# Patient Record
Sex: FEMALE | Race: OTHER | Employment: UNEMPLOYED | URBAN - METROPOLITAN AREA
[De-identification: names, ages, dates, MRNs, and addresses within clinical notes are randomized per-mention and may not be internally consistent; named-entity substitution may affect disease eponyms.]

---

## 2021-03-26 ENCOUNTER — IMMUNIZATION (OUTPATIENT)
Dept: LAB | Age: 26
End: 2021-03-26
Attending: HOSPITALIST
Payer: MEDICAID

## 2021-03-26 DIAGNOSIS — Z23 NEED FOR VACCINATION: Primary | ICD-10-CM

## 2021-03-26 PROCEDURE — 0001A SARSCOV2 VAC 30MCG/0.3ML IM: CPT

## 2021-04-08 ENCOUNTER — OFFICE VISIT (OUTPATIENT)
Dept: OBGYN CLINIC | Facility: CLINIC | Age: 26
End: 2021-04-08
Payer: MEDICAID

## 2021-04-08 VITALS
DIASTOLIC BLOOD PRESSURE: 62 MMHG | BODY MASS INDEX: 30.63 KG/M2 | WEIGHT: 156 LBS | HEART RATE: 101 BPM | HEIGHT: 60 IN | SYSTOLIC BLOOD PRESSURE: 118 MMHG

## 2021-04-08 DIAGNOSIS — Z01.419 WELL WOMAN EXAM WITH ROUTINE GYNECOLOGICAL EXAM: ICD-10-CM

## 2021-04-08 DIAGNOSIS — Z11.3 SCREEN FOR STD (SEXUALLY TRANSMITTED DISEASE): ICD-10-CM

## 2021-04-08 DIAGNOSIS — R56.9 SEIZURE (HCC): ICD-10-CM

## 2021-04-08 DIAGNOSIS — Z12.4 PAPANICOLAOU SMEAR FOR CERVICAL CANCER SCREENING: Primary | ICD-10-CM

## 2021-04-08 PROBLEM — N97.9 FEMALE INFERTILITY: Status: ACTIVE | Noted: 2021-04-08

## 2021-04-08 PROCEDURE — 88175 CYTOPATH C/V AUTO FLUID REDO: CPT | Performed by: OBSTETRICS & GYNECOLOGY

## 2021-04-08 PROCEDURE — 3074F SYST BP LT 130 MM HG: CPT | Performed by: OBSTETRICS & GYNECOLOGY

## 2021-04-08 PROCEDURE — 87491 CHLMYD TRACH DNA AMP PROBE: CPT | Performed by: OBSTETRICS & GYNECOLOGY

## 2021-04-08 PROCEDURE — 99385 PREV VISIT NEW AGE 18-39: CPT | Performed by: OBSTETRICS & GYNECOLOGY

## 2021-04-08 PROCEDURE — 3078F DIAST BP <80 MM HG: CPT | Performed by: OBSTETRICS & GYNECOLOGY

## 2021-04-08 PROCEDURE — 87591 N.GONORRHOEAE DNA AMP PROB: CPT | Performed by: OBSTETRICS & GYNECOLOGY

## 2021-04-08 PROCEDURE — 3008F BODY MASS INDEX DOCD: CPT | Performed by: OBSTETRICS & GYNECOLOGY

## 2021-04-08 RX ORDER — ERGOCALCIFEROL 1.25 MG/1
CAPSULE ORAL
COMMUNITY
End: 2021-04-27

## 2021-04-08 RX ORDER — LEVETIRACETAM 1000 MG/1
TABLET ORAL 2 TIMES DAILY
COMMUNITY
End: 2021-06-16

## 2021-04-08 NOTE — PROGRESS NOTES
Ash Meléndez is a 23year old female [id number] Patient's last menstrual period was 03/16/2021 (exact date). Patient presents with: Other: family planning  . She started her period when she was 8years old she always has heavy periods.   She changed somet Topics      Concerns:        Not on file    Social History Narrative      Not on file    Social Determinants of Health  Financial Resource Strain:       Difficulty of Paying Living Expenses:   Food Insecurity:       Worried About [address] in the weakness or numbness.       PHYSICAL EXAM:   Constitutional: well developed, well nourished  Head/Face: normocephalic    Abdomen:  soft, nontender, nondistended, no masses  Skin/Hair: no unusual rashes or bruises   Extremities: no edema, no cyanosis  Psychi

## 2021-04-16 ENCOUNTER — IMMUNIZATION (OUTPATIENT)
Dept: LAB | Age: 26
End: 2021-04-16
Attending: HOSPITALIST
Payer: MEDICAID

## 2021-04-16 DIAGNOSIS — Z23 NEED FOR VACCINATION: Primary | ICD-10-CM

## 2021-04-16 PROCEDURE — 0002A SARSCOV2 VAC 30MCG/0.3ML IM: CPT

## 2021-04-24 ENCOUNTER — LAB ENCOUNTER (OUTPATIENT)
Dept: LAB | Facility: HOSPITAL | Age: 26
End: 2021-04-24
Attending: OBSTETRICS & GYNECOLOGY
Payer: MEDICAID

## 2021-04-24 DIAGNOSIS — Z01.419 WELL WOMAN EXAM WITH ROUTINE GYNECOLOGICAL EXAM: ICD-10-CM

## 2021-04-24 PROCEDURE — 84443 ASSAY THYROID STIM HORMONE: CPT

## 2021-04-24 PROCEDURE — 80177 DRUG SCRN QUAN LEVETIRACETAM: CPT

## 2021-04-24 PROCEDURE — 36415 COLL VENOUS BLD VENIPUNCTURE: CPT

## 2021-04-24 PROCEDURE — 85025 COMPLETE CBC W/AUTO DIFF WBC: CPT

## 2021-04-24 PROCEDURE — 84144 ASSAY OF PROGESTERONE: CPT

## 2021-04-24 PROCEDURE — 84146 ASSAY OF PROLACTIN: CPT

## 2021-04-27 ENCOUNTER — NURSE ONLY (OUTPATIENT)
Dept: OBGYN CLINIC | Facility: CLINIC | Age: 26
End: 2021-04-27
Payer: COMMERCIAL

## 2021-04-27 VITALS
DIASTOLIC BLOOD PRESSURE: 76 MMHG | HEIGHT: 60 IN | HEART RATE: 98 BPM | SYSTOLIC BLOOD PRESSURE: 118 MMHG | WEIGHT: 156 LBS | BODY MASS INDEX: 30.63 KG/M2

## 2021-04-27 DIAGNOSIS — N34.2 INFECTIVE URETHRITIS: Primary | ICD-10-CM

## 2021-04-27 PROCEDURE — 87086 URINE CULTURE/COLONY COUNT: CPT | Performed by: OBSTETRICS & GYNECOLOGY

## 2021-05-01 ENCOUNTER — OFFICE VISIT (OUTPATIENT)
Dept: INTERNAL MEDICINE CLINIC | Facility: CLINIC | Age: 26
End: 2021-05-01
Payer: MEDICAID

## 2021-05-01 VITALS
DIASTOLIC BLOOD PRESSURE: 78 MMHG | TEMPERATURE: 98 F | WEIGHT: 166.81 LBS | HEIGHT: 60 IN | HEART RATE: 78 BPM | SYSTOLIC BLOOD PRESSURE: 108 MMHG | BODY MASS INDEX: 32.75 KG/M2 | RESPIRATION RATE: 16 BRPM

## 2021-05-01 DIAGNOSIS — E66.09 CLASS 1 OBESITY DUE TO EXCESS CALORIES WITHOUT SERIOUS COMORBIDITY WITH BODY MASS INDEX (BMI) OF 32.0 TO 32.9 IN ADULT: ICD-10-CM

## 2021-05-01 DIAGNOSIS — L91.0 KELOID: ICD-10-CM

## 2021-05-01 DIAGNOSIS — G40.909 SEIZURE DISORDER (HCC): Primary | ICD-10-CM

## 2021-05-01 PROCEDURE — 3008F BODY MASS INDEX DOCD: CPT | Performed by: INTERNAL MEDICINE

## 2021-05-01 PROCEDURE — 99203 OFFICE O/P NEW LOW 30 MIN: CPT | Performed by: INTERNAL MEDICINE

## 2021-05-01 PROCEDURE — 3074F SYST BP LT 130 MM HG: CPT | Performed by: INTERNAL MEDICINE

## 2021-05-01 PROCEDURE — 3078F DIAST BP <80 MM HG: CPT | Performed by: INTERNAL MEDICINE

## 2021-05-01 NOTE — PROGRESS NOTES
Gwen Marker is a 23year old female  Patient presents with:  Establish Care: Patient [place] and looking to establish care; wants neuro referral and vit d level checked      HPI:   She moved from [place] [id number] 6 mos ago  3 concerns:   On Kep cm ovoid anterior upper midline chest  HEART RRR no m      Nurse Only on 04/27/2021   Component Date Value Ref Range Status   • Urine Culture 04/27/2021 No Growth 2 Days   Final   [facility] Lab Encounter on 04/24/2021   Component Date Value Ref Range Status   • Negative Final   • Chlam/GC Source 04/08/2021 Cervical/endocervical/vaginal   Final   • Interpretation/Result 04/08/2021 Negative for intraepithelial lesion or malignancy  Negative for intraepithelial lesion or malignancy Final   • Specimen Adequacy 04/08/ reviewed, WNL  Class 1 obesity due to excess calories without serious comorbidity with body mass index (bmi) of 32.0 to 32.9 in adult-recent TSH normal,  refer to weight loss clinic  Keloid- refer to plastics    No orders of the defined types were placed i

## 2021-05-12 ENCOUNTER — OCC HEALTH (OUTPATIENT)
Dept: OTHER | Facility: HOSPITAL | Age: 26
End: 2021-05-12
Attending: PREVENTIVE MEDICINE

## 2021-05-12 DIAGNOSIS — Z77.21 EXPOSURE TO BLOOD-BORNE PATHOGEN: Primary | ICD-10-CM

## 2021-05-12 PROCEDURE — 87389 HIV-1 AG W/HIV-1&-2 AB AG IA: CPT

## 2021-05-12 PROCEDURE — 86803 HEPATITIS C AB TEST: CPT

## 2021-05-12 PROCEDURE — 86706 HEP B SURFACE ANTIBODY: CPT

## 2021-05-19 ENCOUNTER — TELEPHONE (OUTPATIENT)
Dept: OBGYN CLINIC | Facility: CLINIC | Age: 26
End: 2021-05-19

## 2021-05-19 DIAGNOSIS — O26.899 PREGNANCY RELATED NAUSEA, ANTEPARTUM: ICD-10-CM

## 2021-05-19 DIAGNOSIS — O99.351 SEIZURE DISORDER DURING PREGNANCY IN FIRST TRIMESTER (HCC): ICD-10-CM

## 2021-05-19 DIAGNOSIS — Z87.59 HISTORY OF HYPEREMESIS GRAVIDARUM: ICD-10-CM

## 2021-05-19 DIAGNOSIS — G40.909 SEIZURE DISORDER DURING PREGNANCY IN FIRST TRIMESTER (HCC): ICD-10-CM

## 2021-05-19 DIAGNOSIS — R11.0 PREGNANCY RELATED NAUSEA, ANTEPARTUM: ICD-10-CM

## 2021-05-19 DIAGNOSIS — O36.80X0 PREGNANCY WITH UNCERTAIN FETAL VIABILITY, SINGLE OR UNSPECIFIED FETUS: Primary | ICD-10-CM

## 2021-05-19 RX ORDER — FOLIC ACID 5 MG
5 CAPSULE ORAL DAILY
Qty: 30 CAPSULE | Refills: 5 | Status: SHIPPED | OUTPATIENT
Start: 2021-05-19 | End: 2021-06-18

## 2021-05-19 RX ORDER — METOCLOPRAMIDE 10 MG/1
10 TABLET ORAL EVERY 6 HOURS PRN
Qty: 90 TABLET | Refills: 3 | Status: SHIPPED | OUTPATIENT
Start: 2021-05-19 | End: 2021-06-03

## 2021-05-21 ENCOUNTER — LAB ENCOUNTER (OUTPATIENT)
Dept: LAB | Age: 26
End: 2021-05-21
Attending: OBSTETRICS & GYNECOLOGY
Payer: MEDICAID

## 2021-05-21 DIAGNOSIS — O36.80X0 PREGNANCY WITH UNCERTAIN FETAL VIABILITY, SINGLE OR UNSPECIFIED FETUS: ICD-10-CM

## 2021-05-21 DIAGNOSIS — O99.351 SEIZURE DISORDER DURING PREGNANCY IN FIRST TRIMESTER (HCC): ICD-10-CM

## 2021-05-21 DIAGNOSIS — G40.909 SEIZURE DISORDER DURING PREGNANCY IN FIRST TRIMESTER (HCC): ICD-10-CM

## 2021-05-21 PROCEDURE — 84702 CHORIONIC GONADOTROPIN TEST: CPT

## 2021-05-21 PROCEDURE — 86901 BLOOD TYPING SEROLOGIC RH(D): CPT

## 2021-05-21 PROCEDURE — 84144 ASSAY OF PROGESTERONE: CPT

## 2021-05-21 PROCEDURE — 80177 DRUG SCRN QUAN LEVETIRACETAM: CPT

## 2021-05-21 PROCEDURE — 86900 BLOOD TYPING SEROLOGIC ABO: CPT

## 2021-05-21 PROCEDURE — 86850 RBC ANTIBODY SCREEN: CPT

## 2021-05-21 PROCEDURE — 36415 COLL VENOUS BLD VENIPUNCTURE: CPT

## 2021-05-25 ENCOUNTER — LABORATORY ENCOUNTER (OUTPATIENT)
Dept: LAB | Age: 26
End: 2021-05-25
Attending: OBSTETRICS & GYNECOLOGY
Payer: MEDICAID

## 2021-05-25 DIAGNOSIS — O36.80X0 PREGNANCY WITH UNCERTAIN FETAL VIABILITY, SINGLE OR UNSPECIFIED FETUS: ICD-10-CM

## 2021-05-25 PROCEDURE — 36415 COLL VENOUS BLD VENIPUNCTURE: CPT

## 2021-05-25 PROCEDURE — 84702 CHORIONIC GONADOTROPIN TEST: CPT

## 2021-05-28 ENCOUNTER — TELEPHONE (OUTPATIENT)
Dept: OBGYN CLINIC | Facility: CLINIC | Age: 26
End: 2021-05-28

## 2021-05-28 DIAGNOSIS — O21.9 PREGNANCY RELATED NAUSEA AND VOMITING, ANTEPARTUM: Primary | ICD-10-CM

## 2021-05-28 RX ORDER — PROCHLORPERAZINE MALEATE 10 MG
10 TABLET ORAL EVERY 6 HOURS PRN
Qty: 56 TABLET | Refills: 3 | Status: SHIPPED | OUTPATIENT
Start: 2021-05-28 | End: 2021-08-10

## 2021-05-28 RX ORDER — PROMETHAZINE HYDROCHLORIDE 12.5 MG/1
TABLET ORAL
Qty: 56 TABLET | Refills: 3 | Status: SHIPPED | OUTPATIENT
Start: 2021-05-28 | End: 2021-08-10

## 2021-06-01 ENCOUNTER — TELEPHONE (OUTPATIENT)
Dept: OBGYN CLINIC | Facility: CLINIC | Age: 26
End: 2021-06-01

## 2021-06-01 DIAGNOSIS — Z86.73 HISTORY OF STROKE: Primary | ICD-10-CM

## 2021-06-01 DIAGNOSIS — O09.291 HISTORY OF MISCARRIAGE, CURRENTLY PREGNANT, FIRST TRIMESTER: ICD-10-CM

## 2021-06-01 NOTE — TELEPHONE ENCOUNTER
Tuesday, June 01, 2021 9:52 AM  Summary of Your Request  Please review the details of your request below and if everything looks correct click CONTINUE    Your case has been sent to Medical Review.      Provider Name: DR. Rolly Chavez Contact: Kavon Gregorio

## 2021-06-02 NOTE — TELEPHONE ENCOUNTER
Authorization Number: [id number]  Case Number: [id number]     Status: Approved  P2P Status:   Approval Date: 6/1/2021 12:00:00 AM  Service Code: [id number]  Service Description: OB US < 14 WKS SINGLE FETUS  Site Name: Agueda Mir  Expiration Date: 2/26/2022  D

## 2021-06-03 ENCOUNTER — ULTRASOUND ENCOUNTER (OUTPATIENT)
Dept: OBGYN CLINIC | Facility: CLINIC | Age: 26
End: 2021-06-03
Payer: COMMERCIAL

## 2021-06-03 ENCOUNTER — INITIAL PRENATAL (OUTPATIENT)
Dept: OBGYN CLINIC | Facility: CLINIC | Age: 26
End: 2021-06-03
Payer: COMMERCIAL

## 2021-06-03 VITALS
BODY MASS INDEX: 32.2 KG/M2 | DIASTOLIC BLOOD PRESSURE: 60 MMHG | HEIGHT: 60 IN | SYSTOLIC BLOOD PRESSURE: 110 MMHG | HEART RATE: 87 BPM | WEIGHT: 164 LBS

## 2021-06-03 DIAGNOSIS — Z34.81 PRENATAL CARE, SUBSEQUENT PREGNANCY, FIRST TRIMESTER: Primary | ICD-10-CM

## 2021-06-03 DIAGNOSIS — O99.211 OBESITY AFFECTING PREGNANCY IN FIRST TRIMESTER: ICD-10-CM

## 2021-06-03 DIAGNOSIS — Z86.73 HISTORY OF STROKE: ICD-10-CM

## 2021-06-03 DIAGNOSIS — O09.291 HISTORY OF MISCARRIAGE, CURRENTLY PREGNANT, FIRST TRIMESTER: ICD-10-CM

## 2021-06-03 PROCEDURE — 87491 CHLMYD TRACH DNA AMP PROBE: CPT | Performed by: OBSTETRICS & GYNECOLOGY

## 2021-06-03 PROCEDURE — 0500F INITIAL PRENATAL CARE VISIT: CPT | Performed by: OBSTETRICS & GYNECOLOGY

## 2021-06-03 PROCEDURE — 87591 N.GONORRHOEAE DNA AMP PROB: CPT | Performed by: OBSTETRICS & GYNECOLOGY

## 2021-06-03 PROCEDURE — 76817 TRANSVAGINAL US OBSTETRIC: CPT | Performed by: OBSTETRICS & GYNECOLOGY

## 2021-06-03 PROCEDURE — 87086 URINE CULTURE/COLONY COUNT: CPT | Performed by: OBSTETRICS & GYNECOLOGY

## 2021-06-03 PROCEDURE — 81002 URINALYSIS NONAUTO W/O SCOPE: CPT | Performed by: OBSTETRICS & GYNECOLOGY

## 2021-06-03 PROCEDURE — 3074F SYST BP LT 130 MM HG: CPT | Performed by: OBSTETRICS & GYNECOLOGY

## 2021-06-03 PROCEDURE — 3008F BODY MASS INDEX DOCD: CPT | Performed by: OBSTETRICS & GYNECOLOGY

## 2021-06-03 PROCEDURE — 3078F DIAST BP <80 MM HG: CPT | Performed by: OBSTETRICS & GYNECOLOGY

## 2021-06-03 NOTE — PROGRESS NOTES
1st OB  - patient c/o nausea, vomiting daily - she had severe hyperemesis with 1st pregnancy - not as bad so far - taking phenergan  - denies h/o HSV, blood transfusion, hepatitis  - daughter with severe GI sensitivities to multiple foods - no clear diagno

## 2021-06-16 ENCOUNTER — TELEMEDICINE (OUTPATIENT)
Dept: NEUROLOGY | Facility: CLINIC | Age: 26
End: 2021-06-16
Payer: COMMERCIAL

## 2021-06-16 DIAGNOSIS — G40.909 SEIZURE DISORDER DURING PREGNANCY IN FIRST TRIMESTER (HCC): Primary | ICD-10-CM

## 2021-06-16 DIAGNOSIS — O99.351 SEIZURE DISORDER DURING PREGNANCY IN FIRST TRIMESTER (HCC): Primary | ICD-10-CM

## 2021-06-16 PROCEDURE — 99204 OFFICE O/P NEW MOD 45 MIN: CPT | Performed by: OTHER

## 2021-06-16 RX ORDER — LEVETIRACETAM 1000 MG/1
1000 TABLET ORAL 2 TIMES DAILY
Qty: 180 TABLET | Refills: 1 | Status: SHIPPED | OUTPATIENT
Start: 2021-06-16 | End: 2021-08-10

## 2021-06-16 NOTE — H&P
[facility] [employer] Video H&P    HPI  Patient presents with:  Seizure Disorder      Please note that the following visit was completed using two-way, real-time interactive audio and video communication.   This has been done in good fait Epilepsy [facility]) 2012   • Female infertility 4/8/2021   • Hyperemesis gravidarum     Was put on Zofran - didn't help much. • Keloid scar     chest   • Obesity (BMI 30-39. 9)    • Pap smear for cervical cancer screening 04/08/2021    Pap negative.     • Promise tablet, Rfl: 3  •  Folic Acid 5 MG Oral Cap, Take 5 mg by mouth daily. , Disp: 30 capsule, Rfl: 5    Review of Systems:  No chest pain or palpitations; otherwise as noted in HPI.     Exam:  Exam limited due to virtual visit - unable to comment on sensation, she appears well controlled on her current regimen of Keppra 1000 mg twice daily. As such, I recommend that she continue on this medication. Recent levels appeared to be within normal range as of 5/2021.   However, patient was advised that the clearance [place]  Pager [phone]  6/16/2021

## 2021-06-20 ENCOUNTER — HOSPITAL ENCOUNTER (EMERGENCY)
Facility: HOSPITAL | Age: 26
Discharge: HOME | End: 2021-06-21
Attending: EMERGENCY MEDICINE
Payer: COMMERCIAL

## 2021-06-20 ENCOUNTER — TELEPHONE (OUTPATIENT)
Dept: OBGYN CLINIC | Facility: CLINIC | Age: 26
End: 2021-06-20

## 2021-06-20 DIAGNOSIS — O21.0 HYPEREMESIS AFFECTING PREGNANCY, ANTEPARTUM: Primary | ICD-10-CM

## 2021-06-20 DIAGNOSIS — O26.899 PREGNANCY RELATED NAUSEA, ANTEPARTUM: ICD-10-CM

## 2021-06-20 DIAGNOSIS — R11.0 PREGNANCY RELATED NAUSEA, ANTEPARTUM: ICD-10-CM

## 2021-06-20 DIAGNOSIS — O21.0 HYPEREMESIS GRAVIDARUM: Primary | ICD-10-CM

## 2021-06-20 PROCEDURE — 96375 TX/PRO/DX INJ NEW DRUG ADDON: CPT

## 2021-06-20 PROCEDURE — 85025 COMPLETE CBC W/AUTO DIFF WBC: CPT

## 2021-06-20 PROCEDURE — 99284 EMERGENCY DEPT VISIT MOD MDM: CPT

## 2021-06-20 PROCEDURE — 96361 HYDRATE IV INFUSION ADD-ON: CPT

## 2021-06-20 PROCEDURE — 80053 COMPREHEN METABOLIC PANEL: CPT | Performed by: EMERGENCY MEDICINE

## 2021-06-20 PROCEDURE — 96365 THER/PROPH/DIAG IV INF INIT: CPT

## 2021-06-20 PROCEDURE — 85025 COMPLETE CBC W/AUTO DIFF WBC: CPT | Performed by: EMERGENCY MEDICINE

## 2021-06-20 PROCEDURE — 81001 URINALYSIS AUTO W/SCOPE: CPT | Performed by: EMERGENCY MEDICINE

## 2021-06-20 PROCEDURE — 80053 COMPREHEN METABOLIC PANEL: CPT

## 2021-06-20 RX ORDER — ONDANSETRON 2 MG/ML
4 INJECTION INTRAMUSCULAR; INTRAVENOUS ONCE
Status: COMPLETED | OUTPATIENT
Start: 2021-06-20 | End: 2021-06-20

## 2021-06-20 RX ORDER — METOCLOPRAMIDE HYDROCHLORIDE 5 MG/ML
10 INJECTION INTRAMUSCULAR; INTRAVENOUS ONCE
Status: COMPLETED | OUTPATIENT
Start: 2021-06-20 | End: 2021-06-20

## 2021-06-21 VITALS
BODY MASS INDEX: 26.7 KG/M2 | WEIGHT: 136 LBS | HEIGHT: 60 IN | OXYGEN SATURATION: 96 % | RESPIRATION RATE: 18 BRPM | SYSTOLIC BLOOD PRESSURE: 101 MMHG | TEMPERATURE: 98 F | HEART RATE: 63 BPM | DIASTOLIC BLOOD PRESSURE: 60 MMHG

## 2021-06-21 PROBLEM — O21.0 HYPEREMESIS AFFECTING PREGNANCY, ANTEPARTUM (HCC): Status: ACTIVE | Noted: 2021-06-21

## 2021-06-21 PROBLEM — O21.0 HYPEREMESIS AFFECTING PREGNANCY, ANTEPARTUM: Status: ACTIVE | Noted: 2021-06-21

## 2021-06-21 RX ORDER — FAMOTIDINE 20 MG/1
20 TABLET ORAL 2 TIMES DAILY
Qty: 60 TABLET | Refills: 3 | Status: SHIPPED | OUTPATIENT
Start: 2021-06-21 | End: 2021-08-10

## 2021-06-21 RX ORDER — PROMETHAZINE HYDROCHLORIDE 25 MG/1
25 SUPPOSITORY RECTAL EVERY 6 HOURS PRN
Qty: 30 SUPPOSITORY | Refills: 3 | Status: SHIPPED | OUTPATIENT
Start: 2021-06-21 | End: 2021-08-10

## 2021-06-21 RX ORDER — ONDANSETRON 4 MG/1
4 TABLET, ORALLY DISINTEGRATING ORAL EVERY 8 HOURS PRN
Qty: 30 TABLET | Refills: 2 | Status: SHIPPED | OUTPATIENT
Start: 2021-06-21 | End: 2021-08-10

## 2021-06-21 NOTE — TELEPHONE ENCOUNTER
On call physician    23year old  at 9w1d calling c/o nausea & vomiting is not controlled with Phenergan, which she has been taking on a PRN basis for some weeks now.  Worried because she cannot keep her Keppra down, which she takes for her seizure d

## 2021-06-21 NOTE — ED PROVIDER NOTES
Patient Seen in: [facility] Emergency Department      History   Patient presents with:  Vomiting    Stated Complaint: 9 weeks pregnant and severe nausea and vomiting.     HPI/Subjective:   HPI    Patient is a 51-year-old female who is about 9 weeks p Pulse 63   Temp 98.4 °F (36.9 °C) (Oral)   Resp 17   Ht 152.4 cm (5')   Wt 61.7 kg   LMP 04/17/2021 (Exact Date)   SpO2 95%   BMI 26.56 kg/m²         Physical Exam  Vitals and nursing note reviewed.    Constitutional:       Appearance: She is well-developed these tests on the individual orders.    RAINBOW DRAW LAVENDER   RAINBOW DRAW LIGHT GREEN   CBC W/ DIFFERENTIAL          US OB TRANSVAGINAL ANY TRIMESTER EMG ONLY    Result Date: 6/3/2021  transvaginal us performed single viable iup at 6w1d ( 6 w5d by LMP)

## 2021-06-21 NOTE — ED INITIAL ASSESSMENT (HPI)
9 weeks 3 days pregnant with hyperemesis. Pt reports hx of hyperemesis and seizures with 1st pregnancy. States that she is on Keppra for seizures and her OB told her to come to the ER since she has been vomiting.

## 2021-06-21 NOTE — TELEPHONE ENCOUNTER
Spoke with patient. Was in ED last night and received IV hydration and IV nausea meds with no improvement. Feels somewhat better today, but is still not able to keep anything down. Notes routed to provider.

## 2021-06-22 ENCOUNTER — TELEPHONE (OUTPATIENT)
Dept: OBGYN CLINIC | Facility: CLINIC | Age: 26
End: 2021-06-22

## 2021-06-23 ENCOUNTER — HOSPITAL ENCOUNTER (EMERGENCY)
Facility: HOSPITAL | Age: 26
Discharge: HOME | End: 2021-06-24
Attending: EMERGENCY MEDICINE
Payer: COMMERCIAL

## 2021-06-23 ENCOUNTER — TELEPHONE (OUTPATIENT)
Dept: OBGYN CLINIC | Facility: CLINIC | Age: 26
End: 2021-06-23

## 2021-06-23 DIAGNOSIS — O21.0 HYPEREMESIS AFFECTING PREGNANCY, ANTEPARTUM: Primary | ICD-10-CM

## 2021-06-23 DIAGNOSIS — O20.9 VAGINAL BLEEDING IN PREGNANCY, FIRST TRIMESTER: ICD-10-CM

## 2021-06-23 DIAGNOSIS — O21.0 HYPEREMESIS GRAVIDARUM: Primary | ICD-10-CM

## 2021-06-23 PROCEDURE — 85025 COMPLETE CBC W/AUTO DIFF WBC: CPT | Performed by: EMERGENCY MEDICINE

## 2021-06-23 PROCEDURE — 99284 EMERGENCY DEPT VISIT MOD MDM: CPT

## 2021-06-23 PROCEDURE — 96361 HYDRATE IV INFUSION ADD-ON: CPT

## 2021-06-23 PROCEDURE — 96374 THER/PROPH/DIAG INJ IV PUSH: CPT

## 2021-06-23 PROCEDURE — 80053 COMPREHEN METABOLIC PANEL: CPT | Performed by: EMERGENCY MEDICINE

## 2021-06-23 PROCEDURE — 84702 CHORIONIC GONADOTROPIN TEST: CPT | Performed by: EMERGENCY MEDICINE

## 2021-06-23 RX ORDER — METOCLOPRAMIDE HYDROCHLORIDE 5 MG/ML
10 INJECTION INTRAMUSCULAR; INTRAVENOUS ONCE
Status: COMPLETED | OUTPATIENT
Start: 2021-06-23 | End: 2021-06-23

## 2021-06-23 RX ORDER — ONDANSETRON 2 MG/ML
4 INJECTION INTRAMUSCULAR; INTRAVENOUS ONCE
Status: DISCONTINUED | OUTPATIENT
Start: 2021-06-23 | End: 2021-06-23

## 2021-06-23 NOTE — TELEPHONE ENCOUNTER
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 The office wanted her to call and let Dr Trudy Adan know besides her vomiting she is having abdominal pain of a 4 on a 1-10 pain scale. No bleeding or cramping.  She is 9w4d pr

## 2021-06-24 ENCOUNTER — APPOINTMENT (OUTPATIENT)
Dept: ULTRASOUND IMAGING | Facility: HOSPITAL | Age: 26
End: 2021-06-24
Attending: EMERGENCY MEDICINE
Payer: COMMERCIAL

## 2021-06-24 ENCOUNTER — TELEPHONE (OUTPATIENT)
Dept: OBGYN CLINIC | Facility: CLINIC | Age: 26
End: 2021-06-24

## 2021-06-24 VITALS
OXYGEN SATURATION: 98 % | BODY MASS INDEX: 26.7 KG/M2 | RESPIRATION RATE: 16 BRPM | HEART RATE: 55 BPM | HEIGHT: 60 IN | WEIGHT: 136 LBS | TEMPERATURE: 97 F | DIASTOLIC BLOOD PRESSURE: 66 MMHG | SYSTOLIC BLOOD PRESSURE: 108 MMHG

## 2021-06-24 PROCEDURE — 81001 URINALYSIS AUTO W/SCOPE: CPT | Performed by: EMERGENCY MEDICINE

## 2021-06-24 PROCEDURE — 76801 OB US < 14 WKS SINGLE FETUS: CPT | Performed by: EMERGENCY MEDICINE

## 2021-06-24 NOTE — ED PROVIDER NOTES
Patient Seen in: [facility] Emergency Department      History   Patient presents with:  Eval-G    Stated Complaint: 9 wks preg, vomiting and lower abd cramping and bleeding    HPI/Subjective:   HPI    22-year-old female presents emergency department infection transmission during my interaction with the patient were taken. The patient was already wearing a droplet mask on my arrival to the room.  Personal protective equipment including droplet mask, eye protection, and gloves were worn throughout the du DIFFERENTIAL - Abnormal; Notable for the following components:    WBC 12.3 (*)     Neutrophil Absolute Prelim 8.85 (*)     Neutrophil Absolute 8.85 (*)     All other components within normal limits   CBC WITH DIFFERENTIAL WITH PLATELET    Narrative:      Pierce Martins return immediately to ER for worsening, concerning, new, or changing/persisting symptoms. I discussed the case with the patient and they had no questions, complaints, or concerns. Patient was comfortable going home.       This note was prepared using Drag

## 2021-06-24 NOTE — TELEPHONE ENCOUNTER
Pt advised home IV therapy was denied by insurance. Advised to contact [employer] and see if they are able to give her coverage through secondary insurance. Understanding verbalized.

## 2021-06-25 ENCOUNTER — HOSPITAL ENCOUNTER (EMERGENCY)
Facility: HOSPITAL | Age: 26
Discharge: HOME | End: 2021-06-25
Attending: EMERGENCY MEDICINE
Payer: COMMERCIAL

## 2021-06-25 ENCOUNTER — TELEPHONE (OUTPATIENT)
Dept: OBGYN CLINIC | Facility: CLINIC | Age: 26
End: 2021-06-25

## 2021-06-25 VITALS
BODY MASS INDEX: 25.56 KG/M2 | SYSTOLIC BLOOD PRESSURE: 124 MMHG | HEART RATE: 65 BPM | TEMPERATURE: 98 F | HEIGHT: 60 IN | OXYGEN SATURATION: 98 % | RESPIRATION RATE: 18 BRPM | WEIGHT: 130.19 LBS | DIASTOLIC BLOOD PRESSURE: 82 MMHG

## 2021-06-25 DIAGNOSIS — O21.0 HYPEREMESIS AFFECTING PREGNANCY, ANTEPARTUM: Primary | ICD-10-CM

## 2021-06-25 PROCEDURE — 96365 THER/PROPH/DIAG IV INF INIT: CPT

## 2021-06-25 PROCEDURE — 80053 COMPREHEN METABOLIC PANEL: CPT | Performed by: EMERGENCY MEDICINE

## 2021-06-25 PROCEDURE — 99284 EMERGENCY DEPT VISIT MOD MDM: CPT

## 2021-06-25 PROCEDURE — 96375 TX/PRO/DX INJ NEW DRUG ADDON: CPT

## 2021-06-25 PROCEDURE — 81003 URINALYSIS AUTO W/O SCOPE: CPT | Performed by: EMERGENCY MEDICINE

## 2021-06-25 PROCEDURE — 85025 COMPLETE CBC W/AUTO DIFF WBC: CPT | Performed by: EMERGENCY MEDICINE

## 2021-06-25 PROCEDURE — 99285 EMERGENCY DEPT VISIT HI MDM: CPT

## 2021-06-25 PROCEDURE — 96361 HYDRATE IV INFUSION ADD-ON: CPT

## 2021-06-25 RX ORDER — METOCLOPRAMIDE 10 MG/1
10 TABLET ORAL 3 TIMES DAILY PRN
Qty: 20 TABLET | Refills: 0 | Status: SHIPPED | OUTPATIENT
Start: 2021-06-25 | End: 2021-07-25

## 2021-06-25 RX ORDER — METOCLOPRAMIDE HYDROCHLORIDE 5 MG/ML
10 INJECTION INTRAMUSCULAR; INTRAVENOUS ONCE
Status: COMPLETED | OUTPATIENT
Start: 2021-06-25 | End: 2021-06-25

## 2021-06-25 RX ORDER — ONDANSETRON 2 MG/ML
4 INJECTION INTRAMUSCULAR; INTRAVENOUS ONCE
Status: COMPLETED | OUTPATIENT
Start: 2021-06-25 | End: 2021-06-25

## 2021-06-25 RX ORDER — DEXTROSE, SODIUM CHLORIDE, SODIUM LACTATE, POTASSIUM CHLORIDE, AND CALCIUM CHLORIDE 5; .6; .31; .03; .02 G/100ML; G/100ML; G/100ML; G/100ML; G/100ML
INJECTION, SOLUTION INTRAVENOUS CONTINUOUS
Status: DISCONTINUED | OUTPATIENT
Start: 2021-06-25 | End: 2021-06-25

## 2021-06-25 RX ORDER — DIPHENHYDRAMINE HYDROCHLORIDE 50 MG/ML
25 INJECTION INTRAMUSCULAR; INTRAVENOUS ONCE
Status: COMPLETED | OUTPATIENT
Start: 2021-06-25 | End: 2021-06-25

## 2021-06-25 NOTE — ED INITIAL ASSESSMENT (HPI)
Patient to ED with c/o hyperemesis, has not been able to keep down her keppra in 2 days. Also states she has not urinated since 0300 and is only throwing up brown bile.

## 2021-06-25 NOTE — TELEPHONE ENCOUNTER
Is not able to keep any food or fluids down despite trying brat diet. Has urinated x1 at 3 am this morning and looked very concentrated. Emesis now looks dark brown. Antiemetics have not helped including suppositories.  Patient advised to go to ER for IV f

## 2021-06-25 NOTE — ED QUICK NOTES
Pt 10 weeks pregm pt hyperemesis, pt hx seizure unable to take medication d/t nause/vomit, pt aox4, nad, skin warm and intact

## 2021-06-25 NOTE — ED PROVIDER NOTES
Patient Seen in: [facility] Emergency Department      History   Patient presents with:  Nausea/Vomiting/Diarrhea    Stated Complaint: vomiting    HPI/Subjective:   HPI    21-year-old female 10 weeks pregnant complaint of vomiting.   The patient's had Wt 59.1 kg   LMP 04/17/2021 (Exact Date)   SpO2 98%   BMI 25.43 kg/m²         Physical Exam    Alert and oriented ×3 in no acute distress. HEENT exam within normal limits. Oral mucosa slightly dry  Neck supple without lymphadenopathy or JVD.   Lungs are as IV Keppra after discussion with neurology. Patient was able to take fluids prior to discharge she was given a prescription for Reglan she did receive Reglan in the emergency department.                              Disposition and Plan     Clinical Impr

## 2021-06-29 ENCOUNTER — TELEPHONE (OUTPATIENT)
Dept: OBGYN CLINIC | Facility: CLINIC | Age: 26
End: 2021-06-29

## 2021-06-29 DIAGNOSIS — O21.0 HYPEREMESIS AFFECTING PREGNANCY, ANTEPARTUM: ICD-10-CM

## 2021-06-29 NOTE — TELEPHONE ENCOUNTER
Spoke with pharmacy, medication is on back order and will not be able to get it to patient. Pharmacy had not contacted pt yet, advised pharmacy to contact pt and notify them of status of rx. We can send rx to different pharmacy if pt requests.

## 2021-06-29 NOTE — TELEPHONE ENCOUNTER
Colin Damon called from [address] to inform  [facility] that the medication for Trimethobenzamide HCl 300 MG Oral Cap  Is not something that [facility] is able to order. Please advise.

## 2021-06-29 NOTE — TELEPHONE ENCOUNTER
Notification from [employer] received stating secondary insurance does not include maternity coverage. Left message for pt to call office back.

## 2021-06-30 NOTE — TELEPHONE ENCOUNTER
Spoke with pt, advised Rick on backorder at [facility]. she would like rx transferred to [facility] on [address] in [place]. Will route to provider.

## 2021-06-30 NOTE — TELEPHONE ENCOUNTER
Pt advised to contact insurance or [employer] for additional information regarding maternity coverage. Understanding verbalized.

## 2021-07-02 ENCOUNTER — TELEPHONE (OUTPATIENT)
Dept: OBGYN CLINIC | Facility: CLINIC | Age: 26
End: 2021-07-02

## 2021-07-02 NOTE — TELEPHONE ENCOUNTER
PA for Trimethobenzamide 300mg submitted through Cover My Meds  Your information has been submitted to [employer]. [employer] is reviewing the PA request and you will receive an electronic response.  You may check for the updated outcome later by neymar

## 2021-07-06 NOTE — TELEPHONE ENCOUNTER
Per fax, product does not require prior authorization at this time. May covered at pharmacy in accordance with benefit plan.      Case Certification Number: Zada Genera

## 2021-07-08 ENCOUNTER — TELEPHONE (OUTPATIENT)
Dept: OBGYN CLINIC | Facility: CLINIC | Age: 26
End: 2021-07-08

## 2021-07-08 ENCOUNTER — ROUTINE PRENATAL (OUTPATIENT)
Dept: OBGYN CLINIC | Facility: CLINIC | Age: 26
End: 2021-07-08
Payer: COMMERCIAL

## 2021-07-08 VITALS
DIASTOLIC BLOOD PRESSURE: 58 MMHG | WEIGHT: 140 LBS | SYSTOLIC BLOOD PRESSURE: 120 MMHG | BODY MASS INDEX: 27.48 KG/M2 | HEART RATE: 93 BPM | HEIGHT: 60 IN

## 2021-07-08 DIAGNOSIS — G40.909 SEIZURE DISORDER DURING PREGNANCY IN FIRST TRIMESTER (HCC): ICD-10-CM

## 2021-07-08 DIAGNOSIS — O21.0 HYPEREMESIS AFFECTING PREGNANCY, ANTEPARTUM: ICD-10-CM

## 2021-07-08 DIAGNOSIS — Z34.81 PRENATAL CARE, SUBSEQUENT PREGNANCY, FIRST TRIMESTER: Primary | ICD-10-CM

## 2021-07-08 DIAGNOSIS — O99.351 SEIZURE DISORDER DURING PREGNANCY IN FIRST TRIMESTER (HCC): ICD-10-CM

## 2021-07-08 LAB
GLUCOSE (URINE DIPSTICK): NEGATIVE MG/DL
MULTISTIX LOT#: ABNORMAL NUMERIC
PROTEIN (URINE DIPSTICK): 30 MG/DL

## 2021-07-08 PROCEDURE — 3074F SYST BP LT 130 MM HG: CPT | Performed by: OBSTETRICS & GYNECOLOGY

## 2021-07-08 PROCEDURE — 3078F DIAST BP <80 MM HG: CPT | Performed by: OBSTETRICS & GYNECOLOGY

## 2021-07-08 PROCEDURE — 99212 OFFICE O/P EST SF 10 MIN: CPT | Performed by: OBSTETRICS & GYNECOLOGY

## 2021-07-08 PROCEDURE — 3008F BODY MASS INDEX DOCD: CPT | Performed by: OBSTETRICS & GYNECOLOGY

## 2021-07-08 PROCEDURE — 81002 URINALYSIS NONAUTO W/O SCOPE: CPT | Performed by: OBSTETRICS & GYNECOLOGY

## 2021-07-18 LAB
AMB EXT MYRIAD TRISOMY 13: NEGATIVE
AMB EXT MYRIAD TRISOMY 18: NEGATIVE
AMB EXT MYRIAD TRISOMY 21: NEGATIVE

## 2021-07-19 ENCOUNTER — TELEPHONE (OUTPATIENT)
Dept: OBGYN CLINIC | Facility: CLINIC | Age: 26
End: 2021-07-19

## 2021-07-19 NOTE — TELEPHONE ENCOUNTER
Called patient with her [employer] NIPS screen results. NIPS negative for trisomy 13,18 and 21. Predicted fetal sex available per paper copy. Understanding verbalized.

## 2021-07-28 ENCOUNTER — PATIENT MESSAGE (OUTPATIENT)
Dept: OBGYN CLINIC | Facility: CLINIC | Age: 26
End: 2021-07-28

## 2021-07-28 NOTE — TELEPHONE ENCOUNTER
From: Areatha Felty  To: Sagar Martel MD  Sent: 7/28/2021 8:17 AM CDT  Subject: Non-Urgent Medical Question    Britney Vogel. I'm writing you because last time I saw you. That Saturday I was finally able to eat w/o vomiting and hold down drinks also.  I t

## 2021-08-05 PROBLEM — G40.909 SEIZURE DISORDER DURING PREGNANCY IN SECOND TRIMESTER (HCC): Status: ACTIVE | Noted: 2021-05-19

## 2021-08-05 PROBLEM — O99.352 SEIZURE DISORDER DURING PREGNANCY IN SECOND TRIMESTER (HCC): Status: ACTIVE | Noted: 2021-05-19

## 2021-08-05 PROBLEM — O36.80X0 PREGNANCY WITH UNCERTAIN FETAL VIABILITY: Status: RESOLVED | Noted: 2021-05-19 | Resolved: 2021-08-05

## 2021-08-05 PROBLEM — O36.80X0 PREGNANCY WITH UNCERTAIN FETAL VIABILITY (HCC): Status: RESOLVED | Noted: 2021-05-19 | Resolved: 2021-08-05

## 2021-08-05 PROBLEM — O99.212 OBESITY AFFECTING PREGNANCY IN SECOND TRIMESTER: Status: ACTIVE | Noted: 2021-06-03

## 2021-08-05 PROBLEM — O99.212 OBESITY AFFECTING PREGNANCY IN SECOND TRIMESTER (HCC): Status: ACTIVE | Noted: 2021-06-03

## 2021-08-05 NOTE — PATIENT INSTRUCTIONS
Aspirin 81 mg - 1.5 mg - 2 daily     Labs  Schedule 20 week ultrasound with MFM    AFP Level   There is an optional blood test that we can perform on you called \"AFP level. \" It is a chemical in the bloodstream produced by the pregnancy.  It is done betwevaldo

## 2021-08-05 NOTE — PROGRESS NOTES
OBBO  Nausea & vomiting has waxed & waned. Was better for 2 weeks for no apparent reason & then got bad again. Recently slowly better. The past 2 days doing well.    Not really taking anything for N&V at this time but did recently take some [employer] and [place] and

## 2021-08-06 ENCOUNTER — TELEPHONE (OUTPATIENT)
Dept: OBGYN CLINIC | Facility: CLINIC | Age: 26
End: 2021-08-06

## 2021-08-06 ENCOUNTER — ROUTINE PRENATAL (OUTPATIENT)
Dept: OBGYN CLINIC | Facility: CLINIC | Age: 26
End: 2021-08-06
Payer: MEDICAID

## 2021-08-06 VITALS
DIASTOLIC BLOOD PRESSURE: 52 MMHG | BODY MASS INDEX: 27.88 KG/M2 | SYSTOLIC BLOOD PRESSURE: 114 MMHG | WEIGHT: 142 LBS | HEIGHT: 60 IN | HEART RATE: 105 BPM

## 2021-08-06 DIAGNOSIS — Z36.89 ENCOUNTER FOR FETAL ANATOMIC SURVEY: ICD-10-CM

## 2021-08-06 DIAGNOSIS — O99.352 SEIZURE DISORDER DURING PREGNANCY IN SECOND TRIMESTER (HCC): ICD-10-CM

## 2021-08-06 DIAGNOSIS — Z83.3 FAMILY HISTORY OF DIABETES MELLITUS: ICD-10-CM

## 2021-08-06 DIAGNOSIS — Z36.1 ANTENATAL SCREENING FOR RAISED ALPHAFETOPROTEIN LEVEL: ICD-10-CM

## 2021-08-06 DIAGNOSIS — O21.0 HYPEREMESIS AFFECTING PREGNANCY, ANTEPARTUM: Primary | ICD-10-CM

## 2021-08-06 DIAGNOSIS — Z34.82 PRENATAL CARE, SUBSEQUENT PREGNANCY IN SECOND TRIMESTER: ICD-10-CM

## 2021-08-06 DIAGNOSIS — G40.909 SEIZURE DISORDER DURING PREGNANCY IN SECOND TRIMESTER (HCC): ICD-10-CM

## 2021-08-06 DIAGNOSIS — O99.212 OBESITY AFFECTING PREGNANCY IN SECOND TRIMESTER: ICD-10-CM

## 2021-08-06 PROCEDURE — 3008F BODY MASS INDEX DOCD: CPT | Performed by: OBSTETRICS & GYNECOLOGY

## 2021-08-06 PROCEDURE — 0502F SUBSEQUENT PRENATAL CARE: CPT | Performed by: OBSTETRICS & GYNECOLOGY

## 2021-08-06 PROCEDURE — 3074F SYST BP LT 130 MM HG: CPT | Performed by: OBSTETRICS & GYNECOLOGY

## 2021-08-06 PROCEDURE — 81002 URINALYSIS NONAUTO W/O SCOPE: CPT | Performed by: OBSTETRICS & GYNECOLOGY

## 2021-08-06 PROCEDURE — 3078F DIAST BP <80 MM HG: CPT | Performed by: OBSTETRICS & GYNECOLOGY

## 2021-08-06 RX ORDER — FOLIC ACID 1 MG/1
1 TABLET ORAL DAILY
Qty: 90 TABLET | Refills: 3 | Status: SHIPPED | OUTPATIENT
Start: 2021-08-06 | End: 2021-08-10

## 2021-08-06 RX ORDER — PNV 112/IRON/FOLIC/OM3/DHA/EPA 3.33-.33MG
1 TABLET,CHEWABLE ORAL DAILY
Qty: 90 TABLET | Refills: 3 | Status: SHIPPED | OUTPATIENT
Start: 2021-08-06 | End: 2021-08-10

## 2021-08-06 NOTE — TELEPHONE ENCOUNTER
Per MM, genetic carrier screen blood drawn, tubes labeled and placed in lab for orders and send out.

## 2021-08-09 ENCOUNTER — ROUTINE PRENATAL (OUTPATIENT)
Dept: OBGYN CLINIC | Facility: CLINIC | Age: 26
End: 2021-08-09
Payer: MEDICAID

## 2021-08-09 VITALS
DIASTOLIC BLOOD PRESSURE: 60 MMHG | WEIGHT: 146 LBS | HEART RATE: 96 BPM | BODY MASS INDEX: 28.66 KG/M2 | SYSTOLIC BLOOD PRESSURE: 109 MMHG | HEIGHT: 60 IN

## 2021-08-09 DIAGNOSIS — G40.909 SEIZURE DISORDER DURING PREGNANCY IN SECOND TRIMESTER (HCC): Primary | ICD-10-CM

## 2021-08-09 DIAGNOSIS — Z34.82 PRENATAL CARE, SUBSEQUENT PREGNANCY IN SECOND TRIMESTER: ICD-10-CM

## 2021-08-09 DIAGNOSIS — O99.352 SEIZURE DISORDER DURING PREGNANCY IN SECOND TRIMESTER (HCC): Primary | ICD-10-CM

## 2021-08-09 LAB
GLUCOSE (URINE DIPSTICK): NEGATIVE MG/DL
MULTISTIX LOT#: NORMAL NUMERIC
PROTEIN (URINE DIPSTICK): NEGATIVE MG/DL

## 2021-08-09 PROCEDURE — 0502F SUBSEQUENT PRENATAL CARE: CPT | Performed by: STUDENT IN AN ORGANIZED HEALTH CARE EDUCATION/TRAINING PROGRAM

## 2021-08-09 PROCEDURE — 3078F DIAST BP <80 MM HG: CPT | Performed by: STUDENT IN AN ORGANIZED HEALTH CARE EDUCATION/TRAINING PROGRAM

## 2021-08-09 PROCEDURE — 3074F SYST BP LT 130 MM HG: CPT | Performed by: STUDENT IN AN ORGANIZED HEALTH CARE EDUCATION/TRAINING PROGRAM

## 2021-08-09 PROCEDURE — 3008F BODY MASS INDEX DOCD: CPT | Performed by: STUDENT IN AN ORGANIZED HEALTH CARE EDUCATION/TRAINING PROGRAM

## 2021-08-09 PROCEDURE — 81002 URINALYSIS NONAUTO W/O SCOPE: CPT | Performed by: STUDENT IN AN ORGANIZED HEALTH CARE EDUCATION/TRAINING PROGRAM

## 2021-08-09 NOTE — PROGRESS NOTES
BOBO  Seen in ED at [facility] for witnessed seizure x2 on Sat. Did start having more nausea and vomiting end of July, and last vomited a few days ago.  Wondering if may have vomited up Keppra (did not see pills in emesis but if may have already been dissolved by

## 2021-08-09 NOTE — PATIENT INSTRUCTIONS
To do:  1) get lab tests drawn as soon as possible (need to make lab appointment for this)  2) call to schedule ultrasound appointment with Maternal Fetal Medicine ([phone])--UF this ASAP because they are often booked up a month in advance  3) call yo

## 2021-08-10 ENCOUNTER — OFFICE VISIT (OUTPATIENT)
Dept: NEUROLOGY | Facility: CLINIC | Age: 26
End: 2021-08-10
Payer: MEDICAID

## 2021-08-10 VITALS
RESPIRATION RATE: 16 BRPM | SYSTOLIC BLOOD PRESSURE: 110 MMHG | HEART RATE: 81 BPM | HEIGHT: 60 IN | BODY MASS INDEX: 28.66 KG/M2 | DIASTOLIC BLOOD PRESSURE: 70 MMHG | WEIGHT: 146 LBS

## 2021-08-10 DIAGNOSIS — O99.352 SEIZURE DISORDER DURING PREGNANCY IN SECOND TRIMESTER (HCC): Primary | ICD-10-CM

## 2021-08-10 DIAGNOSIS — G40.909 SEIZURE DISORDER DURING PREGNANCY IN SECOND TRIMESTER (HCC): Primary | ICD-10-CM

## 2021-08-10 PROCEDURE — 3074F SYST BP LT 130 MM HG: CPT | Performed by: OTHER

## 2021-08-10 PROCEDURE — 99214 OFFICE O/P EST MOD 30 MIN: CPT | Performed by: OTHER

## 2021-08-10 PROCEDURE — 3078F DIAST BP <80 MM HG: CPT | Performed by: OTHER

## 2021-08-10 PROCEDURE — 3008F BODY MASS INDEX DOCD: CPT | Performed by: OTHER

## 2021-08-10 RX ORDER — LEVETIRACETAM 1000 MG/1
TABLET ORAL
Qty: 180 TABLET | Refills: 1 | Status: SHIPPED | OUTPATIENT
Start: 2021-08-10 | End: 2021-09-08

## 2021-08-10 RX ORDER — LEVETIRACETAM 250 MG/1
TABLET ORAL
Qty: 60 TABLET | Refills: 2 | Status: SHIPPED | OUTPATIENT
Start: 2021-08-10 | End: 2021-09-08

## 2021-08-10 NOTE — PATIENT INSTRUCTIONS
Please have levels drawn as soon as you can  Increase Keppra to 1250 mg bid tonight and continue while awaiting results of testing   Monitor for additional seizures.    Follow up ~ 3 months or sooner as needed     Refill policies:    • Allow 2-3 business da MONDAY-FRIDAY    Scheduling Tests: If your physician has ordered radiology tests such as MRI or CT scans, do not schedule the test until this office has notified you that the test has been approved by your insurer.   Depending on your insurance carrier,  for this paper. • Failure to follow above steps may result in the delay of form completion.   •

## 2021-08-10 NOTE — PROGRESS NOTES
[facility] Progress Note    HPI  Patient presents with:  Seizures: Follow up      As per my initial H&P from 6/16/2021,   \"   Lorrie Sanchez is a 23year old female, who presents for evaluation of seizures.       Patient states she has hi nonpurposefully without awareness and then had generalized tonic clonic seizure, lasted less than 30 seconds. She denies any auras with her seizures; denies any urinary incontinence or tongue biting and denies being sore with her most recent seizures.  She Concerns:        Caffeine Concern: No        Exercise: No        Seat Belt: Yes        Special Diet: No        Stress Concern: No        Weight Concern: Yes      Shellfish-Derived P*    SWELLING      Current Outpatient Medications:   •  folic acid 1 MG Ora LMP 04/17/2021 (Exact Date)   BMI 28.51 kg/m²   Estimated body mass index is 28.51 kg/m² as calculated from the following:    Height as of this encounter: 60\". Weight as of this encounter: 146 lb (66.2 kg).     Gen: well developed, well nourished, no ac I suspect that this is related to subtherapeutic concentrations of her Keppra related to increased clearance during pregnancy as she is now ~16 weeks pregnant in her second trimester.   She has not had Keppra levels checked, and I recommend that she have le

## 2021-08-12 ENCOUNTER — LAB ENCOUNTER (OUTPATIENT)
Dept: LAB | Age: 26
End: 2021-08-12
Attending: OBSTETRICS & GYNECOLOGY
Payer: MEDICAID

## 2021-08-12 DIAGNOSIS — Z83.3 FAMILY HISTORY OF DIABETES MELLITUS: ICD-10-CM

## 2021-08-12 DIAGNOSIS — Z34.81 PRENATAL CARE, SUBSEQUENT PREGNANCY, FIRST TRIMESTER: ICD-10-CM

## 2021-08-12 DIAGNOSIS — O99.212 OBESITY AFFECTING PREGNANCY IN SECOND TRIMESTER: ICD-10-CM

## 2021-08-12 DIAGNOSIS — G40.909 SEIZURE DISORDER DURING PREGNANCY IN FIRST TRIMESTER (HCC): ICD-10-CM

## 2021-08-12 DIAGNOSIS — O99.012 ANEMIA AFFECTING PREGNANCY IN SECOND TRIMESTER: Primary | ICD-10-CM

## 2021-08-12 DIAGNOSIS — O99.351 SEIZURE DISORDER DURING PREGNANCY IN FIRST TRIMESTER (HCC): ICD-10-CM

## 2021-08-12 DIAGNOSIS — Z34.82 PRENATAL CARE, SUBSEQUENT PREGNANCY IN SECOND TRIMESTER: ICD-10-CM

## 2021-08-12 DIAGNOSIS — O99.012 ANEMIA AFFECTING PREGNANCY IN SECOND TRIMESTER: ICD-10-CM

## 2021-08-12 DIAGNOSIS — O21.0 HYPEREMESIS AFFECTING PREGNANCY, ANTEPARTUM: ICD-10-CM

## 2021-08-12 DIAGNOSIS — Z36.1 ANTENATAL SCREENING FOR RAISED ALPHAFETOPROTEIN LEVEL: ICD-10-CM

## 2021-08-12 PROBLEM — O09.899 RUBELLA NON-IMMUNE STATUS, ANTEPARTUM (HCC): Status: ACTIVE | Noted: 2021-08-12

## 2021-08-12 PROBLEM — Z28.39 RUBELLA NON-IMMUNE STATUS, ANTEPARTUM (HCC): Status: ACTIVE | Noted: 2021-08-12

## 2021-08-12 PROBLEM — O99.891 RUBELLA NON-IMMUNE STATUS, ANTEPARTUM: Status: ACTIVE | Noted: 2021-08-12

## 2021-08-12 PROBLEM — Z28.3 RUBELLA NON-IMMUNE STATUS, ANTEPARTUM: Status: ACTIVE | Noted: 2021-08-12

## 2021-08-12 PROBLEM — Z28.39 RUBELLA NON-IMMUNE STATUS, ANTEPARTUM: Status: ACTIVE | Noted: 2021-08-12

## 2021-08-12 PROBLEM — O09.899 RUBELLA NON-IMMUNE STATUS, ANTEPARTUM: Status: ACTIVE | Noted: 2021-08-12

## 2021-08-12 LAB
ALBUMIN SERPL-MCNC: 2.6 G/DL (ref 3.4–5)
ALBUMIN/GLOB SERPL: 0.7 {RATIO} (ref 1–2)
ALP LIVER SERPL-CCNC: 51 U/L
ALT SERPL-CCNC: 30 U/L
ANION GAP SERPL CALC-SCNC: 7 MMOL/L (ref 0–18)
ANTIBODY SCREEN: NEGATIVE
AST SERPL-CCNC: 10 U/L (ref 15–37)
BASOPHILS # BLD AUTO: 0.05 X10(3) UL (ref 0–0.2)
BASOPHILS NFR BLD AUTO: 0.6 %
BILIRUB SERPL-MCNC: 0.2 MG/DL (ref 0.1–2)
BUN BLD-MCNC: 5 MG/DL (ref 7–18)
CALCIUM BLD-MCNC: 8.8 MG/DL (ref 8.5–10.1)
CHLORIDE SERPL-SCNC: 105 MMOL/L (ref 98–112)
CO2 SERPL-SCNC: 25 MMOL/L (ref 21–32)
CREAT BLD-MCNC: 0.38 MG/DL
DEPRECATED HBV CORE AB SER IA-ACNC: 73.4 NG/ML
EOSINOPHIL # BLD AUTO: 0.34 X10(3) UL (ref 0–0.7)
EOSINOPHIL NFR BLD AUTO: 3.9 %
ERYTHROCYTE [DISTWIDTH] IN BLOOD BY AUTOMATED COUNT: 13.8 %
EST. AVERAGE GLUCOSE BLD GHB EST-MCNC: 108 MG/DL (ref 68–126)
GLOBULIN PLAS-MCNC: 3.9 G/DL (ref 2.8–4.4)
GLUCOSE BLD-MCNC: 96 MG/DL (ref 70–99)
HBA1C MFR BLD HPLC: 5.4 % (ref ?–5.7)
HBV SURFACE AG SER-ACNC: <0.1 [IU]/L
HBV SURFACE AG SERPL QL IA: NONREACTIVE
HCT VFR BLD AUTO: 34.1 %
HGB BLD-MCNC: 11.1 G/DL
IMM GRANULOCYTES # BLD AUTO: 0.03 X10(3) UL (ref 0–1)
IMM GRANULOCYTES NFR BLD: 0.3 %
IRON SATURATION: 18 %
IRON SERPL-MCNC: 76 UG/DL
LYMPHOCYTES # BLD AUTO: 1.59 X10(3) UL (ref 1–4)
LYMPHOCYTES NFR BLD AUTO: 18.2 %
M PROTEIN MFR SERPL ELPH: 6.5 G/DL (ref 6.4–8.2)
MCH RBC QN AUTO: 29.1 PG (ref 26–34)
MCHC RBC AUTO-ENTMCNC: 32.6 G/DL (ref 31–37)
MCV RBC AUTO: 89.5 FL
MONOCYTES # BLD AUTO: 0.59 X10(3) UL (ref 0.1–1)
MONOCYTES NFR BLD AUTO: 6.7 %
NEUTROPHILS # BLD AUTO: 6.15 X10 (3) UL (ref 1.5–7.7)
NEUTROPHILS # BLD AUTO: 6.15 X10(3) UL (ref 1.5–7.7)
NEUTROPHILS NFR BLD AUTO: 70.3 %
OSMOLALITY SERPL CALC.SUM OF ELEC: 281 MOSM/KG (ref 275–295)
PATIENT FASTING Y/N/NP: NO
PLATELET # BLD AUTO: 289 10(3)UL (ref 150–450)
POTASSIUM SERPL-SCNC: 3.6 MMOL/L (ref 3.5–5.1)
RBC # BLD AUTO: 3.81 X10(6)UL
RH BLOOD TYPE: POSITIVE
RUBV IGG SER-ACNC: 9.4 IU/ML (ref 10–?)
SODIUM SERPL-SCNC: 137 MMOL/L (ref 136–145)
T PALLIDUM AB SER QL IA: NONREACTIVE
TOTAL IRON BINDING CAPACITY: 434 UG/DL (ref 240–450)
TRANSFERRIN SERPL-MCNC: 291 MG/DL (ref 200–360)
TSI SER-ACNC: 1.57 MIU/ML (ref 0.36–3.74)
VIT B12 SERPL-MCNC: 225 PG/ML (ref 193–986)
WBC # BLD AUTO: 8.8 X10(3) UL (ref 4–11)

## 2021-08-12 PROCEDURE — 86901 BLOOD TYPING SEROLOGIC RH(D): CPT

## 2021-08-12 PROCEDURE — 86900 BLOOD TYPING SEROLOGIC ABO: CPT

## 2021-08-12 PROCEDURE — 87389 HIV-1 AG W/HIV-1&-2 AB AG IA: CPT

## 2021-08-12 PROCEDURE — 82105 ALPHA-FETOPROTEIN SERUM: CPT

## 2021-08-12 PROCEDURE — 82728 ASSAY OF FERRITIN: CPT

## 2021-08-12 PROCEDURE — 84443 ASSAY THYROID STIM HORMONE: CPT

## 2021-08-12 PROCEDURE — 86762 RUBELLA ANTIBODY: CPT

## 2021-08-12 PROCEDURE — 83036 HEMOGLOBIN GLYCOSYLATED A1C: CPT

## 2021-08-12 PROCEDURE — 85025 COMPLETE CBC W/AUTO DIFF WBC: CPT

## 2021-08-12 PROCEDURE — 86592 SYPHILIS TEST NON-TREP QUAL: CPT

## 2021-08-12 PROCEDURE — 86780 TREPONEMA PALLIDUM: CPT

## 2021-08-12 PROCEDURE — 87340 HEPATITIS B SURFACE AG IA: CPT

## 2021-08-12 PROCEDURE — 80177 DRUG SCRN QUAN LEVETIRACETAM: CPT

## 2021-08-12 PROCEDURE — 36415 COLL VENOUS BLD VENIPUNCTURE: CPT

## 2021-08-12 PROCEDURE — 83550 IRON BINDING TEST: CPT

## 2021-08-12 PROCEDURE — 83540 ASSAY OF IRON: CPT

## 2021-08-12 PROCEDURE — 86850 RBC ANTIBODY SCREEN: CPT

## 2021-08-12 PROCEDURE — 82607 VITAMIN B-12: CPT

## 2021-08-12 PROCEDURE — 80053 COMPREHEN METABOLIC PANEL: CPT

## 2021-08-13 ENCOUNTER — TELEPHONE (OUTPATIENT)
Dept: OBGYN CLINIC | Facility: CLINIC | Age: 26
End: 2021-08-13

## 2021-08-13 LAB — RPR SER QL: NONREACTIVE

## 2021-08-13 NOTE — TELEPHONE ENCOUNTER
Called pt to speak to her about her insurance. Pt states she does not know of any changes and that she should still have Medicaid [employer] Comm. Informed pt that neither the [employer] or [employer] PPO are eligible. Pt states she will call her insurance and let us know.

## 2021-08-13 NOTE — PROGRESS NOTES
Rubella equivocal (non-immune) - recommend MMR after delivery. Anemia noted. Recommend try to take the prenatal vitamin containing iron. Known difficulty with hyperemesis this pregnancy. Reflexed ferritin & vitamin B12.  If unable to tolerate PNV containing

## 2021-08-14 LAB — LEVETIRACETAM (KEPPRA): 34 UG/ML

## 2021-08-15 LAB
AFP SMOKING: NO
FAMILY HX NEURAL TUBE DEFECT: NO
INSULIN REQ MATERNAL DIABETES: NO
MATERNAL AGE OF DELIVERY: 24.5 YR
MOM FOR AFP: 1.14
PATIENT'S AFP: 45 NG/ML

## 2021-08-16 NOTE — PROGRESS NOTES
Pt aware of results and recommendations for Take vitamin B12 as advised. Prenatal vitamin containing iron. Understanding verbalized.      Pt would like vitafol gummies ordered since she is having a hard time keeping her prenatals down and had tried those in

## 2021-08-19 ENCOUNTER — TELEPHONE (OUTPATIENT)
Dept: OBGYN CLINIC | Facility: CLINIC | Age: 26
End: 2021-08-19

## 2021-08-19 NOTE — TELEPHONE ENCOUNTER
Spoke with pt, having pelvic bone pain for few days and yellow discharge since this morning. Occassional cramping that feels like period cramps, x4 today. Per pt, drinks 1/2-1 gal of water per day.  Does not feel like UTI or smell like it, although was pron

## 2021-08-19 NOTE — TELEPHONE ENCOUNTER
Patient called in stating she is experiencing lower vaginal pain, discharge and cramping. Please call back to advise.

## 2021-08-20 ENCOUNTER — ROUTINE PRENATAL (OUTPATIENT)
Dept: OBGYN CLINIC | Facility: CLINIC | Age: 26
End: 2021-08-20
Payer: MEDICAID

## 2021-08-20 ENCOUNTER — LAB ENCOUNTER (OUTPATIENT)
Dept: LAB | Age: 26
End: 2021-08-20
Attending: OBSTETRICS & GYNECOLOGY
Payer: MEDICAID

## 2021-08-20 VITALS
WEIGHT: 147 LBS | SYSTOLIC BLOOD PRESSURE: 98 MMHG | HEART RATE: 98 BPM | DIASTOLIC BLOOD PRESSURE: 59 MMHG | BODY MASS INDEX: 28.86 KG/M2 | HEIGHT: 60 IN

## 2021-08-20 DIAGNOSIS — O26.892 VAGINAL DISCHARGE DURING PREGNANCY IN SECOND TRIMESTER: ICD-10-CM

## 2021-08-20 DIAGNOSIS — Z34.82 PRENATAL CARE, SUBSEQUENT PREGNANCY, SECOND TRIMESTER: Primary | ICD-10-CM

## 2021-08-20 DIAGNOSIS — Z83.3 FAMILY HISTORY OF DIABETES MELLITUS: ICD-10-CM

## 2021-08-20 DIAGNOSIS — N89.8 VAGINAL DISCHARGE DURING PREGNANCY IN SECOND TRIMESTER: ICD-10-CM

## 2021-08-20 DIAGNOSIS — O99.212 OBESITY AFFECTING PREGNANCY IN SECOND TRIMESTER: ICD-10-CM

## 2021-08-20 LAB
GLUCOSE (URINE DIPSTICK): NEGATIVE MG/DL
GLUCOSE 1H P GLC SERPL-MCNC: 86 MG/DL
MULTISTIX LOT#: ABNORMAL NUMERIC
PROTEIN (URINE DIPSTICK): 30 MG/DL

## 2021-08-20 PROCEDURE — 87491 CHLMYD TRACH DNA AMP PROBE: CPT | Performed by: STUDENT IN AN ORGANIZED HEALTH CARE EDUCATION/TRAINING PROGRAM

## 2021-08-20 PROCEDURE — 87591 N.GONORRHOEAE DNA AMP PROB: CPT | Performed by: STUDENT IN AN ORGANIZED HEALTH CARE EDUCATION/TRAINING PROGRAM

## 2021-08-20 PROCEDURE — 3008F BODY MASS INDEX DOCD: CPT | Performed by: STUDENT IN AN ORGANIZED HEALTH CARE EDUCATION/TRAINING PROGRAM

## 2021-08-20 PROCEDURE — 87480 CANDIDA DNA DIR PROBE: CPT | Performed by: STUDENT IN AN ORGANIZED HEALTH CARE EDUCATION/TRAINING PROGRAM

## 2021-08-20 PROCEDURE — 87660 TRICHOMONAS VAGIN DIR PROBE: CPT | Performed by: STUDENT IN AN ORGANIZED HEALTH CARE EDUCATION/TRAINING PROGRAM

## 2021-08-20 PROCEDURE — 3074F SYST BP LT 130 MM HG: CPT | Performed by: STUDENT IN AN ORGANIZED HEALTH CARE EDUCATION/TRAINING PROGRAM

## 2021-08-20 PROCEDURE — 81002 URINALYSIS NONAUTO W/O SCOPE: CPT | Performed by: STUDENT IN AN ORGANIZED HEALTH CARE EDUCATION/TRAINING PROGRAM

## 2021-08-20 PROCEDURE — 87510 GARDNER VAG DNA DIR PROBE: CPT | Performed by: STUDENT IN AN ORGANIZED HEALTH CARE EDUCATION/TRAINING PROGRAM

## 2021-08-20 PROCEDURE — 0502F SUBSEQUENT PRENATAL CARE: CPT | Performed by: STUDENT IN AN ORGANIZED HEALTH CARE EDUCATION/TRAINING PROGRAM

## 2021-08-20 PROCEDURE — 3078F DIAST BP <80 MM HG: CPT | Performed by: STUDENT IN AN ORGANIZED HEALTH CARE EDUCATION/TRAINING PROGRAM

## 2021-08-20 PROCEDURE — 36415 COLL VENOUS BLD VENIPUNCTURE: CPT

## 2021-08-20 PROCEDURE — 82950 GLUCOSE TEST: CPT

## 2021-08-20 NOTE — PROGRESS NOTES
BOBO  Pelvic pain and increased discharge for several days. Felt like she was leaking urine some of the time but unsure if was urine vs discharge.  Generally mucusy and clear  No irritation or itching  -moderate amount of thinner appearing physiologic discha

## 2021-08-23 LAB
C TRACH DNA SPEC QL NAA+PROBE: NEGATIVE
N GONORRHOEA DNA SPEC QL NAA+PROBE: NEGATIVE

## 2021-09-05 ENCOUNTER — TELEPHONE (OUTPATIENT)
Dept: OBGYN CLINIC | Facility: CLINIC | Age: 26
End: 2021-09-05

## 2021-09-06 ENCOUNTER — PATIENT MESSAGE (OUTPATIENT)
Dept: NEUROLOGY | Facility: CLINIC | Age: 26
End: 2021-09-06

## 2021-09-06 NOTE — TELEPHONE ENCOUNTER
----- Message from Leisa Marina sent at 9/2/2021  2:27 PM CDT -----  Regarding: pa needed for 20 wk us on 09- [facility]  Orly Ohara pt needs pa for her 20 wk us on 09- [facility].  Thanks

## 2021-09-06 NOTE — TELEPHONE ENCOUNTER
Sunday, September 05, 2021 8:24 PM  Summary of Your Request  Please review the details of your request below and if everything looks correct click CONTINUE    Your case has been Approved.      Provider Name: DR. Patricia Sierra Contact: Oma Hawkins  Provider Vernestine Hammans

## 2021-09-07 ENCOUNTER — TELEPHONE (OUTPATIENT)
Dept: NEUROLOGY | Facility: CLINIC | Age: 26
End: 2021-09-07

## 2021-09-07 ENCOUNTER — ROUTINE PRENATAL (OUTPATIENT)
Dept: OBGYN CLINIC | Facility: CLINIC | Age: 26
End: 2021-09-07
Payer: MEDICAID

## 2021-09-07 ENCOUNTER — ULTRASOUND ENCOUNTER (OUTPATIENT)
Dept: OBGYN CLINIC | Facility: CLINIC | Age: 26
End: 2021-09-07
Payer: MEDICAID

## 2021-09-07 VITALS
SYSTOLIC BLOOD PRESSURE: 98 MMHG | DIASTOLIC BLOOD PRESSURE: 60 MMHG | BODY MASS INDEX: 29.84 KG/M2 | WEIGHT: 152 LBS | HEIGHT: 60 IN

## 2021-09-07 DIAGNOSIS — Z36.89 ENCOUNTER FOR FETAL ANATOMIC SURVEY: Primary | ICD-10-CM

## 2021-09-07 DIAGNOSIS — Z34.92 ENCOUNTER FOR SUPERVISION OF NORMAL PREGNANCY IN SECOND TRIMESTER, UNSPECIFIED GRAVIDITY: Primary | ICD-10-CM

## 2021-09-07 DIAGNOSIS — O99.352 SEIZURE DISORDER DURING PREGNANCY IN SECOND TRIMESTER (HCC): ICD-10-CM

## 2021-09-07 DIAGNOSIS — G40.909 SEIZURE DISORDER DURING PREGNANCY IN SECOND TRIMESTER (HCC): ICD-10-CM

## 2021-09-07 PROCEDURE — 76805 OB US >/= 14 WKS SNGL FETUS: CPT | Performed by: OBSTETRICS & GYNECOLOGY

## 2021-09-07 PROCEDURE — 3078F DIAST BP <80 MM HG: CPT | Performed by: OBSTETRICS & GYNECOLOGY

## 2021-09-07 PROCEDURE — 3074F SYST BP LT 130 MM HG: CPT | Performed by: OBSTETRICS & GYNECOLOGY

## 2021-09-07 PROCEDURE — 0502F SUBSEQUENT PRENATAL CARE: CPT | Performed by: OBSTETRICS & GYNECOLOGY

## 2021-09-07 PROCEDURE — 81002 URINALYSIS NONAUTO W/O SCOPE: CPT | Performed by: OBSTETRICS & GYNECOLOGY

## 2021-09-07 PROCEDURE — 3008F BODY MASS INDEX DOCD: CPT | Performed by: OBSTETRICS & GYNECOLOGY

## 2021-09-07 NOTE — TELEPHONE ENCOUNTER
From: Gwen Marker  To: Modena Phalen, MD  Sent: 9/6/2021 9:37 AM CDT  Subject: Non-Urgent Medical Question    Good morning Dr. Burns Favor, just wanting to let you know that I had 2 more episodes on Saturday. Early morning.  I have been taking the 1,200 how

## 2021-09-07 NOTE — TELEPHONE ENCOUNTER
RN informed pt Dr. Asher Sandoval is aware of her condition update. RN advised she currently is in clinic and will be contacting his sick patients after clinic. Pt saw her Ob/GYN today and baby is fine. Pt has an appt tomorrow with maternal fetal medicine.

## 2021-09-07 NOTE — TELEPHONE ENCOUNTER
Per Patria message: Good morning Dr. Maco Alberto, just wanting to let you know that I had 2 more episodes on Saturday. Early morning. I have been taking the 1,200 how I am suppose to. I'm not sure what's happening.

## 2021-09-07 NOTE — PROGRESS NOTES
Patient has no complaints, baby active at night and keeps her up  -Rubella nonimmune: MMR postpartum  -cfDNA neg, BOY   -AFP normal    1. Hyperemesis  - resolved in 2nd trimesteri    2.  Seizure Disorder  - patient had seizure 9/4/21 - grand mol - awaiting

## 2021-09-07 NOTE — TELEPHONE ENCOUNTER
Called patient - she states she had seizure Saturday morning - she states she lost awareness and was told she had seizure for ~30 seconds.       She states she had not taken her seizure medication that morning but states she took her medications the prior d

## 2021-09-08 RX ORDER — LEVETIRACETAM 1000 MG/1
TABLET ORAL
Qty: 180 TABLET | Refills: 1 | COMMUNITY
Start: 2021-09-08 | End: 2021-09-23

## 2021-09-08 RX ORDER — LEVETIRACETAM 250 MG/1
TABLET ORAL
Qty: 60 TABLET | Refills: 2 | COMMUNITY
Start: 2021-09-08 | End: 2021-09-23

## 2021-09-23 DIAGNOSIS — O99.352 SEIZURE DISORDER DURING PREGNANCY IN SECOND TRIMESTER (HCC): ICD-10-CM

## 2021-09-23 DIAGNOSIS — G40.909 SEIZURE DISORDER DURING PREGNANCY IN SECOND TRIMESTER (HCC): ICD-10-CM

## 2021-09-23 RX ORDER — LEVETIRACETAM 500 MG/1
TABLET ORAL
Qty: 60 TABLET | Refills: 1 | Status: SHIPPED | OUTPATIENT
Start: 2021-09-23 | End: 2021-11-26

## 2021-09-23 RX ORDER — LEVETIRACETAM 1000 MG/1
TABLET ORAL
Qty: 180 TABLET | Refills: 1 | Status: SHIPPED | OUTPATIENT
Start: 2021-09-23 | End: 2021-11-29

## 2021-10-05 ENCOUNTER — ROUTINE PRENATAL (OUTPATIENT)
Dept: OBGYN CLINIC | Facility: CLINIC | Age: 26
End: 2021-10-05
Payer: MEDICAID

## 2021-10-05 VITALS
DIASTOLIC BLOOD PRESSURE: 60 MMHG | HEART RATE: 74 BPM | SYSTOLIC BLOOD PRESSURE: 100 MMHG | BODY MASS INDEX: 31.41 KG/M2 | HEIGHT: 60 IN | WEIGHT: 160 LBS

## 2021-10-05 DIAGNOSIS — Z34.82 PRENATAL CARE, SUBSEQUENT PREGNANCY, SECOND TRIMESTER: Primary | ICD-10-CM

## 2021-10-05 DIAGNOSIS — Z23 NEED FOR VACCINATION: ICD-10-CM

## 2021-10-05 PROCEDURE — 90471 IMMUNIZATION ADMIN: CPT | Performed by: OBSTETRICS & GYNECOLOGY

## 2021-10-05 PROCEDURE — 3074F SYST BP LT 130 MM HG: CPT | Performed by: OBSTETRICS & GYNECOLOGY

## 2021-10-05 PROCEDURE — 81002 URINALYSIS NONAUTO W/O SCOPE: CPT | Performed by: OBSTETRICS & GYNECOLOGY

## 2021-10-05 PROCEDURE — 0502F SUBSEQUENT PRENATAL CARE: CPT | Performed by: OBSTETRICS & GYNECOLOGY

## 2021-10-05 PROCEDURE — 90686 IIV4 VACC NO PRSV 0.5 ML IM: CPT | Performed by: OBSTETRICS & GYNECOLOGY

## 2021-10-05 PROCEDURE — 3008F BODY MASS INDEX DOCD: CPT | Performed by: OBSTETRICS & GYNECOLOGY

## 2021-10-05 PROCEDURE — 3078F DIAST BP <80 MM HG: CPT | Performed by: OBSTETRICS & GYNECOLOGY

## 2021-10-05 RX ORDER — PRENATAL VIT/IRON FUM/FOLIC AC 27MG-0.8MG
1 TABLET ORAL DAILY
COMMUNITY

## 2021-10-05 NOTE — PROGRESS NOTES
Patient has no complaints, nausea greatly improved  -Rubella nonimmune: MMR postpartum  -cfDNA neg, BOY   -AFP normal  - 1 GTT, CBc and HIV ordered    1. Hyperemesis  - resolved in 2nd trimesteri    2.  Seizure Disorder  - patient had seizure 9/4/21 - gra

## 2021-10-17 ENCOUNTER — TELEPHONE (OUTPATIENT)
Dept: OBGYN CLINIC | Facility: CLINIC | Age: 26
End: 2021-10-17

## 2021-11-02 ENCOUNTER — ROUTINE PRENATAL (OUTPATIENT)
Dept: OBGYN CLINIC | Facility: CLINIC | Age: 26
End: 2021-11-02
Payer: MEDICAID

## 2021-11-02 VITALS
HEIGHT: 60 IN | BODY MASS INDEX: 32.59 KG/M2 | DIASTOLIC BLOOD PRESSURE: 56 MMHG | WEIGHT: 166 LBS | HEART RATE: 86 BPM | SYSTOLIC BLOOD PRESSURE: 102 MMHG

## 2021-11-02 DIAGNOSIS — O99.019 ANTEPARTUM ANEMIA: Primary | ICD-10-CM

## 2021-11-02 DIAGNOSIS — Z34.93 ENCOUNTER FOR SUPERVISION OF NORMAL PREGNANCY IN THIRD TRIMESTER, UNSPECIFIED GRAVIDITY: Primary | ICD-10-CM

## 2021-11-02 PROCEDURE — 3074F SYST BP LT 130 MM HG: CPT | Performed by: STUDENT IN AN ORGANIZED HEALTH CARE EDUCATION/TRAINING PROGRAM

## 2021-11-02 PROCEDURE — 81002 URINALYSIS NONAUTO W/O SCOPE: CPT | Performed by: STUDENT IN AN ORGANIZED HEALTH CARE EDUCATION/TRAINING PROGRAM

## 2021-11-02 PROCEDURE — 90715 TDAP VACCINE 7 YRS/> IM: CPT | Performed by: STUDENT IN AN ORGANIZED HEALTH CARE EDUCATION/TRAINING PROGRAM

## 2021-11-02 PROCEDURE — 3008F BODY MASS INDEX DOCD: CPT | Performed by: STUDENT IN AN ORGANIZED HEALTH CARE EDUCATION/TRAINING PROGRAM

## 2021-11-02 PROCEDURE — 90471 IMMUNIZATION ADMIN: CPT | Performed by: STUDENT IN AN ORGANIZED HEALTH CARE EDUCATION/TRAINING PROGRAM

## 2021-11-02 PROCEDURE — 3078F DIAST BP <80 MM HG: CPT | Performed by: STUDENT IN AN ORGANIZED HEALTH CARE EDUCATION/TRAINING PROGRAM

## 2021-11-02 PROCEDURE — 0502F SUBSEQUENT PRENATAL CARE: CPT | Performed by: STUDENT IN AN ORGANIZED HEALTH CARE EDUCATION/TRAINING PROGRAM

## 2021-11-02 NOTE — PROGRESS NOTES
Soreness at pubic symphysis, no contractions or cramping  No further seizures since increasing keppra to 1500 BID    -Rubella nonimmune: MMR postpartum  -cfDNA neg, BOY   -AFP normal  - 1 GTT, Cbc (10.9 at 27wk) and HIV normal  -Tdap done    1.  Hyperemesis

## 2021-11-10 ENCOUNTER — OFFICE VISIT (OUTPATIENT)
Dept: NEUROLOGY | Facility: CLINIC | Age: 26
End: 2021-11-10
Payer: MEDICAID

## 2021-11-10 VITALS
DIASTOLIC BLOOD PRESSURE: 58 MMHG | HEIGHT: 60 IN | HEART RATE: 77 BPM | BODY MASS INDEX: 32.59 KG/M2 | WEIGHT: 166 LBS | RESPIRATION RATE: 16 BRPM | SYSTOLIC BLOOD PRESSURE: 100 MMHG

## 2021-11-10 DIAGNOSIS — O99.352 SEIZURE DISORDER DURING PREGNANCY IN SECOND TRIMESTER (HCC): Primary | ICD-10-CM

## 2021-11-10 DIAGNOSIS — G40.909 SEIZURE DISORDER DURING PREGNANCY IN SECOND TRIMESTER (HCC): Primary | ICD-10-CM

## 2021-11-10 PROCEDURE — 99213 OFFICE O/P EST LOW 20 MIN: CPT | Performed by: OTHER

## 2021-11-10 PROCEDURE — 3074F SYST BP LT 130 MM HG: CPT | Performed by: OTHER

## 2021-11-10 PROCEDURE — 3078F DIAST BP <80 MM HG: CPT | Performed by: OTHER

## 2021-11-10 PROCEDURE — 3008F BODY MASS INDEX DOCD: CPT | Performed by: OTHER

## 2021-11-10 RX ORDER — FENOFIBRATE 67 MG/1
67 CAPSULE ORAL
COMMUNITY
End: 2021-11-29

## 2021-11-10 NOTE — PROGRESS NOTES
[facility] Progress Note    HPI  Patient presents with:  Seizures:  Follow up, last seizure september, no seizures since increased KEPPRA      As per my initial H&P from 6/16/2021,   \"   Alejandra Moreira is a 23year old female, who present at 13   • Female infertility 4/8/2021   • Hyperemesis gravidarum 2017    Was put on Zofran - didn't help much. • Keloid scar     chest   • Obesity (BMI 30-39. 9)    • Pap smear for cervical cancer screening 04/08/2021    Pap negative.     • Screening for palpitations; otherwise as noted in HPI.     Exam:  /58 (BP Location: Right arm, Patient Position: Sitting, Cuff Size: adult)   Pulse 77   Resp 16   Ht 60\"   Wt 166 lb (75.3 kg)   LMP 04/17/2021 (Exact Date)   BMI 32.42 kg/m²   Estimated body mass in was having complications of hyperemesis gravidarum during first and second trimesters with this pregnancy. She did not have any seizures during that time.   However, she had recurrent seizures and is now on Keppra 1500 mg bid with no recurrent seizures - w

## 2021-11-15 ENCOUNTER — ROUTINE PRENATAL (OUTPATIENT)
Dept: OBGYN CLINIC | Facility: CLINIC | Age: 26
End: 2021-11-15
Payer: MEDICAID

## 2021-11-15 VITALS
DIASTOLIC BLOOD PRESSURE: 60 MMHG | WEIGHT: 164 LBS | BODY MASS INDEX: 32.2 KG/M2 | HEIGHT: 60 IN | HEART RATE: 88 BPM | SYSTOLIC BLOOD PRESSURE: 98 MMHG

## 2021-11-15 DIAGNOSIS — Z34.93 ENCOUNTER FOR SUPERVISION OF NORMAL PREGNANCY IN THIRD TRIMESTER, UNSPECIFIED GRAVIDITY: Primary | ICD-10-CM

## 2021-11-15 DIAGNOSIS — O99.013 ANEMIA AFFECTING PREGNANCY IN THIRD TRIMESTER: ICD-10-CM

## 2021-11-15 PROCEDURE — 0502F SUBSEQUENT PRENATAL CARE: CPT | Performed by: STUDENT IN AN ORGANIZED HEALTH CARE EDUCATION/TRAINING PROGRAM

## 2021-11-15 PROCEDURE — 3078F DIAST BP <80 MM HG: CPT | Performed by: STUDENT IN AN ORGANIZED HEALTH CARE EDUCATION/TRAINING PROGRAM

## 2021-11-15 PROCEDURE — 3008F BODY MASS INDEX DOCD: CPT | Performed by: STUDENT IN AN ORGANIZED HEALTH CARE EDUCATION/TRAINING PROGRAM

## 2021-11-15 PROCEDURE — 3074F SYST BP LT 130 MM HG: CPT | Performed by: STUDENT IN AN ORGANIZED HEALTH CARE EDUCATION/TRAINING PROGRAM

## 2021-11-15 PROCEDURE — 81002 URINALYSIS NONAUTO W/O SCOPE: CPT | Performed by: STUDENT IN AN ORGANIZED HEALTH CARE EDUCATION/TRAINING PROGRAM

## 2021-11-15 NOTE — PROGRESS NOTES
Claudy hurst stronger but irregular. Still with pubic symphysis pain  SVE closed    -Rubella nonimmune: MMR postpartum  -cfDNA neg, BOY   -AFP normal  - 1 GTT, Cbc (10.9 at 27wk) and HIV normal  -Tdap, flu shot done    1.  Hyperemesis  - resolved in 2nd tr

## 2021-11-26 DIAGNOSIS — G40.909 SEIZURE DISORDER DURING PREGNANCY IN SECOND TRIMESTER (HCC): ICD-10-CM

## 2021-11-26 DIAGNOSIS — O99.352 SEIZURE DISORDER DURING PREGNANCY IN SECOND TRIMESTER (HCC): ICD-10-CM

## 2021-11-26 RX ORDER — LEVETIRACETAM 1000 MG/1
TABLET ORAL
Qty: 180 TABLET | Refills: 1 | Status: CANCELLED | OUTPATIENT
Start: 2021-11-26

## 2021-11-26 RX ORDER — LEVETIRACETAM 500 MG/1
TABLET ORAL
Qty: 60 TABLET | Refills: 1 | Status: CANCELLED | OUTPATIENT
Start: 2021-11-26

## 2021-11-26 NOTE — TELEPHONE ENCOUNTER
Medication: Levetiracetam 500  & 1000 mg     Date of last refill: 09/23/21 with 1 addt refill  [place]      Last office visit: 11/10/21  Due back to clinic per last office note:  No appt noted  Date next office visit sixto

## 2021-11-26 NOTE — TELEPHONE ENCOUNTER
Pt stated Dr. Simon Beck was suppose to refill both levetiracetam rx  last OV on 11/10/21. Pt is out of both medications at this time and needs them refilled today.

## 2021-11-28 PROBLEM — O28.0 LOW MATERNAL SERUM VITAMIN B12: Status: ACTIVE | Noted: 2021-11-28

## 2021-11-28 PROBLEM — M89.9 PUBIC BONE PAIN: Status: ACTIVE | Noted: 2021-11-28

## 2021-11-28 PROBLEM — G40.909 SEIZURE DISORDER DURING PREGNANCY IN THIRD TRIMESTER (HCC): Status: ACTIVE | Noted: 2021-05-19

## 2021-11-28 PROBLEM — O99.891 DISORDER OF MUSCULOSKELETAL SYSTEM DURING PREGNANCY (HCC): Status: ACTIVE | Noted: 2021-11-28

## 2021-11-28 PROBLEM — O99.013 ANEMIA AFFECTING PREGNANCY IN THIRD TRIMESTER (HCC): Status: ACTIVE | Noted: 2021-11-28

## 2021-11-28 PROBLEM — O99.353 SEIZURE DISORDER DURING PREGNANCY IN THIRD TRIMESTER (HCC): Status: ACTIVE | Noted: 2021-05-19

## 2021-11-28 PROBLEM — O99.213 OBESITY AFFECTING PREGNANCY IN THIRD TRIMESTER (HCC): Status: ACTIVE | Noted: 2021-06-03

## 2021-11-28 PROBLEM — O99.213 OBESITY AFFECTING PREGNANCY IN THIRD TRIMESTER: Status: ACTIVE | Noted: 2021-06-03

## 2021-11-28 PROBLEM — O99.891 DISORDER OF MUSCULOSKELETAL SYSTEM DURING PREGNANCY: Status: ACTIVE | Noted: 2021-11-28

## 2021-11-28 PROBLEM — O99.013 ANEMIA AFFECTING PREGNANCY IN THIRD TRIMESTER: Status: ACTIVE | Noted: 2021-11-28

## 2021-11-28 NOTE — PROGRESS NOTES
BOBO  +FM. Some contractions in little runs but can be 20-30 minutes apart. Sometimes they are painful. Last time was yesterday. Can be random. Grocery shopping is hard for her. Pubic bone super tender, especially when walking.      No leaking fluid, vaginal

## 2021-11-29 ENCOUNTER — TELEPHONE (OUTPATIENT)
Dept: OBGYN CLINIC | Facility: CLINIC | Age: 26
End: 2021-11-29

## 2021-11-29 ENCOUNTER — ROUTINE PRENATAL (OUTPATIENT)
Dept: OBGYN CLINIC | Facility: CLINIC | Age: 26
End: 2021-11-29
Payer: MEDICAID

## 2021-11-29 VITALS
BODY MASS INDEX: 32.67 KG/M2 | HEIGHT: 60 IN | DIASTOLIC BLOOD PRESSURE: 54 MMHG | SYSTOLIC BLOOD PRESSURE: 100 MMHG | HEART RATE: 95 BPM | WEIGHT: 166.38 LBS

## 2021-11-29 DIAGNOSIS — O99.213 OBESITY AFFECTING PREGNANCY IN THIRD TRIMESTER: Primary | ICD-10-CM

## 2021-11-29 DIAGNOSIS — O99.013 ANEMIA AFFECTING PREGNANCY IN THIRD TRIMESTER: ICD-10-CM

## 2021-11-29 DIAGNOSIS — Z28.3 RUBELLA NON-IMMUNE STATUS, ANTEPARTUM: ICD-10-CM

## 2021-11-29 DIAGNOSIS — Z87.59 HISTORY OF HYPEREMESIS GRAVIDARUM: ICD-10-CM

## 2021-11-29 DIAGNOSIS — O28.0 LOW MATERNAL SERUM VITAMIN B12: ICD-10-CM

## 2021-11-29 DIAGNOSIS — M89.9 PUBIC BONE PAIN: ICD-10-CM

## 2021-11-29 DIAGNOSIS — O99.353 SEIZURE DISORDER DURING PREGNANCY IN THIRD TRIMESTER (HCC): ICD-10-CM

## 2021-11-29 DIAGNOSIS — G40.909 SEIZURE DISORDER DURING PREGNANCY IN THIRD TRIMESTER (HCC): ICD-10-CM

## 2021-11-29 DIAGNOSIS — O99.891 RUBELLA NON-IMMUNE STATUS, ANTEPARTUM: ICD-10-CM

## 2021-11-29 DIAGNOSIS — O99.891 DISORDER OF MUSCULOSKELETAL SYSTEM DURING PREGNANCY: ICD-10-CM

## 2021-11-29 PROCEDURE — 3008F BODY MASS INDEX DOCD: CPT | Performed by: OBSTETRICS & GYNECOLOGY

## 2021-11-29 PROCEDURE — 0502F SUBSEQUENT PRENATAL CARE: CPT | Performed by: OBSTETRICS & GYNECOLOGY

## 2021-11-29 PROCEDURE — 3074F SYST BP LT 130 MM HG: CPT | Performed by: OBSTETRICS & GYNECOLOGY

## 2021-11-29 PROCEDURE — 3078F DIAST BP <80 MM HG: CPT | Performed by: OBSTETRICS & GYNECOLOGY

## 2021-11-29 RX ORDER — LEVETIRACETAM 1000 MG/1
TABLET ORAL
Qty: 180 TABLET | Refills: 1 | Status: SHIPPED | OUTPATIENT
Start: 2021-11-29

## 2021-11-29 RX ORDER — MELATONIN
325
COMMUNITY

## 2021-11-29 RX ORDER — LEVETIRACETAM 500 MG/1
TABLET ORAL
Qty: 180 TABLET | Refills: 1 | Status: SHIPPED | OUTPATIENT
Start: 2021-11-29

## 2021-11-30 DIAGNOSIS — O99.352 SEIZURE DISORDER DURING PREGNANCY IN SECOND TRIMESTER (HCC): ICD-10-CM

## 2021-11-30 DIAGNOSIS — G40.909 SEIZURE DISORDER DURING PREGNANCY IN SECOND TRIMESTER (HCC): ICD-10-CM

## 2021-12-01 ENCOUNTER — TELEPHONE (OUTPATIENT)
Dept: OBGYN CLINIC | Facility: CLINIC | Age: 26
End: 2021-12-01

## 2021-12-01 RX ORDER — LEVETIRACETAM 500 MG/1
TABLET ORAL
Qty: 60 TABLET | Refills: 0 | OUTPATIENT
Start: 2021-12-01

## 2021-12-01 NOTE — TELEPHONE ENCOUNTER
Wednesday, December 01, 2021 12:26 PM  Summary of Your Request  Please review the details of your request below and if everything looks correct click CONTINUE    Your case has been sent to Medical Review.      Provider Name: DR. Anahi Emerson Contact: Bernardo Navarro

## 2021-12-02 ENCOUNTER — TELEPHONE (OUTPATIENT)
Dept: OBGYN CLINIC | Facility: CLINIC | Age: 26
End: 2021-12-02

## 2021-12-03 ENCOUNTER — TELEPHONE (OUTPATIENT)
Dept: OBGYN CLINIC | Facility: CLINIC | Age: 26
End: 2021-12-03

## 2021-12-03 NOTE — TELEPHONE ENCOUNTER
Pt states she noticed some pink spotting since last night. Notices on the toilet and with wiping. Some cramping, but less notable than period cramps. Drinks 64+oz of water per day. Will route to on-call provider for advisement.

## 2021-12-03 NOTE — TELEPHONE ENCOUNTER
Per AM, pt advised Would have her take it easy, pelvic rest for now, tylenol. If goes away, don't worry about it. If increases to bleeding like a period then would have her come to L&D. Understanding verbalized.

## 2021-12-06 ENCOUNTER — TELEPHONE (OUTPATIENT)
Dept: OBGYN CLINIC | Facility: CLINIC | Age: 26
End: 2021-12-06

## 2021-12-06 DIAGNOSIS — Z34.82 PRENATAL CARE, SUBSEQUENT PREGNANCY, SECOND TRIMESTER: ICD-10-CM

## 2021-12-06 DIAGNOSIS — Z34.83 ENCOUNTER FOR SUPERVISION OF OTHER NORMAL PREGNANCY IN THIRD TRIMESTER: Primary | ICD-10-CM

## 2021-12-06 NOTE — TELEPHONE ENCOUNTER
Pt has been experiencing hot flashes on occasion, but had a very bad episode this Saturday evening. Started with a hot flash and lightheadedness/dizziness and then started votmiting.  While vomiting, pt saw spots/stars and when she got up she almost fell an

## 2021-12-06 NOTE — TELEPHONE ENCOUNTER
Richard Benjamin, DO  [facility] Clinical Staff 19 minutes ago (12:56 PM)         Those symptoms might be caused by drop in her BPs, when she feels that way she should drink water and lay down until it passes, staying hydrated most important, may consid

## 2021-12-07 PROBLEM — O99.019 IRON DEFICIENCY ANEMIA DURING PREGNANCY, ANTEPARTUM (HCC): Status: ACTIVE | Noted: 2021-08-12

## 2021-12-07 PROBLEM — O99.019 IRON DEFICIENCY ANEMIA DURING PREGNANCY, ANTEPARTUM: Status: ACTIVE | Noted: 2021-08-12

## 2021-12-07 PROBLEM — D50.9 IRON DEFICIENCY ANEMIA DURING PREGNANCY, ANTEPARTUM: Status: ACTIVE | Noted: 2021-08-12

## 2021-12-07 PROBLEM — D50.9 IRON DEFICIENCY ANEMIA DURING PREGNANCY, ANTEPARTUM (HCC): Status: ACTIVE | Noted: 2021-08-12

## 2021-12-07 NOTE — PROGRESS NOTES
Anemia is mild (Hb 11.1) though ferritin (bioavailable iron) & vitamin B12 are both very low at 33w3d. The ferritin has dropped by 92% over the past 3 months. The vitamin B12 is about the same level.      At last visit patient reported taking:  -PNV gummy -

## 2021-12-07 NOTE — PROGRESS NOTES
Pt advised of results and recommendations for -  -switch to prenatal vitamin containing iron  -take extra oral iron tablet once daily at least 4 hr apart from prenatal vitamin  -start extra vitamin B12 1000 mcg over the counter daily.    Understanding verb

## 2021-12-13 ENCOUNTER — ROUTINE PRENATAL (OUTPATIENT)
Dept: OBGYN CLINIC | Facility: CLINIC | Age: 26
End: 2021-12-13
Payer: MEDICAID

## 2021-12-13 VITALS
DIASTOLIC BLOOD PRESSURE: 62 MMHG | SYSTOLIC BLOOD PRESSURE: 102 MMHG | HEIGHT: 60 IN | BODY MASS INDEX: 32.98 KG/M2 | HEART RATE: 80 BPM | WEIGHT: 168 LBS

## 2021-12-13 DIAGNOSIS — O99.353 SEIZURE DISORDER DURING PREGNANCY IN THIRD TRIMESTER (HCC): Primary | ICD-10-CM

## 2021-12-13 DIAGNOSIS — O28.0 LOW MATERNAL SERUM VITAMIN B12: ICD-10-CM

## 2021-12-13 DIAGNOSIS — Z28.3 RUBELLA NON-IMMUNE STATUS, ANTEPARTUM: ICD-10-CM

## 2021-12-13 DIAGNOSIS — G40.909 SEIZURE DISORDER DURING PREGNANCY IN THIRD TRIMESTER (HCC): Primary | ICD-10-CM

## 2021-12-13 DIAGNOSIS — O99.013 ANEMIA AFFECTING PREGNANCY IN THIRD TRIMESTER: ICD-10-CM

## 2021-12-13 DIAGNOSIS — O99.891 DISORDER OF MUSCULOSKELETAL SYSTEM DURING PREGNANCY: ICD-10-CM

## 2021-12-13 DIAGNOSIS — M89.9 PUBIC BONE PAIN: ICD-10-CM

## 2021-12-13 DIAGNOSIS — Z87.59 HISTORY OF HYPEREMESIS GRAVIDARUM: ICD-10-CM

## 2021-12-13 DIAGNOSIS — O99.891 RUBELLA NON-IMMUNE STATUS, ANTEPARTUM: ICD-10-CM

## 2021-12-13 DIAGNOSIS — O9A.219 TRAUMA DURING PREGNANCY: ICD-10-CM

## 2021-12-13 DIAGNOSIS — D50.9 IRON DEFICIENCY ANEMIA DURING PREGNANCY, ANTEPARTUM: ICD-10-CM

## 2021-12-13 DIAGNOSIS — O99.213 OBESITY AFFECTING PREGNANCY IN THIRD TRIMESTER: ICD-10-CM

## 2021-12-13 DIAGNOSIS — O99.019 IRON DEFICIENCY ANEMIA DURING PREGNANCY, ANTEPARTUM: ICD-10-CM

## 2021-12-13 PROCEDURE — 3008F BODY MASS INDEX DOCD: CPT | Performed by: OBSTETRICS & GYNECOLOGY

## 2021-12-13 PROCEDURE — 81002 URINALYSIS NONAUTO W/O SCOPE: CPT | Performed by: OBSTETRICS & GYNECOLOGY

## 2021-12-13 PROCEDURE — 3078F DIAST BP <80 MM HG: CPT | Performed by: OBSTETRICS & GYNECOLOGY

## 2021-12-13 PROCEDURE — 3074F SYST BP LT 130 MM HG: CPT | Performed by: OBSTETRICS & GYNECOLOGY

## 2021-12-13 PROCEDURE — 0502F SUBSEQUENT PRENATAL CARE: CPT | Performed by: OBSTETRICS & GYNECOLOGY

## 2021-12-13 RX ORDER — PYRIDOXINE HCL (VITAMIN B6) 50 MG
TABLET ORAL
COMMUNITY
End: 2022-01-19

## 2021-12-13 NOTE — PROGRESS NOTES
BOBO    12/3 Patient contacted office c/o noticing some pink spotting with wiping evening prior, very mild cramping. 12/6 Patient contacted office - c/o hot flashes with lightheadedness -> vomiting. No LOC. No syncope.  Had had a headache but not current - NSTs 36 wk     4. Pubic symphysis pain  -PT referral placed     5. Rubella non-immune  -MMR postpartum     6.  Anemia   -8/12 Hb 11.1, Vit B12 225, ferritin 73.4, iron 76 -> advised extra vit B12   -10/27 Hb 10.9   -Hb 11.1, ferritin 6, vit B12 226 - ha

## 2021-12-13 NOTE — PATIENT INSTRUCTIONS
Please have the following [facility]:    -CMP ordered by Dr. Mahesh Tapia on 12/6  -Levetiracetam ordered by Dr. Magdalena Feng on 11/10    Notes about last ultrasound:   Fetal abdominal circumference was abnormally high on your last M ultrasound.  aJmaal Mancia is to lie down on your left side

## 2021-12-14 ENCOUNTER — TELEPHONE (OUTPATIENT)
Dept: OBGYN CLINIC | Facility: CLINIC | Age: 26
End: 2021-12-14

## 2021-12-14 DIAGNOSIS — Z01.812 PRE-PROCEDURAL LABORATORY EXAMINATION: Primary | ICD-10-CM

## 2021-12-14 NOTE — TELEPHONE ENCOUNTER
Surgical Scheduling Notification  Received: Maritza Lala, [facility] Staff  Please schedule PM cytotec IOL for 39 weeks for seizure disorder, obesity. Thanks!

## 2021-12-14 NOTE — PROGRESS NOTES
Pt aware IV iron to be ordered, should still take B12. Understanding verbalized. Order form filled and placed in provider bin for signature and faxing.

## 2021-12-14 NOTE — TELEPHONE ENCOUNTER
Pt aware IOL scheduled for 1/16/2021 at 2000. Understanding verbalized. Calendar updated, scheduled delivery COVID test placed and routed for signature.

## 2021-12-16 ENCOUNTER — TELEPHONE (OUTPATIENT)
Dept: HEMATOLOGY/ONCOLOGY | Facility: HOSPITAL | Age: 26
End: 2021-12-16

## 2021-12-18 ENCOUNTER — ROUTINE PRENATAL (OUTPATIENT)
Dept: OBGYN CLINIC | Facility: CLINIC | Age: 26
End: 2021-12-18
Payer: MEDICAID

## 2021-12-18 VITALS
HEIGHT: 60 IN | WEIGHT: 165 LBS | HEART RATE: 74 BPM | BODY MASS INDEX: 32.39 KG/M2 | DIASTOLIC BLOOD PRESSURE: 62 MMHG | SYSTOLIC BLOOD PRESSURE: 102 MMHG

## 2021-12-18 DIAGNOSIS — Z34.93 ENCOUNTER FOR SUPERVISION OF NORMAL PREGNANCY IN THIRD TRIMESTER, UNSPECIFIED GRAVIDITY: Primary | ICD-10-CM

## 2021-12-18 PROCEDURE — 3074F SYST BP LT 130 MM HG: CPT | Performed by: OBSTETRICS & GYNECOLOGY

## 2021-12-18 PROCEDURE — 81002 URINALYSIS NONAUTO W/O SCOPE: CPT | Performed by: OBSTETRICS & GYNECOLOGY

## 2021-12-18 PROCEDURE — 3078F DIAST BP <80 MM HG: CPT | Performed by: OBSTETRICS & GYNECOLOGY

## 2021-12-18 PROCEDURE — 0502F SUBSEQUENT PRENATAL CARE: CPT | Performed by: OBSTETRICS & GYNECOLOGY

## 2021-12-18 PROCEDURE — 3008F BODY MASS INDEX DOCD: CPT | Performed by: OBSTETRICS & GYNECOLOGY

## 2021-12-18 NOTE — PROGRESS NOTES
She was urinating today she got up and had an additional gush no bleeding no contractions other than [employer]. Is due for her third Covid vaccination. Having to wear a pad when she got here.   Sterile vaginal exam showed no fluid in the vagina and ne

## 2021-12-20 ENCOUNTER — APPOINTMENT (OUTPATIENT)
Dept: HEMATOLOGY/ONCOLOGY | Facility: HOSPITAL | Age: 26
End: 2021-12-20
Attending: OBSTETRICS & GYNECOLOGY
Payer: MEDICAID

## 2021-12-22 ENCOUNTER — OFFICE VISIT (OUTPATIENT)
Dept: HEMATOLOGY/ONCOLOGY | Facility: HOSPITAL | Age: 26
End: 2021-12-22
Attending: OBSTETRICS & GYNECOLOGY
Payer: MEDICAID

## 2021-12-22 VITALS
SYSTOLIC BLOOD PRESSURE: 108 MMHG | TEMPERATURE: 98 F | RESPIRATION RATE: 20 BRPM | DIASTOLIC BLOOD PRESSURE: 67 MMHG | HEART RATE: 74 BPM | OXYGEN SATURATION: 98 %

## 2021-12-22 DIAGNOSIS — O99.013 ANEMIA AFFECTING PREGNANCY IN THIRD TRIMESTER: Primary | ICD-10-CM

## 2021-12-22 PROCEDURE — 96374 THER/PROPH/DIAG INJ IV PUSH: CPT

## 2021-12-22 NOTE — PROGRESS NOTES
Education Record    Learner:  Patient    Disease / Diagnosis:Iron Deficiency     Barriers / Limitations:  None   Comments:    Method:  Discussion   Comments:    General Topics:  Plan of care reviewed   Comments:    Outcome:  Shows understanding   Comments:

## 2021-12-27 ENCOUNTER — ROUTINE PRENATAL (OUTPATIENT)
Dept: OBGYN CLINIC | Facility: CLINIC | Age: 26
End: 2021-12-27
Payer: MEDICAID

## 2021-12-27 ENCOUNTER — OFFICE VISIT (OUTPATIENT)
Dept: HEMATOLOGY/ONCOLOGY | Facility: HOSPITAL | Age: 26
End: 2021-12-27
Attending: OBSTETRICS & GYNECOLOGY
Payer: MEDICAID

## 2021-12-27 VITALS
HEART RATE: 94 BPM | OXYGEN SATURATION: 97 % | DIASTOLIC BLOOD PRESSURE: 50 MMHG | TEMPERATURE: 97 F | WEIGHT: 171.63 LBS | RESPIRATION RATE: 18 BRPM | SYSTOLIC BLOOD PRESSURE: 95 MMHG | HEIGHT: 60 IN | BODY MASS INDEX: 33.7 KG/M2

## 2021-12-27 VITALS
HEART RATE: 94 BPM | WEIGHT: 169 LBS | DIASTOLIC BLOOD PRESSURE: 58 MMHG | HEIGHT: 60 IN | BODY MASS INDEX: 33.18 KG/M2 | SYSTOLIC BLOOD PRESSURE: 96 MMHG

## 2021-12-27 DIAGNOSIS — O99.213 OBESITY AFFECTING PREGNANCY IN THIRD TRIMESTER: ICD-10-CM

## 2021-12-27 DIAGNOSIS — O99.013 ANEMIA AFFECTING PREGNANCY IN THIRD TRIMESTER: Primary | ICD-10-CM

## 2021-12-27 DIAGNOSIS — Z34.93 ENCOUNTER FOR SUPERVISION OF NORMAL PREGNANCY IN THIRD TRIMESTER, UNSPECIFIED GRAVIDITY: Primary | ICD-10-CM

## 2021-12-27 PROCEDURE — 87081 CULTURE SCREEN ONLY: CPT | Performed by: STUDENT IN AN ORGANIZED HEALTH CARE EDUCATION/TRAINING PROGRAM

## 2021-12-27 PROCEDURE — 0502F SUBSEQUENT PRENATAL CARE: CPT | Performed by: STUDENT IN AN ORGANIZED HEALTH CARE EDUCATION/TRAINING PROGRAM

## 2021-12-27 PROCEDURE — 3008F BODY MASS INDEX DOCD: CPT | Performed by: STUDENT IN AN ORGANIZED HEALTH CARE EDUCATION/TRAINING PROGRAM

## 2021-12-27 PROCEDURE — 87653 STREP B DNA AMP PROBE: CPT | Performed by: STUDENT IN AN ORGANIZED HEALTH CARE EDUCATION/TRAINING PROGRAM

## 2021-12-27 PROCEDURE — 59025 FETAL NON-STRESS TEST: CPT | Performed by: STUDENT IN AN ORGANIZED HEALTH CARE EDUCATION/TRAINING PROGRAM

## 2021-12-27 PROCEDURE — 81002 URINALYSIS NONAUTO W/O SCOPE: CPT | Performed by: STUDENT IN AN ORGANIZED HEALTH CARE EDUCATION/TRAINING PROGRAM

## 2021-12-27 PROCEDURE — 3074F SYST BP LT 130 MM HG: CPT | Performed by: STUDENT IN AN ORGANIZED HEALTH CARE EDUCATION/TRAINING PROGRAM

## 2021-12-27 PROCEDURE — 96374 THER/PROPH/DIAG INJ IV PUSH: CPT

## 2021-12-27 PROCEDURE — 3078F DIAST BP <80 MM HG: CPT | Performed by: STUDENT IN AN ORGANIZED HEALTH CARE EDUCATION/TRAINING PROGRAM

## 2021-12-27 NOTE — PROGRESS NOTES
Random pelvic pain & pressure. Some \"hot flashes\" e.g when standing doing dishes, last one >1wk ago  Baby active    27year old  at 36w2d   WAI 22   A+  -cfDNA neg, BOY. Carrier neg.  AFP normal  -1 hr GTT ok, 3rd trim HIV neg  -Tdap, Flu, COV

## 2021-12-29 ENCOUNTER — OFFICE VISIT (OUTPATIENT)
Dept: HEMATOLOGY/ONCOLOGY | Facility: HOSPITAL | Age: 26
End: 2021-12-29
Attending: OBSTETRICS & GYNECOLOGY
Payer: MEDICAID

## 2021-12-29 VITALS
DIASTOLIC BLOOD PRESSURE: 60 MMHG | TEMPERATURE: 98 F | RESPIRATION RATE: 16 BRPM | SYSTOLIC BLOOD PRESSURE: 114 MMHG | HEART RATE: 79 BPM

## 2021-12-29 DIAGNOSIS — O99.013 ANEMIA AFFECTING PREGNANCY IN THIRD TRIMESTER: Primary | ICD-10-CM

## 2021-12-29 PROCEDURE — 96374 THER/PROPH/DIAG INJ IV PUSH: CPT

## 2021-12-29 NOTE — PROGRESS NOTES
Education Record    Learner:  Patient    Disease / Diagnosis: anemia    Barriers / Limitations:  None   Comments:    Method:  Discussion   Comments:    General Topics:  Medication   Comments:    Outcome:  Shows understanding   Comments:    venofer given.  V

## 2022-01-03 ENCOUNTER — APPOINTMENT (OUTPATIENT)
Dept: HEMATOLOGY/ONCOLOGY | Facility: HOSPITAL | Age: 27
End: 2022-01-03
Attending: OBSTETRICS & GYNECOLOGY
Payer: MEDICAID

## 2022-01-05 ENCOUNTER — ROUTINE PRENATAL (OUTPATIENT)
Dept: OBGYN CLINIC | Facility: CLINIC | Age: 27
End: 2022-01-05
Payer: MEDICAID

## 2022-01-05 VITALS
BODY MASS INDEX: 33.77 KG/M2 | SYSTOLIC BLOOD PRESSURE: 116 MMHG | HEIGHT: 60 IN | WEIGHT: 172 LBS | DIASTOLIC BLOOD PRESSURE: 54 MMHG | HEART RATE: 79 BPM

## 2022-01-05 DIAGNOSIS — G40.909 SEIZURE DISORDER DURING PREGNANCY IN THIRD TRIMESTER (HCC): ICD-10-CM

## 2022-01-05 DIAGNOSIS — O99.353 SEIZURE DISORDER DURING PREGNANCY IN THIRD TRIMESTER (HCC): ICD-10-CM

## 2022-01-05 DIAGNOSIS — Z34.83 PRENATAL CARE, SUBSEQUENT PREGNANCY, THIRD TRIMESTER: Primary | ICD-10-CM

## 2022-01-05 LAB
GLUCOSE (URINE DIPSTICK): NEGATIVE MG/DL
MULTISTIX LOT#: NORMAL NUMERIC

## 2022-01-05 PROCEDURE — 59025 FETAL NON-STRESS TEST: CPT | Performed by: OBSTETRICS & GYNECOLOGY

## 2022-01-05 PROCEDURE — 3074F SYST BP LT 130 MM HG: CPT | Performed by: OBSTETRICS & GYNECOLOGY

## 2022-01-05 PROCEDURE — 81002 URINALYSIS NONAUTO W/O SCOPE: CPT | Performed by: OBSTETRICS & GYNECOLOGY

## 2022-01-05 PROCEDURE — 3008F BODY MASS INDEX DOCD: CPT | Performed by: OBSTETRICS & GYNECOLOGY

## 2022-01-05 PROCEDURE — 3078F DIAST BP <80 MM HG: CPT | Performed by: OBSTETRICS & GYNECOLOGY

## 2022-01-05 PROCEDURE — 0502F SUBSEQUENT PRENATAL CARE: CPT | Performed by: OBSTETRICS & GYNECOLOGY

## 2022-01-11 NOTE — PROGRESS NOTES
BOBO    +FM. Occasional mild contractions. No leaking fluid, vaginal bleeding    27year old  at 38w4d  WAI 22   A+/GBS neg    -cfDNA neg, BOY. Carrier neg. AFP normal  -1 hr GTT ok, 3rd trim HIV neg  -Tdap, Flu, COVID-19 vaccines done.  COVID jeanine weight gain   - Growth US at 32 wk - as above   - NSTs 36 wk     4. Pubic symphysis pain  -PT referral placed     5. Rubella non-immune  -MMR postpartum     6.  Anemia   -8/12 Hb 11.1, Vit B12 225, ferritin 73.4, iron 76 -> advised extra vit B12   -10/27 Hb

## 2022-01-12 ENCOUNTER — ROUTINE PRENATAL (OUTPATIENT)
Dept: OBGYN CLINIC | Facility: CLINIC | Age: 27
End: 2022-01-12
Payer: MEDICAID

## 2022-01-12 VITALS
SYSTOLIC BLOOD PRESSURE: 112 MMHG | DIASTOLIC BLOOD PRESSURE: 60 MMHG | WEIGHT: 172.19 LBS | HEART RATE: 82 BPM | HEIGHT: 60 IN | BODY MASS INDEX: 33.8 KG/M2

## 2022-01-12 DIAGNOSIS — Z30.2 REQUEST FOR STERILIZATION: ICD-10-CM

## 2022-01-12 DIAGNOSIS — O99.891 RUBELLA NON-IMMUNE STATUS, ANTEPARTUM: ICD-10-CM

## 2022-01-12 DIAGNOSIS — O99.213 OBESITY AFFECTING PREGNANCY IN THIRD TRIMESTER: ICD-10-CM

## 2022-01-12 DIAGNOSIS — O99.353 SEIZURE DISORDER DURING PREGNANCY IN THIRD TRIMESTER (HCC): Primary | ICD-10-CM

## 2022-01-12 DIAGNOSIS — O28.0 LOW MATERNAL SERUM VITAMIN B12: ICD-10-CM

## 2022-01-12 DIAGNOSIS — G40.909 SEIZURE DISORDER DURING PREGNANCY IN THIRD TRIMESTER (HCC): Primary | ICD-10-CM

## 2022-01-12 DIAGNOSIS — O99.019 IRON DEFICIENCY ANEMIA DURING PREGNANCY, ANTEPARTUM: ICD-10-CM

## 2022-01-12 DIAGNOSIS — O99.013 ANEMIA AFFECTING PREGNANCY IN THIRD TRIMESTER: ICD-10-CM

## 2022-01-12 DIAGNOSIS — D50.9 IRON DEFICIENCY ANEMIA DURING PREGNANCY, ANTEPARTUM: ICD-10-CM

## 2022-01-12 DIAGNOSIS — O21.0 HYPEREMESIS AFFECTING PREGNANCY, ANTEPARTUM: ICD-10-CM

## 2022-01-12 DIAGNOSIS — Z28.3 RUBELLA NON-IMMUNE STATUS, ANTEPARTUM: ICD-10-CM

## 2022-01-12 PROCEDURE — 3008F BODY MASS INDEX DOCD: CPT | Performed by: OBSTETRICS & GYNECOLOGY

## 2022-01-12 PROCEDURE — 0502F SUBSEQUENT PRENATAL CARE: CPT | Performed by: OBSTETRICS & GYNECOLOGY

## 2022-01-12 PROCEDURE — 81002 URINALYSIS NONAUTO W/O SCOPE: CPT | Performed by: OBSTETRICS & GYNECOLOGY

## 2022-01-12 PROCEDURE — 59025 FETAL NON-STRESS TEST: CPT | Performed by: OBSTETRICS & GYNECOLOGY

## 2022-01-12 PROCEDURE — 3074F SYST BP LT 130 MM HG: CPT | Performed by: OBSTETRICS & GYNECOLOGY

## 2022-01-12 PROCEDURE — 3078F DIAST BP <80 MM HG: CPT | Performed by: OBSTETRICS & GYNECOLOGY

## 2022-01-14 ENCOUNTER — LAB ENCOUNTER (OUTPATIENT)
Dept: LAB | Facility: HOSPITAL | Age: 27
End: 2022-01-14
Attending: OBSTETRICS & GYNECOLOGY
Payer: MEDICAID

## 2022-01-14 DIAGNOSIS — Z01.812 PRE-PROCEDURAL LABORATORY EXAMINATION: ICD-10-CM

## 2022-01-16 LAB — SARS-COV-2 RNA RESP QL NAA+PROBE: NOT DETECTED

## 2022-01-17 ENCOUNTER — ANESTHESIA (OUTPATIENT)
Dept: OBGYN UNIT | Facility: HOSPITAL | Age: 27
End: 2022-01-17
Payer: MEDICAID

## 2022-01-17 ENCOUNTER — ANESTHESIA EVENT (OUTPATIENT)
Dept: OBGYN UNIT | Facility: HOSPITAL | Age: 27
End: 2022-01-17
Payer: MEDICAID

## 2022-01-17 ENCOUNTER — HOSPITAL ENCOUNTER (INPATIENT)
Facility: HOSPITAL | Age: 27
LOS: 2 days | Discharge: HOME | End: 2022-01-19
Attending: OBSTETRICS & GYNECOLOGY | Admitting: OBSTETRICS & GYNECOLOGY
Payer: MEDICAID

## 2022-01-17 ENCOUNTER — APPOINTMENT (OUTPATIENT)
Dept: OBGYN CLINIC | Facility: HOSPITAL | Age: 27
End: 2022-01-17
Payer: MEDICAID

## 2022-01-17 PROBLEM — Z34.90 PREGNANCY (HCC): Status: ACTIVE | Noted: 2022-01-17

## 2022-01-17 PROBLEM — Z34.90 PREGNANCY: Status: ACTIVE | Noted: 2022-01-17

## 2022-01-17 LAB
ANTIBODY SCREEN: NEGATIVE
APTT PPP: 28.3 SECONDS (ref 23.3–35.6)
BASOPHILS # BLD AUTO: 0.02 X10(3) UL (ref 0–0.2)
BASOPHILS # BLD AUTO: 0.03 X10(3) UL (ref 0–0.2)
BASOPHILS NFR BLD AUTO: 0.1 %
BASOPHILS NFR BLD AUTO: 0.4 %
EOSINOPHIL # BLD AUTO: 0.03 X10(3) UL (ref 0–0.7)
EOSINOPHIL # BLD AUTO: 0.11 X10(3) UL (ref 0–0.7)
EOSINOPHIL NFR BLD AUTO: 0.2 %
EOSINOPHIL NFR BLD AUTO: 1.5 %
ERYTHROCYTE [DISTWIDTH] IN BLOOD BY AUTOMATED COUNT: 17 %
ERYTHROCYTE [DISTWIDTH] IN BLOOD BY AUTOMATED COUNT: 17.2 %
FIBRINOGEN PPP-MCNC: 345 MG/DL (ref 180–480)
HCT VFR BLD AUTO: 28 %
HCT VFR BLD AUTO: 35.9 %
HGB BLD-MCNC: 11.7 G/DL
HGB BLD-MCNC: 9.3 G/DL
IMM GRANULOCYTES # BLD AUTO: 0.05 X10(3) UL (ref 0–1)
IMM GRANULOCYTES # BLD AUTO: 0.05 X10(3) UL (ref 0–1)
IMM GRANULOCYTES NFR BLD: 0.3 %
IMM GRANULOCYTES NFR BLD: 0.7 %
INR BLD: 1.18 (ref 0.8–1.2)
LYMPHOCYTES # BLD AUTO: 1.67 X10(3) UL (ref 1–4)
LYMPHOCYTES # BLD AUTO: 1.9 X10(3) UL (ref 1–4)
LYMPHOCYTES NFR BLD AUTO: 12.8 %
LYMPHOCYTES NFR BLD AUTO: 22.8 %
MCH RBC QN AUTO: 27.9 PG (ref 26–34)
MCH RBC QN AUTO: 29.1 PG (ref 26–34)
MCHC RBC AUTO-ENTMCNC: 32.6 G/DL (ref 31–37)
MCHC RBC AUTO-ENTMCNC: 33.2 G/DL (ref 31–37)
MCV RBC AUTO: 85.5 FL
MCV RBC AUTO: 87.5 FL
MONOCYTES # BLD AUTO: 0.56 X10(3) UL (ref 0.1–1)
MONOCYTES # BLD AUTO: 1.02 X10(3) UL (ref 0.1–1)
MONOCYTES NFR BLD AUTO: 6.9 %
MONOCYTES NFR BLD AUTO: 7.7 %
NEUTROPHILS # BLD AUTO: 11.83 X10 (3) UL (ref 1.5–7.7)
NEUTROPHILS # BLD AUTO: 11.83 X10(3) UL (ref 1.5–7.7)
NEUTROPHILS # BLD AUTO: 4.9 X10 (3) UL (ref 1.5–7.7)
NEUTROPHILS # BLD AUTO: 4.9 X10(3) UL (ref 1.5–7.7)
NEUTROPHILS NFR BLD AUTO: 66.9 %
NEUTROPHILS NFR BLD AUTO: 79.7 %
PLATELET # BLD AUTO: 214 10(3)UL (ref 150–450)
PLATELET # BLD AUTO: 252 10(3)UL (ref 150–450)
PROTHROMBIN TIME: 15.1 SECONDS (ref 11.6–14.8)
RBC # BLD AUTO: 3.2 X10(6)UL
RBC # BLD AUTO: 4.2 X10(6)UL
RH BLOOD TYPE: POSITIVE
T PALLIDUM AB SER QL IA: NONREACTIVE
WBC # BLD AUTO: 14.9 X10(3) UL (ref 4–11)
WBC # BLD AUTO: 7.3 X10(3) UL (ref 4–11)

## 2022-01-17 PROCEDURE — 0UQC7ZZ REPAIR CERVIX, VIA NATURAL OR ARTIFICIAL OPENING: ICD-10-PCS | Performed by: OBSTETRICS & GYNECOLOGY

## 2022-01-17 PROCEDURE — 3E033VJ INTRODUCTION OF OTHER HORMONE INTO PERIPHERAL VEIN, PERCUTANEOUS APPROACH: ICD-10-PCS | Performed by: OBSTETRICS & GYNECOLOGY

## 2022-01-17 PROCEDURE — 59409 OBSTETRICAL CARE: CPT | Performed by: OBSTETRICS & GYNECOLOGY

## 2022-01-17 PROCEDURE — 30233N1 TRANSFUSION OF NONAUTOLOGOUS RED BLOOD CELLS INTO PERIPHERAL VEIN, PERCUTANEOUS APPROACH: ICD-10-PCS | Performed by: OBSTETRICS & GYNECOLOGY

## 2022-01-17 PROCEDURE — 10907ZC DRAINAGE OF AMNIOTIC FLUID, THERAPEUTIC FROM PRODUCTS OF CONCEPTION, VIA NATURAL OR ARTIFICIAL OPENING: ICD-10-PCS | Performed by: OBSTETRICS & GYNECOLOGY

## 2022-01-17 PROCEDURE — 57720 REVISION OF CERVIX: CPT | Performed by: OBSTETRICS & GYNECOLOGY

## 2022-01-17 PROCEDURE — 10D17Z9 MANUAL EXTRACTION OF PRODUCTS OF CONCEPTION, RETAINED, VIA NATURAL OR ARTIFICIAL OPENING: ICD-10-PCS | Performed by: OBSTETRICS & GYNECOLOGY

## 2022-01-17 PROCEDURE — 36430 TRANSFUSION BLD/BLD COMPNT: CPT | Performed by: ANESTHESIOLOGY

## 2022-01-17 PROCEDURE — 0KQM0ZZ REPAIR PERINEUM MUSCLE, OPEN APPROACH: ICD-10-PCS | Performed by: OBSTETRICS & GYNECOLOGY

## 2022-01-17 RX ORDER — BUPIVACAINE HCL/0.9 % NACL/PF 0.25 %
5 PLASTIC BAG, INJECTION (ML) EPIDURAL AS NEEDED
Status: DISCONTINUED | OUTPATIENT
Start: 2022-01-17 | End: 2022-01-17

## 2022-01-17 RX ORDER — LOPERAMIDE HYDROCHLORIDE 2 MG/1
4 CAPSULE ORAL ONCE AS NEEDED
Status: DISCONTINUED | OUTPATIENT
Start: 2022-01-17 | End: 2022-01-18

## 2022-01-17 RX ORDER — METHYLERGONOVINE MALEATE 0.2 MG/ML
INJECTION INTRAVENOUS
Status: COMPLETED
Start: 2022-01-17 | End: 2022-01-17

## 2022-01-17 RX ORDER — TRANEXAMIC ACID 10 MG/ML
INJECTION, SOLUTION INTRAVENOUS
Status: DISCONTINUED
Start: 2022-01-17 | End: 2022-01-17 | Stop reason: WASHOUT

## 2022-01-17 RX ORDER — DOCUSATE SODIUM 100 MG/1
100 CAPSULE, LIQUID FILLED ORAL
Status: DISCONTINUED | OUTPATIENT
Start: 2022-01-17 | End: 2022-01-18

## 2022-01-17 RX ORDER — SODIUM CHLORIDE, SODIUM LACTATE, POTASSIUM CHLORIDE, CALCIUM CHLORIDE 600; 310; 30; 20 MG/100ML; MG/100ML; MG/100ML; MG/100ML
INJECTION, SOLUTION INTRAVENOUS CONTINUOUS PRN
Status: DISCONTINUED | OUTPATIENT
Start: 2022-01-17 | End: 2022-01-18

## 2022-01-17 RX ORDER — METHYLERGONOVINE MALEATE 0.2 MG/ML
0.2 INJECTION INTRAVENOUS ONCE AS NEEDED
Status: DISCONTINUED | OUTPATIENT
Start: 2022-01-17 | End: 2022-01-17 | Stop reason: HOSPADM

## 2022-01-17 RX ORDER — BISACODYL 10 MG
10 SUPPOSITORY, RECTAL RECTAL ONCE AS NEEDED
Status: DISCONTINUED | OUTPATIENT
Start: 2022-01-17 | End: 2022-01-19

## 2022-01-17 RX ORDER — CEFAZOLIN SODIUM/WATER 2 G/20 ML
SYRINGE (ML) INTRAVENOUS
Status: DISCONTINUED
Start: 2022-01-17 | End: 2022-01-18

## 2022-01-17 RX ORDER — NALBUPHINE HCL 10 MG/ML
2.5 AMPUL (ML) INJECTION
Status: DISCONTINUED | OUTPATIENT
Start: 2022-01-17 | End: 2022-01-18 | Stop reason: HOSPADM

## 2022-01-17 RX ORDER — NITROGLYCERIN 20 MG/100ML
INJECTION INTRAVENOUS
Status: DISCONTINUED
Start: 2022-01-17 | End: 2022-01-18

## 2022-01-17 RX ORDER — TERBUTALINE SULFATE 1 MG/ML
0.25 INJECTION, SOLUTION SUBCUTANEOUS AS NEEDED
Status: DISCONTINUED | OUTPATIENT
Start: 2022-01-17 | End: 2022-01-17 | Stop reason: HOSPADM

## 2022-01-17 RX ORDER — OXYTOCIN 10 [USP'U]/ML
INJECTION, SOLUTION INTRAMUSCULAR; INTRAVENOUS
Status: DISCONTINUED
Start: 2022-01-17 | End: 2022-01-17 | Stop reason: WASHOUT

## 2022-01-17 RX ORDER — ACETAMINOPHEN 325 MG/1
650 TABLET ORAL EVERY 6 HOURS PRN
Status: DISCONTINUED | OUTPATIENT
Start: 2022-01-17 | End: 2022-01-19

## 2022-01-17 RX ORDER — HYDROMORPHONE HYDROCHLORIDE 1 MG/ML
0.2 INJECTION, SOLUTION INTRAMUSCULAR; INTRAVENOUS; SUBCUTANEOUS EVERY 5 MIN PRN
Status: DISCONTINUED | OUTPATIENT
Start: 2022-01-17 | End: 2022-01-18 | Stop reason: HOSPADM

## 2022-01-17 RX ORDER — NALBUPHINE HCL 10 MG/ML
2.5 AMPUL (ML) INJECTION
Status: DISCONTINUED | OUTPATIENT
Start: 2022-01-17 | End: 2022-01-17

## 2022-01-17 RX ORDER — TRANEXAMIC ACID 10 MG/ML
1000 INJECTION, SOLUTION INTRAVENOUS EVERY 30 MIN PRN
Status: DISCONTINUED | OUTPATIENT
Start: 2022-01-17 | End: 2022-01-18

## 2022-01-17 RX ORDER — DIPHENHYDRAMINE HYDROCHLORIDE 50 MG/ML
25 INJECTION INTRAMUSCULAR; INTRAVENOUS ONCE AS NEEDED
Status: ACTIVE | OUTPATIENT
Start: 2022-01-17 | End: 2022-01-17

## 2022-01-17 RX ORDER — DEXTROSE, SODIUM CHLORIDE, SODIUM LACTATE, POTASSIUM CHLORIDE, AND CALCIUM CHLORIDE 5; .6; .31; .03; .02 G/100ML; G/100ML; G/100ML; G/100ML; G/100ML
INJECTION, SOLUTION INTRAVENOUS AS NEEDED
Status: DISCONTINUED | OUTPATIENT
Start: 2022-01-17 | End: 2022-01-17 | Stop reason: HOSPADM

## 2022-01-17 RX ORDER — DEXAMETHASONE SODIUM PHOSPHATE 4 MG/ML
VIAL (ML) INJECTION AS NEEDED
Status: DISCONTINUED | OUTPATIENT
Start: 2022-01-17 | End: 2022-01-17 | Stop reason: SURG

## 2022-01-17 RX ORDER — MISOPROSTOL 200 UG/1
TABLET ORAL
Status: COMPLETED
Start: 2022-01-17 | End: 2022-01-17

## 2022-01-17 RX ORDER — LEVETIRACETAM 500 MG/1
1500 TABLET ORAL 2 TIMES DAILY
Status: DISCONTINUED | OUTPATIENT
Start: 2022-01-17 | End: 2022-01-19

## 2022-01-17 RX ORDER — CARBOPROST TROMETHAMINE 250 UG/ML
INJECTION, SOLUTION INTRAMUSCULAR
Status: COMPLETED
Start: 2022-01-17 | End: 2022-01-17

## 2022-01-17 RX ORDER — MISOPROSTOL 200 UG/1
1000 TABLET ORAL ONCE
Status: DISCONTINUED | OUTPATIENT
Start: 2022-01-17 | End: 2022-01-18

## 2022-01-17 RX ORDER — SODIUM CHLORIDE, SODIUM LACTATE, POTASSIUM CHLORIDE, CALCIUM CHLORIDE 600; 310; 30; 20 MG/100ML; MG/100ML; MG/100ML; MG/100ML
INJECTION, SOLUTION INTRAVENOUS CONTINUOUS
Status: DISCONTINUED | OUTPATIENT
Start: 2022-01-17 | End: 2022-01-17 | Stop reason: HOSPADM

## 2022-01-17 RX ORDER — SIMETHICONE 80 MG
80 TABLET,CHEWABLE ORAL 3 TIMES DAILY PRN
Status: DISCONTINUED | OUTPATIENT
Start: 2022-01-17 | End: 2022-01-19

## 2022-01-17 RX ORDER — TRISODIUM CITRATE DIHYDRATE AND CITRIC ACID MONOHYDRATE 500; 334 MG/5ML; MG/5ML
30 SOLUTION ORAL AS NEEDED
Status: DISCONTINUED | OUTPATIENT
Start: 2022-01-17 | End: 2022-01-17 | Stop reason: HOSPADM

## 2022-01-17 RX ORDER — ONDANSETRON 2 MG/ML
4 INJECTION INTRAMUSCULAR; INTRAVENOUS ONCE AS NEEDED
Status: ACTIVE | OUTPATIENT
Start: 2022-01-17 | End: 2022-01-17

## 2022-01-17 RX ORDER — SODIUM CHLORIDE, SODIUM LACTATE, POTASSIUM CHLORIDE, CALCIUM CHLORIDE 600; 310; 30; 20 MG/100ML; MG/100ML; MG/100ML; MG/100ML
INJECTION, SOLUTION INTRAVENOUS CONTINUOUS
Status: DISCONTINUED | OUTPATIENT
Start: 2022-01-18 | End: 2022-01-18

## 2022-01-17 RX ORDER — SODIUM CHLORIDE 9 MG/ML
INJECTION, SOLUTION INTRAVENOUS CONTINUOUS PRN
Status: DISCONTINUED | OUTPATIENT
Start: 2022-01-17 | End: 2022-01-18

## 2022-01-17 RX ORDER — TERBUTALINE SULFATE 1 MG/ML
INJECTION, SOLUTION SUBCUTANEOUS
Status: DISCONTINUED
Start: 2022-01-17 | End: 2022-01-18

## 2022-01-17 RX ORDER — CEFAZOLIN SODIUM/WATER 2 G/20 ML
SYRINGE (ML) INTRAVENOUS AS NEEDED
Status: DISCONTINUED | OUTPATIENT
Start: 2022-01-17 | End: 2022-01-17 | Stop reason: SURG

## 2022-01-17 RX ORDER — ONDANSETRON 2 MG/ML
4 INJECTION INTRAMUSCULAR; INTRAVENOUS EVERY 6 HOURS PRN
Status: DISCONTINUED | OUTPATIENT
Start: 2022-01-17 | End: 2022-01-17 | Stop reason: HOSPADM

## 2022-01-17 RX ORDER — KETOROLAC TROMETHAMINE 30 MG/ML
30 INJECTION, SOLUTION INTRAMUSCULAR; INTRAVENOUS EVERY 6 HOURS PRN
Status: DISCONTINUED | OUTPATIENT
Start: 2022-01-17 | End: 2022-01-18

## 2022-01-17 RX ORDER — IBUPROFEN 600 MG/1
600 TABLET ORAL EVERY 6 HOURS PRN
Status: DISCONTINUED | OUTPATIENT
Start: 2022-01-17 | End: 2022-01-17

## 2022-01-17 RX ORDER — METHYLERGONOVINE MALEATE 0.2 MG/ML
0.2 INJECTION INTRAVENOUS ONCE
Status: DISCONTINUED | OUTPATIENT
Start: 2022-01-17 | End: 2022-01-18

## 2022-01-17 RX ORDER — METHYLERGONOVINE MALEATE 0.2 MG/ML
INJECTION INTRAVENOUS
Status: DISCONTINUED
Start: 2022-01-17 | End: 2022-01-18

## 2022-01-17 RX ORDER — ONDANSETRON 2 MG/ML
INJECTION INTRAMUSCULAR; INTRAVENOUS AS NEEDED
Status: DISCONTINUED | OUTPATIENT
Start: 2022-01-17 | End: 2022-01-17 | Stop reason: SURG

## 2022-01-17 RX ORDER — ACETAMINOPHEN 500 MG
1000 TABLET ORAL EVERY 6 HOURS PRN
Status: DISCONTINUED | OUTPATIENT
Start: 2022-01-17 | End: 2022-01-17

## 2022-01-17 RX ORDER — AMMONIA INHALANTS 0.04 G/.3ML
0.3 INHALANT RESPIRATORY (INHALATION) AS NEEDED
Status: DISCONTINUED | OUTPATIENT
Start: 2022-01-17 | End: 2022-01-17 | Stop reason: HOSPADM

## 2022-01-17 RX ORDER — LOPERAMIDE HYDROCHLORIDE 2 MG/1
2 CAPSULE ORAL AS NEEDED
Status: DISCONTINUED | OUTPATIENT
Start: 2022-01-17 | End: 2022-01-18

## 2022-01-17 RX ORDER — MIDAZOLAM HYDROCHLORIDE 1 MG/ML
INJECTION INTRAMUSCULAR; INTRAVENOUS AS NEEDED
Status: DISCONTINUED | OUTPATIENT
Start: 2022-01-17 | End: 2022-01-17 | Stop reason: SURG

## 2022-01-17 RX ORDER — CARBOPROST TROMETHAMINE 250 UG/ML
250 INJECTION, SOLUTION INTRAMUSCULAR
Status: DISCONTINUED | OUTPATIENT
Start: 2022-01-17 | End: 2022-01-18

## 2022-01-17 RX ORDER — IBUPROFEN 600 MG/1
600 TABLET ORAL EVERY 6 HOURS
Status: DISCONTINUED | OUTPATIENT
Start: 2022-01-17 | End: 2022-01-18

## 2022-01-17 RX ADMIN — SODIUM CHLORIDE, SODIUM LACTATE, POTASSIUM CHLORIDE, CALCIUM CHLORIDE: 600; 310; 30; 20 INJECTION, SOLUTION INTRAVENOUS at 23:36:00

## 2022-01-17 RX ADMIN — DEXAMETHASONE SODIUM PHOSPHATE 8 MG: 4 MG/ML VIAL (ML) INJECTION at 23:00:00

## 2022-01-17 RX ADMIN — CEFAZOLIN SODIUM/WATER 2 G: 2 G/20 ML SYRINGE (ML) INTRAVENOUS at 22:54:00

## 2022-01-17 RX ADMIN — SODIUM CHLORIDE, SODIUM LACTATE, POTASSIUM CHLORIDE, CALCIUM CHLORIDE: 600; 310; 30; 20 INJECTION, SOLUTION INTRAVENOUS at 22:50:00

## 2022-01-17 RX ADMIN — MIDAZOLAM HYDROCHLORIDE 2 MG: 1 INJECTION INTRAMUSCULAR; INTRAVENOUS at 22:54:00

## 2022-01-17 RX ADMIN — ONDANSETRON 4 MG: 2 INJECTION INTRAMUSCULAR; INTRAVENOUS at 23:23:00

## 2022-01-17 RX ADMIN — SODIUM CHLORIDE, SODIUM LACTATE, POTASSIUM CHLORIDE, CALCIUM CHLORIDE: 600; 310; 30; 20 INJECTION, SOLUTION INTRAVENOUS at 23:05:00

## 2022-01-17 NOTE — PLAN OF CARE
Problem: SAFETY ADULT - FALL  Goal: Free from fall injury  Description: INTERVENTIONS:  - Assess pt frequently for physical needs  - Identify cognitive and physical deficits and behaviors that affect risk of falls.   - [place] fall precautions as indica Provide emotional support with 1:1 interaction with staff  Outcome: Progressing     Problem: Patient/Family Goals  Goal: Patient/Family Long Term Goal  Description: Patient's Long Term Goal: Have a safe delivery     Interventions:  - Follow policies and pr

## 2022-01-17 NOTE — ANESTHESIA PROCEDURE NOTES
Labor Analgesia  Performed by: Adan Turpin MD  Authorized by: Adan Turpin MD       General Information and Staff    Start Time:  1/17/2022 10:55 AM  End Time:  1/17/2022 11:05 AM  Anesthesiologist:  Adan Turpin MD  Performed by:   Misti

## 2022-01-17 NOTE — PROGRESS NOTES
EFMs applied, FHTs 140. Pt denies ctxs, LOF, and vaginal bleeding. +FM. Pt has anemia. Pt states she has epilepsy and has had seizures during this pregnancy. Pt taking meds for seizures. Pt has hyperemesis.  Pt denies any other problems with this or previou

## 2022-01-17 NOTE — ANESTHESIA PREPROCEDURE EVALUATION
PRE-OP EVALUATION    Patient Name: Georges Rajput    Admit Diagnosis: Pregnancy  Pregnancy    Pre-op Diagnosis: * No pre-op diagnosis entered *        Anesthesia Procedure: LABOR ANALGESIA    * No surgeons found in log *    Pre-op vitals reviewed.   Temp: 97 0615  levetiracetam 1000 MG Oral Tab, Take 1500 mg bid (1000 mg with 500 mg twice daily ), Disp: 180 tablet, Rfl: 1, 1/17/2022 at 0615  ferrous sulfate 325 (65 FE) MG Oral Tab EC, Take 325 mg by mouth daily with breakfast., Disp: , Rfl: , 1/16/2022 at [facility]    Pulmonary exam normal.  Breath sounds clear to auscultation bilaterally. Other findings            ASA: 2   Plan: epidural           Comment: Discussed epidural analgesia in detail.   Risk of headache, infection, bleeding, back pa

## 2022-01-17 NOTE — PROGRESS NOTES
Dr John Mccarthy on unit. This RN updates her on SVE. Pt has epidural. Tracing reviewed per Dr John Mccarthy.

## 2022-01-18 PROBLEM — S37.63XA CERVICAL LACERATION: Status: ACTIVE | Noted: 2022-01-18

## 2022-01-18 PROBLEM — D62 ACUTE BLOOD LOSS ANEMIA: Status: ACTIVE | Noted: 2022-01-18

## 2022-01-18 PROBLEM — Z92.89 HX OF TRANSFUSION OF PACKED RED BLOOD CELLS: Status: ACTIVE | Noted: 2022-01-18

## 2022-01-18 LAB
ALBUMIN SERPL-MCNC: 2 G/DL (ref 3.4–5)
ALBUMIN/GLOB SERPL: 0.6 {RATIO} (ref 1–2)
ALP LIVER SERPL-CCNC: 135 U/L
ALT SERPL-CCNC: 13 U/L
ANION GAP SERPL CALC-SCNC: 9 MMOL/L (ref 0–18)
AST SERPL-CCNC: 24 U/L (ref 15–37)
BASOPHILS # BLD AUTO: 0.03 X10(3) UL (ref 0–0.2)
BASOPHILS # BLD AUTO: 0.03 X10(3) UL (ref 0–0.2)
BASOPHILS NFR BLD AUTO: 0.2 %
BASOPHILS NFR BLD AUTO: 0.2 %
BILIRUB SERPL-MCNC: 0.3 MG/DL (ref 0.1–2)
BUN BLD-MCNC: 7 MG/DL (ref 7–18)
CALCIUM BLD-MCNC: 8.4 MG/DL (ref 8.5–10.1)
CHLORIDE SERPL-SCNC: 110 MMOL/L (ref 98–112)
CO2 SERPL-SCNC: 21 MMOL/L (ref 21–32)
CREAT BLD-MCNC: 0.57 MG/DL
EOSINOPHIL # BLD AUTO: 0.02 X10(3) UL (ref 0–0.7)
EOSINOPHIL # BLD AUTO: 0.03 X10(3) UL (ref 0–0.7)
EOSINOPHIL NFR BLD AUTO: 0.1 %
EOSINOPHIL NFR BLD AUTO: 0.2 %
ERYTHROCYTE [DISTWIDTH] IN BLOOD BY AUTOMATED COUNT: 16 %
ERYTHROCYTE [DISTWIDTH] IN BLOOD BY AUTOMATED COUNT: 16 %
GLOBULIN PLAS-MCNC: 3.2 G/DL (ref 2.8–4.4)
GLUCOSE BLD-MCNC: 100 MG/DL (ref 70–99)
HCT VFR BLD AUTO: 28.1 %
HCT VFR BLD AUTO: 33.5 %
HGB BLD-MCNC: 10.5 G/DL
HGB BLD-MCNC: 9 G/DL
IMM GRANULOCYTES # BLD AUTO: 0.08 X10(3) UL (ref 0–1)
IMM GRANULOCYTES # BLD AUTO: 0.08 X10(3) UL (ref 0–1)
IMM GRANULOCYTES NFR BLD: 0.4 %
IMM GRANULOCYTES NFR BLD: 0.5 %
LYMPHOCYTES # BLD AUTO: 1.39 X10(3) UL (ref 1–4)
LYMPHOCYTES # BLD AUTO: 2.85 X10(3) UL (ref 1–4)
LYMPHOCYTES NFR BLD AUTO: 18.2 %
LYMPHOCYTES NFR BLD AUTO: 7.8 %
MCH RBC QN AUTO: 28.2 PG (ref 26–34)
MCH RBC QN AUTO: 28.3 PG (ref 26–34)
MCHC RBC AUTO-ENTMCNC: 31.3 G/DL (ref 31–37)
MCHC RBC AUTO-ENTMCNC: 32 G/DL (ref 31–37)
MCV RBC AUTO: 88.4 FL
MCV RBC AUTO: 90.1 FL
MONOCYTES # BLD AUTO: 0.45 X10(3) UL (ref 0.1–1)
MONOCYTES # BLD AUTO: 1.19 X10(3) UL (ref 0.1–1)
MONOCYTES NFR BLD AUTO: 2.5 %
MONOCYTES NFR BLD AUTO: 7.6 %
NEUTROPHILS # BLD AUTO: 11.47 X10 (3) UL (ref 1.5–7.7)
NEUTROPHILS # BLD AUTO: 11.47 X10(3) UL (ref 1.5–7.7)
NEUTROPHILS # BLD AUTO: 15.84 X10 (3) UL (ref 1.5–7.7)
NEUTROPHILS # BLD AUTO: 15.84 X10(3) UL (ref 1.5–7.7)
NEUTROPHILS NFR BLD AUTO: 73.3 %
NEUTROPHILS NFR BLD AUTO: 89 %
OSMOLALITY SERPL CALC.SUM OF ELEC: 288 MOSM/KG (ref 275–295)
PLATELET # BLD AUTO: 207 10(3)UL (ref 150–450)
PLATELET # BLD AUTO: 211 10(3)UL (ref 150–450)
POTASSIUM SERPL-SCNC: 4.3 MMOL/L (ref 3.5–5.1)
PROT SERPL-MCNC: 5.2 G/DL (ref 6.4–8.2)
RBC # BLD AUTO: 3.18 X10(6)UL
RBC # BLD AUTO: 3.72 X10(6)UL
SODIUM SERPL-SCNC: 140 MMOL/L (ref 136–145)
WBC # BLD AUTO: 15.7 X10(3) UL (ref 4–11)
WBC # BLD AUTO: 17.8 X10(3) UL (ref 4–11)

## 2022-01-18 PROCEDURE — 3E0134Z INTRODUCTION OF SERUM, TOXOID AND VACCINE INTO SUBCUTANEOUS TISSUE, PERCUTANEOUS APPROACH: ICD-10-PCS | Performed by: OBSTETRICS & GYNECOLOGY

## 2022-01-18 RX ORDER — IBUPROFEN 600 MG/1
600 TABLET ORAL EVERY 6 HOURS
Status: DISCONTINUED | OUTPATIENT
Start: 2022-01-20 | End: 2022-01-18

## 2022-01-18 RX ORDER — METHYLERGONOVINE MALEATE 0.2 MG/ML
0.2 INJECTION INTRAVENOUS ONCE
Status: DISCONTINUED | OUTPATIENT
Start: 2021-01-17 | End: 2022-01-18

## 2022-01-18 RX ORDER — KETOROLAC TROMETHAMINE 30 MG/ML
INJECTION, SOLUTION INTRAMUSCULAR; INTRAVENOUS
Status: COMPLETED
Start: 2022-01-18 | End: 2022-01-18

## 2022-01-18 RX ORDER — IBUPROFEN 600 MG/1
600 TABLET ORAL EVERY 6 HOURS
Status: DISCONTINUED | OUTPATIENT
Start: 2022-01-18 | End: 2022-01-19

## 2022-01-18 RX ORDER — DOCUSATE SODIUM 100 MG/1
100 CAPSULE, LIQUID FILLED ORAL
Status: DISCONTINUED | OUTPATIENT
Start: 2022-01-18 | End: 2022-01-19

## 2022-01-18 RX ORDER — METHYLERGONOVINE MALEATE 0.2 MG/ML
0.2 INJECTION INTRAVENOUS ONCE
Status: COMPLETED | OUTPATIENT
Start: 2022-01-17 | End: 2022-01-17

## 2022-01-18 NOTE — PAYOR COMM NOTE
--------------  ADMISSION REVIEW     Payor: Tremaine Butler #:  [id number]  Authorization Number: [id number]    Admit date: 1/17/22  Admit time:  7:35 AM       REVIEW DOCUMENTATION:  ED Provider Notes    No notes of 09/10/2021    Carrier Screen = Negative   • Seizure disorder ([facility] [address]     epilepsy   • Vitamin D deficiency    Temp:  [96.6 °F (35.9 °C)-97.2 °F (36.2 °C)] 96.6 °F (35.9 °C)  Pulse:  [57-98] 98  Resp:  [20] 20  BP: ()/(50-76) 121/59    Lungs:   clear to exam under anesthesia, repair of cervical laceration, evacuation of blood clots from uterus  Anesthesia; GENERAL  Surgeon:   Procedure:  Patient was taken to OR were general anesthesia was obtained without difficulty, she was then prepped and d Dose Route User    1/18/2022 1132 Given 600 mg Oral Lester Jones RN      ketorolac (TORADOL) 30 MG/ML injection 30 mg     Date Action Dose Route User    1/18/2022 0628 Given 30 mg Intravenous Claire De Leon RN    1/18/2022 0021 Given 30 mg I 1/17/2022 1959 New Bag 300 mL/hr Intravenous Zachary Underwood RN    1/17/2022 1914 Bolus from Bag 300 mL/hr Intravenous Pipe Dawson RN      oxyTOCIN (PITOCIN) 30 units/ 500 ml 0.9% NS premix infusion     Date Action Dose Route User    1/17/2022 9454

## 2022-01-18 NOTE — PROGRESS NOTES
Labor Analgesia Follow Up Note    Patient underwent epidural anesthesia for labor analgesia,    Placenta Date/Time: 1/17/2022  7:13 PM    Delivery Date/Time[de-identified] 1/17/2022  7:10 PM    /67   Pulse 74   Temp 98.6 °F (37 °C) (Oral)   Resp 18   Ht 1.524 m (

## 2022-01-18 NOTE — CM/SW NOTE
met with Ms Kyra Carson review insurance and PCP for infant. [facility] already spoke with Ms Sharan Marinojasmyne and infant will be added to medicaid. PCP would be Dr Mary Fitch, but parent not sure is Dr Mary Fitch office accepts medicaid.   checked and

## 2022-01-18 NOTE — PROGRESS NOTES
OB progress note    A/P: 27year old  now PPD#1 s/p  at 39w2d on 2022 & POD1 s/p exam under anesthesia, manual removal of uterine clots, and repair of cervical laceration for postpartum hemorrhage on 2022.   Pregnancy complicated by hyp +Kimbrough catheter   Ext nontender, no edema, not wearing SCDs    Component      Latest Ref Rng & Units 1/18/2022   WBC      4.0 - 11.0 x10(3) uL 17.8 (H)   RBC      3.80 - 5.30 x10(6)uL 3.72 (L)   Hemoglobin      12.0 - 16.0 g/dL 10.5 (L)   Hematocrit      3

## 2022-01-18 NOTE — PROGRESS NOTES
Pt transferred  to Mother Baby room 21  in stable condition. Report given to Edelmira Augustine RN. Infant transferred with mother in stable condition.

## 2022-01-18 NOTE — PROGRESS NOTES
Dr Zaheer Jacques called per this RN. This RN informs her SVE 9/100/0. Pt feeling pressure but not strong urge to push. Dr Zaheer Jacques states she will be here in 30 min and to call if needed sooner.

## 2022-01-18 NOTE — PLAN OF CARE
Problem: POSTPARTUM  Goal: Long Term Goal:Experiences normal postpartum course  Description: INTERVENTIONS:  - Assess and monitor vital signs and lab values. - Assess fundus and lochia. - Provide ice/sitz baths for perineum discomfort.   - Monitor heali pain/trauma. - Instruct and provide assistance with proper latch. - Review techniques for milk expression (breast pumping) and storage of breast milk. Provide pumping equipment/supplies, instructions and assistance, as needed.   - Encourage rooming-in and INTERVENTIONS:  - Maintain airway, patient safety  and administer oxygen as ordered  - Monitor patient for seizure activity, document and report duration and description of seizure to MD/LIP  - If seizure occurs, turn patient to side and suction secretions

## 2022-01-18 NOTE — H&P
[address] Patient Status:  Inpatient    1995 MRN [id number]   Location [address] [facility] Attending Brooke Baldwin, [address] [facility] Day # 0 PCP Shari Mcfarlane MD     Date of Admission:  1 Known Problems Paternal Grandmother    • No Known Problems Paternal Grandfather    • No Known Problems Sister    • No Known Problems Sister    • No Known Problems Sister      Social History: Social History    Tobacco Use      Smoking status: Never Smoker Pre-admission, admission, and post admission procedures and expectations were discussed in detail. All questions answered, all appropriate consents will be signed at the Hospital. Admission is planned for today.    Intervention: amniorhexis and IV Pitocin

## 2022-01-18 NOTE — PROGRESS NOTES
Dr Lan Alanis called per this RN. This RN informs her SVE 9.5/100/0. Pt having variables to 60s. Dr Lan Alanis states she is on her way.

## 2022-01-18 NOTE — ANESTHESIA POSTPROCEDURE EVALUATION
[address] Patient Status:  Inpatient   Age/Gender 27year old female MRN [id number]   Location [address] [facility] Attending Alexsander Arguelles, [address] [facility] Day # 0 PCP Casper Cardona MD       Anesthesia Post-op Note

## 2022-01-18 NOTE — L&D DELIVERY NOTE
Clarence Zamora [[id number]]    Labor Events     labor?: No   steroids?: None  Antibiotics received during labor?: No  Antibiotics (enter # doses in comment): none  Rupture date/time: 2022 0941     Rupture type: AROM  Fluid color: Clear  Ind Minute:  5 Minute:  10 Minute:  15 Minute:  20 Minute:    Skin color: 0  1       Heart rate: 2  2       Reflex irritablity: 2  2       Muscle tone: 2  2       Respiratory effort: 2  2       Total: 8  9          Apgars assigned by: Rogelio So RN   di

## 2022-01-18 NOTE — PROGRESS NOTES
Was called to evaluate patient for postpartum hemorrhage, 3 hours after delivery started bleeding with clots, weighted 1500 ml, cytotec 1000 mcg rectally and one dose of Methergin IM given, bleeding actively.  Discussed with patient - will examine under ane

## 2022-01-18 NOTE — PLAN OF CARE
Pt returned from L&D S/P D&C for PPH. Drowsy but oriented. Accompanied by staff and s.o. VSS, fundus firm below umbilicus. Kimbrough in use, w/ clear yellow urine. LR infusing in rt hand.   Problem: POSTPARTUM  Goal: Long Term Goal:Experiences normal postpartum how to manage common lactation challenges. - Recommend avoidance of specific medications or substances incompatible with breast feeding.  - Assess and monitor for signs of nipple pain/trauma. - Instruct and provide assistance with proper latch.   - Review Administer anti-seizure medications as ordered  - Monitor neurological status  Outcome: Progressing  Goal: Remains free of injury related to seizure activity  Description: INTERVENTIONS:  - Maintain airway, patient safety  and administer oxygen as ordered

## 2022-01-18 NOTE — PROGRESS NOTES
Received pt at , s/p , pt c/o rt hip pain, tylenol PO given at . Demetrice Nevarezws Upon assessing pt, found to have a boggy uterus and steady bleeding, fundal massge pushed out 506cc clot. Pt A/O x 4, VSS,   Called for [facility] cart . 30units oxytocin iv hung at wide

## 2022-01-18 NOTE — CM/SW NOTE
Pt experienced a post partum hemorrhage. Pt to complete the [facility] post partum depression screening to determine further follow up resources.     Nasreen Denton MSW, LCSW   for [address] at [facility]  Ph: [phone] o

## 2022-01-18 NOTE — OPERATIVE REPORT
Pre op Dx: postpartum hemorrhage   Post op Dx; same  Procedure: exam under anesthesia, repair of cervical laceration, evacuation of blood clots from uterus  Anesthesia; GENERAL  Surgeon:   Procedure:  Patient was taken to OR were general anesth

## 2022-01-18 NOTE — ANESTHESIA PROCEDURE NOTES
Airway  Date/Time: 1/17/2022 10:53 PM  Urgency: elective    Airway not difficult    General Information and Staff    Patient location during procedure: OR  Anesthesiologist: Willian Hsieh MD  Performed: anesthesiologist     Indications and Patient Cond

## 2022-01-19 VITALS
HEIGHT: 60 IN | BODY MASS INDEX: 33.77 KG/M2 | SYSTOLIC BLOOD PRESSURE: 106 MMHG | RESPIRATION RATE: 18 BRPM | DIASTOLIC BLOOD PRESSURE: 81 MMHG | WEIGHT: 172 LBS | TEMPERATURE: 98 F | HEART RATE: 66 BPM | OXYGEN SATURATION: 95 %

## 2022-01-19 LAB
BASOPHILS # BLD AUTO: 0.05 X10(3) UL (ref 0–0.2)
BASOPHILS NFR BLD AUTO: 0.4 %
BLOOD TYPE BARCODE: 6200
EOSINOPHIL # BLD AUTO: 0.22 X10(3) UL (ref 0–0.7)
EOSINOPHIL NFR BLD AUTO: 1.9 %
ERYTHROCYTE [DISTWIDTH] IN BLOOD BY AUTOMATED COUNT: 16.7 %
HCT VFR BLD AUTO: 26.8 %
HGB BLD-MCNC: 8.8 G/DL
IMM GRANULOCYTES # BLD AUTO: 0.04 X10(3) UL (ref 0–1)
IMM GRANULOCYTES NFR BLD: 0.3 %
LYMPHOCYTES # BLD AUTO: 3.54 X10(3) UL (ref 1–4)
LYMPHOCYTES NFR BLD AUTO: 30.5 %
MCH RBC QN AUTO: 29.1 PG (ref 26–34)
MCHC RBC AUTO-ENTMCNC: 32.8 G/DL (ref 31–37)
MCV RBC AUTO: 88.7 FL
MONOCYTES # BLD AUTO: 0.85 X10(3) UL (ref 0.1–1)
MONOCYTES NFR BLD AUTO: 7.3 %
NEUTROPHILS # BLD AUTO: 6.92 X10 (3) UL (ref 1.5–7.7)
NEUTROPHILS # BLD AUTO: 6.92 X10(3) UL (ref 1.5–7.7)
NEUTROPHILS NFR BLD AUTO: 59.6 %
PLATELET # BLD AUTO: 188 10(3)UL (ref 150–450)
RBC # BLD AUTO: 3.02 X10(6)UL
WBC # BLD AUTO: 11.6 X10(3) UL (ref 4–11)

## 2022-01-19 NOTE — PROGRESS NOTES
PPD#2    Pt has no complaints, lochia min   01/19/22  0743   BP: 106/81   Pulse: 66   Resp: 18   Temp: 97.7 °F (36.5 °C)     Recent Labs   Lab 01/18/22  0639 01/18/22  1447 01/19/22  0600   RBC 3.72* 3.18* 3.02*   HGB 10.5* 9.0* 8.8*   HCT 33.5* 28.1* 26.8

## 2022-01-19 NOTE — PLAN OF CARE
Problem: POSTPARTUM  Goal: Long Term Goal:Experiences normal postpartum course  Description: INTERVENTIONS:  - Assess and monitor vital signs and lab values. - Assess fundus and lochia. - Provide ice/sitz baths for perineum discomfort.   - Monitor heali pain/trauma. - Instruct and provide assistance with proper latch. - Review techniques for milk expression (breast pumping) and storage of breast milk. Provide pumping equipment/supplies, instructions and assistance, as needed.   - Encourage rooming-in and Maintain airway, patient safety  and administer oxygen as ordered  - Monitor patient for seizure activity, document and report duration and description of seizure to MD/LIP  - If seizure occurs, turn patient to side and suction secretions as needed  - Reor

## 2022-01-21 ENCOUNTER — TELEPHONE (OUTPATIENT)
Dept: NEUROLOGY | Facility: CLINIC | Age: 27
End: 2022-01-21

## 2022-01-21 ENCOUNTER — HOSPITAL ENCOUNTER (EMERGENCY)
Facility: HOSPITAL | Age: 27
Discharge: HOME | End: 2022-01-21
Attending: STUDENT IN AN ORGANIZED HEALTH CARE EDUCATION/TRAINING PROGRAM
Payer: MEDICAID

## 2022-01-21 VITALS
TEMPERATURE: 98 F | DIASTOLIC BLOOD PRESSURE: 77 MMHG | BODY MASS INDEX: 26.58 KG/M2 | RESPIRATION RATE: 18 BRPM | SYSTOLIC BLOOD PRESSURE: 126 MMHG | HEIGHT: 63 IN | HEART RATE: 74 BPM | WEIGHT: 150 LBS | OXYGEN SATURATION: 99 %

## 2022-01-21 DIAGNOSIS — G40.919 BREAKTHROUGH SEIZURE (HCC): Primary | ICD-10-CM

## 2022-01-21 LAB
ALBUMIN SERPL-MCNC: 2.7 G/DL (ref 3.4–5)
ALBUMIN/GLOB SERPL: 0.6 {RATIO} (ref 1–2)
ALP LIVER SERPL-CCNC: 114 U/L
ALT SERPL-CCNC: 22 U/L
ANION GAP SERPL CALC-SCNC: 2 MMOL/L (ref 0–18)
AST SERPL-CCNC: 22 U/L (ref 15–37)
BASOPHILS # BLD AUTO: 0.03 X10(3) UL (ref 0–0.2)
BASOPHILS NFR BLD AUTO: 0.3 %
BILIRUB SERPL-MCNC: 0.4 MG/DL (ref 0.1–2)
BILIRUB UR QL STRIP.AUTO: NEGATIVE
BUN BLD-MCNC: 12 MG/DL (ref 7–18)
CALCIUM BLD-MCNC: 8.8 MG/DL (ref 8.5–10.1)
CHLORIDE SERPL-SCNC: 108 MMOL/L (ref 98–112)
CO2 SERPL-SCNC: 30 MMOL/L (ref 21–32)
COLOR UR AUTO: YELLOW
CREAT BLD-MCNC: 0.55 MG/DL
EOSINOPHIL # BLD AUTO: 0.2 X10(3) UL (ref 0–0.7)
EOSINOPHIL NFR BLD AUTO: 1.9 %
ERYTHROCYTE [DISTWIDTH] IN BLOOD BY AUTOMATED COUNT: 16.5 %
GLOBULIN PLAS-MCNC: 4.3 G/DL (ref 2.8–4.4)
GLUCOSE BLD-MCNC: 77 MG/DL (ref 70–99)
GLUCOSE UR STRIP.AUTO-MCNC: NEGATIVE MG/DL
HCT VFR BLD AUTO: 32.6 %
HGB BLD-MCNC: 10.3 G/DL
IMM GRANULOCYTES # BLD AUTO: 0.06 X10(3) UL (ref 0–1)
IMM GRANULOCYTES NFR BLD: 0.6 %
KETONES UR STRIP.AUTO-MCNC: NEGATIVE MG/DL
LYMPHOCYTES # BLD AUTO: 1.94 X10(3) UL (ref 1–4)
LYMPHOCYTES NFR BLD AUTO: 18.5 %
MCH RBC QN AUTO: 27.9 PG (ref 26–34)
MCHC RBC AUTO-ENTMCNC: 31.6 G/DL (ref 31–37)
MCV RBC AUTO: 88.3 FL
MONOCYTES # BLD AUTO: 0.55 X10(3) UL (ref 0.1–1)
MONOCYTES NFR BLD AUTO: 5.2 %
NEUTROPHILS # BLD AUTO: 7.73 X10 (3) UL (ref 1.5–7.7)
NEUTROPHILS # BLD AUTO: 7.73 X10(3) UL (ref 1.5–7.7)
NEUTROPHILS NFR BLD AUTO: 73.5 %
NITRITE UR QL STRIP.AUTO: NEGATIVE
OSMOLALITY SERPL CALC.SUM OF ELEC: 289 MOSM/KG (ref 275–295)
PH UR STRIP.AUTO: 7 [PH] (ref 5–8)
PLATELET # BLD AUTO: 287 10(3)UL (ref 150–450)
POTASSIUM SERPL-SCNC: 3.8 MMOL/L (ref 3.5–5.1)
PROT SERPL-MCNC: 7 G/DL (ref 6.4–8.2)
PROT UR STRIP.AUTO-MCNC: 100 MG/DL
RBC # BLD AUTO: 3.69 X10(6)UL
RBC #/AREA URNS AUTO: >10 /HPF
SODIUM SERPL-SCNC: 140 MMOL/L (ref 136–145)
SP GR UR STRIP.AUTO: >1.03 (ref 1–1.03)
UROBILINOGEN UR STRIP.AUTO-MCNC: <2 MG/DL
WBC # BLD AUTO: 10.5 X10(3) UL (ref 4–11)
WBC #/AREA URNS AUTO: >50 /HPF

## 2022-01-21 PROCEDURE — 87086 URINE CULTURE/COLONY COUNT: CPT | Performed by: STUDENT IN AN ORGANIZED HEALTH CARE EDUCATION/TRAINING PROGRAM

## 2022-01-21 PROCEDURE — 81001 URINALYSIS AUTO W/SCOPE: CPT | Performed by: STUDENT IN AN ORGANIZED HEALTH CARE EDUCATION/TRAINING PROGRAM

## 2022-01-21 PROCEDURE — 96365 THER/PROPH/DIAG IV INF INIT: CPT

## 2022-01-21 PROCEDURE — 85025 COMPLETE CBC W/AUTO DIFF WBC: CPT | Performed by: STUDENT IN AN ORGANIZED HEALTH CARE EDUCATION/TRAINING PROGRAM

## 2022-01-21 PROCEDURE — 99284 EMERGENCY DEPT VISIT MOD MDM: CPT

## 2022-01-21 PROCEDURE — 80053 COMPREHEN METABOLIC PANEL: CPT | Performed by: STUDENT IN AN ORGANIZED HEALTH CARE EDUCATION/TRAINING PROGRAM

## 2022-01-21 PROCEDURE — 80177 DRUG SCRN QUAN LEVETIRACETAM: CPT | Performed by: STUDENT IN AN ORGANIZED HEALTH CARE EDUCATION/TRAINING PROGRAM

## 2022-01-21 RX ORDER — LEVETIRACETAM 500 MG/5ML
1000 INJECTION, SOLUTION, CONCENTRATE INTRAVENOUS ONCE
Status: COMPLETED | OUTPATIENT
Start: 2022-01-21 | End: 2022-01-21

## 2022-01-21 NOTE — TELEPHONE ENCOUNTER
RN advised the patient of Dr. Linda portillo. Pt verbalized understanding and did not have any further questions.

## 2022-01-21 NOTE — TELEPHONE ENCOUNTER
RN called the patient to get more information about her seizures. RN asked the patient to call back with specific information about her seizures.

## 2022-01-21 NOTE — TELEPHONE ENCOUNTER
Asked for levels to be checked previously; this was not done; she is on max dose Keppra and just had delivery; recommend evaluation in ED for other etiologies for seizure breakthrough - ie infection, electrolyte abnormalities     Thanks

## 2022-01-21 NOTE — TELEPHONE ENCOUNTER
S: Increased Seizures     B: Dx: Seizure Disorder thru pregnancy     A: Pt delivered son Monday- 1--had a blood transfusion. Came home yesterday and had 3 seizures.  Third seizure was a grand mal, lasted 3 minutes, slightly bite her tongue and urinat

## 2022-01-21 NOTE — TELEPHONE ENCOUNTER
Patient calling, advised that she has been has been having seizures, thinks this may have something to do with having a baby recently. Notes that yesterday she had 3 seizures.  She does not remember scheduling the appointment that I helped her schedule

## 2022-01-21 NOTE — ED INITIAL ASSESSMENT (HPI)
Pt states gave birth on 1/17, was rushed to the OR d/t hemorrhage. Pt states yesterday having 3 witnessed seizures, estimated about 2 minutes each time.  Pt has hx of seizures, took normal dose of kepra today, spoke with Dr. Nadeen Conde who advised her to come

## 2022-01-21 NOTE — ED PROVIDER NOTES
Patient Seen in: [facility] Emergency Department      History   Patient presents with:  Seizure Disorder    Stated Complaint: pt had baby 12/17/21. pt states seizures x 3 yesterday.       Subjective:   HPI    Patient is a 28-year-old female who pres use: Not Currently    Drug use: Never             Review of Systems    Positive for stated complaint: pt had baby 12/17/22. pt states seizures x 3 yesterday. Other systems are as noted in HPI. Constitutional and vital signs reviewed.       All other sy CULTURE REFLEX - Abnormal; Notable for the following components:    Clarity Urine Hazy (*)     Spec Gravity >1.030 (*)     Blood Urine Large (*)     Protein Urine 100  (*)     Leukocyte Esterase Urine Small (*)     WBC Urine >50 (*)     RBC Urine >10 (*) medical care if she were to worsen.               Disposition and Plan     Clinical Impression:  Breakthrough seizure ([facility] [address]  (primary encounter diagnosis)     Disposition:  Discharge  1/21/2022  3:13 pm    Follow-up:  Akira Peñaloza MD  [id number] [facility]

## 2022-01-23 ENCOUNTER — TELEPHONE (OUTPATIENT)
Dept: OBGYN UNIT | Facility: HOSPITAL | Age: 27
End: 2022-01-23

## 2022-01-23 NOTE — PROGRESS NOTES
Reviewed self and infant care w / mom, she verbalizes understanding of instructions reviewed. Encourage to follow up w/ MDs as directed and w/ questions/concerns.  Remains on Keppra but had 3 seizures this week, Dr Adam Witt is aware and she has appt w him o

## 2022-01-24 LAB — LEVETIRACETAM (KEPPRA): 55 UG/ML

## 2022-01-26 ENCOUNTER — OFFICE VISIT (OUTPATIENT)
Dept: NEUROLOGY | Facility: CLINIC | Age: 27
End: 2022-01-26
Payer: MEDICAID

## 2022-01-26 VITALS
RESPIRATION RATE: 16 BRPM | SYSTOLIC BLOOD PRESSURE: 120 MMHG | WEIGHT: 150 LBS | HEIGHT: 63 IN | DIASTOLIC BLOOD PRESSURE: 84 MMHG | HEART RATE: 72 BPM | BODY MASS INDEX: 26.58 KG/M2

## 2022-01-26 DIAGNOSIS — G40.909 SEIZURE DISORDER (HCC): Primary | ICD-10-CM

## 2022-01-26 DIAGNOSIS — G40.919 BREAKTHROUGH SEIZURE (HCC): ICD-10-CM

## 2022-01-26 PROCEDURE — 99214 OFFICE O/P EST MOD 30 MIN: CPT | Performed by: OTHER

## 2022-01-26 PROCEDURE — 3008F BODY MASS INDEX DOCD: CPT | Performed by: OTHER

## 2022-01-26 PROCEDURE — 3079F DIAST BP 80-89 MM HG: CPT | Performed by: OTHER

## 2022-01-26 PROCEDURE — 3074F SYST BP LT 130 MM HG: CPT | Performed by: OTHER

## 2022-01-26 RX ORDER — LAMOTRIGINE 25(84)-100
KIT ORAL
Qty: 1 KIT | Refills: 0 | Status: SHIPPED | OUTPATIENT
Start: 2022-01-26

## 2022-01-26 NOTE — PROGRESS NOTES
Gil Progress Note    HPI  Patient presents with:  Seizures: F/U, no events since 1/21      As per my initial H&P from 6/16/2021,   \"   Davin Villela is a 23year old female, who presents for evaluation of seizures.       Patient s her dose of Keppra in the evening that night    She states on 1/20/2021, she took her AM dose of Keppra and subsequently had 3 seizures. She noted she had sat down to eat in the dining room.   She does not recall what occurred but was told she fell to the Sister      Social History    Socioeconomic History      Marital status: Single    Tobacco Use      Smoking status: Never Smoker      Smokeless tobacco: Never Used    Vaping Use      Vaping Use: Never used    Substance and Sexual Activity      Alcohol use: time  Speech Fluent and conversational    CN: PERRL, EOMI with no nystagmaus, VFF, smile symmetric, sensation intact, tongue and palate midline, SCM intact, otherwise, CN 2-12 intact  Motor: 5/5 strength throughout, tone normal  DTR: 2+ symmetric throughou 13 - 56 U/L 22   ALKALINE PHOSPHATASE      37 - 98 U/L 114 (H)   Total Bilirubin      0.1 - 2.0 mg/dL 0.4   PROTEIN, TOTAL      6.4 - 8.2 g/dL 7.0   Albumin      3.4 - 5.0 g/dL 2.7 (L)   Globulin      2.8 - 4.4 g/dL 4.3   A/G Ratio      1.0 - 2.0 0.6 (L) pregnancy. She did not have any seizures during that time.   However, she had recurrent seizures and is now on Keppra 1500 mg bid - she has since delivered her baby and had recurrent seizures, which suspect are breakthrough seizures and may be related to m

## 2022-01-28 ENCOUNTER — TELEPHONE (OUTPATIENT)
Dept: OBGYN CLINIC | Facility: CLINIC | Age: 27
End: 2022-01-28

## 2022-01-29 ENCOUNTER — PATIENT MESSAGE (OUTPATIENT)
Dept: OBGYN CLINIC | Facility: CLINIC | Age: 27
End: 2022-01-29

## 2022-01-30 ENCOUNTER — TELEPHONE (OUTPATIENT)
Dept: OBGYN CLINIC | Facility: CLINIC | Age: 27
End: 2022-01-30

## 2022-01-30 NOTE — TELEPHONE ENCOUNTER
Patient called 1/29/2022, Deliver 3 weeks ago and having clots. and now pelvic pain. Patient asked if need to change every 2 hours and said yes. Advise to go to E/R for evaluation.

## 2022-01-31 NOTE — TELEPHONE ENCOUNTER
S: c/o pain on rt side, vaginal bleeding, clots  B: 2 wks PP vd   A: Pain is resolved. Pt changes pads q3 hrs, does not soak whole pad in 1 hr, some heavy some light bleeding, passes lime size clot x 1. Denies fever, malodorous discharge.    R: Pt may take

## 2022-01-31 NOTE — TELEPHONE ENCOUNTER
From: Olesya Grant  To: Alexandru Marley DO  Sent: 1/29/2022 6:30 PM CST  Subject: Right side hip pain     Britney Lentz. Belinda Sanford been having constant pain on my right side where I got the procedure on after I delivered.  Belinda Sanford been taking Tylenol but only

## 2022-02-20 LAB — LAMOTRIGINE: 2.9 MCG/ML (ref 4–18)

## 2022-03-01 ENCOUNTER — POSTPARTUM (OUTPATIENT)
Dept: OBGYN CLINIC | Facility: CLINIC | Age: 27
End: 2022-03-01
Payer: MEDICAID

## 2022-03-01 VITALS
HEART RATE: 78 BPM | SYSTOLIC BLOOD PRESSURE: 110 MMHG | WEIGHT: 160 LBS | DIASTOLIC BLOOD PRESSURE: 80 MMHG | BODY MASS INDEX: 31.41 KG/M2 | HEIGHT: 60 IN

## 2022-03-01 DIAGNOSIS — Z30.430 ENCOUNTER FOR INSERTION OF INTRAUTERINE CONTRACEPTIVE DEVICE: ICD-10-CM

## 2022-03-01 DIAGNOSIS — Z01.812 PRE-PROCEDURAL LABORATORY EXAMINATION: ICD-10-CM

## 2022-03-01 LAB
CONTROL LINE PRESENT WITH A CLEAR BACKGROUND (YES/NO): YES YES/NO
PREGNANCY TEST, URINE: NEGATIVE

## 2022-03-01 PROCEDURE — 81025 URINE PREGNANCY TEST: CPT | Performed by: OBSTETRICS & GYNECOLOGY

## 2022-03-01 PROCEDURE — 3079F DIAST BP 80-89 MM HG: CPT | Performed by: OBSTETRICS & GYNECOLOGY

## 2022-03-01 PROCEDURE — 58300 INSERT INTRAUTERINE DEVICE: CPT | Performed by: OBSTETRICS & GYNECOLOGY

## 2022-03-01 PROCEDURE — 3008F BODY MASS INDEX DOCD: CPT | Performed by: OBSTETRICS & GYNECOLOGY

## 2022-03-01 PROCEDURE — 3074F SYST BP LT 130 MM HG: CPT | Performed by: OBSTETRICS & GYNECOLOGY

## 2022-03-01 RX ORDER — COPPER 313.4 MG/1
1 INTRAUTERINE DEVICE INTRAUTERINE ONCE
Status: COMPLETED | OUTPATIENT
Start: 2022-03-01 | End: 2022-03-01

## 2022-03-01 RX ADMIN — COPPER 1 DEVICE: 313.4 INTRAUTERINE DEVICE INTRAUTERINE at 10:47:00

## 2022-03-02 ENCOUNTER — TELEPHONE (OUTPATIENT)
Dept: OBGYN CLINIC | Facility: CLINIC | Age: 27
End: 2022-03-02

## 2022-03-02 NOTE — TELEPHONE ENCOUNTER
Per pt, IUD inserted yesterday. Woke up this morning with body aches and sore throat. Has a fever today. Denies pelvic pain, has some cramping, but very mild (1/10 on pain scale), spotting is also very light today. Pt advised to go to urgent care to be evaluated, current illness seems to be unrelated to IUD, but should be evaluated for respiratory illness and possible IUD complications. Understanding verbalized. Pt advised to call office back with any concerns or worsening pelvic pain/spotting. Understanding verbalized.

## 2022-03-09 ENCOUNTER — HOSPITAL ENCOUNTER (INPATIENT)
Facility: HOSPITAL | Age: 27
LOS: 2 days | Discharge: HOME | DRG: 101 | End: 2022-03-11
Attending: EMERGENCY MEDICINE | Admitting: HOSPITALIST
Payer: MEDICAID

## 2022-03-09 ENCOUNTER — APPOINTMENT (OUTPATIENT)
Dept: CT IMAGING | Facility: HOSPITAL | Age: 27
DRG: 101 | End: 2022-03-09
Attending: EMERGENCY MEDICINE
Payer: MEDICAID

## 2022-03-09 ENCOUNTER — TELEPHONE (OUTPATIENT)
Dept: NEUROLOGY | Facility: CLINIC | Age: 27
End: 2022-03-09

## 2022-03-09 ENCOUNTER — APPOINTMENT (OUTPATIENT)
Dept: GENERAL RADIOLOGY | Facility: HOSPITAL | Age: 27
DRG: 101 | End: 2022-03-09
Attending: HOSPITALIST
Payer: MEDICAID

## 2022-03-09 DIAGNOSIS — G40.909 SEIZURE DISORDER (HCC): Primary | ICD-10-CM

## 2022-03-09 LAB
ALBUMIN SERPL-MCNC: 4.2 G/DL (ref 3.4–5)
ALBUMIN/GLOB SERPL: 0.9 {RATIO} (ref 1–2)
ALP LIVER SERPL-CCNC: 75 U/L
ALT SERPL-CCNC: 51 U/L
AMPHET UR QL SCN: NEGATIVE
ANION GAP SERPL CALC-SCNC: 7 MMOL/L (ref 0–18)
AST SERPL-CCNC: 28 U/L (ref 15–37)
BASOPHILS # BLD AUTO: 0.02 X10(3) UL (ref 0–0.2)
BASOPHILS NFR BLD AUTO: 0.1 %
BENZODIAZ UR QL SCN: NEGATIVE
BILIRUB SERPL-MCNC: 0.3 MG/DL (ref 0.1–2)
BILIRUB UR QL STRIP.AUTO: NEGATIVE
BUN BLD-MCNC: 11 MG/DL (ref 7–18)
CALCIUM BLD-MCNC: 9.6 MG/DL (ref 8.5–10.1)
CANNABINOIDS UR QL SCN: NEGATIVE
CHLORIDE SERPL-SCNC: 109 MMOL/L (ref 98–112)
CO2 SERPL-SCNC: 23 MMOL/L (ref 21–32)
COCAINE UR QL: NEGATIVE
COLOR UR AUTO: YELLOW
CREAT BLD-MCNC: 0.8 MG/DL
CREAT UR-SCNC: 168 MG/DL
EOSINOPHIL # BLD AUTO: 0.04 X10(3) UL (ref 0–0.7)
EOSINOPHIL NFR BLD AUTO: 0.3 %
ERYTHROCYTE [DISTWIDTH] IN BLOOD BY AUTOMATED COUNT: 14 %
GLOBULIN PLAS-MCNC: 4.6 G/DL (ref 2.8–4.4)
GLUCOSE BLD-MCNC: 113 MG/DL (ref 70–99)
GLUCOSE UR STRIP.AUTO-MCNC: NEGATIVE MG/DL
HCT VFR BLD AUTO: 41.2 %
HGB BLD-MCNC: 13.1 G/DL
IMM GRANULOCYTES # BLD AUTO: 0.05 X10(3) UL (ref 0–1)
IMM GRANULOCYTES NFR BLD: 0.4 %
LYMPHOCYTES # BLD AUTO: 1.62 X10(3) UL (ref 1–4)
LYMPHOCYTES NFR BLD AUTO: 12.1 %
MCH RBC QN AUTO: 27.9 PG (ref 26–34)
MCHC RBC AUTO-ENTMCNC: 31.8 G/DL (ref 31–37)
MCV RBC AUTO: 87.7 FL
MDMA UR QL SCN: NEGATIVE
MONOCYTES # BLD AUTO: 0.4 X10(3) UL (ref 0.1–1)
MONOCYTES NFR BLD AUTO: 3 %
NEUTROPHILS # BLD AUTO: 11.28 X10 (3) UL (ref 1.5–7.7)
NEUTROPHILS # BLD AUTO: 11.28 X10(3) UL (ref 1.5–7.7)
NEUTROPHILS NFR BLD AUTO: 84.1 %
NITRITE UR QL STRIP.AUTO: NEGATIVE
OPIATES UR QL SCN: NEGATIVE
OSMOLALITY SERPL CALC.SUM OF ELEC: 288 MOSM/KG (ref 275–295)
OXYCODONE UR QL SCN: NEGATIVE
PH UR STRIP.AUTO: 5 [PH] (ref 5–8)
PLATELET # BLD AUTO: 324 10(3)UL (ref 150–450)
POTASSIUM SERPL-SCNC: 4 MMOL/L (ref 3.5–5.1)
PROT SERPL-MCNC: 8.8 G/DL (ref 6.4–8.2)
PROT UR STRIP.AUTO-MCNC: 30 MG/DL
RBC # BLD AUTO: 4.7 X10(6)UL
RBC #/AREA URNS AUTO: >10 /HPF
SARS-COV-2 RNA RESP QL NAA+PROBE: NOT DETECTED
SODIUM SERPL-SCNC: 139 MMOL/L (ref 136–145)
SP GR UR STRIP.AUTO: 1.02 (ref 1–1.03)
WBC # BLD AUTO: 13.4 X10(3) UL (ref 4–11)

## 2022-03-09 PROCEDURE — 70450 CT HEAD/BRAIN W/O DYE: CPT | Performed by: EMERGENCY MEDICINE

## 2022-03-09 PROCEDURE — 71045 X-RAY EXAM CHEST 1 VIEW: CPT | Performed by: HOSPITALIST

## 2022-03-09 PROCEDURE — 72125 CT NECK SPINE W/O DYE: CPT | Performed by: EMERGENCY MEDICINE

## 2022-03-09 PROCEDURE — 99223 1ST HOSP IP/OBS HIGH 75: CPT | Performed by: HOSPITALIST

## 2022-03-09 RX ORDER — PROCHLORPERAZINE EDISYLATE 5 MG/ML
5 INJECTION INTRAMUSCULAR; INTRAVENOUS EVERY 8 HOURS PRN
Status: DISCONTINUED | OUTPATIENT
Start: 2022-03-09 | End: 2022-03-11

## 2022-03-09 RX ORDER — SODIUM CHLORIDE 9 MG/ML
INJECTION, SOLUTION INTRAVENOUS CONTINUOUS
Status: ACTIVE | OUTPATIENT
Start: 2022-03-09 | End: 2022-03-09

## 2022-03-09 RX ORDER — LORAZEPAM 2 MG/ML
INJECTION INTRAMUSCULAR
Status: COMPLETED
Start: 2022-03-09 | End: 2022-03-09

## 2022-03-09 RX ORDER — ACETAMINOPHEN 500 MG
500 TABLET ORAL EVERY 6 HOURS PRN
COMMUNITY

## 2022-03-09 RX ORDER — LEVETIRACETAM 500 MG/5ML
500 INJECTION, SOLUTION, CONCENTRATE INTRAVENOUS ONCE
Status: COMPLETED | OUTPATIENT
Start: 2022-03-09 | End: 2022-03-09

## 2022-03-09 RX ORDER — ONDANSETRON 2 MG/ML
4 INJECTION INTRAMUSCULAR; INTRAVENOUS EVERY 6 HOURS PRN
Status: DISCONTINUED | OUTPATIENT
Start: 2022-03-09 | End: 2022-03-11

## 2022-03-09 RX ORDER — LORAZEPAM 2 MG/ML
1 INJECTION INTRAMUSCULAR ONCE
Status: COMPLETED | OUTPATIENT
Start: 2022-03-09 | End: 2022-03-09

## 2022-03-09 RX ORDER — LEVETIRACETAM 500 MG/1
1500 TABLET ORAL 2 TIMES DAILY
Status: DISCONTINUED | OUTPATIENT
Start: 2022-03-09 | End: 2022-03-09

## 2022-03-09 RX ORDER — SODIUM CHLORIDE 9 MG/ML
INJECTION, SOLUTION INTRAVENOUS CONTINUOUS
Status: DISCONTINUED | OUTPATIENT
Start: 2022-03-09 | End: 2022-03-11

## 2022-03-09 NOTE — ED QUICK NOTES
Stark patient make a yelling noise and arms were straight out in front of her, collar was off and was noted to be seizing. Yelled out to doctor, staff to bedside, placed on 100% NRB. Airway suctioned.

## 2022-03-09 NOTE — ED QUICK NOTES
Alert and oriented but slow to respond to questions at times. States has been compliant with medications.

## 2022-03-09 NOTE — IMAGING NOTE
Per Ed physician do ct scan without pregnancy test Problem: Falls - Risk of 
Goal: *Absence of Falls Document Candice Cuevas Fall Risk and appropriate interventions in the flowsheet. Outcome: Progressing Towards Goal 
Fall Risk Interventions: 
Mobility Interventions: Patient to call before getting OOB, Utilize walker, cane, or other assistive device, Bed/chair exit alarm Medication Interventions: Patient to call before getting OOB, Teach patient to arise slowly, Assess postural VS orthostatic hypotension Elimination Interventions: Call light in reach, Bed/chair exit alarm, Patient to call for help with toileting needs, Urinal in reach

## 2022-03-09 NOTE — TELEPHONE ENCOUNTER
RN heard the staff call for help. RN assisted with DAYAMI Alvarado in the bathroom.  was at the patient's side throughout the entire event. RN held patient's head until a pillow was available and held to the left. Pt was unresponsive to stimuli. 911 was called and Dr. Kingston Monreal arrived at the patient's side. VS were obtained.

## 2022-03-09 NOTE — TELEPHONE ENCOUNTER
RMA went to call pt back to exam room when heard a crash in the bathroom. Found pt and was able to assist the pt on the floor with BRIGITTE Ambrosio and yolanda VS. Pt was unresponsive to stimuli. 911 was called and Dr. Vinita Simmons arrived at the pt's side.  was at the pt's side throughout the entire event.

## 2022-03-09 NOTE — ED QUICK NOTES
Orders for admission, patient is aware of plan and ready to go upstairs. Any questions, please call ED RN Hammad Mosqueda  at extension [phone]. Vaccinated? Yes  Type of COVID test sent:  COVID Suspicion level: Low      Titratable drug(s) infusing:N/A  Rate:    LOC at time of transport:Opens eyes to verbal, somnolent.   Nonverbal at this time    Other pertinent information:    CIWA score=N/A  NIH score=N/A

## 2022-03-10 ENCOUNTER — NURSE ONLY (OUTPATIENT)
Dept: ELECTROPHYSIOLOGY | Facility: HOSPITAL | Age: 27
DRG: 101 | End: 2022-03-10
Attending: Other
Payer: MEDICAID

## 2022-03-10 ENCOUNTER — APPOINTMENT (OUTPATIENT)
Dept: MRI IMAGING | Facility: HOSPITAL | Age: 27
DRG: 101 | End: 2022-03-10
Attending: Other
Payer: MEDICAID

## 2022-03-10 LAB
ATRIAL RATE: 91 BPM
P AXIS: 45 DEGREES
P-R INTERVAL: 148 MS
Q-T INTERVAL: 358 MS
QRS DURATION: 80 MS
QTC CALCULATION (BEZET): 440 MS
R AXIS: 76 DEGREES
T AXIS: 51 DEGREES
VENTRICULAR RATE: 91 BPM

## 2022-03-10 PROCEDURE — 70553 MRI BRAIN STEM W/O & W/DYE: CPT | Performed by: OTHER

## 2022-03-10 PROCEDURE — 99255 IP/OBS CONSLTJ NEW/EST HI 80: CPT | Performed by: OTHER

## 2022-03-10 PROCEDURE — 99356 PROLONGED SERV,INPATIENT,1ST HR: CPT | Performed by: OTHER

## 2022-03-10 PROCEDURE — 95717 EEG PHYS/QHP 2-12 HR W/O VID: CPT | Performed by: OTHER

## 2022-03-10 PROCEDURE — 99232 SBSQ HOSP IP/OBS MODERATE 35: CPT | Performed by: HOSPITALIST

## 2022-03-10 RX ORDER — LAMOTRIGINE 25 MG/1
25 TABLET ORAL 2 TIMES DAILY
Status: DISCONTINUED | OUTPATIENT
Start: 2022-03-10 | End: 2022-03-11

## 2022-03-10 RX ORDER — LEVETIRACETAM 500 MG/5ML
750 INJECTION, SOLUTION, CONCENTRATE INTRAVENOUS ONCE
Status: COMPLETED | OUTPATIENT
Start: 2022-03-10 | End: 2022-03-10

## 2022-03-10 RX ORDER — CEPHALEXIN 500 MG/1
500 CAPSULE ORAL 4 TIMES DAILY
Qty: 8 CAPSULE | Refills: 0 | Status: SHIPPED | OUTPATIENT
Start: 2022-03-10 | End: 2022-03-12

## 2022-03-10 NOTE — PROGRESS NOTES
Neurology note:  Radhika Gowers to see pt, review EEG and discuss with pt and family again. EEG reviewed, active epileptic discharges, no clinical convulsion. Pt has no new complaints, no seizure reported. 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 This involved direct patient intervention, complex decision making, and/or extensive discussions with the patient, family, and clinical staff.     Andrew Rivers MD  Neurology  [facility]  3/10/2022, 3:28 PM  Sanjay Suarez MD

## 2022-03-10 NOTE — PROCEDURES
Date of Procedure: 3/10/2022    Procedure: EEG (ELECTROENCEPHALOGRAM) (2-12 hours prolonged EEG)     25 Y/O FEMALE PT WAS BROUGHT TO ER FROM NEUROLOGIST OFFICE YESTERDAY AFTER SHE WAS NOTED BY STAFF TO BE DOWN AND UNRESPONSIVE IN RESTROOM AFTER SZ. PER CHART, PT'S FAMILY REPORTED PT HAD 2 PRIOR EPISODES OF SZ AT HOME. PT ALSO HAD SZ IN ER AND WAS GIVEN ATIVAN AND STARTED ON KEPPRA AND WAS POST ICTAL. PER CHART, PT WAS ON KEPPRA AND LAMICTAL. PT REPORTED TO BE OUT OF LAMICTAL MEDICATION AND HAD NOT TAKEN IT FOR >2 WEEKS. PT FEELS BETTER TODAY. LEVELS FROM 02/17/22 LAMOTRIGINE 2.9. PMH: ANEMIA, SZ, HX OF TRANSFUSION, HYPEREMESIS GRAVIDUM    BACKGROUND ACTIVITY: Posterior rhythm was in the range of 7-9 Hz, reactive to eye opening; symmetrical and synchronous. Noted also are generalized intermittent slowing. Drowsiness is characterized by intermittent theta waves bitemporally. Occasional sharp transients noted in posterior region. EPILEPTIFORM DISCHARGES: There were persistent generalized sharp waves/complex,polyspike and waves (lasting 5-10 seconds, 3-5 hz) throughout the most recording which gradually was tapering down after intravanous keppra and depakote were given. No associated clinical convulsion recorded. HYPERVENTILATION: Hyperventilation was not performed. PHOTIC STIMULATION: Photic stimulation was not performed. Stage II sleep was not reached. IMPRESSION: Abnormal EEG with frequent and persistent generalized sharp waves/complex, polyspike and waves suggestive of underlying generalized epilepsy (non convulsive). This was improved with additional anticonvulsants; Clinical correlation is advised.     Jorge Art MD   Neurology  [facility]  3/10/2022, 4:17 PM  CC: Rasta Jeffers MD

## 2022-03-10 NOTE — PLAN OF CARE
NURSING ADMISSION NOTE      Patient admitted via Cart  Oriented to room. Safety precautions initiated. Bed in low position. Call light in reach. Assumed care of patient [address]  Patient's partner at the bedside. Patient still post ictal, unable to tell me the events of today or the year. Per patient's partner, patient had two seizures at home. The first seizure the partner found her on the kitchen floor. The second seizure was in the patient's bathroom. Patient's partner reports she fell and hit her head. Patient was then brought to her schedule neurology appointment and had a seizure in the waiting room and was then brought to the ED. Patient's partner reports that earlier in the day she was \"acting off\" and unable to pay attention.  Patient also reports that she was on the Lamictal starter pack but \"ran out and didn't get a refill\"  Seizure precautions in place  One episode of emesis, hospitalist notified

## 2022-03-10 NOTE — PLAN OF CARE
Assumed care of pt this pm. Pt is alert and oriented but still very lethargic overnight. SR;ST;Tele. RA. Denies pain. Neuro q 4hr- sz precautions maintained. Pt tolerated SB assist to bathroom U/A and drug screen collected- positive WBCs in urine- MD notified. IVF infusing per order. IV keppra given. Neuro to see this am. Needs attended to this pm, will continue to monitor.

## 2022-03-11 VITALS
SYSTOLIC BLOOD PRESSURE: 126 MMHG | TEMPERATURE: 98 F | HEART RATE: 78 BPM | HEIGHT: 60 IN | WEIGHT: 160.06 LBS | RESPIRATION RATE: 17 BRPM | DIASTOLIC BLOOD PRESSURE: 80 MMHG | OXYGEN SATURATION: 98 % | BODY MASS INDEX: 31.42 KG/M2

## 2022-03-11 LAB — VALPROATE SERPL-MCNC: 80.6 UG/ML (ref 50–100)

## 2022-03-11 PROCEDURE — 99232 SBSQ HOSP IP/OBS MODERATE 35: CPT | Performed by: OTHER

## 2022-03-11 PROCEDURE — 99239 HOSP IP/OBS DSCHRG MGMT >30: CPT | Performed by: HOSPITALIST

## 2022-03-11 RX ORDER — LAMOTRIGINE 25 MG/1
25 TABLET ORAL 2 TIMES DAILY
Qty: 60 TABLET | Refills: 0 | Status: SHIPPED | OUTPATIENT
Start: 2022-03-11 | End: 2022-03-29

## 2022-03-11 RX ORDER — DIVALPROEX SODIUM 500 MG/1
500 TABLET, DELAYED RELEASE ORAL 2 TIMES DAILY
Qty: 60 TABLET | Refills: 1 | Status: SHIPPED | OUTPATIENT
Start: 2022-03-11 | End: 2022-03-29

## 2022-03-11 NOTE — PLAN OF CARE
NURSING DISCHARGE NOTE    Discharged Home via Wheelchair. Accompanied by Family member and Support staff  Belongings Taken by patient/family. Cleared for discharge by all consults. Discharge AVS completed and reviewed with patient. Discussed discharge medications including purpose, dose, and side effects. Instructed to  scripts sent to pharmacy upon discharge. Reviewed follow-ups and the importance of maintaining those appointments. Discussed discharge instructions/precautions and when to notify MD vs seek emergency medical care. All questions and concerns addressed appropriately. Pt demonstrated adequate understanding of all instructions. Problem: Patient/Family Goals  Goal: Patient/Family Long Term Goal  Description: Patient's Long Term Goal: remain seizure free    Interventions:  - take meds as prescribed  - See additional Care Plan goals for specific interventions  Outcome: Adequate for Discharge  Goal: Patient/Family Short Term Goal  Description: Patient's Short Term Goal: go home    Interventions:   - take meds per [facility]  - See additional Care Plan goals for specific interventions  Outcome: Adequate for Discharge     Problem: SAFETY ADULT - FALL  Goal: Free from fall injury  Description: INTERVENTIONS:  - Assess pt frequently for physical needs  - Identify cognitive and physical deficits and behaviors that affect risk of falls.   - [place] fall precautions as indicated by assessment.  - Educate pt/family on patient safety including physical limitations  - Instruct pt to call for assistance with activity based on assessment  - Modify environment to reduce risk of injury  - Provide assistive devices as appropriate  - Consider OT/PT consult to assist with strengthening/mobility  - Encourage toileting schedule  Outcome: Adequate for Discharge     Problem: NEUROLOGICAL - ADULT  Goal: Achieves stable or improved neurological status  Description: INTERVENTIONS  - Assess for and report changes in neurological status  - Initiate measures to prevent increased intracranial pressure  - Maintain blood pressure and fluid volume within ordered parameters to optimize cerebral perfusion and minimize risk of hemorrhage  - Monitor temperature, glucose, and sodium.  Initiate appropriate interventions as ordered  Outcome: Adequate for Discharge  Goal: Absence of seizures  Description: INTERVENTIONS  - Monitor for seizure activity  - Administer anti-seizure medications as ordered  - Monitor neurological status  Outcome: Adequate for Discharge  Goal: Remains free of injury related to seizure activity  Description: INTERVENTIONS:  - Maintain airway, patient safety  and administer oxygen as ordered  - Monitor patient for seizure activity, document and report duration and description of seizure to MD/LIP  - If seizure occurs, turn patient to side and suction secretions as needed  - Reorient patient post seizure  - Seizure pads on all 4 side rails  - Instruct patient/family to notify RN of any seizure activity  - Instruct patient/family to call for assistance with activity based on assessment  Outcome: Adequate for Discharge

## 2022-03-11 NOTE — PLAN OF CARE
Assumed care at 1930   A&Ox4  MRI complete   Seizure precautions maintained   IV fluids infusing per protocol       POC discussed with patient

## 2022-03-14 ENCOUNTER — PATIENT OUTREACH (OUTPATIENT)
Dept: CASE MANAGEMENT | Age: 27
End: 2022-03-14

## 2022-03-14 NOTE — PROGRESS NOTES
NCM attempted to contact the patient for HFU. No answer after many rings and no VM turned on. NCM will try again another time.

## 2022-03-17 ENCOUNTER — OFFICE VISIT (OUTPATIENT)
Dept: NEUROLOGY | Facility: CLINIC | Age: 27
End: 2022-03-17
Payer: MEDICAID

## 2022-03-17 VITALS
RESPIRATION RATE: 16 BRPM | HEART RATE: 90 BPM | WEIGHT: 160 LBS | HEIGHT: 60 IN | DIASTOLIC BLOOD PRESSURE: 76 MMHG | BODY MASS INDEX: 31.41 KG/M2 | SYSTOLIC BLOOD PRESSURE: 112 MMHG

## 2022-03-17 DIAGNOSIS — G40.919 BREAKTHROUGH SEIZURE (HCC): ICD-10-CM

## 2022-03-17 DIAGNOSIS — G40.909 SEIZURE DISORDER (HCC): Primary | ICD-10-CM

## 2022-03-17 PROCEDURE — 99214 OFFICE O/P EST MOD 30 MIN: CPT | Performed by: OTHER

## 2022-03-17 PROCEDURE — 3074F SYST BP LT 130 MM HG: CPT | Performed by: OTHER

## 2022-03-17 PROCEDURE — 3008F BODY MASS INDEX DOCD: CPT | Performed by: OTHER

## 2022-03-17 PROCEDURE — 3078F DIAST BP <80 MM HG: CPT | Performed by: OTHER

## 2022-03-21 ENCOUNTER — PATIENT MESSAGE (OUTPATIENT)
Dept: NEUROLOGY | Facility: CLINIC | Age: 27
End: 2022-03-21

## 2022-03-23 LAB
LAMOTRIGINE: 5.8 MCG/ML (ref 4–18)
VALPROIC ACID: 131.6 MG/L (ref 50–100)

## 2022-03-24 ENCOUNTER — TELEPHONE (OUTPATIENT)
Dept: NEUROLOGY | Facility: CLINIC | Age: 27
End: 2022-03-24

## 2022-03-24 NOTE — TELEPHONE ENCOUNTER
Labs noted    Results for Gregg Guillen (MRN [id number]) as of 3/24/2022 16:26   Ref.  Range 3/21/2022 10:51   VALPROIC ACID Latest Ref Range: 50.0 - 100.0 mg/L  131.6 (H)   LAMOTRIGINE Latest Ref Range: 4.0 - 18.0 mcg/mL 5.8       Valproic acid level high and lamotrigine level normal    Currently on valproic acid (Depakote) 500 mg bid   Lamictal 50 mg bid    Patient reports no signs of toxicity - denies balance issues, falls, double vision, nausea and denies seizures; no rash    Recommend:   - reduce Depakote to 250 mg AM / 500 mg PM  - continue Lamictal 50 mg bid     Repeat levels ~ 2 weeks     Follow up in ~3 weeks

## 2022-03-29 RX ORDER — LAMOTRIGINE 25 MG/1
50 TABLET ORAL 2 TIMES DAILY
Qty: 120 TABLET | Refills: 2 | Status: SHIPPED | OUTPATIENT
Start: 2022-03-29

## 2022-03-29 RX ORDER — DIVALPROEX SODIUM 250 MG/1
TABLET, DELAYED RELEASE ORAL
Qty: 90 TABLET | Refills: 2 | Status: SHIPPED | OUTPATIENT
Start: 2022-03-29

## 2022-03-29 NOTE — TELEPHONE ENCOUNTER
Pt states the lamictal and depokote was never called in pharm.   Pt only has one day left of medication

## 2022-04-19 ENCOUNTER — OFFICE VISIT (OUTPATIENT)
Dept: NEUROLOGY | Facility: CLINIC | Age: 27
End: 2022-04-19
Payer: MEDICAID

## 2022-04-19 VITALS
HEIGHT: 60 IN | WEIGHT: 160 LBS | BODY MASS INDEX: 31.41 KG/M2 | SYSTOLIC BLOOD PRESSURE: 104 MMHG | DIASTOLIC BLOOD PRESSURE: 64 MMHG | RESPIRATION RATE: 16 BRPM | HEART RATE: 84 BPM

## 2022-04-19 DIAGNOSIS — G40.909 SEIZURE DISORDER (HCC): Primary | ICD-10-CM

## 2022-04-19 PROCEDURE — 3078F DIAST BP <80 MM HG: CPT | Performed by: OTHER

## 2022-04-19 PROCEDURE — 3074F SYST BP LT 130 MM HG: CPT | Performed by: OTHER

## 2022-04-19 PROCEDURE — 99213 OFFICE O/P EST LOW 20 MIN: CPT | Performed by: OTHER

## 2022-04-19 PROCEDURE — 3008F BODY MASS INDEX DOCD: CPT | Performed by: OTHER

## 2022-04-20 LAB
LAMOTRIGINE: 6.7 MCG/ML (ref 4–18)
VALPROIC ACID: 64.6 MG/L (ref 50–100)

## 2022-05-12 ENCOUNTER — OFFICE VISIT (OUTPATIENT)
Dept: INTERNAL MEDICINE CLINIC | Facility: CLINIC | Age: 27
End: 2022-05-12
Payer: MEDICAID

## 2022-05-12 VITALS
WEIGHT: 173 LBS | BODY MASS INDEX: 33.96 KG/M2 | HEART RATE: 92 BPM | SYSTOLIC BLOOD PRESSURE: 112 MMHG | DIASTOLIC BLOOD PRESSURE: 58 MMHG | OXYGEN SATURATION: 97 % | TEMPERATURE: 98 F | HEIGHT: 60 IN | RESPIRATION RATE: 16 BRPM

## 2022-05-12 DIAGNOSIS — Z00.00 ENCOUNTER FOR ANNUAL PHYSICAL EXAM: Primary | ICD-10-CM

## 2022-05-12 DIAGNOSIS — E66.09 CLASS 1 OBESITY DUE TO EXCESS CALORIES WITHOUT SERIOUS COMORBIDITY WITH BODY MASS INDEX (BMI) OF 33.0 TO 33.9 IN ADULT: ICD-10-CM

## 2022-05-12 DIAGNOSIS — L91.0 KELOID SCAR: ICD-10-CM

## 2022-05-12 DIAGNOSIS — R56.9 SEIZURE (HCC): ICD-10-CM

## 2022-05-12 DIAGNOSIS — F32.9 REACTIVE DEPRESSION: ICD-10-CM

## 2022-05-12 PROBLEM — R11.0 PREGNANCY RELATED NAUSEA, ANTEPARTUM: Status: RESOLVED | Noted: 2021-05-19 | Resolved: 2022-05-12

## 2022-05-12 PROBLEM — M89.9 PUBIC BONE PAIN: Status: RESOLVED | Noted: 2021-11-28 | Resolved: 2022-05-12

## 2022-05-12 PROBLEM — O26.899 PREGNANCY RELATED NAUSEA, ANTEPARTUM (HCC): Status: RESOLVED | Noted: 2021-05-19 | Resolved: 2022-05-12

## 2022-05-12 PROBLEM — O9A.219 TRAUMA DURING PREGNANCY: Status: RESOLVED | Noted: 2021-12-13 | Resolved: 2022-05-12

## 2022-05-12 PROBLEM — E66.811 OBESITY (BMI 30.0-34.9): Status: ACTIVE | Noted: 2022-05-12

## 2022-05-12 PROBLEM — O99.891 DISORDER OF MUSCULOSKELETAL SYSTEM DURING PREGNANCY (HCC): Status: RESOLVED | Noted: 2021-11-28 | Resolved: 2022-05-12

## 2022-05-12 PROBLEM — Z28.39 RUBELLA NON-IMMUNE STATUS, ANTEPARTUM (HCC): Status: RESOLVED | Noted: 2021-08-12 | Resolved: 2022-05-12

## 2022-05-12 PROBLEM — G40.909 SEIZURE DISORDER DURING PREGNANCY IN THIRD TRIMESTER (HCC): Status: RESOLVED | Noted: 2021-05-19 | Resolved: 2022-05-12

## 2022-05-12 PROBLEM — O09.899 RUBELLA NON-IMMUNE STATUS, ANTEPARTUM (HCC): Status: RESOLVED | Noted: 2021-08-12 | Resolved: 2022-05-12

## 2022-05-12 PROBLEM — S37.63XA CERVICAL LACERATION: Status: RESOLVED | Noted: 2022-01-18 | Resolved: 2022-05-12

## 2022-05-12 PROBLEM — Z92.89 HX OF TRANSFUSION OF PACKED RED BLOOD CELLS: Status: RESOLVED | Noted: 2022-01-18 | Resolved: 2022-05-12

## 2022-05-12 PROBLEM — Z34.90 PREGNANCY: Status: RESOLVED | Noted: 2022-01-17 | Resolved: 2022-05-12

## 2022-05-12 PROBLEM — O26.899 PREGNANCY RELATED NAUSEA, ANTEPARTUM: Status: RESOLVED | Noted: 2021-05-19 | Resolved: 2022-05-12

## 2022-05-12 PROBLEM — Z34.90 PREGNANCY (HCC): Status: RESOLVED | Noted: 2022-01-17 | Resolved: 2022-05-12

## 2022-05-12 PROBLEM — R11.0 PREGNANCY RELATED NAUSEA, ANTEPARTUM (HCC): Status: RESOLVED | Noted: 2021-05-19 | Resolved: 2022-05-12

## 2022-05-12 PROBLEM — Z87.59 HISTORY OF HYPEREMESIS GRAVIDARUM: Status: RESOLVED | Noted: 2021-05-19 | Resolved: 2022-05-12

## 2022-05-12 PROBLEM — O99.353 SEIZURE DISORDER DURING PREGNANCY IN THIRD TRIMESTER (HCC): Status: RESOLVED | Noted: 2021-05-19 | Resolved: 2022-05-12

## 2022-05-12 PROBLEM — E66.9 OBESITY (BMI 30.0-34.9): Status: ACTIVE | Noted: 2022-05-12

## 2022-05-12 PROBLEM — Z30.2 REQUEST FOR STERILIZATION: Status: RESOLVED | Noted: 2022-01-12 | Resolved: 2022-05-12

## 2022-05-12 PROBLEM — F32.A DEPRESSION: Status: ACTIVE | Noted: 2022-05-12

## 2022-05-12 PROBLEM — O99.213 OBESITY AFFECTING PREGNANCY IN THIRD TRIMESTER: Status: RESOLVED | Noted: 2021-06-03 | Resolved: 2022-05-12

## 2022-05-12 PROBLEM — O9A.219 TRAUMA DURING PREGNANCY (HCC): Status: RESOLVED | Noted: 2021-12-13 | Resolved: 2022-05-12

## 2022-05-12 PROBLEM — O99.019 IRON DEFICIENCY ANEMIA DURING PREGNANCY, ANTEPARTUM (HCC): Status: RESOLVED | Noted: 2021-08-12 | Resolved: 2022-05-12

## 2022-05-12 PROBLEM — O99.891 DISORDER OF MUSCULOSKELETAL SYSTEM DURING PREGNANCY: Status: RESOLVED | Noted: 2021-11-28 | Resolved: 2022-05-12

## 2022-05-12 PROBLEM — O99.213 OBESITY AFFECTING PREGNANCY IN THIRD TRIMESTER (HCC): Status: RESOLVED | Noted: 2021-06-03 | Resolved: 2022-05-12

## 2022-05-12 PROBLEM — D50.9 IRON DEFICIENCY ANEMIA DURING PREGNANCY, ANTEPARTUM (HCC): Status: RESOLVED | Noted: 2021-08-12 | Resolved: 2022-05-12

## 2022-05-12 PROBLEM — D50.9 IRON DEFICIENCY ANEMIA DURING PREGNANCY, ANTEPARTUM: Status: RESOLVED | Noted: 2021-08-12 | Resolved: 2022-05-12

## 2022-05-12 PROBLEM — O28.0 LOW MATERNAL SERUM VITAMIN B12: Status: RESOLVED | Noted: 2021-11-28 | Resolved: 2022-05-12

## 2022-05-12 PROBLEM — Z28.39 RUBELLA NON-IMMUNE STATUS, ANTEPARTUM: Status: RESOLVED | Noted: 2021-08-12 | Resolved: 2022-05-12

## 2022-05-12 PROBLEM — D62 ACUTE BLOOD LOSS ANEMIA: Status: RESOLVED | Noted: 2022-01-18 | Resolved: 2022-05-12

## 2022-05-12 PROBLEM — O09.899 RUBELLA NON-IMMUNE STATUS, ANTEPARTUM: Status: RESOLVED | Noted: 2021-08-12 | Resolved: 2022-05-12

## 2022-05-12 PROBLEM — E66.811 CLASS 1 OBESITY DUE TO EXCESS CALORIES WITHOUT SERIOUS COMORBIDITY WITH BODY MASS INDEX (BMI) OF 33.0 TO 33.9 IN ADULT: Status: ACTIVE | Noted: 2022-05-12

## 2022-05-12 PROBLEM — O99.019 IRON DEFICIENCY ANEMIA DURING PREGNANCY, ANTEPARTUM: Status: RESOLVED | Noted: 2021-08-12 | Resolved: 2022-05-12

## 2022-05-12 PROCEDURE — 3078F DIAST BP <80 MM HG: CPT | Performed by: NURSE PRACTITIONER

## 2022-05-12 PROCEDURE — 99214 OFFICE O/P EST MOD 30 MIN: CPT | Performed by: NURSE PRACTITIONER

## 2022-05-12 PROCEDURE — 99395 PREV VISIT EST AGE 18-39: CPT | Performed by: NURSE PRACTITIONER

## 2022-05-12 PROCEDURE — 3074F SYST BP LT 130 MM HG: CPT | Performed by: NURSE PRACTITIONER

## 2022-05-12 PROCEDURE — 3008F BODY MASS INDEX DOCD: CPT | Performed by: NURSE PRACTITIONER

## 2022-05-12 NOTE — PATIENT INSTRUCTIONS
Get your labs done. You should be fasting for at least 10 hours. If you take a multivitamin with Biotin or any biotin product it should be held for 3 days prior to getting your labs done. Continue seeing the psychiatrist and neurologist.    Follow a low carb diet and track macros in the free abby [employer].    Exercise at least 30 minutes 5 times weekly. See weight loss clinic. Start off with seeing dermatologist for your keloid scar treatment. Return to clinic in 1 year for annual physical or sooner as needed.

## 2022-06-08 ENCOUNTER — OFFICE VISIT (OUTPATIENT)
Dept: INTERNAL MEDICINE CLINIC | Facility: CLINIC | Age: 27
End: 2022-06-08
Payer: MEDICAID

## 2022-06-08 ENCOUNTER — OFFICE VISIT (OUTPATIENT)
Dept: NEUROLOGY | Facility: CLINIC | Age: 27
End: 2022-06-08
Payer: MEDICAID

## 2022-06-08 VITALS
RESPIRATION RATE: 16 BRPM | WEIGHT: 182 LBS | HEART RATE: 84 BPM | BODY MASS INDEX: 35.73 KG/M2 | DIASTOLIC BLOOD PRESSURE: 56 MMHG | HEIGHT: 60 IN | OXYGEN SATURATION: 98 % | SYSTOLIC BLOOD PRESSURE: 108 MMHG | TEMPERATURE: 98 F

## 2022-06-08 VITALS
HEART RATE: 98 BPM | WEIGHT: 173 LBS | BODY MASS INDEX: 33.96 KG/M2 | HEIGHT: 60 IN | RESPIRATION RATE: 16 BRPM | DIASTOLIC BLOOD PRESSURE: 56 MMHG | SYSTOLIC BLOOD PRESSURE: 108 MMHG

## 2022-06-08 DIAGNOSIS — M25.472 SWELLING OF BOTH ANKLES: Primary | ICD-10-CM

## 2022-06-08 DIAGNOSIS — G40.909 SEIZURE DISORDER (HCC): Primary | ICD-10-CM

## 2022-06-08 DIAGNOSIS — R35.0 URINARY FREQUENCY: ICD-10-CM

## 2022-06-08 DIAGNOSIS — N76.0 ACUTE VAGINITIS: ICD-10-CM

## 2022-06-08 DIAGNOSIS — N89.8 VAGINAL CYST: ICD-10-CM

## 2022-06-08 DIAGNOSIS — M25.471 SWELLING OF BOTH ANKLES: Primary | ICD-10-CM

## 2022-06-08 LAB
APPEARANCE: CLEAR
BILIRUBIN: NEGATIVE
GLUCOSE (URINE DIPSTICK): NEGATIVE MG/DL
LEUKOCYTES: NEGATIVE
MULTISTIX LOT#: ABNORMAL NUMERIC
NITRITE, URINE: NEGATIVE
PH, URINE: 5.5 (ref 4.5–8)
PROTEIN (URINE DIPSTICK): NEGATIVE MG/DL
SPECIFIC GRAVITY: >1.03 (ref 1–1.03)
URINE-COLOR: YELLOW
UROBILINOGEN,SEMI-QN: 0.2 MG/DL (ref 0–1.9)

## 2022-06-08 PROCEDURE — 87510 GARDNER VAG DNA DIR PROBE: CPT | Performed by: NURSE PRACTITIONER

## 2022-06-08 PROCEDURE — 87086 URINE CULTURE/COLONY COUNT: CPT | Performed by: NURSE PRACTITIONER

## 2022-06-08 PROCEDURE — 81003 URINALYSIS AUTO W/O SCOPE: CPT | Performed by: NURSE PRACTITIONER

## 2022-06-08 PROCEDURE — 99213 OFFICE O/P EST LOW 20 MIN: CPT | Performed by: OTHER

## 2022-06-08 PROCEDURE — 3008F BODY MASS INDEX DOCD: CPT | Performed by: NURSE PRACTITIONER

## 2022-06-08 PROCEDURE — 3074F SYST BP LT 130 MM HG: CPT | Performed by: NURSE PRACTITIONER

## 2022-06-08 PROCEDURE — 3078F DIAST BP <80 MM HG: CPT | Performed by: OTHER

## 2022-06-08 PROCEDURE — 87480 CANDIDA DNA DIR PROBE: CPT | Performed by: NURSE PRACTITIONER

## 2022-06-08 PROCEDURE — 99215 OFFICE O/P EST HI 40 MIN: CPT | Performed by: NURSE PRACTITIONER

## 2022-06-08 PROCEDURE — 87660 TRICHOMONAS VAGIN DIR PROBE: CPT | Performed by: NURSE PRACTITIONER

## 2022-06-08 PROCEDURE — 3078F DIAST BP <80 MM HG: CPT | Performed by: NURSE PRACTITIONER

## 2022-06-08 PROCEDURE — 3008F BODY MASS INDEX DOCD: CPT | Performed by: OTHER

## 2022-06-08 PROCEDURE — 3074F SYST BP LT 130 MM HG: CPT | Performed by: OTHER

## 2022-06-08 RX ORDER — LAMOTRIGINE 25 MG/1
50 TABLET ORAL 2 TIMES DAILY
Qty: 120 TABLET | Refills: 2 | Status: SHIPPED | OUTPATIENT
Start: 2022-06-08

## 2022-06-08 RX ORDER — LEVETIRACETAM 1000 MG/1
TABLET ORAL
Qty: 180 TABLET | Refills: 1 | Status: SHIPPED | OUTPATIENT
Start: 2022-06-08

## 2022-06-08 RX ORDER — CEPHALEXIN 500 MG/1
500 CAPSULE ORAL 3 TIMES DAILY
Qty: 30 CAPSULE | Refills: 0 | Status: SHIPPED | OUTPATIENT
Start: 2022-06-08

## 2022-06-08 RX ORDER — FLUCONAZOLE 150 MG/1
150 TABLET ORAL ONCE
Qty: 2 TABLET | Refills: 0 | Status: SHIPPED | OUTPATIENT
Start: 2022-06-08 | End: 2022-06-08

## 2022-06-08 RX ORDER — LEVETIRACETAM 500 MG/1
TABLET ORAL
Qty: 180 TABLET | Refills: 1 | Status: SHIPPED | OUTPATIENT
Start: 2022-06-08

## 2022-06-08 NOTE — PATIENT INSTRUCTIONS
Get your labs done. You should be fasting for at least 10 hours. If you take a multivitamin with Biotin or any biotin product it should be held for 3 days prior to getting your labs done. Take antibiotic completely as ordered. Take antibiotic with food. Eat yogurt twice a day while on antibiotic or take an oral probiotic. Monitor for diarrhea, side effects, allergy. Take the Fluconazole once, may repeat in 72 hours if symptoms persist.     Apply warm compresses to the cyst.     Apply ice on both ankles 2-3 times daily. Never apply ice directly on skin. Apply over a barrier. Elevate both feet while sitting or lying. Avoid prolonged sitting or standing. Decrease salt in your diet. Return to clinic in 1 month if the swelling worsens/does not improve or sooner as needed.

## 2022-06-09 RX ORDER — METRONIDAZOLE 7.5 MG/G
1 GEL VAGINAL 2 TIMES DAILY
Qty: 70 G | Refills: 0 | Status: SHIPPED | OUTPATIENT
Start: 2022-06-09 | End: 2022-06-14

## 2022-06-28 DIAGNOSIS — G40.909 SEIZURE DISORDER (HCC): ICD-10-CM

## 2022-06-28 RX ORDER — LAMOTRIGINE 25 MG/1
50 TABLET ORAL 2 TIMES DAILY
Qty: 120 TABLET | Refills: 2 | Status: SHIPPED | OUTPATIENT
Start: 2022-06-28

## 2022-06-28 NOTE — TELEPHONE ENCOUNTER
Patient states that pharmacy does not have the Lamotrigine that was ordered at office visit.     Medication pended for provider approval.

## 2022-06-28 NOTE — TELEPHONE ENCOUNTER
Pt saw Dr. Riley Reyes on 6/8/22 and doctor was to order lamotrigene. Not at pharm. Pt took last two pills this morning.

## 2022-07-10 LAB
ABSOLUTE BASOPHILS: 51 CELLS/UL (ref 0–200)
ABSOLUTE EOSINOPHILS: 504 CELLS/UL (ref 15–500)
ABSOLUTE LYMPHOCYTES: 2256 CELLS/UL (ref 850–3900)
ABSOLUTE MONOCYTES: 562 CELLS/UL (ref 200–950)
ABSOLUTE NEUTROPHILS: 3927 CELLS/UL (ref 1500–7800)
BASOPHILS: 0.7 %
CHOL/HDLC RATIO: 3.5 (CALC)
CHOLESTEROL, TOTAL: 153 MG/DL
EOSINOPHILS: 6.9 %
HDL CHOLESTEROL: 44 MG/DL
HEMATOCRIT: 40.9 % (ref 35–45)
HEMOGLOBIN A1C: 5.3 % OF TOTAL HGB
HEMOGLOBIN: 13.4 G/DL (ref 11.7–15.5)
LDL-CHOLESTEROL: 89 MG/DL (CALC)
LYMPHOCYTES: 30.9 %
MCH: 28.9 PG (ref 27–33)
MCHC: 32.8 G/DL (ref 32–36)
MCV: 88.1 FL (ref 80–100)
MONOCYTES: 7.7 %
MPV: 9.7 FL (ref 7.5–12.5)
NEUTROPHILS: 53.8 %
NON-HDL CHOLESTEROL: 109 MG/DL (CALC)
PLATELET COUNT: 374 THOUSAND/UL (ref 140–400)
RDW: 13.4 % (ref 11–15)
RED BLOOD CELL COUNT: 4.64 MILLION/UL (ref 3.8–5.1)
TRIGLYCERIDES: 106 MG/DL
TSH W/REFLEX TO FT4: 1.53 MIU/L
WHITE BLOOD CELL COUNT: 7.3 THOUSAND/UL (ref 3.8–10.8)

## 2022-07-12 LAB — LAMOTRIGINE: 3.1 MCG/ML (ref 4–18)

## 2022-07-13 ENCOUNTER — OFFICE VISIT (OUTPATIENT)
Dept: NEUROLOGY | Facility: CLINIC | Age: 27
End: 2022-07-13
Payer: MEDICAID

## 2022-07-13 VITALS
HEIGHT: 60 IN | BODY MASS INDEX: 35.73 KG/M2 | SYSTOLIC BLOOD PRESSURE: 120 MMHG | RESPIRATION RATE: 16 BRPM | WEIGHT: 182 LBS | DIASTOLIC BLOOD PRESSURE: 76 MMHG | OXYGEN SATURATION: 98 % | HEART RATE: 77 BPM

## 2022-07-13 DIAGNOSIS — G40.909 SEIZURE DISORDER (HCC): Primary | ICD-10-CM

## 2022-07-13 PROCEDURE — 3078F DIAST BP <80 MM HG: CPT | Performed by: OTHER

## 2022-07-13 PROCEDURE — 99213 OFFICE O/P EST LOW 20 MIN: CPT | Performed by: OTHER

## 2022-07-13 PROCEDURE — 3074F SYST BP LT 130 MM HG: CPT | Performed by: OTHER

## 2022-07-13 PROCEDURE — 3008F BODY MASS INDEX DOCD: CPT | Performed by: OTHER

## 2022-07-13 RX ORDER — LAMOTRIGINE 100 MG/1
TABLET ORAL
Qty: 60 TABLET | Refills: 2 | Status: SHIPPED | OUTPATIENT
Start: 2022-07-13

## 2022-07-13 RX ORDER — FLUCONAZOLE 150 MG/1
TABLET ORAL
COMMUNITY
Start: 2022-06-08

## 2022-07-13 RX ORDER — CLINDAMYCIN PHOSPHATE 10 UG/ML
LOTION TOPICAL
COMMUNITY
Start: 2022-06-15

## 2022-08-06 LAB — LAMOTRIGINE: 5.3 MCG/ML (ref 4–18)

## 2022-08-24 ENCOUNTER — OFFICE VISIT (OUTPATIENT)
Dept: INTERNAL MEDICINE CLINIC | Facility: CLINIC | Age: 27
End: 2022-08-24
Payer: MEDICAID

## 2022-08-24 VITALS
RESPIRATION RATE: 16 BRPM | SYSTOLIC BLOOD PRESSURE: 110 MMHG | HEIGHT: 60.5 IN | WEIGHT: 178 LBS | HEART RATE: 95 BPM | DIASTOLIC BLOOD PRESSURE: 78 MMHG | OXYGEN SATURATION: 98 % | BODY MASS INDEX: 34.04 KG/M2

## 2022-08-24 DIAGNOSIS — E66.9 OBESITY (BMI 30-39.9): ICD-10-CM

## 2022-08-24 DIAGNOSIS — F32.9 REACTIVE DEPRESSION: ICD-10-CM

## 2022-08-24 DIAGNOSIS — Z51.81 THERAPEUTIC DRUG MONITORING: Primary | ICD-10-CM

## 2022-08-24 DIAGNOSIS — R56.9 SEIZURE (HCC): ICD-10-CM

## 2022-08-24 PROCEDURE — 3008F BODY MASS INDEX DOCD: CPT | Performed by: NURSE PRACTITIONER

## 2022-08-24 PROCEDURE — 99213 OFFICE O/P EST LOW 20 MIN: CPT | Performed by: NURSE PRACTITIONER

## 2022-08-24 PROCEDURE — 3074F SYST BP LT 130 MM HG: CPT | Performed by: NURSE PRACTITIONER

## 2022-08-24 PROCEDURE — 3078F DIAST BP <80 MM HG: CPT | Performed by: NURSE PRACTITIONER

## 2022-08-24 NOTE — PATIENT INSTRUCTIONS
We are here to support you with weight loss, but please remember that you still need your primary care provider for your routine health maintenance. PLAN:  Will call you about medications  Schedule appt with moisés bhakta (behavioral psych)   Follow up with me in 5 weeks  Schedule follow up appointments: Jason Cruz (dietitian) or Merced Kim (presurgery dietitian)   Check for insurance coverage for dietitian and labwork prior to scheduling appointment. Please try to work on the following dietary changes:  1. Goals: Aim for 20-30 grams of protein/ meal  i. Aim for 100 grams of carbohydrates/day  ii. Eat 4-6 vegetables/day  iii. Avoid skipping meals- eat every 4-5 hours  iv. Aim for 3 meals/day  2. Drink lots of water and cut down on soda/juice consumption if soda/juice drinker  3. Focus on protein: (15-30 grams with each meal) ie. greek yogurt, cottage cheese, string cheese, hard boiled eggs  4. Healthy snacks: peanut butter and apples, hummus and carrots, berries, nuts (1/4 cup), tuna and crackers                 Protein Shakes: Premier protein or Core Power                Protein Bars: Rx Bars, Oatmega, Power Crunch                 [place] balanced breaks (cheese and nuts)- without chocolate  5. Reduce carbohydrates which includes sweets as well as rice, pasta, potatoes, bread, corn and instead choose whole grain options or more protein or vegetables (4-6 servings of vegetables per day)  6. Get a good night of sleep  7. Try to decrease stress in life     Please download apps:  1. \"My Fitness Pal\" (other option is Lose it)) to help you to monitor daily dietary intake and you will be able to see if you are eating the right amount of calories, protein, carbs                With My Fitness Pal-->When you set-up the abby or need to adjust settings:                Goals should include:                  Lose 1.5-2 lbs per week                Activity level: not very active (can't count exercise towards calorie number per day)                   ** Daily INPUT> Look at nutrition section-- \"nutrients\" and it will break down your macros for the day (ie. Protein, carbs, fibers, sugars and fats). Try to stay within these numbers daily     2. \"7 minute workout\" to help with exercise/activity which takes 7 minutes of your day and that you can do at home! 3. \"Calm\" or \"Headspace\" which helps with mindfulness, meditation, clarity, sleep, and nasir to your daily life. 4. [employer] blog for healthy recipe ideas  5. Streetline for low carb resources    HIGH PROTEIN SNACK IDEAS  -cottage cheese  -plain yogurt  -kefir  -hard-boiled eggs  -natural cheeses  -nuts (measure portion size)   -unsweetened nut butters  -dried edamame   -elyse seeds soaked in water or almond milk  -soy nuts  -cured meats (monitor for sodium issues)   -hummus with vegetables  -bean dip with vegetables     FRUIT  Low carb fruit options   Raspberries: Half a cup (60 grams) contains 3 grams of carbs. Blackberries: Half a cup (70 grams) contains 4 grams of carbs. Strawberries: Half a cup (100 grams) contains 6 grams of carbs. Blueberries: Half a cup (50 grams) contains 6 grams of carbs. Plum: One medium-sized (80 grams) contains 6 grams of carbs.      VEGETABLES  Low carb vegetables

## 2022-09-09 ENCOUNTER — TELEPHONE (OUTPATIENT)
Dept: INTERNAL MEDICINE CLINIC | Facility: CLINIC | Age: 27
End: 2022-09-09

## 2022-09-09 NOTE — TELEPHONE ENCOUNTER
Patient was seen 8/24/22 and was told she would get a call regarding medication - she has yet to get call. She further stated she left a message a week ago and never heard back. This is the first call on my nurse line. I tried calling patient back and let her know this is the first call I have gotten. I will inform AMF and let her know as soon as we can    PLAN:  Will call you about medications  Schedule appt with moisés bhakta (behavioral psych)   Follow up with me in 5 weeks  Schedule follow up appointments: Hailey Henderson (dietitian) or Shahla Cobos (presurgery dietitian)   Check for insurance coverage for dietitian and labwork prior to scheduling appointment.

## 2022-09-09 NOTE — TELEPHONE ENCOUNTER
I called the Dr office of Heidi Emery M.D.  [phone] to speak to nurse or clinical staff to know if pt can start on Prozac with given h/o Seizure. Office is closed, opens on Monday at [address]. Will call again on Monday.

## 2022-09-09 NOTE — TELEPHONE ENCOUNTER
This provider called and talked with patient, apologized that we didn't return her call (since we never got a notification that she called)  Told patient that he had sent a message to Dr. Marsha Anthony on 8/24/22 and haven't heard back from him. I would have one of our nurses try calling his office directly to see about starting medication. Below is the message sent  From: NGOC Gonzalez On: 08/24/2022 11:09 AM   To: Akira Peñaloza MD   Priority: Routine   Routing Comments:   Helmaritza Anthony,   I saw Ms. Margot Mcgee in the office today, I am wondering if you would be ok with her trying prozac (given her seizure history).    Sincerely, NGOC Ochoa      Can someone please call Dr. Shelly Newell office and ask about this. thanks

## 2022-09-13 ENCOUNTER — TELEPHONE (OUTPATIENT)
Dept: NEUROLOGY | Facility: CLINIC | Age: 27
End: 2022-09-13

## 2022-09-13 NOTE — TELEPHONE ENCOUNTER
I called office to see if safe for Prozac. I spoke with his nurse Daily. She will check with doctor and call us back.

## 2022-09-13 NOTE — TELEPHONE ENCOUNTER
RN received a call from [address] [place] [address] @ Dr. Fide Hills office asking Dr. Pelletier Folds opinion about patient being prescribed Prozac for weightloss. [phone] Dr. Desirae Ambrose    Prozac 20mg most likely due with her hx of seizures. Please advise.

## 2022-09-14 ENCOUNTER — PATIENT MESSAGE (OUTPATIENT)
Dept: INTERNAL MEDICINE CLINIC | Facility: CLINIC | Age: 27
End: 2022-09-14

## 2022-09-14 RX ORDER — FLUOXETINE 10 MG/1
10 CAPSULE ORAL DAILY
Qty: 30 CAPSULE | Refills: 1 | Status: SHIPPED | OUTPATIENT
Start: 2022-09-14

## 2022-09-14 NOTE — TELEPHONE ENCOUNTER
From: Nelli MCINTOSH  To: Jagruti Echeverria  Sent: 9/14/2022 10:45 AM CDT  Subject: fluoxetine/prozac    Hi Bebo Brooks did reach Dr. Faina Rubio office and they are okay with us prescribing fluoxetine/prozac for weight loss.  Slider would like to know if you wish to proceed with that? Thank you.   BRIGITTE Aiken

## 2022-09-14 NOTE — TELEPHONE ENCOUNTER
BRIGITTE PANDEY for the [facility] Jimenez advising Dr. Tico Butler is comfortable with patient being prescribed fluoxetine for weightloss. RN advised to call with any further questions.

## 2022-09-14 NOTE — TELEPHONE ENCOUNTER
Received a message on nurse line from [facility] with Dr. Janell Grayson office. He is fine with patient receiving fluoxetine for weight loss. He does not want you to prescribe bupropion as it causes increase risk for seizures.

## 2022-09-15 ENCOUNTER — OFFICE VISIT (OUTPATIENT)
Dept: OBGYN CLINIC | Facility: CLINIC | Age: 27
End: 2022-09-15
Payer: MEDICAID

## 2022-09-15 VITALS
SYSTOLIC BLOOD PRESSURE: 108 MMHG | BODY MASS INDEX: 34.81 KG/M2 | HEIGHT: 60.5 IN | WEIGHT: 182 LBS | DIASTOLIC BLOOD PRESSURE: 52 MMHG | HEART RATE: 111 BPM

## 2022-09-15 DIAGNOSIS — Z01.419 ENCOUNTER FOR WELL WOMAN EXAM WITH ROUTINE GYNECOLOGICAL EXAM: Primary | ICD-10-CM

## 2022-09-15 DIAGNOSIS — Z12.4 CERVICAL CANCER SCREENING: ICD-10-CM

## 2022-09-15 LAB — CYTOLOGY CVX/VAG DOC THIN PREP: NORMAL

## 2022-09-15 PROCEDURE — 3078F DIAST BP <80 MM HG: CPT | Performed by: OBSTETRICS & GYNECOLOGY

## 2022-09-15 PROCEDURE — 3074F SYST BP LT 130 MM HG: CPT | Performed by: OBSTETRICS & GYNECOLOGY

## 2022-09-15 PROCEDURE — 3008F BODY MASS INDEX DOCD: CPT | Performed by: OBSTETRICS & GYNECOLOGY

## 2022-09-15 PROCEDURE — 99395 PREV VISIT EST AGE 18-39: CPT | Performed by: OBSTETRICS & GYNECOLOGY

## 2022-09-29 ENCOUNTER — OFFICE VISIT (OUTPATIENT)
Dept: INTERNAL MEDICINE CLINIC | Facility: CLINIC | Age: 27
End: 2022-09-29

## 2022-09-29 VITALS
HEART RATE: 73 BPM | SYSTOLIC BLOOD PRESSURE: 110 MMHG | DIASTOLIC BLOOD PRESSURE: 76 MMHG | RESPIRATION RATE: 16 BRPM | HEIGHT: 60 IN | WEIGHT: 179 LBS | BODY MASS INDEX: 35.14 KG/M2 | OXYGEN SATURATION: 99 %

## 2022-09-29 DIAGNOSIS — Z51.81 THERAPEUTIC DRUG MONITORING: Primary | ICD-10-CM

## 2022-09-29 DIAGNOSIS — E66.9 OBESITY (BMI 30-39.9): ICD-10-CM

## 2022-09-29 DIAGNOSIS — F32.9 REACTIVE DEPRESSION: ICD-10-CM

## 2022-09-29 DIAGNOSIS — R56.9 SEIZURE (HCC): ICD-10-CM

## 2022-09-29 PROCEDURE — 3074F SYST BP LT 130 MM HG: CPT | Performed by: NURSE PRACTITIONER

## 2022-09-29 PROCEDURE — 99213 OFFICE O/P EST LOW 20 MIN: CPT | Performed by: NURSE PRACTITIONER

## 2022-09-29 PROCEDURE — 3008F BODY MASS INDEX DOCD: CPT | Performed by: NURSE PRACTITIONER

## 2022-09-29 PROCEDURE — 3078F DIAST BP <80 MM HG: CPT | Performed by: NURSE PRACTITIONER

## 2022-09-29 NOTE — PATIENT INSTRUCTIONS
We are here to support you with weight loss, but please remember that you still need your primary care provider for your routine health maintenance. PLAN:  Will try to increase the fluoxetine (can take 2 tablets= 20 mg) and if you like this dosage we can send it into the pharmacy   Follow up with me in 8 weeks  Schedule follow up appointments: Rita Paez (dietitian) or Juan Carlos Soriano (presurgery dietitian)   Check for insurance coverage for dietitian and labwork prior to scheduling appointment. Please try to work on the following dietary changes:  1. Goals: Aim for 20-30 grams of protein/ meal  i. Aim for 100 grams of carbohydrates/day  ii. Eat 4-6 vegetables/day  iii. Avoid skipping meals- eat every 4-5 hours  iv. Aim for 3 meals/day  2. Drink lots of water and cut down on soda/juice consumption if soda/juice drinker  3. Focus on protein: (15-30 grams with each meal) ie. greek yogurt, cottage cheese, string cheese, hard boiled eggs  4. Healthy snacks: peanut butter and apples, hummus and carrots, berries, nuts (1/4 cup), tuna and crackers                 Protein Shakes: Premier protein or Core Power                Protein Bars: Rx Bars, Oatmega, Power Crunch                 S[place]ento balanced breaks (cheese and nuts)- without chocolate  5. Reduce carbohydrates which includes sweets as well as rice, pasta, potatoes, bread, corn and instead choose whole grain options or more protein or vegetables (4-6 servings of vegetables per day)  6. Get a good night of sleep  7. Try to decrease stress in life     Please download apps:  1. \"My Fitness Pal\" (other option is Lose it)) to help you to monitor daily dietary intake and you will be able to see if you are eating the right amount of calories, protein, carbs                With My Fitness Pal-->When you set-up the abby or need to adjust settings:                Goals should include:                  Lose 1.5-2 lbs per week                Activity level: not very active (can't count exercise towards calorie number per day)                   ** Daily INPUT> Look at nutrition section-- \"nutrients\" and it will break down your macros for the day (ie. Protein, carbs, fibers, sugars and fats). Try to stay within these numbers daily     2. \"7 minute workout\" to help with exercise/activity which takes 7 minutes of your day and that you can do at home! 3. \"Calm\" or \"Headspace\" which helps with mindfulness, meditation, clarity, sleep, and nasir to your daily life. 4. [employer] blog for healthy recipe ideas  5. [employer]Path for low carb resources    HIGH PROTEIN SNACK IDEAS  -cottage cheese  -plain yogurt  -kefir  -hard-boiled eggs  -natural cheeses  -nuts (measure portion size)   -unsweetened nut butters  -dried edamame   -elyse seeds soaked in water or almond milk  -soy nuts  -cured meats (monitor for sodium issues)   -hummus with vegetables  -bean dip with vegetables     FRUIT  Low carb fruit options   Raspberries: Half a cup (60 grams) contains 3 grams of carbs. Blackberries: Half a cup (70 grams) contains 4 grams of carbs. Strawberries: Half a cup (100 grams) contains 6 grams of carbs. Blueberries: Half a cup (50 grams) contains 6 grams of carbs. Plum: One medium-sized (80 grams) contains 6 grams of carbs.      VEGETABLES  Low carb vegetables

## 2022-10-05 ENCOUNTER — TELEPHONE (OUTPATIENT)
Dept: INTERNAL MEDICINE CLINIC | Facility: CLINIC | Age: 27
End: 2022-10-05

## 2022-10-05 DIAGNOSIS — E66.9 OBESITY (BMI 30-39.9): Primary | ICD-10-CM

## 2022-10-05 NOTE — TELEPHONE ENCOUNTER
Nelli Cabrera RN Hi Amy,     Can you please enter a Dietitian referral for patient as she is seeing Humphrey Eva on Friday. Please remember to include any cardiovascular risk factor dx in addition to obesity.       No cardiac diagnosis to add.  done

## 2022-10-17 ENCOUNTER — OFFICE VISIT (OUTPATIENT)
Dept: NEUROLOGY | Facility: CLINIC | Age: 27
End: 2022-10-17
Payer: MEDICAID

## 2022-10-17 VITALS
SYSTOLIC BLOOD PRESSURE: 132 MMHG | BODY MASS INDEX: 35.14 KG/M2 | DIASTOLIC BLOOD PRESSURE: 84 MMHG | RESPIRATION RATE: 16 BRPM | WEIGHT: 179 LBS | HEIGHT: 60 IN | HEART RATE: 71 BPM

## 2022-10-17 DIAGNOSIS — G40.909 SEIZURE DISORDER (HCC): Primary | ICD-10-CM

## 2022-10-17 RX ORDER — LAMOTRIGINE 100 MG/1
100 TABLET ORAL 2 TIMES DAILY
Qty: 180 TABLET | Refills: 1 | Status: SHIPPED | OUTPATIENT
Start: 2022-10-17

## 2023-01-17 ENCOUNTER — OFFICE VISIT (OUTPATIENT)
Dept: NEUROLOGY | Facility: CLINIC | Age: 28
End: 2023-01-17
Payer: MEDICAID

## 2023-01-17 VITALS
DIASTOLIC BLOOD PRESSURE: 80 MMHG | HEART RATE: 87 BPM | OXYGEN SATURATION: 100 % | RESPIRATION RATE: 20 BRPM | WEIGHT: 170 LBS | HEIGHT: 60 IN | BODY MASS INDEX: 33.38 KG/M2 | SYSTOLIC BLOOD PRESSURE: 121 MMHG

## 2023-01-17 DIAGNOSIS — G40.909 SEIZURE DISORDER (HCC): Primary | ICD-10-CM

## 2023-01-17 PROCEDURE — 3008F BODY MASS INDEX DOCD: CPT | Performed by: OTHER

## 2023-01-17 PROCEDURE — 3074F SYST BP LT 130 MM HG: CPT | Performed by: OTHER

## 2023-01-17 PROCEDURE — 3079F DIAST BP 80-89 MM HG: CPT | Performed by: OTHER

## 2023-01-17 PROCEDURE — 99213 OFFICE O/P EST LOW 20 MIN: CPT | Performed by: OTHER

## 2023-01-17 RX ORDER — DULAGLUTIDE 3 MG/.5ML
INJECTION, SOLUTION SUBCUTANEOUS
COMMUNITY
Start: 2023-01-05

## 2023-01-17 RX ORDER — LEVETIRACETAM 1000 MG/1
1000 TABLET ORAL 2 TIMES DAILY
Qty: 180 TABLET | Refills: 1 | Status: SHIPPED | OUTPATIENT
Start: 2023-01-17

## 2023-01-17 RX ORDER — LAMOTRIGINE 100 MG/1
100 TABLET ORAL 2 TIMES DAILY
Qty: 180 TABLET | Refills: 1 | Status: SHIPPED | OUTPATIENT
Start: 2023-01-17

## 2023-02-17 ENCOUNTER — TELEPHONE (OUTPATIENT)
Dept: NEUROLOGY | Facility: CLINIC | Age: 28
End: 2023-02-17

## 2023-02-17 NOTE — TELEPHONE ENCOUNTER
Called patient. Patient states she was lying down in bed around 7 AM - reportedly generally seizure and now sore today with some headache     She is on Keppra 1000 mg bid and Lamictal 100 mg bid     Admits she missed dose of Keppra and Lamictal last night and took this this AM after having seizure this AM; no new seizures and admits she has been not sleeping well and more stressed as well.       Recommend keep same dose for now; have lamotrigine levels done when able - not able to view in chart but states she had this done in the past    Recommend if she has additional seizures, go to ER; otherwise, take regularly scheduled medications; seizure likely due to stress, lack of sleep and missing dose of medication and seems back to baseline over phone

## 2023-02-17 NOTE — TELEPHONE ENCOUNTER
Patient calling, advised that she just had a grand mal seizure for the first time in a while. Please advise if she needs to do anything additional or if Dariusz Margaret is ok.

## 2023-02-17 NOTE — TELEPHONE ENCOUNTER
Grand Mal seizure witnessed by . Was typical and lasted 4-5 minutes. Had very stressful family situation going on yesterday and then forgot to take [address] yesterday. No recent illness. Has been taking SAXENDA daily injectable for weight loss with no recent increase in dose. Routed to provider.

## 2023-06-21 DIAGNOSIS — G40.909 SEIZURE DISORDER (HCC): ICD-10-CM

## 2023-06-21 RX ORDER — LEVETIRACETAM 1000 MG/1
TABLET ORAL
Qty: 180 TABLET | Refills: 1 | Status: SHIPPED | OUTPATIENT
Start: 2023-06-21

## 2023-06-21 RX ORDER — LAMOTRIGINE 100 MG/1
TABLET ORAL
Qty: 180 TABLET | Refills: 1 | Status: SHIPPED | OUTPATIENT
Start: 2023-06-21

## 2023-07-31 ENCOUNTER — OFFICE VISIT (OUTPATIENT)
Dept: NEUROLOGY | Facility: CLINIC | Age: 28
End: 2023-07-31
Payer: COMMERCIAL

## 2023-07-31 VITALS
WEIGHT: 170 LBS | BODY MASS INDEX: 33.38 KG/M2 | RESPIRATION RATE: 16 BRPM | DIASTOLIC BLOOD PRESSURE: 86 MMHG | HEIGHT: 60 IN | HEART RATE: 85 BPM | SYSTOLIC BLOOD PRESSURE: 124 MMHG

## 2023-07-31 DIAGNOSIS — G40.909 SEIZURE DISORDER (HCC): Primary | ICD-10-CM

## 2023-07-31 PROCEDURE — 3074F SYST BP LT 130 MM HG: CPT | Performed by: OTHER

## 2023-07-31 PROCEDURE — 99214 OFFICE O/P EST MOD 30 MIN: CPT | Performed by: OTHER

## 2023-07-31 PROCEDURE — 3008F BODY MASS INDEX DOCD: CPT | Performed by: OTHER

## 2023-07-31 PROCEDURE — 3079F DIAST BP 80-89 MM HG: CPT | Performed by: OTHER

## 2023-08-07 ENCOUNTER — LAB ENCOUNTER (OUTPATIENT)
Dept: LAB | Age: 28
End: 2023-08-07
Attending: Other
Payer: COMMERCIAL

## 2023-08-07 DIAGNOSIS — G40.909 SEIZURE DISORDER (HCC): Primary | ICD-10-CM

## 2023-08-07 PROCEDURE — 80175 DRUG SCREEN QUAN LAMOTRIGINE: CPT

## 2023-08-07 PROCEDURE — 36415 COLL VENOUS BLD VENIPUNCTURE: CPT

## 2023-08-09 LAB — LAMOTRIGINE LVL: 4.6 UG/ML

## 2023-08-16 ENCOUNTER — OFFICE VISIT (OUTPATIENT)
Dept: FAMILY MEDICINE | Age: 28
End: 2023-08-16

## 2023-08-16 ENCOUNTER — LAB SERVICES (OUTPATIENT)
Dept: LAB | Age: 28
End: 2023-08-16

## 2023-08-16 VITALS
BODY MASS INDEX: 33.57 KG/M2 | WEIGHT: 171 LBS | DIASTOLIC BLOOD PRESSURE: 76 MMHG | SYSTOLIC BLOOD PRESSURE: 120 MMHG | HEIGHT: 60 IN | HEART RATE: 80 BPM

## 2023-08-16 DIAGNOSIS — Z23 NEED FOR HPV VACCINE: ICD-10-CM

## 2023-08-16 DIAGNOSIS — Z86.39 HX OF DIABETES MELLITUS: ICD-10-CM

## 2023-08-16 DIAGNOSIS — Z00.00 ANNUAL PHYSICAL EXAM: Primary | ICD-10-CM

## 2023-08-16 DIAGNOSIS — G40.919 INTRACTABLE EPILEPSY WITHOUT STATUS EPILEPTICUS, UNSPECIFIED EPILEPSY TYPE (CMD): ICD-10-CM

## 2023-08-16 DIAGNOSIS — L73.2 HIDRADENITIS SUPPURATIVA: ICD-10-CM

## 2023-08-16 PROBLEM — N97.9 FEMALE INFERTILITY: Status: ACTIVE | Noted: 2021-04-08

## 2023-08-16 PROBLEM — D62 ACUTE BLOOD LOSS ANEMIA: Status: ACTIVE | Noted: 2022-01-18

## 2023-08-16 PROBLEM — E66.09 CLASS 1 OBESITY DUE TO EXCESS CALORIES WITHOUT SERIOUS COMORBIDITY WITH BODY MASS INDEX (BMI) OF 33.0 TO 33.9 IN ADULT: Status: ACTIVE | Noted: 2022-05-12

## 2023-08-16 PROBLEM — O28.0 LOW MATERNAL SERUM VITAMIN B12: Status: ACTIVE | Noted: 2021-11-28

## 2023-08-16 PROBLEM — F32.A DEPRESSION: Status: ACTIVE | Noted: 2022-05-12

## 2023-08-16 LAB
ALBUMIN SERPL-MCNC: 4.1 G/DL (ref 3.6–5.1)
ALBUMIN/GLOB SERPL: 1 {RATIO} (ref 1–2.4)
ALP SERPL-CCNC: 78 UNITS/L (ref 45–117)
ALT SERPL-CCNC: 40 UNITS/L
ANION GAP SERPL CALC-SCNC: 12 MMOL/L (ref 7–19)
AST SERPL-CCNC: 25 UNITS/L
BILIRUB SERPL-MCNC: 0.4 MG/DL (ref 0.2–1)
BUN SERPL-MCNC: 11 MG/DL (ref 6–20)
BUN/CREAT SERPL: 17 (ref 7–25)
CALCIUM SERPL-MCNC: 9.1 MG/DL (ref 8.4–10.2)
CHLORIDE SERPL-SCNC: 104 MMOL/L (ref 97–110)
CO2 SERPL-SCNC: 30 MMOL/L (ref 21–32)
CREAT SERPL-MCNC: 0.65 MG/DL (ref 0.51–0.95)
EGFRCR SERPLBLD CKD-EPI 2021: >90 ML/MIN/{1.73_M2}
FASTING DURATION TIME PATIENT: 14 HOURS (ref 0–999)
GLOBULIN SER-MCNC: 4.1 G/DL (ref 2–4)
GLUCOSE SERPL-MCNC: 85 MG/DL (ref 70–99)
HBA1C MFR BLD: 5.3 % (ref 4.5–5.6)
POTASSIUM SERPL-SCNC: 4.8 MMOL/L (ref 3.4–5.1)
PROT SERPL-MCNC: 8.2 G/DL (ref 6.4–8.2)
SODIUM SERPL-SCNC: 141 MMOL/L (ref 135–145)

## 2023-08-16 PROCEDURE — 83036 HEMOGLOBIN GLYCOSYLATED A1C: CPT | Performed by: INTERNAL MEDICINE

## 2023-08-16 PROCEDURE — 99385 PREV VISIT NEW AGE 18-39: CPT | Performed by: STUDENT IN AN ORGANIZED HEALTH CARE EDUCATION/TRAINING PROGRAM

## 2023-08-16 PROCEDURE — 90471 IMMUNIZATION ADMIN: CPT | Performed by: STUDENT IN AN ORGANIZED HEALTH CARE EDUCATION/TRAINING PROGRAM

## 2023-08-16 PROCEDURE — 36415 COLL VENOUS BLD VENIPUNCTURE: CPT | Performed by: STUDENT IN AN ORGANIZED HEALTH CARE EDUCATION/TRAINING PROGRAM

## 2023-08-16 PROCEDURE — 90651 9VHPV VACCINE 2/3 DOSE IM: CPT | Performed by: STUDENT IN AN ORGANIZED HEALTH CARE EDUCATION/TRAINING PROGRAM

## 2023-08-16 PROCEDURE — 80053 COMPREHEN METABOLIC PANEL: CPT | Performed by: INTERNAL MEDICINE

## 2023-08-16 RX ORDER — LEVETIRACETAM 1000 MG/1
1 TABLET ORAL 2 TIMES DAILY
COMMUNITY
Start: 2023-06-21

## 2023-08-16 RX ORDER — LAMOTRIGINE 100 MG/1
100 TABLET ORAL 2 TIMES DAILY
COMMUNITY
Start: 2023-05-17

## 2023-08-16 RX ORDER — DULAGLUTIDE 4.5 MG/.5ML
INJECTION, SOLUTION SUBCUTANEOUS
COMMUNITY
Start: 2023-05-28

## 2023-08-16 ASSESSMENT — PATIENT HEALTH QUESTIONNAIRE - PHQ9
SUM OF ALL RESPONSES TO PHQ9 QUESTIONS 1 AND 2: 0
CLINICAL INTERPRETATION OF PHQ2 SCORE: NO FURTHER SCREENING NEEDED
SUM OF ALL RESPONSES TO PHQ9 QUESTIONS 1 AND 2: 0
2. FEELING DOWN, DEPRESSED OR HOPELESS: NOT AT ALL
1. LITTLE INTEREST OR PLEASURE IN DOING THINGS: NOT AT ALL

## 2023-12-13 DIAGNOSIS — G40.909 SEIZURE DISORDER (HCC): ICD-10-CM

## 2023-12-13 RX ORDER — LEVETIRACETAM 1000 MG/1
1000 TABLET ORAL 2 TIMES DAILY
Qty: 180 TABLET | Refills: 1 | Status: SHIPPED | OUTPATIENT
Start: 2023-12-13

## 2023-12-13 RX ORDER — LAMOTRIGINE 100 MG/1
100 TABLET ORAL 2 TIMES DAILY
Qty: 180 TABLET | Refills: 1 | Status: SHIPPED | OUTPATIENT
Start: 2023-12-13

## 2023-12-13 NOTE — TELEPHONE ENCOUNTER
Medication: Levetiracetam 1000 mg & Lamotrigine 100 mg     Date of last refill: 06/21/2023 with 1 addt refill:  [place]      Last office visit: 07/31/2023  Due back to clinic per last office note:    Date next office visit scheduled:    Future Appointments   Date Time Provider Department Center   1/29/2024  8:40 AM Yogi Alfonso MD [facility]           Last OV note recommendation:      Impression/ Plan:  Dotty Gross is a 28 year old female patient with PMHx significant for seizure disorder, who originally presented 6/2021, for evaluation and management.     Neurologic examination is normal and nonfocal     Patient endorses a history of seizures since 2012.  She has no recollection during these events.    Previously, her seizures were well controlled on Keppra at 1000 mg twice daily, with last seizure occurring approximately 1 year prior, and one prior to that occurring during second trimester of pregnancy in 2017.     However, she was having complications of hyperemesis gravidarum during first and second trimesters with most recent pregnancy.  She did not have any seizures during that time.  However, she had recurrent seizures and was increased on Keppra up to 1500 mg bid - she subsequently delivered her baby and had recurrent seizures, which suspect are breakthrough seizures and may be related to missing dose of medication or receiving blood transfusion.  However, levels were therapeutic and she has had seizures on several occasions during and after pregnancy.     Given this most recent seizure cluster, she was previously recommended to start Lamictal and was doing well on dose up to 100 mg daily after following starter kit instructions; however, she did not refill the medication and stopped talking for ~ 2 weeks and presented to ED for admission 3/9/2022 following witnessed seizure in office with reports of 3 or 4 additional seizures that day.       She is now  doing well and suspect her breakthrough seizures were due to missing anti-seizure medication as well as concurrent infectious process - she was doing well on Depakote and Lamictal aside from weight gain -  levels were in range as of 4/18/2022 - given weight gain, she was weaned off Depakote and started on Lamictal up to 100 mg bid along with Keppra 1500 mg bid.  Levels  done off Depakote and on Lamictal 100 mg bid were within normal limits; given improvement, she has been weaned down on Keppra to 1000 mg bid and doing well; lamictal level was low range as of 1/30/2023 and patient had seizure 2/17/2023.  She has not had any recurrent seizures and no repeat levels available at this time; recommend have levels done and pending results may need to increase Lamictal (ie 150 mg bid) but await levels; no recurrent seizures since this time but given seizure ~ 5 months ago, patient advised she should not drive for ~ 6 months from her last seizure.     (G40.909) Seizure disorder (HCC)  (primary encounter diagnosis)  Plan: LAMOTRIGINE (LAMICTAL), SERUM        As noted above  Return in about 6 months (around 1/31/2024).

## 2024-01-29 ENCOUNTER — OFFICE VISIT (OUTPATIENT)
Dept: NEUROLOGY | Facility: CLINIC | Age: 29
End: 2024-01-29
Payer: COMMERCIAL

## 2024-01-29 VITALS
WEIGHT: 170 LBS | HEART RATE: 74 BPM | DIASTOLIC BLOOD PRESSURE: 79 MMHG | BODY MASS INDEX: 33.38 KG/M2 | HEIGHT: 60 IN | RESPIRATION RATE: 16 BRPM | SYSTOLIC BLOOD PRESSURE: 127 MMHG

## 2024-01-29 DIAGNOSIS — G40.909 SEIZURE DISORDER (HCC): Primary | ICD-10-CM

## 2024-01-29 PROCEDURE — 3008F BODY MASS INDEX DOCD: CPT | Performed by: OTHER

## 2024-01-29 PROCEDURE — 3078F DIAST BP <80 MM HG: CPT | Performed by: OTHER

## 2024-01-29 PROCEDURE — 99214 OFFICE O/P EST MOD 30 MIN: CPT | Performed by: OTHER

## 2024-01-29 PROCEDURE — 3074F SYST BP LT 130 MM HG: CPT | Performed by: OTHER

## 2024-01-29 NOTE — PROGRESS NOTES
[facility] Progress Note    HPI  Chief Complaint   Patient presents with    Seizures     6 month Follow up and no recent seizures       As per my initial H&P from 6/16/2021,   \"   Dotty Gross is a 25 year old female, who presents for evaluation of seizures.      Patient states she has history of seizures.  She states these usually last 30 seconds and she does not recall them but is told she has a grunting sound and then shakes and loses awareness; denies auras of odd tastes, smells or sounds; has some post ictal confusion and is sore after seizures; may bite tongue and jaw muscles are sore.      She states last seizure was July 29, 2020.  She states she previously had COVID July 18-19, with extensive weight loss and nausea/emesis.  She denied lack of taste and smell and denied fevers or headaches.      She states when she had her last seizure, she was taking Keppra but was not able to keep down medication; was on 750 mg bid and was increased up to 1000 mg bid.  Since this time, she has not had any seizures.  She is currently 9 weeks pregnant and has remained on Keppra; her first pregnancy was in 2017 - had seizure during second trimester.     She has been doing well currently but has been having hyperemesis gravidarum.   Otherwise, patient denies any recent weight change, fevers, chills, double vision/ blurry vision / loss of vision, chest pain, palpitations, shortness of breath, rashes, joint pains, bowel / bladder incontinence or mood issues. \"    Prior notes as below 3/17/2022    Patient at time of last visit was unable to seen formally and was sent to ER as she had breakthrough seizures - states she does not recall coming to office but admits she was out of Lamictal for the prior 2 weeks - she had titrated up to 100 mg dose but never got this refilled and then started to have more confusion and was not as quick to respond; she had 4 seizures reportedly on day of admission 3/9/2022.  She was  admitted for workup and had EEG which was abnormal. She was treated with Depakote 500 mg q8 hrs and Lamictal was restarted at 25 mg bid.  She was discharged home with Depakote 500 mg bid with plan to increase Lamictal to 50 mg bid after 1 week, which she has not done yet.  She remains on Keppra 1500 mg bid.      She denies any recurrent seizures and denies any auras of odd tastes, smells or sounds; she feels alert and back to her usual self.     Prior notes as below: 4/19/2022  Patient last seen 3/17/2022.  Since last visit, she has increased lamotrigine (Lamictal) up to 50 mg bid and reduced valproic acid (Depakote) to 250 mg AM / 500 mg PM- her last level was done 3/21/2022 with valproic acid level elevated and lamotrigine within normal range. She was on Depakote 500 mg bid and Lamictal 50 mg bid at the time and advised to have repeat levels done in ~2 weeks but results not available at this time.  Currently, she is doing well with no seizures and denies any rash, balance issues, double vision, nausea; she denies any auras of odd tastes, smells or sounds and denies recurrent seizures recently.   She has not been able to lose weight since her pregnancy but denies tremor or balance issues.            Prior notes as below 6/8/2022.  Patient last seen 4/19/2022.  Since last visit, she has been on Depakote 250 mg AM/ 500 mg PM and Lamictal 50 mg bid along with Keppra 1500 mg bid.  On this regimen, she denies any seizures and denies any auras of odd tastes,smells, sounds or dizzy spells or episodes of loss of awareness. She has noted some continued weight gain but denies any other side effect of rash, double vision, nausea/emesis or balance issues/falls.            Prior notes as per 7/13/2022.  Patient last seen 6/8/2022.  She is now off Depakote and remains on Keppra 1500 mg bid and Lamictal 50 mg bid; she denies any seizures and denies any auras of odd tastes,smells, sounds or dizzy spells but had an episode a couple  of weeks ago where she briefly \"zoned out\" for a matter of seconds, after she stopped taking the Depakote but has not had any since this time; She denies other  episodes of loss of awareness. She denies other side effect of rash, double vision, nausea/emesis or balance issues/falls.  She recently had levels checked.        Prior notes as per 10/17/2022.  Patient last seen 7/13/2022.  She is still on Keppra 1500 mg bid and is now on Lamictal 100 mg bid.  She has been doing well on this dose.  She denies any episodes of \"zoning out\" and denies auras of odd tastes, smells or sounds.  She denies any episodes of speech arrest and denies other side effects of rash, double vision, nausea/emeiss or balance issues/falls. She has been going to weight loss clinic and is now on fluoxetine 20 mg daily but denies any significant improvement.      Prior notes as per 1/17/2023.  Patient last seen 10/17/2022. Since last visit, she has been weaned down on Keppra currently at 1000 mg bid.  She has remained on Lamictal 100 mg bid and denies any seizures or episodes of loss of awareness, or speech arrest or auras of odd tastes, smells or sounds.  She denies side effects of rash, balance issues, double vision, or nausea.  She has been working with weight loss clinic but is not on fluoxetine or topiramate at this time.         Prior notes as per 7/31/2023.  Patient last seen 1/17/2023.  Patient since last visit had a generalized seizure 2/17/2023.  At that time, she was on Keppra 1000 mg bid and Lamictal 100 mg bid.  She had missed doses of Keppra and Lamictal the night prior to her seizure and did not have recurrent seizures; no auras of odd tastes, smells or sounds.  She admitted to poor sleep and increased stress at time of seizure.  She was recommended to have levels checked but has not done so yet.  Currently, she is doing well with no recurrent seizures and denies auras of odd tastes, smells or sounds or episodes of loss of  awareness or speech arrest.  She denies side effects of rash, balance issues, double vision or nausea.        Patient last seen 2023.  Since last visit, she has been doing well.  She remains on Lamictal 100 mg bid and Keppra 1000 mg bid and denies any seizures.  She denies missing any doses of medication and has been sleeping better as her child is sleeping through the night ( no issues for child age 4). She denies any auras of odd tastes, smells or sounds or episodes of speech arrest or loss of awareness; no issues with waking in the AM having wet the bed or bitten her tongue.  She additionally denies any side effects of rash, balance issues, double vision or nausea.  She has been driving without issues.     Past Medical History:   Diagnosis Date    Anemia     during pregnancy    Anemia affecting pregnancy in third trimester 2021    Cervical laceration 2022    COVID-2020    Disorder of musculoskeletal system during pregnancy 2021    Epilepsy (HCC) 2012    1st grand mal seizure at 17    Female infertility 2021    History of hyperemesis gravidarum 2021    Hx of transfusion of packed red blood cells 2022    Received 2 units packed red blood cells for postpartum hemorrhage - atonic & cervical laceration    Hyperemesis gravidarum 2017    Was put on Zofran - didn't help much.     Iron deficiency anemia during pregnancy, antepartum 2021    Consider IV iron if difficulty with tolerating PNV due to hyperemesis persists    Keloid scar     chest    Low maternal serum vitamin B12 2021     (normal spontaneous vaginal delivery) 2022    Obesity (BMI 30-39.9)     Obesity affecting pregnancy in third trimester 6/3/2021    2021 - Passed early 1 hour glucose test at 17w6d. Plan re-screening at 26-28 wk      Other immediate postpartum hemorrhage     Pap smear for cervical cancer screening 2021    Pap negative.     Pregnancy related nausea, antepartum 2021     Pubic bone pain 11/28/2021    Request for sterilization 1/12/2022 1/12/2022 Discussed permanent/irreversible, optional nature of sterilization. Patient expressed understanding. Medicaid consent form signed together.     Rubella non-immune status, antepartum 8/12/2021    MMR after delivery    Screening for genetic disease carrier status 09/10/2021    Carrier Screen = Negative    Seizure disorder (HCC)     epilepsy    Seizure disorder during pregnancy in third trimester (HCC) 5/19/2021    Trauma during pregnancy 12/13/2021    Uterine atony, postpartum, current hospitalization 1/18/2022    Vitamin D deficiency      Past Surgical History:   Procedure Laterality Date    CHOLECYSTECTOMY  2018    D&C AFTER DELIVERY  01/17/2022    OTHER SURGICAL HISTORY  2012    benign keloid bx chest area     Family History   Problem Relation Age of Onset    No Known Problems Father     No Known Problems Mother     Other (Other) Daughter         severe GI sensitivities to multiple foods - no clear diagnosis    Diabetes Maternal Grandmother     No Known Problems Maternal Grandfather     No Known Problems Paternal Grandmother     No Known Problems Paternal Grandfather     No Known Problems Sister     No Known Problems Sister     No Known Problems Sister      Social History     Socioeconomic History    Marital status: Single   Tobacco Use    Smoking status: Never     Passive exposure: Never    Smokeless tobacco: Never   Vaping Use    Vaping Use: Never used   Substance and Sexual Activity    Alcohol use: Never    Drug use: Never    Sexual activity: Yes     Partners: Male   Other Topics Concern    Caffeine Concern Yes     Comment: occasionally    Exercise Yes     Comment: walking     Seat Belt Yes    Special Diet No    Stress Concern No    Weight Concern Yes     Allergies   Allergen Reactions    Shellfish-Derived Products SWELLING         Current Outpatient Medications:     levetiracetam 1000 MG Oral Tab, Take 1 tablet (1,000 mg total) by  mouth 2 (two) times daily., Disp: 180 tablet, Rfl: 1    lamoTRIgine 100 MG Oral Tab, Take 1 tablet (100 mg total) by mouth 2 (two) times daily., Disp: 180 tablet, Rfl: 1    Review of Systems:  No chest pain or palpitations; otherwise as noted in HPI.    Exam:  /79 (BP Location: Left arm, Patient Position: Sitting, Cuff Size: large)   Pulse 74   Resp 16   Ht 60\"   Wt 170 lb (77.1 kg)   LMP 01/15/2024 (Exact Date)   BMI 33.20 kg/m²   Estimated body mass index is 33.2 kg/m² as calculated from the following:    Height as of this encounter: 60\".    Weight as of this encounter: 170 lb (77.1 kg).    Gen: well developed, well nourished, no acute distress  HEENT: normocephalic  Heart; normal S1/S2, regular rate and rhythm  Lungs: clear to auscultation bilaterally  Extremities: no edema, peripheral pulses intact    Neck: supple, full range of motion; no carotid bruits    Neuro:  Mental status:  Orientation: Alert and oriented to person, place, time  Speech Fluent and conversational    CN: PERRL, EOMI with no nystagmaus, VFF, smile symmetric, sensation intact, tongue and palate midline, SCM intact, otherwise, CN 2-12 intact  Motor: 5/5 strength throughout, tone normal  DTR: 2+ symmetric throughout, toes downgoing bilaterally, no clonus  Sensory: intact to light touch throughout  Coord: FNF intact with no tremor or dysmetria; rapid alternating movements intact bilaterally  Romberg: absent  Gait: normal casual gait    Labs:  New    Component      Latest Ref Rng 8/7/2023   Lamotrigine Lvl      2.0 - 20.0 ug/mL 4.6      From EMR    12/6/2023    CMP: normal range with exception of ALT 38 (minimally elevated)  CBC: normal range    1/25/2023:   From Outside records    Lamotrigine: 1.5 (low)     1/11/2023: 3.3 (mildly low)     Prior as noted below    Component      Latest Ref Rng & Units 8/2/2022   LAMOTRIGINE      4.0 - 18.0 mcg/mL 5.3       Prior as noted below     Component      Latest Ref Rng & Units 7/9/2022    LAMOTRIGINE      4.0 - 18.0 mcg/mL 3.1 (L)       Prior as noted below  Component      Latest Ref Rng & Units 4/18/2022   LAMOTRIGINE      4.0 - 18.0 mcg/mL 6.7   VALPROIC ACID      50.0 - 100.0 mg/L  64.6     Prior as noted below    Component      Latest Ref Rng & Units 3/21/2022   LAMOTRIGINE      4.0 - 18.0 mcg/mL 5.8   VALPROIC ACID      50.0 - 100.0 mg/L  131.6 (H)     Prior as noted below    Component      Latest Ref Rng & Units 3/11/2022 3/9/2022 2/17/2022   WBC      4.0 - 11.0 x10(3) uL  13.4 (H)    RBC      3.80 - 5.30 x10(6)uL  4.70    Hemoglobin      12.0 - 16.0 g/dL  13.1    Hematocrit      35.0 - 48.0 %  41.2    Platelet Count      150.0 - 450.0 10(3)uL  324.0    MCV      80.0 - 100.0 fL  87.7    MCH      26.0 - 34.0 pg  27.9    MCHC      31.0 - 37.0 g/dL  31.8    RDW      %  14.0    Prelim Neutrophil Abs      1.50 - 7.70 x10 (3) uL  11.28 (H)    Neutrophils Absolute      1.50 - 7.70 x10(3) uL  11.28 (H)    Lymphocytes Absolute      1.00 - 4.00 x10(3) uL  1.62    Monocytes Absolute      0.10 - 1.00 x10(3) uL  0.40    Eosinophils Absolute      0.00 - 0.70 x10(3) uL  0.04    Basophils Absolute      0.00 - 0.20 x10(3) uL  0.02    Immature Granulocyte Absolute      0.00 - 1.00 x10(3) uL  0.05    Neutrophils %      %  84.1    Lymphocytes %      %  12.1    Monocytes %      %  3.0    Eosinophils %      %  0.3    Basophils %      %  0.1    Immature Granulocyte %      %  0.4    Glucose      70 - 99 mg/dL  113 (H)    Sodium      136 - 145 mmol/L  139    Potassium      3.5 - 5.1 mmol/L  4.0    Chloride      98 - 112 mmol/L  109    Carbon Dioxide, Total      21.0 - 32.0 mmol/L  23.0    ANION GAP      0 - 18 mmol/L  7    BUN      7 - 18 mg/dL  11    CREATININE      0.55 - 1.02 mg/dL  0.80    CALCIUM      8.5 - 10.1 mg/dL  9.6    CALCULATED OSMOLALITY      275 - 295 mOsm/kg  288    eGFR NON-AFR. AMERICAN      >=60  104    eGFR       >=60  119    AST (SGOT)      15 - 37 U/L  28    ALT (SGPT)      13 -  56 U/L  51    ALKALINE PHOSPHATASE      37 - 98 U/L  75    Total Bilirubin      0.1 - 2.0 mg/dL  0.3    PROTEIN, TOTAL      6.4 - 8.2 g/dL  8.8 (H)    Albumin      3.4 - 5.0 g/dL  4.2    Globulin      2.8 - 4.4 g/dL  4.6 (H)    A/G Ratio      1.0 - 2.0  0.9 (L)    Color Urine      Yellow  Yellow    Clarity Urine      Clear  Cloudy (A)    Spec Gravity      1.001 - 1.030  1.024    Glucose Urine      Negative mg/dL  Negative    Bilirubin Urine      Negative  Negative    Ketones, UA      Negative mg/dL  Trace (A)    Blood Urine      Negative  Large (A)    PH Urine      5.0 - 8.0  5.0    Protein Urine      Negative mg/dL  30 (A)    Urobilinogen Urine      <2.0 mg/dL  <2.0    Nitrite Urine      Negative  Negative    Leukocyte Esterase Urine      Negative  Large (A)    WBC Urine      0 - 5 /HPF  11-20 (A)    RBC Urine      0 - 2 /HPF  >10 (A)    Bacteria Urine      None Seen /HPF  Rare (A)    SQUAM EPI CELLS UR      None Seen /HPF  Few (A)    RENAL TUBULAR EPITHELIAL CELLS      None Seen /HPF  None Seen    TRANSITIONAL EPI CELLS      None Seen /HPF  None Seen    YEAST URINE      None Seen /HPF  None Seen    AMPHETAMINE URINE      Negative  Negative    BENZODIAZEPINES URINE      Negative  Negative    COCAINE URINE      Negative  Negative    CANNABINOID URINE      Negative  Negative    Ecstasy Urine      Negative  Negative    OPIATE URINE      Negative  Negative    Oxycodone Urine      Negative  Negative    CREATININE UR RANDOM      mg/dL  168.00    LEVETIRACETAM (KEPPRA)      12 - 46 ug/mL      LAMOTRIGINE      4.0 - 18.0 mcg/mL   2.9 (L)   VALPROIC ACID      50.0 - 100.0 ug/mL 80.6       Component      Latest Ref Rng & Units 1/21/2022   WBC      4.0 - 11.0 x10(3) uL    RBC      3.80 - 5.30 x10(6)uL    Hemoglobin      12.0 - 16.0 g/dL    Hematocrit      35.0 - 48.0 %    Platelet Count      150.0 - 450.0 10(3)uL    MCV      80.0 - 100.0 fL    MCH      26.0 - 34.0 pg    MCHC      31.0 - 37.0 g/dL    RDW      %    Prelim  Neutrophil Abs      1.50 - 7.70 x10 (3) uL    Neutrophils Absolute      1.50 - 7.70 x10(3) uL    Lymphocytes Absolute      1.00 - 4.00 x10(3) uL    Monocytes Absolute      0.10 - 1.00 x10(3) uL    Eosinophils Absolute      0.00 - 0.70 x10(3) uL    Basophils Absolute      0.00 - 0.20 x10(3) uL    Immature Granulocyte Absolute      0.00 - 1.00 x10(3) uL    Neutrophils %      %    Lymphocytes %      %    Monocytes %      %    Eosinophils %      %    Basophils %      %    Immature Granulocyte %      %    Glucose      70 - 99 mg/dL    Sodium      136 - 145 mmol/L    Potassium      3.5 - 5.1 mmol/L    Chloride      98 - 112 mmol/L    Carbon Dioxide, Total      21.0 - 32.0 mmol/L    ANION GAP      0 - 18 mmol/L    BUN      7 - 18 mg/dL    CREATININE      0.55 - 1.02 mg/dL    CALCIUM      8.5 - 10.1 mg/dL    CALCULATED OSMOLALITY      275 - 295 mOsm/kg    eGFR NON-AFR. AMERICAN      >=60    eGFR       >=60    AST (SGOT)      15 - 37 U/L    ALT (SGPT)      13 - 56 U/L    ALKALINE PHOSPHATASE      37 - 98 U/L    Total Bilirubin      0.1 - 2.0 mg/dL    PROTEIN, TOTAL      6.4 - 8.2 g/dL    Albumin      3.4 - 5.0 g/dL    Globulin      2.8 - 4.4 g/dL    A/G Ratio      1.0 - 2.0    Color Urine      Yellow    Clarity Urine      Clear    Spec Gravity      1.001 - 1.030    Glucose Urine      Negative mg/dL    Bilirubin Urine      Negative    Ketones, UA      Negative mg/dL    Blood Urine      Negative    PH Urine      5.0 - 8.0    Protein Urine      Negative mg/dL    Urobilinogen Urine      <2.0 mg/dL    Nitrite Urine      Negative    Leukocyte Esterase Urine      Negative    WBC Urine      0 - 5 /HPF    RBC Urine      0 - 2 /HPF    Bacteria Urine      None Seen /HPF    SQUAM EPI CELLS UR      None Seen /HPF    RENAL TUBULAR EPITHELIAL CELLS      None Seen /HPF    TRANSITIONAL EPI CELLS      None Seen /HPF    YEAST URINE      None Seen /HPF    AMPHETAMINE URINE      Negative    BENZODIAZEPINES URINE      Negative     COCAINE URINE      Negative    CANNABINOID URINE      Negative    Ecstasy Urine      Negative    OPIATE URINE      Negative    Oxycodone Urine      Negative    CREATININE UR RANDOM      mg/dL    LEVETIRACETAM (KEPPRA)      12 - 46 ug/mL 55 (H)   LAMOTRIGINE      4.0 - 18.0 mcg/mL    VALPROIC ACID      50.0 - 100.0 ug/mL          Component      Latest Ref Rng & Units 1/21/2022   WBC      4.0 - 11.0 x10(3) uL 10.5   RBC      3.80 - 5.30 x10(6)uL 3.69 (L)   Hemoglobin      12.0 - 16.0 g/dL 10.3 (L)   Hematocrit      35.0 - 48.0 % 32.6 (L)   Platelet Count      150.0 - 450.0 10(3)uL 287.0   MCV      80.0 - 100.0 fL 88.3   MCH      26.0 - 34.0 pg 27.9   MCHC      31.0 - 37.0 g/dL 31.6   RDW      % 16.5   Prelim Neutrophil Abs      1.50 - 7.70 x10 (3) uL 7.73 (H)   Neutrophils Absolute      1.50 - 7.70 x10(3) uL 7.73 (H)   Lymphocytes Absolute      1.00 - 4.00 x10(3) uL 1.94   Monocytes Absolute      0.10 - 1.00 x10(3) uL 0.55   Eosinophils Absolute      0.00 - 0.70 x10(3) uL 0.20   Basophils Absolute      0.00 - 0.20 x10(3) uL 0.03   Immature Granulocyte Absolute      0.00 - 1.00 x10(3) uL 0.06   Neutrophils %      % 73.5   Lymphocytes %      % 18.5   Monocytes %      % 5.2   Eosinophils %      % 1.9   Basophils %      % 0.3   Immature Granulocyte %      % 0.6   Glucose      70 - 99 mg/dL 77   Sodium      136 - 145 mmol/L 140   Potassium      3.5 - 5.1 mmol/L 3.8   Chloride      98 - 112 mmol/L 108   Carbon Dioxide, Total      21.0 - 32.0 mmol/L 30.0   ANION GAP      0 - 18 mmol/L 2   BUN      7 - 18 mg/dL 12   CREATININE      0.55 - 1.02 mg/dL 0.55   CALCIUM      8.5 - 10.1 mg/dL 8.8   CALCULATED OSMOLALITY      275 - 295 mOsm/kg 289   eGFR NON-AFR. AMERICAN      >=60 132   eGFR       >=60 152   AST (SGOT)      15 - 37 U/L 22   ALT (SGPT)      13 - 56 U/L 22   ALKALINE PHOSPHATASE      37 - 98 U/L 114 (H)   Total Bilirubin      0.1 - 2.0 mg/dL 0.4   PROTEIN, TOTAL      6.4 - 8.2 g/dL 7.0   Albumin       3.4 - 5.0 g/dL 2.7 (L)   Globulin      2.8 - 4.4 g/dL 4.3   A/G Ratio      1.0 - 2.0 0.6 (L)   Color Urine      Yellow Yellow   Clarity Urine      Clear Hazy (A)   Spec Gravity      1.001 - 1.030 >1.030 (H)   Glucose Urine      Negative mg/dL Negative   Bilirubin Urine      Negative Negative   Ketones, UA      Negative mg/dL Negative   Blood Urine      Negative Large (A)   PH Urine      5.0 - 8.0 7.0   Protein Urine      Negative mg/dL 100 (A)   Urobilinogen Urine      <2.0 mg/dL <2.0   Nitrite Urine      Negative Negative   Leukocyte Esterase Urine      Negative Small (A)   WBC Urine      0 - 5 /HPF >50 (A)   RBC Urine      0 - 2 /HPF >10 (A)   Bacteria Urine      None Seen /HPF Rare (A)   SQUAM EPI CELLS UR      None Seen /HPF Few (A)   RENAL TUBULAR EPITHELIAL CELLS      None Seen /HPF None Seen   TRANSITIONAL EPI CELLS      None Seen /HPF None Seen   YEAST URINE      None Seen /HPF None Seen   LEVETIRACETAM (KEPPRA)      12 - 46 ug/mL 55 (H)     Prior as noted below  Component      Latest Ref Rng & Units 8/12/2021   LEVETIRACETAM (KEPPRA)      12 - 46 ug/mL 34       Imaging:  None new             Impression/ Plan:  Dotty Gross is a 28 year old female patient with PMHx significant for seizure disorder, who originally presented 6/2021, for evaluation and management.     Neurologic examination is normal and nonfocal     Patient endorses a history of seizures since 2012.  She has no recollection during these events.    Previously, her seizures were well controlled on Keppra at 1000 mg twice daily, with last seizure occurring approximately 1 year prior, and one prior to that occurring during second trimester of pregnancy in 2017.    However, she was having complications of hyperemesis gravidarum during first and second trimesters with most recent pregnancy.  She did not have any seizures during that time.  However, she had recurrent seizures and was increased on Keppra up to 1500 mg bid - she subsequently delivered  her baby and had recurrent seizures, which suspect are breakthrough seizures and may be related to missing dose of medication or receiving blood transfusion.  However, levels were therapeutic and she has had seizures on several occasions during and after pregnancy.     Given this most recent seizure cluster, she was previously recommended to start Lamictal and was doing well on dose up to 100 mg daily after following starter kit instructions; however, she did not refill the medication and stopped talking for ~ 2 weeks and presented to ED for admission 3/9/2022 following witnessed seizure in office with reports of 3 or 4 additional seizures that day.       She is now doing well and suspect her breakthrough seizures were due to missing anti-seizure medication as well as concurrent infectious process - she was doing well on Depakote and Lamictal aside from weight gain -  levels were in range as of 4/18/2022 - given weight gain, she was weaned off Depakote and started on Lamictal up to 100 mg bid along with Keppra 1500 mg bid.  Levels  done off Depakote and on Lamictal 100 mg bid were within normal limits; given improvement, she has been weaned down on Keppra to 1000 mg bid and doing well.      Lamictal level was low range as of 1/30/2023 and patient had seizure 2/17/2023.  She has not had any recurrent seizures and repeat level 8/2023 was within range - she remains on Lamictal 100 mg bid and Keppra 1000 mg bid - CMP showed minimally elevated ALT but otherwise labs in range - will continue same dose and patient has not had seizures in over 6 months and advised ok to continue to drive.     1. Seizure disorder (HCC)  As noted above    30 total minutes spent, including chart review, discussion of pertinent labs and imaging with patient with discussion of seizure safety and plan of care as noted above; patient allowed to ask questions and all questions answered to the best of my ability     Return in about 6 months (around  7/29/2024).    Yogi Alfonso MD, Neurology  [facility]  Pager [phone]  1/29/2024

## 2024-01-29 NOTE — PATIENT INSTRUCTIONS
Refill policies:    Allow 2-3 business days for refills; controlled substances may take longer.  Contact your pharmacy at least 5 days prior to running out of medication and have them send an electronic request or submit request through the “request refill” option in your [employer] account.  Refills are not addressed on weekends; covering physicians do not authorize routine medications on weekends.  No narcotics or controlled substances are refilled after noon on Fridays or by on call physicians.  By law, narcotics must be electronically prescribed.  A 30 day supply with no refills is the maximum allowed.  If your prescription is due for a refill, you may be due for a follow up appointment.  To best provide you care, patients receiving routine medications need to be seen at least once a year.  Patients receiving narcotic/controlled substance medications need to be seen at least once every 3 months.  In the event that your preferred pharmacy does not have the requested medication in stock (e.g. Backordered), it is your responsibility to find another pharmacy that has the requested medication available.  We will gladly send a new prescription to that pharmacy at your request.    Scheduling Tests:    If your physician has ordered radiology tests such as MRI or CT scans, please contact Central Scheduling at [phone] right away to schedule the test.  Once scheduled, the [facility] Centralized Referral Team will work with your insurance carrier to obtain pre-certification or prior authorization.  Depending on your insurance carrier, approval may take 3-10 days.  It is highly recommended patients assure they have received an authorization before having a test performed.  If test is done without insurance authorization, patient may be responsible for the entire amount billed.      Precertification and Prior Authorizations:  If your physician has recommended that you have a procedure or additional testing performed the [facility]  Centralized Referral Team will contact your insurance carrier to obtain pre-certification or prior authorization.    You are strongly encouraged to contact your insurance carrier to verify that your procedure/test has been approved and is a COVERED benefit.  Although the [facility] Centralized Referral Team does its due diligence, the insurance carrier gives the disclaimer that \"Although the procedure is authorized, this does not guarantee payment.\"    Ultimately the patient is responsible for payment.   Thank you for your understanding in this matter.  Paperwork Completion:  If you require FMLA or disability paperwork for your recovery, please make sure to either drop it off or have it faxed to our office at [phone]. Be sure the form has your name and date of birth on it.  The form will be faxed to our Forms Department and they will complete it for you.  There is a 25$ fee for all forms that need to be filled out.  Please be aware there is a 10-14 day turnaround time.  You will need to sign a release of information (MINOO) form if your paperwork does not come with one.  You may call the Forms Department with any questions at [phone].  Their fax number is [phone].

## 2024-02-27 ENCOUNTER — CLINICAL ABSTRACT (OUTPATIENT)
Dept: CARE COORDINATION | Age: 29
End: 2024-02-27

## 2024-03-19 ENCOUNTER — HOSPITAL ENCOUNTER (EMERGENCY)
Facility: HOSPITAL | Age: 29
Discharge: HOME | End: 2024-03-19
Attending: EMERGENCY MEDICINE
Payer: COMMERCIAL

## 2024-03-19 ENCOUNTER — APPOINTMENT (OUTPATIENT)
Dept: GENERAL RADIOLOGY | Facility: HOSPITAL | Age: 29
End: 2024-03-19
Attending: EMERGENCY MEDICINE
Payer: COMMERCIAL

## 2024-03-19 VITALS
HEIGHT: 60 IN | DIASTOLIC BLOOD PRESSURE: 82 MMHG | TEMPERATURE: 99 F | WEIGHT: 277 LBS | RESPIRATION RATE: 18 BRPM | SYSTOLIC BLOOD PRESSURE: 118 MMHG | BODY MASS INDEX: 54.38 KG/M2 | HEART RATE: 109 BPM | OXYGEN SATURATION: 95 %

## 2024-03-19 DIAGNOSIS — J18.9 PNEUMONIA OF BOTH LOWER LOBES DUE TO INFECTIOUS ORGANISM: Primary | ICD-10-CM

## 2024-03-19 LAB
B-HCG UR QL: NEGATIVE
FLUAV + FLUBV RNA SPEC NAA+PROBE: NEGATIVE
FLUAV + FLUBV RNA SPEC NAA+PROBE: NEGATIVE
RSV RNA SPEC NAA+PROBE: NEGATIVE
SARS-COV-2 RNA RESP QL NAA+PROBE: NOT DETECTED

## 2024-03-19 PROCEDURE — 96375 TX/PRO/DX INJ NEW DRUG ADDON: CPT

## 2024-03-19 PROCEDURE — 81025 URINE PREGNANCY TEST: CPT

## 2024-03-19 PROCEDURE — 0241U SARS-COV-2/FLU A AND B/RSV BY PCR (GENEXPERT): CPT | Performed by: EMERGENCY MEDICINE

## 2024-03-19 PROCEDURE — 94640 AIRWAY INHALATION TREATMENT: CPT

## 2024-03-19 PROCEDURE — 99284 EMERGENCY DEPT VISIT MOD MDM: CPT

## 2024-03-19 PROCEDURE — S0119 ONDANSETRON 4 MG: HCPCS | Performed by: EMERGENCY MEDICINE

## 2024-03-19 PROCEDURE — 96365 THER/PROPH/DIAG IV INF INIT: CPT

## 2024-03-19 PROCEDURE — 71046 X-RAY EXAM CHEST 2 VIEWS: CPT | Performed by: EMERGENCY MEDICINE

## 2024-03-19 PROCEDURE — 96367 TX/PROPH/DG ADDL SEQ IV INF: CPT

## 2024-03-19 PROCEDURE — 94799 UNLISTED PULMONARY SVC/PX: CPT

## 2024-03-19 PROCEDURE — 99285 EMERGENCY DEPT VISIT HI MDM: CPT

## 2024-03-19 RX ORDER — AZITHROMYCIN 250 MG/1
250 TABLET, FILM COATED ORAL DAILY
Qty: 4 TABLET | Refills: 0 | Status: SHIPPED | OUTPATIENT
Start: 2024-03-20 | End: 2024-03-24

## 2024-03-19 RX ORDER — BENZONATATE 100 MG/1
100 CAPSULE ORAL ONCE
Status: COMPLETED | OUTPATIENT
Start: 2024-03-19 | End: 2024-03-19

## 2024-03-19 RX ORDER — ONDANSETRON 4 MG/1
4 TABLET, ORALLY DISINTEGRATING ORAL EVERY 4 HOURS PRN
Qty: 10 TABLET | Refills: 0 | Status: SHIPPED | OUTPATIENT
Start: 2024-03-19 | End: 2024-03-26

## 2024-03-19 RX ORDER — BENZONATATE 200 MG/1
200 CAPSULE ORAL 3 TIMES DAILY PRN
Qty: 30 CAPSULE | Refills: 0 | Status: SHIPPED | OUTPATIENT
Start: 2024-03-19 | End: 2024-04-18

## 2024-03-19 RX ORDER — DEXAMETHASONE SODIUM PHOSPHATE 10 MG/ML
10 INJECTION, SOLUTION INTRAMUSCULAR; INTRAVENOUS ONCE
Status: COMPLETED | OUTPATIENT
Start: 2024-03-19 | End: 2024-03-19

## 2024-03-19 RX ORDER — ACETAMINOPHEN 500 MG
1000 TABLET ORAL ONCE
Status: COMPLETED | OUTPATIENT
Start: 2024-03-19 | End: 2024-03-19

## 2024-03-19 RX ORDER — ONDANSETRON 4 MG/1
4 TABLET, ORALLY DISINTEGRATING ORAL ONCE
Status: COMPLETED | OUTPATIENT
Start: 2024-03-19 | End: 2024-03-19

## 2024-03-19 RX ORDER — ALBUTEROL SULFATE 2.5 MG/3ML
2.5 SOLUTION RESPIRATORY (INHALATION) ONCE
Status: COMPLETED | OUTPATIENT
Start: 2024-03-19 | End: 2024-03-19

## 2024-03-19 RX ORDER — AMOXICILLIN AND CLAVULANATE POTASSIUM 875; 125 MG/1; MG/1
1 TABLET, FILM COATED ORAL 2 TIMES DAILY
Qty: 20 TABLET | Refills: 0 | Status: SHIPPED | OUTPATIENT
Start: 2024-03-19 | End: 2024-03-29

## 2024-03-19 RX ORDER — GUAIFENESIN AND DEXTROMETHORPHAN HYDROBROMIDE 1200; 60 MG/1; MG/1
1 TABLET, EXTENDED RELEASE ORAL EVERY 12 HOURS
Qty: 20 TABLET | Refills: 0 | Status: SHIPPED | OUTPATIENT
Start: 2024-03-19

## 2024-03-19 NOTE — ED PROVIDER NOTES
Patient Seen in: [facility] Emergency Department      History     Chief Complaint   Patient presents with    Cough/URI    Nausea/Vomiting/Diarrhea     Stated Complaint: Pt presents ambulatory to ED for cough/uri x1 month. Pt states X-ray and CT wer*    Subjective:   HPI    28-year-old female comes to the emergency department complaining of a 4-day history of a increasing cough with fevers as high as 101.9 degrees yesterday.  She feels as though she is wheezing and she is coughing hard enough that she is vomiting.  No diarrhea.  A month ago she was treated for pneumonia with dual antibiotic therapy and has had intermittent wheezing since that time for which she is using an inhaler which does not provide symptom relief today.  Some increase in shortness of breath recently.  She was evaluated 3 weeks ago in another emergency department and had a CTA of the chest which ruled out a pulmonary embolism.    Objective:   Past Medical History:   Diagnosis Date    Anemia     during pregnancy    Anemia affecting pregnancy in third trimester ([facility]) 2021    Cervical laceration 2022    COVID-2020    Disorder of musculoskeletal system during pregnancy ([facility]) 2021    Epilepsy ([facility])     1st grand mal seizure at 17    Female infertility 2021    History of hyperemesis gravidarum 2021    Hx of transfusion of packed red blood cells 2022    Received 2 units packed red blood cells for postpartum hemorrhage - atonic & cervical laceration    Hyperemesis gravidarum ([facility]) 2017    Was put on Zofran - didn't help much.     Iron deficiency anemia during pregnancy, antepartum ([facility]) 2021    Consider IV iron if difficulty with tolerating PNV due to hyperemesis persists    Keloid scar     chest    Low maternal serum vitamin B12 2021     (normal spontaneous vaginal delivery) ([facility]) 2022    Obesity (BMI 30-39.9)     Obesity affecting pregnancy in third trimester ([facility]) 6/3/2021    2021 -  Passed early 1 hour glucose test at 17w6d. Plan re-screening at 26-28 wk      Other immediate postpartum hemorrhage ([facility])     Pap smear for cervical cancer screening 04/08/2021    Pap negative.     Pregnancy related nausea, antepartum ([facility]) 5/19/2021    Pubic bone pain 11/28/2021    Request for sterilization 1/12/2022 1/12/2022 Discussed permanent/irreversible, optional nature of sterilization. Patient expressed understanding. Medicaid consent form signed together.     Rubella non-immune status, antepartum ([facility]) 8/12/2021    MMR after delivery    Screening for genetic disease carrier status 09/10/2021    Carrier Screen = Negative    Seizure disorder ([facility])     epilepsy    Seizure disorder during pregnancy in third trimester ([facility]) 5/19/2021    Trauma during pregnancy ([facility]) 12/13/2021    Uterine atony, postpartum, current hospitalization ([facility]) 1/18/2022    Vitamin D deficiency               Past Surgical History:   Procedure Laterality Date    CHOLECYSTECTOMY  2018    D&C AFTER DELIVERY  01/17/2022    OTHER SURGICAL HISTORY  2012    benign keloid bx chest area                Social History     Socioeconomic History    Marital status:    Tobacco Use    Smoking status: Never     Passive exposure: Never    Smokeless tobacco: Never   Vaping Use    Vaping Use: Never used   Substance and Sexual Activity    Alcohol use: Never    Drug use: Never    Sexual activity: Yes     Partners: Male   Other Topics Concern    Caffeine Concern Yes     Comment: occasionally    Exercise Yes     Comment: walking     Seat Belt Yes    Special Diet No    Stress Concern No    Weight Concern Yes              Review of Systems    Positive for stated complaint: Pt presents ambulatory to ED for cough/uri x1 month. Pt states X-ray and CT wer*  Other systems are as noted in HPI.  Constitutional and vital signs reviewed.      All other systems reviewed and negative except as noted above.    Physical Exam     ED Triage Vitals [03/19/24 0834]    /76   Pulse (!) 125   Resp 19   Temp 99.2 °F (37.3 °C)   Temp src Oral   SpO2 96 %   O2 Device None (Room air)       Current:/82   Pulse 109   Temp 99.2 °F (37.3 °C) (Oral)   Resp 18   Ht 152.4 cm (5')   Wt 125.6 kg   LMP 01/15/2024 (Exact Date)   SpO2 95%   BMI 54.10 kg/m²         Physical Exam    General appearance: This is a young adult female sitting on a gurney.  Vital signs were reviewed per nurses notes.  Monitor reveals a sinus tachycardia rate of 120s.  Temperature is 99.2 orally.  Respirations are 20 and unlabored.  Pulse oximetry is 96% on room air.  HEENT: Normocephalic atraumatic.  Anicteric sclera.  Oral mucosa is moist.  Oropharynx is normal.  Neck: No adenopathy or thyromegaly.  No hoarseness or stridor.  Lungs: Good air exchange.  Rare end expiratory wheezes.  No rales or rhonchi.  Heart exam: Normal S1-S2 without extra sounds or murmurs.  Regular rate and rhythm.  Abdomen is nontender.  Skin is dry without rashes or lesions.  Extremities are atraumatic.  Neuroexam: Awake, conversive and moving all 4 extremities well.    ED Course     Labs Reviewed   POCT PREGNANCY URINE - Normal   SARS-COV-2/FLU A AND B/RSV BY PCR (GENEXPERT) - Normal    Narrative:     This test is intended for the qualitative detection and differentiation of SARS-CoV-2, influenza A, influenza B, and respiratory syncytial virus (RSV) viral RNA in nasopharyngeal or nares swabs from individuals suspected of respiratory viral infection consistent with COVID-19 by their healthcare provider. Signs and symptoms of respiratory viral infection due to SARS-CoV-2, influenza, and RSV can be similar.    Test performed using the Xpert Xpress SARS-CoV-2/FLU/RSV (real time RT-PCR)  assay on the [employer] instrument, [employer], [place] [address].   This test is being used under the Food and Drug Administration's Emergency Use Authorization.    The authorized Fact Sheet for Healthcare Providers for this assay is available upon  request from the laboratory.             Albuterol by nebulizer was administered.  Acetaminophen was given for elevated temperature.  Zofran was administered.    XR CHEST PA + LAT CHEST (CPT=71046)    Result Date: 3/19/2024  PROCEDURE:  XR CHEST PA + LAT CHEST (CPT=71046)  INDICATIONS:  Pt presents ambulatory to ED for cough/uri x1 month. Pt states X-ray and CT were negative. Now having moments of hot flashes and then chills. Pt also experienci  COMPARISON:  [facility] , XR, XR CHEST AP PORTABLE  (CPT=71045), 3/09/2022, 7:29 PM.  TECHNIQUE:  PA and lateral chest radiographs were obtained.  PATIENT STATED HISTORY: (As transcribed by Technologist)  patient states she has had upper respiratory infection for about a month and has not had improvement to symptoms. she states she now has chills. she states history of pneumonia.               CONCLUSION:   Stable cardiac and mediastinal contours.  Patchy lingular and left lower lobe opacity compatible with pneumonia or aspiration.  No associated pleural effusion or pneumothorax.   LOCATION:  [facility]   Dictated by (CST): Kaela Gregory MD on 3/19/2024 at 10:09 AM     Finalized by (CST): Kaela Gregory MD on 3/19/2024 at 10:10 AM       I personally reviewed the images myself and went over results with patient.    I viewed the chest x-ray films myself.  There are lingular and left lower lobe infiltrates consistent with pneumonia.    Because of the patient's vomiting, intravenous Rocephin and Zithromax were administered.    Upon reevaluation the patient was able to tolerate oral fluids after Zofran.  She stated that the albuterol nebulizer treatment did not seem to help her symptoms.    Test results and treatment plan were discussed.         MDM      #1.  Bilateral pneumonia.  Discharged home on Augmentin and Zithromax.  Patient was also given prescriptions for antitussives and something for nausea.  Oxygenation is adequate.  No evidence of pneumothorax or suggestion of other  intra-thoracic pathology.                                   Medical Decision Making      Disposition and Plan     Clinical Impression:  1. Pneumonia of both lower lobes due to infectious organism         Disposition:  Discharge  3/19/2024 11:55 am    Follow-up:  Rolan Gonzalez MD  [address]  [facility] [address]  [phone]    Call in 1 day(s)            Medications Prescribed:  Discharge Medication List as of 3/19/2024 12:44 PM        START taking these medications    Details   ondansetron 4 MG Oral Tablet Dispersible Take 1 tablet (4 mg total) by mouth every 4 (four) hours as needed for Nausea., Normal, Disp-10 tablet, R-0      benzonatate 200 MG Oral Cap Take 1 capsule (200 mg total) by mouth 3 (three) times daily as needed., Normal, Disp-30 capsule, R-0      dextromethorphan-guaifenesin ER (MUCINEX DM MAXIMUM STRENGTH)  MG Oral Tablet 12 Hr Take 1 tablet by mouth every 12 (twelve) hours., Normal, Disp-20 tablet, R-0      amoxicillin clavulanate 875-125 MG Oral Tab Take 1 tablet by mouth 2 (two) times daily for 10 days., Normal, Disp-20 tablet, R-0      azithromycin 250 MG Oral Tab Take 1 tablet (250 mg total) by mouth daily for 4 days., Normal, Disp-4 tablet, R-0

## 2024-03-19 NOTE — ED INITIAL ASSESSMENT (HPI)
Patient reports cough/ SOB for over a month, worse the last 4 days with chills and post tussive emesis. Patient was given a Z pack, steroids, tessalon pearls and albuterol inhaler a month ago with no relief. Was seen at [facility] and had a negative CT chest 3 weeks ago.

## 2024-03-19 NOTE — DISCHARGE INSTRUCTIONS
Follow-up with your primary care physician by phone tomorrow.    Return to the emergency department for new or worsening symptoms.

## 2024-04-30 NOTE — TELEPHONE ENCOUNTER
No contra-indication to fluoxetine from neurology view but would NOT recommend Buproprion due to increased risk for seizures     Called patient to discuss as well; she is doing well on current regimen of Lamictal 100 mg bid. 29-Apr-2024 23:13

## 2024-05-13 ENCOUNTER — OFFICE VISIT (OUTPATIENT)
Dept: NEUROLOGY | Facility: CLINIC | Age: 29
End: 2024-05-13

## 2024-05-13 VITALS
DIASTOLIC BLOOD PRESSURE: 69 MMHG | HEIGHT: 60 IN | HEART RATE: 94 BPM | SYSTOLIC BLOOD PRESSURE: 119 MMHG | WEIGHT: 277 LBS | BODY MASS INDEX: 54.38 KG/M2 | RESPIRATION RATE: 16 BRPM

## 2024-05-13 DIAGNOSIS — R68.89 SPELLS OF DECREASED ATTENTIVENESS: ICD-10-CM

## 2024-05-13 DIAGNOSIS — G40.909 SEIZURE DISORDER (HCC): Primary | ICD-10-CM

## 2024-05-13 DIAGNOSIS — R05.3 COUGH, PERSISTENT: ICD-10-CM

## 2024-05-13 PROCEDURE — 3008F BODY MASS INDEX DOCD: CPT | Performed by: OTHER

## 2024-05-13 PROCEDURE — 3074F SYST BP LT 130 MM HG: CPT | Performed by: OTHER

## 2024-05-13 PROCEDURE — 3078F DIAST BP <80 MM HG: CPT | Performed by: OTHER

## 2024-05-13 PROCEDURE — 99213 OFFICE O/P EST LOW 20 MIN: CPT | Performed by: OTHER

## 2024-05-13 RX ORDER — LAMOTRIGINE 100 MG/1
100 TABLET ORAL 2 TIMES DAILY
Qty: 180 TABLET | Refills: 3 | Status: SHIPPED | OUTPATIENT
Start: 2024-05-13

## 2024-05-13 RX ORDER — LEVETIRACETAM 1000 MG/1
1000 TABLET ORAL 2 TIMES DAILY
Qty: 180 TABLET | Refills: 3 | Status: SHIPPED | OUTPATIENT
Start: 2024-05-13

## 2024-05-13 NOTE — PATIENT INSTRUCTIONS
Refill policies:    Allow 2-3 business days for refills; controlled substances may take longer.  Contact your pharmacy at least 5 days prior to running out of medication and have them send an electronic request or submit request through the “request refill” option in your [employer] account.  Refills are not addressed on weekends; covering physicians do not authorize routine medications on weekends.  No narcotics or controlled substances are refilled after noon on Fridays or by on call physicians.  By law, narcotics must be electronically prescribed.  A 30 day supply with no refills is the maximum allowed.  If your prescription is due for a refill, you may be due for a follow up appointment.  To best provide you care, patients receiving routine medications need to be seen at least once a year.  Patients receiving narcotic/controlled substance medications need to be seen at least once every 3 months.  In the event that your preferred pharmacy does not have the requested medication in stock (e.g. Backordered), it is your responsibility to find another pharmacy that has the requested medication available.  We will gladly send a new prescription to that pharmacy at your request.    Scheduling Tests:    If your physician has ordered radiology tests such as MRI or CT scans, please contact Central Scheduling at [phone] right away to schedule the test.  Once scheduled, the [facility] Centralized Referral Team will work with your insurance carrier to obtain pre-certification or prior authorization.  Depending on your insurance carrier, approval may take 3-10 days.  It is highly recommended patients assure they have received an authorization before having a test performed.  If test is done without insurance authorization, patient may be responsible for the entire amount billed.      Precertification and Prior Authorizations:  If your physician has recommended that you have a procedure or additional testing performed the [facility] the [facility]  Centralized Referral Team will contact your insurance carrier to obtain pre-certification or prior authorization.    You are strongly encouraged to contact your insurance carrier to verify that your procedure/test has been approved and is a COVERED benefit.  Although the [facility] Centralized Referral Team does its due diligence, the insurance carrier gives the disclaimer that \"Although the procedure is authorized, this does not guarantee payment.\"    Ultimately the patient is responsible for payment.   Thank you for your understanding in this matter.  Paperwork Completion:  If you require FMLA or disability paperwork for your recovery, please make sure to either drop it off or have it faxed to our office at [phone]. Be sure the form has your name and date of birth on it.  The form will be faxed to our Forms Department and they will complete it for you.  There is a 25$ fee for all forms that need to be filled out.  Please be aware there is a 10-14 day turnaround time.  You will need to sign a release of information (MINOO) form if your paperwork does not come with one.  You may call the Forms Department with any questions at [phone].  Their fax number is [phone].

## 2024-05-13 NOTE — PROGRESS NOTES
[facility] Progress Note    HPI  Chief Complaint   Patient presents with    Seizures     Follow up and seizure like activity daily. Patient cannot keep a conversation cause she spaces out       As per my initial H&P from 6/16/2021,   \"   Dotty Gross is a 25 year old female, who presents for evaluation of seizures.      Patient states she has history of seizures.  She states these usually last 30 seconds and she does not recall them but is told she has a grunting sound and then shakes and loses awareness; denies auras of odd tastes, smells or sounds; has some post ictal confusion and is sore after seizures; may bite tongue and jaw muscles are sore.      She states last seizure was July 29, 2020.  She states she previously had COVID July 18-19, with extensive weight loss and nausea/emesis.  She denied lack of taste and smell and denied fevers or headaches.      She states when she had her last seizure, she was taking Keppra but was not able to keep down medication; was on 750 mg bid and was increased up to 1000 mg bid.  Since this time, she has not had any seizures.  She is currently 9 weeks pregnant and has remained on Keppra; her first pregnancy was in 2017 - had seizure during second trimester.     She has been doing well currently but has been having hyperemesis gravidarum.   Otherwise, patient denies any recent weight change, fevers, chills, double vision/ blurry vision / loss of vision, chest pain, palpitations, shortness of breath, rashes, joint pains, bowel / bladder incontinence or mood issues. \"    Prior notes as below 3/17/2022    Patient at time of last visit was unable to seen formally and was sent to ER as she had breakthrough seizures - states she does not recall coming to office but admits she was out of Lamictal for the prior 2 weeks - she had titrated up to 100 mg dose but never got this refilled and then started to have more confusion and was not as quick to respond; she had 4  seizures reportedly on day of admission 3/9/2022.  She was admitted for workup and had EEG which was abnormal. She was treated with Depakote 500 mg q8 hrs and Lamictal was restarted at 25 mg bid.  She was discharged home with Depakote 500 mg bid with plan to increase Lamictal to 50 mg bid after 1 week, which she has not done yet.  She remains on Keppra 1500 mg bid.      She denies any recurrent seizures and denies any auras of odd tastes, smells or sounds; she feels alert and back to her usual self.     Prior notes as below: 4/19/2022  Patient last seen 3/17/2022.  Since last visit, she has increased lamotrigine (Lamictal) up to 50 mg bid and reduced valproic acid (Depakote) to 250 mg AM / 500 mg PM- her last level was done 3/21/2022 with valproic acid level elevated and lamotrigine within normal range. She was on Depakote 500 mg bid and Lamictal 50 mg bid at the time and advised to have repeat levels done in ~2 weeks but results not available at this time.  Currently, she is doing well with no seizures and denies any rash, balance issues, double vision, nausea; she denies any auras of odd tastes, smells or sounds and denies recurrent seizures recently.   She has not been able to lose weight since her pregnancy but denies tremor or balance issues.            Prior notes as below 6/8/2022.  Patient last seen 4/19/2022.  Since last visit, she has been on Depakote 250 mg AM/ 500 mg PM and Lamictal 50 mg bid along with Keppra 1500 mg bid.  On this regimen, she denies any seizures and denies any auras of odd tastes,smells, sounds or dizzy spells or episodes of loss of awareness. She has noted some continued weight gain but denies any other side effect of rash, double vision, nausea/emesis or balance issues/falls.            Prior notes as per 7/13/2022.  Patient last seen 6/8/2022.  She is now off Depakote and remains on Keppra 1500 mg bid and Lamictal 50 mg bid; she denies any seizures and denies any auras of odd  tastes,smells, sounds or dizzy spells but had an episode a couple of weeks ago where she briefly \"zoned out\" for a matter of seconds, after she stopped taking the Depakote but has not had any since this time; She denies other  episodes of loss of awareness. She denies other side effect of rash, double vision, nausea/emesis or balance issues/falls.  She recently had levels checked.        Prior notes as per 10/17/2022.  Patient last seen 7/13/2022.  She is still on Keppra 1500 mg bid and is now on Lamictal 100 mg bid.  She has been doing well on this dose.  She denies any episodes of \"zoning out\" and denies auras of odd tastes, smells or sounds.  She denies any episodes of speech arrest and denies other side effects of rash, double vision, nausea/emeiss or balance issues/falls. She has been going to weight loss clinic and is now on fluoxetine 20 mg daily but denies any significant improvement.      Prior notes as per 1/17/2023.  Patient last seen 10/17/2022. Since last visit, she has been weaned down on Keppra currently at 1000 mg bid.  She has remained on Lamictal 100 mg bid and denies any seizures or episodes of loss of awareness, or speech arrest or auras of odd tastes, smells or sounds.  She denies side effects of rash, balance issues, double vision, or nausea.  She has been working with weight loss clinic but is not on fluoxetine or topiramate at this time.         Prior notes as per 7/31/2023.  Patient last seen 1/17/2023.  Patient since last visit had a generalized seizure 2/17/2023.  At that time, she was on Keppra 1000 mg bid and Lamictal 100 mg bid.  She had missed doses of Keppra and Lamictal the night prior to her seizure and did not have recurrent seizures; no auras of odd tastes, smells or sounds.  She admitted to poor sleep and increased stress at time of seizure.  She was recommended to have levels checked but has not done so yet.  Currently, she is doing well with no recurrent seizures and denies  auras of odd tastes, smells or sounds or episodes of loss of awareness or speech arrest.  She denies side effects of rash, balance issues, double vision or nausea.        Prior notes as per 1/29/2024.  Patient last seen 7/31/2023.  Since last visit, she has been doing well.  She remains on Lamictal 100 mg bid and Keppra 1000 mg bid and denies any seizures.  She denies missing any doses of medication and has been sleeping better as her child is sleeping through the night ( no issues for child age 4). She denies any auras of odd tastes, smells or sounds or episodes of speech arrest or loss of awareness; no issues with waking in the AM having wet the bed or bitten her tongue.  She additionally denies any side effects of rash, balance issues, double vision or nausea.  She has been driving without issues.       Patient last seen 1/29/2024.  She was doing well on Keppra 1000 mg bid and Lamictal 100 mg bid. However, in the past few weeks she has been tired during the day despite getting good sleep. She admits to some weight gain and wakes up in the middle of the night coughing.  She may fall asleep in a dark room but not when driving. She has been having episodes in the past 2 weeks where she has difficulty with concentration and has episodes of \"staring off\" but denies loss of awareness.      She denies being tested for sleep apnea but has been gaining weight; she denies issues when driving but notes this mainly when she is conversing with others; denies auras of odd tastes, smells or sounds. She notes when she coughs she has a \"bleach taste\" in her mouth.     Of note, she had pneumonia 3/2024. She has some headaches in the front of the head as well recently; she is light sensitive; no recent head trauma.       Past Medical History:    Anemia    during pregnancy    Anemia affecting pregnancy in third trimester (HCC)    Cervical laceration    COVID-19    Disorder of musculoskeletal system during pregnancy (HCC)    Epilepsy  ([facility])    1st grand mal seizure at 17    Female infertility    History of hyperemesis gravidarum    Hx of transfusion of packed red blood cells    Received 2 units packed red blood cells for postpartum hemorrhage - atonic & cervical laceration    Hyperemesis gravidarum ([facility])    Was put on Zofran - didn't help much.     Iron deficiency anemia during pregnancy, antepartum ([facility])    Consider IV iron if difficulty with tolerating PNV due to hyperemesis persists    Keloid scar    chest    Low maternal serum vitamin B12     (normal spontaneous vaginal delivery) ([facility])    Obesity (BMI 30-39.9)    Obesity affecting pregnancy in third trimester ([facility])    2021 - Passed early 1 hour glucose test at 17w6d. Plan re-screening at 26-28 wk      Other immediate postpartum hemorrhage ([facility])    Pap smear for cervical cancer screening    Pap negative.     Pregnancy related nausea, antepartum ([facility])    Pubic bone pain    Request for sterilization    2022 Discussed permanent/irreversible, optional nature of sterilization. Patient expressed understanding. Medicaid consent form signed together.     Rubella non-immune status, antepartum ([facility])    MMR after delivery    Screening for genetic disease carrier status    Carrier Screen = Negative    Seizure disorder ([facility])    epilepsy    Seizure disorder during pregnancy in third trimester ([facility])    Trauma during pregnancy ([facility])    Uterine atony, postpartum, current hospitalization ([facility])    Vitamin D deficiency     Past Surgical History:   Procedure Laterality Date    Cholecystectomy      D&c after delivery  2022    Other surgical history      benign keloid bx chest area     Family History   Problem Relation Age of Onset    No Known Problems Father     No Known Problems Mother     Other (Other) Daughter         severe GI sensitivities to multiple foods - no clear diagnosis    Diabetes Maternal Grandmother     No Known Problems Maternal Grandfather     No Known Problems Paternal  Grandmother     No Known Problems Paternal Grandfather     No Known Problems Sister     No Known Problems Sister     No Known Problems Sister      Social History     Socioeconomic History    Marital status:    Tobacco Use    Smoking status: Never     Passive exposure: Never    Smokeless tobacco: Never   Vaping Use    Vaping status: Never Used   Substance and Sexual Activity    Alcohol use: Never    Drug use: Never    Sexual activity: Yes     Partners: Male   Other Topics Concern    Caffeine Concern Yes     Comment: occasionally    Exercise Yes     Comment: walking     Seat Belt Yes    Special Diet No    Stress Concern No    Weight Concern Yes     Allergies   Allergen Reactions    Shellfish-Derived Products SWELLING         Current Outpatient Medications:     lamoTRIgine 100 MG Oral Tab, Take 1 tablet (100 mg total) by mouth 2 (two) times daily., Disp: 180 tablet, Rfl: 3    levetiracetam 1000 MG Oral Tab, Take 1 tablet (1,000 mg total) by mouth 2 (two) times daily., Disp: 180 tablet, Rfl: 3    Review of Systems:  No chest pain or palpitations; otherwise as noted in HPI.    Exam:  /69 (BP Location: Left arm, Patient Position: Sitting, Cuff Size: large)   Pulse 94   Resp 16   Ht 60\"   Wt 277 lb (125.6 kg)   LMP 04/14/2024 (Exact Date)   BMI 54.10 kg/m²   Estimated body mass index is 54.1 kg/m² as calculated from the following:    Height as of this encounter: 60\".    Weight as of this encounter: 277 lb (125.6 kg).    Gen: well developed, well nourished, no acute distress  HEENT: normocephalic  Heart; normal S1/S2, regular rate and rhythm  Lungs: clear to auscultation bilaterally  Extremities: no edema, peripheral pulses intact    Neck: supple, full range of motion; no carotid bruits    Neuro:  Mental status:  Orientation: Alert and oriented to person, place, time  Speech Fluent and conversational    CN: PERRL, EOMI with no nystagmaus, VFF, smile symmetric, sensation intact, tongue and palate midline,  SCM intact, otherwise, CN 2-12 intact  Motor: 5/5 strength throughout, tone normal  DTR: 2+ symmetric throughout, toes downgoing bilaterally, no clonus; +tromners in UE bilaterall;y   Sensory: intact to light touch throughout; decreased to pin R great to up to mid foot; vibration 6 sec both feet great toes   Coord: FNF intact with no tremor or dysmetria; rapid alternating movements intact bilaterally  Romberg: absent  Gait: normal casual, heel, toe and tandem gait    Labs:  None new    Prior as noted below    Component      Latest Ref Rng 8/7/2023   Lamotrigine Lvl      2.0 - 20.0 ug/mL 4.6      From EMR    12/6/2023    CMP: normal range with exception of ALT 38 (minimally elevated)  CBC: normal range    1/25/2023:   From Outside records    Lamotrigine: 1.5 (low)     1/11/2023: 3.3 (mildly low)     Prior as noted below    Component      Latest Ref Rng & Units 8/2/2022   LAMOTRIGINE      4.0 - 18.0 mcg/mL 5.3       Prior as noted below     Component      Latest Ref Rng & Units 7/9/2022   LAMOTRIGINE      4.0 - 18.0 mcg/mL 3.1 (L)       Prior as noted below  Component      Latest Ref Rng & Units 4/18/2022   LAMOTRIGINE      4.0 - 18.0 mcg/mL 6.7   VALPROIC ACID      50.0 - 100.0 mg/L  64.6     Prior as noted below    Component      Latest Ref Rng & Units 3/21/2022   LAMOTRIGINE      4.0 - 18.0 mcg/mL 5.8   VALPROIC ACID      50.0 - 100.0 mg/L  131.6 (H)     Prior as noted below    Component      Latest Ref Rng & Units 3/11/2022 3/9/2022 2/17/2022   WBC      4.0 - 11.0 x10(3) uL  13.4 (H)    RBC      3.80 - 5.30 x10(6)uL  4.70    Hemoglobin      12.0 - 16.0 g/dL  13.1    Hematocrit      35.0 - 48.0 %  41.2    Platelet Count      150.0 - 450.0 10(3)uL  324.0    MCV      80.0 - 100.0 fL  87.7    MCH      26.0 - 34.0 pg  27.9    MCHC      31.0 - 37.0 g/dL  31.8    RDW      %  14.0    Prelim Neutrophil Abs      1.50 - 7.70 x10 (3) uL  11.28 (H)    Neutrophils Absolute      1.50 - 7.70 x10(3) uL  11.28 (H)    Lymphocytes  Absolute      1.00 - 4.00 x10(3) uL  1.62    Monocytes Absolute      0.10 - 1.00 x10(3) uL  0.40    Eosinophils Absolute      0.00 - 0.70 x10(3) uL  0.04    Basophils Absolute      0.00 - 0.20 x10(3) uL  0.02    Immature Granulocyte Absolute      0.00 - 1.00 x10(3) uL  0.05    Neutrophils %      %  84.1    Lymphocytes %      %  12.1    Monocytes %      %  3.0    Eosinophils %      %  0.3    Basophils %      %  0.1    Immature Granulocyte %      %  0.4    Glucose      70 - 99 mg/dL  113 (H)    Sodium      136 - 145 mmol/L  139    Potassium      3.5 - 5.1 mmol/L  4.0    Chloride      98 - 112 mmol/L  109    Carbon Dioxide, Total      21.0 - 32.0 mmol/L  23.0    ANION GAP      0 - 18 mmol/L  7    BUN      7 - 18 mg/dL  11    CREATININE      0.55 - 1.02 mg/dL  0.80    CALCIUM      8.5 - 10.1 mg/dL  9.6    CALCULATED OSMOLALITY      275 - 295 mOsm/kg  288    eGFR NON-AFR. AMERICAN      >=60  104    eGFR       >=60  119    AST (SGOT)      15 - 37 U/L  28    ALT (SGPT)      13 - 56 U/L  51    ALKALINE PHOSPHATASE      37 - 98 U/L  75    Total Bilirubin      0.1 - 2.0 mg/dL  0.3    PROTEIN, TOTAL      6.4 - 8.2 g/dL  8.8 (H)    Albumin      3.4 - 5.0 g/dL  4.2    Globulin      2.8 - 4.4 g/dL  4.6 (H)    A/G Ratio      1.0 - 2.0  0.9 (L)    Color Urine      Yellow  Yellow    Clarity Urine      Clear  Cloudy (A)    Spec Gravity      1.001 - 1.030  1.024    Glucose Urine      Negative mg/dL  Negative    Bilirubin Urine      Negative  Negative    Ketones, UA      Negative mg/dL  Trace (A)    Blood Urine      Negative  Large (A)    PH Urine      5.0 - 8.0  5.0    Protein Urine      Negative mg/dL  30 (A)    Urobilinogen Urine      <2.0 mg/dL  <2.0    Nitrite Urine      Negative  Negative    Leukocyte Esterase Urine      Negative  Large (A)    WBC Urine      0 - 5 /HPF  11-20 (A)    RBC Urine      0 - 2 /HPF  >10 (A)    Bacteria Urine      None Seen /HPF  Rare (A)    SQUAM EPI CELLS UR      None Seen /HPF  Few (A)     RENAL TUBULAR EPITHELIAL CELLS      None Seen /HPF  None Seen    TRANSITIONAL EPI CELLS      None Seen /HPF  None Seen    YEAST URINE      None Seen /HPF  None Seen    AMPHETAMINE URINE      Negative  Negative    BENZODIAZEPINES URINE      Negative  Negative    COCAINE URINE      Negative  Negative    CANNABINOID URINE      Negative  Negative    Ecstasy Urine      Negative  Negative    OPIATE URINE      Negative  Negative    Oxycodone Urine      Negative  Negative    CREATININE UR RANDOM      mg/dL  168.00    LEVETIRACETAM (KEPPRA)      12 - 46 ug/mL      LAMOTRIGINE      4.0 - 18.0 mcg/mL   2.9 (L)   VALPROIC ACID      50.0 - 100.0 ug/mL 80.6       Component      Latest Ref Rng & Units 1/21/2022   WBC      4.0 - 11.0 x10(3) uL    RBC      3.80 - 5.30 x10(6)uL    Hemoglobin      12.0 - 16.0 g/dL    Hematocrit      35.0 - 48.0 %    Platelet Count      150.0 - 450.0 10(3)uL    MCV      80.0 - 100.0 fL    MCH      26.0 - 34.0 pg    MCHC      31.0 - 37.0 g/dL    RDW      %    Prelim Neutrophil Abs      1.50 - 7.70 x10 (3) uL    Neutrophils Absolute      1.50 - 7.70 x10(3) uL    Lymphocytes Absolute      1.00 - 4.00 x10(3) uL    Monocytes Absolute      0.10 - 1.00 x10(3) uL    Eosinophils Absolute      0.00 - 0.70 x10(3) uL    Basophils Absolute      0.00 - 0.20 x10(3) uL    Immature Granulocyte Absolute      0.00 - 1.00 x10(3) uL    Neutrophils %      %    Lymphocytes %      %    Monocytes %      %    Eosinophils %      %    Basophils %      %    Immature Granulocyte %      %    Glucose      70 - 99 mg/dL    Sodium      136 - 145 mmol/L    Potassium      3.5 - 5.1 mmol/L    Chloride      98 - 112 mmol/L    Carbon Dioxide, Total      21.0 - 32.0 mmol/L    ANION GAP      0 - 18 mmol/L    BUN      7 - 18 mg/dL    CREATININE      0.55 - 1.02 mg/dL    CALCIUM      8.5 - 10.1 mg/dL    CALCULATED OSMOLALITY      275 - 295 mOsm/kg    eGFR NON-AFR. AMERICAN      >=60    eGFR       >=60    AST (SGOT)      15 -  37 U/L    ALT (SGPT)      13 - 56 U/L    ALKALINE PHOSPHATASE      37 - 98 U/L    Total Bilirubin      0.1 - 2.0 mg/dL    PROTEIN, TOTAL      6.4 - 8.2 g/dL    Albumin      3.4 - 5.0 g/dL    Globulin      2.8 - 4.4 g/dL    A/G Ratio      1.0 - 2.0    Color Urine      Yellow    Clarity Urine      Clear    Spec Gravity      1.001 - 1.030    Glucose Urine      Negative mg/dL    Bilirubin Urine      Negative    Ketones, UA      Negative mg/dL    Blood Urine      Negative    PH Urine      5.0 - 8.0    Protein Urine      Negative mg/dL    Urobilinogen Urine      <2.0 mg/dL    Nitrite Urine      Negative    Leukocyte Esterase Urine      Negative    WBC Urine      0 - 5 /HPF    RBC Urine      0 - 2 /HPF    Bacteria Urine      None Seen /HPF    SQUAM EPI CELLS UR      None Seen /HPF    RENAL TUBULAR EPITHELIAL CELLS      None Seen /HPF    TRANSITIONAL EPI CELLS      None Seen /HPF    YEAST URINE      None Seen /HPF    AMPHETAMINE URINE      Negative    BENZODIAZEPINES URINE      Negative    COCAINE URINE      Negative    CANNABINOID URINE      Negative    Ecstasy Urine      Negative    OPIATE URINE      Negative    Oxycodone Urine      Negative    CREATININE UR RANDOM      mg/dL    LEVETIRACETAM (KEPPRA)      12 - 46 ug/mL 55 (H)   LAMOTRIGINE      4.0 - 18.0 mcg/mL    VALPROIC ACID      50.0 - 100.0 ug/mL          Component      Latest Ref Rng & Units 1/21/2022   WBC      4.0 - 11.0 x10(3) uL 10.5   RBC      3.80 - 5.30 x10(6)uL 3.69 (L)   Hemoglobin      12.0 - 16.0 g/dL 10.3 (L)   Hematocrit      35.0 - 48.0 % 32.6 (L)   Platelet Count      150.0 - 450.0 10(3)uL 287.0   MCV      80.0 - 100.0 fL 88.3   MCH      26.0 - 34.0 pg 27.9   MCHC      31.0 - 37.0 g/dL 31.6   RDW      % 16.5   Prelim Neutrophil Abs      1.50 - 7.70 x10 (3) uL 7.73 (H)   Neutrophils Absolute      1.50 - 7.70 x10(3) uL 7.73 (H)   Lymphocytes Absolute      1.00 - 4.00 x10(3) uL 1.94   Monocytes Absolute      0.10 - 1.00 x10(3) uL 0.55   Eosinophils  Absolute      0.00 - 0.70 x10(3) uL 0.20   Basophils Absolute      0.00 - 0.20 x10(3) uL 0.03   Immature Granulocyte Absolute      0.00 - 1.00 x10(3) uL 0.06   Neutrophils %      % 73.5   Lymphocytes %      % 18.5   Monocytes %      % 5.2   Eosinophils %      % 1.9   Basophils %      % 0.3   Immature Granulocyte %      % 0.6   Glucose      70 - 99 mg/dL 77   Sodium      136 - 145 mmol/L 140   Potassium      3.5 - 5.1 mmol/L 3.8   Chloride      98 - 112 mmol/L 108   Carbon Dioxide, Total      21.0 - 32.0 mmol/L 30.0   ANION GAP      0 - 18 mmol/L 2   BUN      7 - 18 mg/dL 12   CREATININE      0.55 - 1.02 mg/dL 0.55   CALCIUM      8.5 - 10.1 mg/dL 8.8   CALCULATED OSMOLALITY      275 - 295 mOsm/kg 289   eGFR NON-AFR. AMERICAN      >=60 132   eGFR       >=60 152   AST (SGOT)      15 - 37 U/L 22   ALT (SGPT)      13 - 56 U/L 22   ALKALINE PHOSPHATASE      37 - 98 U/L 114 (H)   Total Bilirubin      0.1 - 2.0 mg/dL 0.4   PROTEIN, TOTAL      6.4 - 8.2 g/dL 7.0   Albumin      3.4 - 5.0 g/dL 2.7 (L)   Globulin      2.8 - 4.4 g/dL 4.3   A/G Ratio      1.0 - 2.0 0.6 (L)   Color Urine      Yellow Yellow   Clarity Urine      Clear Hazy (A)   Spec Gravity      1.001 - 1.030 >1.030 (H)   Glucose Urine      Negative mg/dL Negative   Bilirubin Urine      Negative Negative   Ketones, UA      Negative mg/dL Negative   Blood Urine      Negative Large (A)   PH Urine      5.0 - 8.0 7.0   Protein Urine      Negative mg/dL 100 (A)   Urobilinogen Urine      <2.0 mg/dL <2.0   Nitrite Urine      Negative Negative   Leukocyte Esterase Urine      Negative Small (A)   WBC Urine      0 - 5 /HPF >50 (A)   RBC Urine      0 - 2 /HPF >10 (A)   Bacteria Urine      None Seen /HPF Rare (A)   SQUAM EPI CELLS UR      None Seen /HPF Few (A)   RENAL TUBULAR EPITHELIAL CELLS      None Seen /HPF None Seen   TRANSITIONAL EPI CELLS      None Seen /HPF None Seen   YEAST URINE      None Seen /HPF None Seen   LEVETIRACETAM (KEPPRA)      12 - 46  ug/mL 55 (H)     Prior as noted below  Component      Latest Ref Rng & Units 8/12/2021   LEVETIRACETAM (KEPPRA)      12 - 46 ug/mL 34       Imaging:  None new             Impression/ Plan:  Dotty Gross is a 28 year old female patient with PMHx significant for seizure disorder, who originally presented 6/2021, for evaluation and management.     Neurologic examination is normal and nonfocal     Patient endorses a history of seizures since 2012.  She has no recollection during these events.    Previously, her seizures were well controlled on Keppra at 1000 mg twice daily, with last seizure occurring approximately 1 year prior, and one prior to that occurring during second trimester of pregnancy in 2017.    However, she was having complications of hyperemesis gravidarum during first and second trimesters with most recent pregnancy.  She did not have any seizures during that time.  However, she had recurrent seizures and was increased on Keppra up to 1500 mg bid - she subsequently delivered her baby and had recurrent seizures, which suspect are breakthrough seizures and may be related to missing dose of medication or receiving blood transfusion.  However, levels were therapeutic and she has had seizures on several occasions during and after pregnancy.     Given this most recent seizure cluster, she was previously recommended to start Lamictal and was doing well on dose up to 100 mg daily after following starter kit instructions; however, she did not refill the medication and stopped talking for ~ 2 weeks and presented to ED for admission 3/9/2022 following witnessed seizure in office with reports of 3 or 4 additional seizures that day.       She is now doing well and suspect her breakthrough seizures were due to missing anti-seizure medication as well as concurrent infectious process - she was doing well on Depakote and Lamictal aside from weight gain -  levels were in range as of 4/18/2022 - given weight gain, she was  weaned off Depakote and started on Lamictal up to 100 mg bid along with Keppra 1500 mg bid.  Levels  done off Depakote and on Lamictal 100 mg bid were within normal limits; given improvement, she has been weaned down on Keppra to 1000 mg bid and was doing well.    Lamictal level was low range as of 1/30/2023 and patient had seizure 2/17/2023.  She has not had any recurrent seizures and repeat level 8/2023 was within range - she remains on Lamictal 100 mg bid and Keppra 1000 mg bid.  Recently, she has been having issues with episodes of staring off but not losing awareness. She has been having some poor sleep and weight gain and persistent cough since having recent pneumonia - will check levels of lamotrigine and levetiracetam as well as CBC,CMP; continue same dose for now  1. Seizure disorder (HCC)  As noted above   - lamoTRIgine 100 MG Oral Tab; Take 1 tablet (100 mg total) by mouth 2 (two) times daily.  Dispense: 180 tablet; Refill: 3  - levetiracetam 1000 MG Oral Tab; Take 1 tablet (1,000 mg total) by mouth 2 (two) times daily.  Dispense: 180 tablet; Refill: 3  - CBC W Differential W Platelet  - Comp Metabolic Panel (14)  - Lamotrigine (Lamictal), Serum  - Levetiracetam, S    2. Spells of decreased attentiveness  As noted above   - CBC W Differential W Platelet  - Comp Metabolic Panel (14)  - Lamotrigine (Lamictal), Serum  - Levetiracetam, S    3. Cough, persistent  As noted above     Return in about 2 months (around 7/13/2024).    Yogi Alfonso MD, Neurology  [facility]  Pager [phone]  5/13/2024

## 2024-06-06 LAB
ABSOLUTE BASOPHILS: 54 CELLS/UL (ref 0–200)
ABSOLUTE EOSINOPHILS: 262 CELLS/UL (ref 15–500)
ABSOLUTE LYMPHOCYTES: 3011 CELLS/UL (ref 850–3900)
ABSOLUTE MONOCYTES: 593 CELLS/UL (ref 200–950)
ABSOLUTE NEUTROPHILS: 3781 CELLS/UL (ref 1500–7800)
ALBUMIN/GLOBULIN RATIO: 1.3 (CALC) (ref 1–2.5)
ALBUMIN: 4.5 G/DL (ref 3.6–5.1)
ALKALINE PHOSPHATASE: 81 U/L (ref 31–125)
ALT: 29 U/L (ref 6–29)
AST: 22 U/L (ref 10–30)
BASOPHILS: 0.7 %
BILIRUBIN, TOTAL: 0.4 MG/DL (ref 0.2–1.2)
BUN: 14 MG/DL (ref 7–25)
CALCIUM: 9.9 MG/DL (ref 8.6–10.2)
CARBON DIOXIDE: 27 MMOL/L (ref 20–32)
CHLORIDE: 102 MMOL/L (ref 98–110)
CREATININE: 0.64 MG/DL (ref 0.5–0.96)
EGFR: 125 ML/MIN/1.73M2
EOSINOPHILS: 3.4 %
GLOBULIN: 3.6 G/DL (CALC) (ref 1.9–3.7)
GLUCOSE: 76 MG/DL (ref 65–99)
HEMATOCRIT: 44.9 % (ref 35–45)
HEMOGLOBIN: 14 G/DL (ref 11.7–15.5)
LAMOTRIGINE: 6.1 MCG/ML (ref 2.5–15)
LEVETIRACETAM: 45.5 MCG/ML
LYMPHOCYTES: 39.1 %
MCH: 26.8 PG (ref 27–33)
MCHC: 31.2 G/DL (ref 32–36)
MCV: 86 FL (ref 80–100)
MONOCYTES: 7.7 %
MPV: 10.3 FL (ref 7.5–12.5)
NEUTROPHILS: 49.1 %
PLATELET COUNT: 392 THOUSAND/UL (ref 140–400)
POTASSIUM: 4.8 MMOL/L (ref 3.5–5.3)
PROTEIN, TOTAL: 8.1 G/DL (ref 6.1–8.1)
RDW: 13.6 % (ref 11–15)
RED BLOOD CELL COUNT: 5.22 MILLION/UL (ref 3.8–5.1)
SODIUM: 137 MMOL/L (ref 135–146)
WHITE BLOOD CELL COUNT: 7.7 THOUSAND/UL (ref 3.8–10.8)

## 2024-07-23 ENCOUNTER — NURSE ONLY (OUTPATIENT)
Dept: NEUROLOGY | Facility: CLINIC | Age: 29
End: 2024-07-23
Payer: COMMERCIAL

## 2024-07-23 ENCOUNTER — TELEPHONE (OUTPATIENT)
Dept: NEUROLOGY | Facility: CLINIC | Age: 29
End: 2024-07-23

## 2024-07-23 DIAGNOSIS — R51.9 HEADACHE ABOVE THE EYE REGION: Primary | ICD-10-CM

## 2024-07-23 PROCEDURE — 96372 THER/PROPH/DIAG INJ SC/IM: CPT | Performed by: OTHER

## 2024-07-23 RX ORDER — KETOROLAC TROMETHAMINE 30 MG/ML
30 INJECTION, SOLUTION INTRAMUSCULAR; INTRAVENOUS ONCE
Status: COMPLETED | OUTPATIENT
Start: 2024-07-23 | End: 2024-07-23

## 2024-07-23 RX ORDER — METHYLPREDNISOLONE 4 MG/1
TABLET ORAL
Qty: 1 EACH | Refills: 0 | Status: SHIPPED | OUTPATIENT
Start: 2024-07-23

## 2024-07-23 RX ORDER — DEXAMETHASONE SODIUM PHOSPHATE 10 MG/ML
10 INJECTION INTRAMUSCULAR; INTRAVENOUS ONCE
Status: COMPLETED | OUTPATIENT
Start: 2024-07-23 | End: 2024-07-23

## 2024-07-23 RX ADMIN — DEXAMETHASONE SODIUM PHOSPHATE 10 MG: 10 INJECTION INTRAMUSCULAR; INTRAVENOUS at 14:48:00

## 2024-07-23 RX ADMIN — KETOROLAC TROMETHAMINE 30 MG: 30 INJECTION, SOLUTION INTRAMUSCULAR; INTRAVENOUS at 14:49:00

## 2024-07-23 NOTE — TELEPHONE ENCOUNTER
Pt is having a really bad headache with nausea,  she is worry because of her seizures please call to advice

## 2024-07-23 NOTE — TELEPHONE ENCOUNTER
Patient calling from work     Acute Migraine assessment:    Is this your typical migraine?  Describe any change in character from past migraines.  No: 1st time having headache like this.     Location of Pain (select all that apply):  forehead and top of head  # Days pain has been present:  headache x7 days, but today is worse    Describe the pain:  Pressure and Throbbing  Intensity of the headaches:    With medication: SEVERE   Without medication SEVERE  Associated Symptoms (check all that apply):  Nausea/Upset Stomach, Sensitivity to light, and Eyes tearing   Non-pharmaceutical interventions tried and outcome:   [] Lying down / sleeping:  not tried  [x] Being in a dark quiet room:  BETTER  [] Massage your head: NO CHANGE  [] Cold pack on your head/neck:  not tried  [] Hot pack on your head/neck:  not tried      Abortive Medications tried:  tylenol and ibuprofen.  Current preventative medication list:  None  Any other   relevant information:  1st headache like this  Willing to try Medrol Dosepak?  Yes  Last taken:  never  Willing to try Toradol/Decadron injections?  Yes  Last taken:  never  Advised patient to seek immediate medical treatment in ED for any concerning symptoms or changes to condition.

## 2024-07-23 NOTE — TELEPHONE ENCOUNTER
Called patient and shared Dr. Dutton's orders. Patient expressed understanding. Will come to office prior to 4pm.

## 2024-07-23 NOTE — PROGRESS NOTES
Patient in office today for Toradol and decadron shots.     Toradol 30mg and Decadron 10mg IM injections given per VORB.  See MAR for admin details. Pt tolerated well.     Follow up Medrol dose pack sent to pharmacy.

## 2024-07-29 ENCOUNTER — OFFICE VISIT (OUTPATIENT)
Dept: NEUROLOGY | Facility: CLINIC | Age: 29
End: 2024-07-29
Payer: COMMERCIAL

## 2024-07-29 VITALS
DIASTOLIC BLOOD PRESSURE: 78 MMHG | HEART RATE: 95 BPM | SYSTOLIC BLOOD PRESSURE: 106 MMHG | HEIGHT: 60 IN | OXYGEN SATURATION: 96 % | RESPIRATION RATE: 14 BRPM | BODY MASS INDEX: 34.55 KG/M2 | WEIGHT: 176 LBS

## 2024-07-29 DIAGNOSIS — R51.9 WORST HEADACHE OF LIFE: ICD-10-CM

## 2024-07-29 DIAGNOSIS — G43.009 MIGRAINE WITHOUT AURA AND WITHOUT STATUS MIGRAINOSUS, NOT INTRACTABLE: ICD-10-CM

## 2024-07-29 DIAGNOSIS — G40.909 SEIZURE DISORDER (HCC): Primary | ICD-10-CM

## 2024-07-29 PROCEDURE — 3008F BODY MASS INDEX DOCD: CPT | Performed by: OTHER

## 2024-07-29 PROCEDURE — 3078F DIAST BP <80 MM HG: CPT | Performed by: OTHER

## 2024-07-29 PROCEDURE — 99214 OFFICE O/P EST MOD 30 MIN: CPT | Performed by: OTHER

## 2024-07-29 PROCEDURE — 3074F SYST BP LT 130 MM HG: CPT | Performed by: OTHER

## 2024-07-29 RX ORDER — DROSPIRENONE AND ETHINYL ESTRADIOL 0.02-3(28)
1 KIT ORAL DAILY
COMMUNITY

## 2024-07-29 RX ORDER — ALBUTEROL SULFATE 90 UG/1
1 AEROSOL, METERED RESPIRATORY (INHALATION) EVERY 6 HOURS PRN
COMMUNITY
Start: 2024-02-16

## 2024-07-29 RX ORDER — SUMATRIPTAN 50 MG/1
TABLET, FILM COATED ORAL
Qty: 9 TABLET | Refills: 0 | Status: SHIPPED | OUTPATIENT
Start: 2024-07-29

## 2024-07-29 NOTE — PROGRESS NOTES
[facility] Progress Note    HPI  Chief Complaint   Patient presents with    Follow - Up     LOV  5/13/24 for seizures - Pt was here 7/23/24 for nurse visit for toradol and decadron injection  Pt is taking lamotrigine 100mg BID and kepra 1000mg BID. Pt has not had any seizures. But had the one migraine episode on 7/23/24.       As per my initial H&P from 6/16/2021,   \"   Dotty Gross is a 25 year old female, who presents for evaluation of seizures.      Patient states she has history of seizures.  She states these usually last 30 seconds and she does not recall them but is told she has a grunting sound and then shakes and loses awareness; denies auras of odd tastes, smells or sounds; has some post ictal confusion and is sore after seizures; may bite tongue and jaw muscles are sore.      She states last seizure was July 29, 2020.  She states she previously had COVID July 18-19, with extensive weight loss and nausea/emesis.  She denied lack of taste and smell and denied fevers or headaches.      She states when she had her last seizure, she was taking Keppra but was not able to keep down medication; was on 750 mg bid and was increased up to 1000 mg bid.  Since this time, she has not had any seizures.  She is currently 9 weeks pregnant and has remained on Keppra; her first pregnancy was in 2017 - had seizure during second trimester.     She has been doing well currently but has been having hyperemesis gravidarum.   Otherwise, patient denies any recent weight change, fevers, chills, double vision/ blurry vision / loss of vision, chest pain, palpitations, shortness of breath, rashes, joint pains, bowel / bladder incontinence or mood issues. \"    Prior notes as below 3/17/2022    Patient at time of last visit was unable to seen formally and was sent to ER as she had breakthrough seizures - states she does not recall coming to office but admits she was out of Lamictal for the prior 2 weeks - she had  titrated up to 100 mg dose but never got this refilled and then started to have more confusion and was not as quick to respond; she had 4 seizures reportedly on day of admission 3/9/2022.  She was admitted for workup and had EEG which was abnormal. She was treated with Depakote 500 mg q8 hrs and Lamictal was restarted at 25 mg bid.  She was discharged home with Depakote 500 mg bid with plan to increase Lamictal to 50 mg bid after 1 week, which she has not done yet.  She remains on Keppra 1500 mg bid.      She denies any recurrent seizures and denies any auras of odd tastes, smells or sounds; she feels alert and back to her usual self.     Prior notes as below: 4/19/2022  Patient last seen 3/17/2022.  Since last visit, she has increased lamotrigine (Lamictal) up to 50 mg bid and reduced valproic acid (Depakote) to 250 mg AM / 500 mg PM- her last level was done 3/21/2022 with valproic acid level elevated and lamotrigine within normal range. She was on Depakote 500 mg bid and Lamictal 50 mg bid at the time and advised to have repeat levels done in ~2 weeks but results not available at this time.  Currently, she is doing well with no seizures and denies any rash, balance issues, double vision, nausea; she denies any auras of odd tastes, smells or sounds and denies recurrent seizures recently.   She has not been able to lose weight since her pregnancy but denies tremor or balance issues.            Prior notes as below 6/8/2022.  Patient last seen 4/19/2022.  Since last visit, she has been on Depakote 250 mg AM/ 500 mg PM and Lamictal 50 mg bid along with Keppra 1500 mg bid.  On this regimen, she denies any seizures and denies any auras of odd tastes,smells, sounds or dizzy spells or episodes of loss of awareness. She has noted some continued weight gain but denies any other side effect of rash, double vision, nausea/emesis or balance issues/falls.            Prior notes as per 7/13/2022.  Patient last seen 6/8/2022.   She is now off Depakote and remains on Keppra 1500 mg bid and Lamictal 50 mg bid; she denies any seizures and denies any auras of odd tastes,smells, sounds or dizzy spells but had an episode a couple of weeks ago where she briefly \"zoned out\" for a matter of seconds, after she stopped taking the Depakote but has not had any since this time; She denies other  episodes of loss of awareness. She denies other side effect of rash, double vision, nausea/emesis or balance issues/falls.  She recently had levels checked.        Prior notes as per 10/17/2022.  Patient last seen 7/13/2022.  She is still on Keppra 1500 mg bid and is now on Lamictal 100 mg bid.  She has been doing well on this dose.  She denies any episodes of \"zoning out\" and denies auras of odd tastes, smells or sounds.  She denies any episodes of speech arrest and denies other side effects of rash, double vision, nausea/emeiss or balance issues/falls. She has been going to weight loss clinic and is now on fluoxetine 20 mg daily but denies any significant improvement.      Prior notes as per 1/17/2023.  Patient last seen 10/17/2022. Since last visit, she has been weaned down on Keppra currently at 1000 mg bid.  She has remained on Lamictal 100 mg bid and denies any seizures or episodes of loss of awareness, or speech arrest or auras of odd tastes, smells or sounds.  She denies side effects of rash, balance issues, double vision, or nausea.  She has been working with weight loss clinic but is not on fluoxetine or topiramate at this time.         Prior notes as per 7/31/2023.  Patient last seen 1/17/2023.  Patient since last visit had a generalized seizure 2/17/2023.  At that time, she was on Keppra 1000 mg bid and Lamictal 100 mg bid.  She had missed doses of Keppra and Lamictal the night prior to her seizure and did not have recurrent seizures; no auras of odd tastes, smells or sounds.  She admitted to poor sleep and increased stress at time of seizure.  She  was recommended to have levels checked but has not done so yet.  Currently, she is doing well with no recurrent seizures and denies auras of odd tastes, smells or sounds or episodes of loss of awareness or speech arrest.  She denies side effects of rash, balance issues, double vision or nausea.        Prior notes as per 1/29/2024.  Patient last seen 7/31/2023.  Since last visit, she has been doing well.  She remains on Lamictal 100 mg bid and Keppra 1000 mg bid and denies any seizures.  She denies missing any doses of medication and has been sleeping better as her child is sleeping through the night ( no issues for child age 4). She denies any auras of odd tastes, smells or sounds or episodes of speech arrest or loss of awareness; no issues with waking in the AM having wet the bed or bitten her tongue.  She additionally denies any side effects of rash, balance issues, double vision or nausea.  She has been driving without issues.       Prior notes as per 5/13/2024.  Patient last seen 1/29/2024.  She was doing well on Keppra 1000 mg bid and Lamictal 100 mg bid. However, in the past few weeks she has been tired during the day despite getting good sleep. She admits to some weight gain and wakes up in the middle of the night coughing.  She may fall asleep in a dark room but not when driving. She has been having episodes in the past 2 weeks where she has difficulty with concentration and has episodes of \"staring off\" but denies loss of awareness.      She denies being tested for sleep apnea but has been gaining weight; she denies issues when driving but notes this mainly when she is conversing with others; denies auras of odd tastes, smells or sounds. She notes when she coughs she has a \"bleach taste\" in her mouth.     Of note, she had pneumonia 3/2024. She has some headaches in the front of the head as well recently; she is light sensitive; no recent head trauma.        Patient last seen 5/13/2024. In terms of seizures,  she is doing well on Keppra 1000 mg bid and Lamcital 100 mg bid - denies seizures or auras but had migraine and severe headache 2024 - having headaches when she got to work with pain in head and did not improve with Tylenol. She then noted light sensitivity and nausea and went to lie in a dark room with some brief improvement. She denied loss of awareness and denied associated focal numbness/tingling/weakness. She was given steroid pack with improvement. She felt this headache was the worst of her life.          Past Medical History:    Anemia    during pregnancy    Anemia affecting pregnancy in third trimester ([facility])    Cervical laceration    COVID-19    Disorder of musculoskeletal system during pregnancy ([facility])    Epilepsy ([facility])    1st grand mal seizure at 17    Female infertility    History of hyperemesis gravidarum    Hx of transfusion of packed red blood cells    Received 2 units packed red blood cells for postpartum hemorrhage - atonic & cervical laceration    Hyperemesis gravidarum ([facility])    Was put on Zofran - didn't help much.     Iron deficiency anemia during pregnancy, antepartum ([facility])    Consider IV iron if difficulty with tolerating PNV due to hyperemesis persists    Keloid scar    chest    Low maternal serum vitamin B12     (normal spontaneous vaginal delivery) ([facility])    Obesity (BMI 30-39.9)    Obesity affecting pregnancy in third trimester ([facility])    2021 - Passed early 1 hour glucose test at 17w6d. Plan re-screening at 26-28 wk      Other immediate postpartum hemorrhage ([facility])    Pap smear for cervical cancer screening    Pap negative.     Pregnancy related nausea, antepartum ([facility])    Pubic bone pain    Request for sterilization    2022 Discussed permanent/irreversible, optional nature of sterilization. Patient expressed understanding. Medicaid consent form signed together.     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[facility])    Vitamin D deficiency     Past Surgical History:   Procedure Laterality Date    Cholecystectomy  2018    D&c after delivery  01/17/2022    Other surgical history  2012    benign keloid bx chest area     Family History   Problem Relation Age of Onset    No Known Problems Father     No Known Problems Mother     Other (Other) Daughter         severe GI sensitivities to multiple foods - no clear diagnosis    Diabetes Maternal Grandmother     No Known Problems Maternal Grandfather     No Known Problems Paternal Grandmother     No Known Problems Paternal Grandfather     No Known Problems Sister     No Known Problems Sister     No Known Problems Sister      Social History     Socioeconomic History    Marital status:    Tobacco Use    Smoking status: Never     Passive exposure: Never    Smokeless tobacco: Never   Vaping Use    Vaping status: Never Used   Substance and Sexual Activity    Alcohol use: Never    Drug use: Never    Sexual activity: Yes     Partners: Male   Other Topics Concern    Caffeine Concern Yes     Comment: occasionally    Exercise Yes     Comment: walking     Seat Belt Yes    Special Diet No    Stress Concern No    Weight Concern Yes     Allergies   Allergen Reactions    Shellfish-Derived Products SWELLING         Current Outpatient Medications:     albuterol 108 (90 Base) MCG/ACT Inhalation Aero Soln, Inhale 1 puff into the lungs every 6 (six) hours as needed., Disp: , Rfl:     Drospirenone-Ethinyl Estradiol 3-0.02 MG Oral Tab, Take 1 tablet by mouth daily., Disp: , Rfl:     SUMAtriptan 50 MG Oral Tab, Use at onset; repeat once after 2 hours-ONLY 2 IN 24 HR MAX. This is a 30 day supply., Disp: 9 tablet, Rfl: 0    methylPREDNISolone (MEDROL) 4 MG Oral Tablet Therapy Pack,  Take as directed, Disp: 1 each, Rfl: 0    lamoTRIgine 100 MG Oral Tab, Take 1 tablet (100 mg total) by mouth 2 (two) times daily., Disp: 180 tablet, Rfl: 3    levetiracetam 1000 MG Oral Tab, Take 1 tablet (1,000 mg total) by mouth 2 (two) times daily., Disp: 180 tablet, Rfl: 3    Review of Systems:  No chest pain or palpitations; otherwise as noted in HPI.    Exam:  /78   Pulse 95   Resp 14   Ht 60\"   Wt 176 lb (79.8 kg)   LMP 04/14/2024 (Exact Date)   SpO2 96%   BMI 34.37 kg/m²   Estimated body mass index is 34.37 kg/m² as calculated from the following:    Height as of this encounter: 60\".    Weight as of this encounter: 176 lb (79.8 kg).    Gen: well developed, well nourished, no acute distress  HEENT: normocephalic  Heart; normal S1/S2, regular rate and rhythm  Lungs: clear to auscultation bilaterally  Extremities: no edema, peripheral pulses intact    Neck: supple, full range of motion; no carotid bruits    Neuro:  Mental status:  Orientation: Alert and oriented to person, place, time  Speech Fluent and conversational    CN: PERRL, EOMI with no nystagmaus, VFF, smile symmetric, sensation intact, tongue and palate midline, SCM intact, otherwise, CN 2-12 intact  Motor: 5/5 strength throughout, tone normal  DTR: 2+ symmetric throughout, toes downgoing bilaterally, no clonus; +tromners in UE bilaterall;y   Sensory: intact to light touch throughout; decreased to pin R great to up to mid foot; vibration 6 sec both feet great toes   Coord: FNF intact with no tremor or dysmetria; rapid alternating movements intact bilaterally  Romberg: absent  Gait: normal casual, heel, toe and tandem gait    Labs:  New    Component      Latest Ref Rng 6/1/2024   WBC      3.8 - 10.8 Thousand/uL 7.7    RBC      3.80 - 5.10 Million/uL 5.22 (H)    Hemoglobin      11.7 - 15.5 g/dL 14.0    Hematocrit      35.0 - 45.0 % 44.9    MCV      80.0 - 100.0 fL 86.0    MCH      27.0 - 33.0 pg 26.8 (L)    MCHC      32.0 - 36.0 g/dL 31.2  (L)    RDW      11.0 - 15.0 % 13.6    Platelet Count      140 - 400 Thousand/uL 392    MPV      7.5 - 12.5 fL 10.3    Neutrophils Absolute      1,500 - 7,800 cells/uL 3,781    Lymphocytes Absolute      850 - 3,900 cells/uL 3,011    Monocytes Absolute      200 - 950 cells/uL 593    Eosinophils Absolute      15 - 500 cells/uL 262    Basophils Absolute      0 - 200 cells/uL 54    Neutrophils %      % 49.1    Lymphocytes %      % 39.1    Monocytes %      % 7.7    Eosinophils %      % 3.4    Basophils %      % 0.7    Glucose      65 - 99 mg/dL 76    BUN      7 - 25 mg/dL 14    CREATININE      0.50 - 0.96 mg/dL 0.64    EGFR      > OR = 60 mL/min/1.73m2 125    BUN/CREATININE RATIO      6 - 22 (calc) SEE NOTE:    Sodium      135 - 146 mmol/L 137    Potassium      3.5 - 5.3 mmol/L 4.8    Chloride      98 - 110 mmol/L 102    Carbon Dioxide, Total      20 - 32 mmol/L 27    CALCIUM      8.6 - 10.2 mg/dL 9.9    PROTEIN, TOTAL      6.1 - 8.1 g/dL 8.1    Albumin      3.6 - 5.1 g/dL 4.5    Globulin      1.9 - 3.7 g/dL (calc) 3.6    A/G Ratio      1.0 - 2.5 (calc) 1.3    Total Bilirubin      0.2 - 1.2 mg/dL 0.4    Alkaline Phosphatase      31 - 125 U/L 81    AST (SGOT)      10 - 30 U/L 22    ALT (SGPT)      6 - 29 U/L 29    LAMOTRIGINE      2.5 - 15.0 mcg/mL 6.1    LEVETIRACETAM      mcg/mL 45.5           Prior as noted below    Component      Latest Ref Rng 8/7/2023   Lamotrigine Lvl      2.0 - 20.0 ug/mL 4.6      From EMR    12/6/2023    CMP: normal range with exception of ALT 38 (minimally elevated)  CBC: normal range    1/25/2023:   From Outside records    Lamotrigine: 1.5 (low)     1/11/2023: 3.3 (mildly low)     Prior as noted below    Component      Latest Ref Rng & Units 8/2/2022   LAMOTRIGINE      4.0 - 18.0 mcg/mL 5.3       Prior as noted below     Component      Latest Ref Rng & Units 7/9/2022   LAMOTRIGINE      4.0 - 18.0 mcg/mL 3.1 (L)       Prior as noted below  Component      Latest Ref Rng & Units 4/18/2022    LAMOTRIGINE      4.0 - 18.0 mcg/mL 6.7   VALPROIC ACID      50.0 - 100.0 mg/L  64.6     Prior as noted below    Component      Latest Ref Rng & Units 3/21/2022   LAMOTRIGINE      4.0 - 18.0 mcg/mL 5.8   VALPROIC ACID      50.0 - 100.0 mg/L  131.6 (H)     Prior as noted below    Component      Latest Ref Rng & Units 3/11/2022 3/9/2022 2/17/2022   WBC      4.0 - 11.0 x10(3) uL  13.4 (H)    RBC      3.80 - 5.30 x10(6)uL  4.70    Hemoglobin      12.0 - 16.0 g/dL  13.1    Hematocrit      35.0 - 48.0 %  41.2    Platelet Count      150.0 - 450.0 10(3)uL  324.0    MCV      80.0 - 100.0 fL  87.7    MCH      26.0 - 34.0 pg  27.9    MCHC      31.0 - 37.0 g/dL  31.8    RDW      %  14.0    Prelim Neutrophil Abs      1.50 - 7.70 x10 (3) uL  11.28 (H)    Neutrophils Absolute      1.50 - 7.70 x10(3) uL  11.28 (H)    Lymphocytes Absolute      1.00 - 4.00 x10(3) uL  1.62    Monocytes Absolute      0.10 - 1.00 x10(3) uL  0.40    Eosinophils Absolute      0.00 - 0.70 x10(3) uL  0.04    Basophils Absolute      0.00 - 0.20 x10(3) uL  0.02    Immature Granulocyte Absolute      0.00 - 1.00 x10(3) uL  0.05    Neutrophils %      %  84.1    Lymphocytes %      %  12.1    Monocytes %      %  3.0    Eosinophils %      %  0.3    Basophils %      %  0.1    Immature Granulocyte %      %  0.4    Glucose      70 - 99 mg/dL  113 (H)    Sodium      136 - 145 mmol/L  139    Potassium      3.5 - 5.1 mmol/L  4.0    Chloride      98 - 112 mmol/L  109    Carbon Dioxide, Total      21.0 - 32.0 mmol/L  23.0    ANION GAP      0 - 18 mmol/L  7    BUN      7 - 18 mg/dL  11    CREATININE      0.55 - 1.02 mg/dL  0.80    CALCIUM      8.5 - 10.1 mg/dL  9.6    CALCULATED OSMOLALITY      275 - 295 mOsm/kg  288    eGFR NON-AFR. AMERICAN      >=60  104    eGFR       >=60  119    AST (SGOT)      15 - 37 U/L  28    ALT (SGPT)      13 - 56 U/L  51    ALKALINE PHOSPHATASE      37 - 98 U/L  75    Total Bilirubin      0.1 - 2.0 mg/dL  0.3    PROTEIN, TOTAL       6.4 - 8.2 g/dL  8.8 (H)    Albumin      3.4 - 5.0 g/dL  4.2    Globulin      2.8 - 4.4 g/dL  4.6 (H)    A/G Ratio      1.0 - 2.0  0.9 (L)    Color Urine      Yellow  Yellow    Clarity Urine      Clear  Cloudy (A)    Spec Gravity      1.001 - 1.030  1.024    Glucose Urine      Negative mg/dL  Negative    Bilirubin Urine      Negative  Negative    Ketones, UA      Negative mg/dL  Trace (A)    Blood Urine      Negative  Large (A)    PH Urine      5.0 - 8.0  5.0    Protein Urine      Negative mg/dL  30 (A)    Urobilinogen Urine      <2.0 mg/dL  <2.0    Nitrite Urine      Negative  Negative    Leukocyte Esterase Urine      Negative  Large (A)    WBC Urine      0 - 5 /HPF  11-20 (A)    RBC Urine      0 - 2 /HPF  >10 (A)    Bacteria Urine      None Seen /HPF  Rare (A)    SQUAM EPI CELLS UR      None Seen /HPF  Few (A)    RENAL TUBULAR EPITHELIAL CELLS      None Seen /HPF  None Seen    TRANSITIONAL EPI CELLS      None Seen /HPF  None Seen    YEAST URINE      None Seen /HPF  None Seen    AMPHETAMINE URINE      Negative  Negative    BENZODIAZEPINES URINE      Negative  Negative    COCAINE URINE      Negative  Negative    CANNABINOID URINE      Negative  Negative    Ecstasy Urine      Negative  Negative    OPIATE URINE      Negative  Negative    Oxycodone Urine      Negative  Negative    CREATININE UR RANDOM      mg/dL  168.00    LEVETIRACETAM (KEPPRA)      12 - 46 ug/mL      LAMOTRIGINE      4.0 - 18.0 mcg/mL   2.9 (L)   VALPROIC ACID      50.0 - 100.0 ug/mL 80.6       Component      Latest Ref Rng & Units 1/21/2022   WBC      4.0 - 11.0 x10(3) uL    RBC      3.80 - 5.30 x10(6)uL    Hemoglobin      12.0 - 16.0 g/dL    Hematocrit      35.0 - 48.0 %    Platelet Count      150.0 - 450.0 10(3)uL    MCV      80.0 - 100.0 fL    MCH      26.0 - 34.0 pg    MCHC      31.0 - 37.0 g/dL    RDW      %    Prelim Neutrophil Abs      1.50 - 7.70 x10 (3) uL    Neutrophils Absolute      1.50 - 7.70 x10(3) uL    Lymphocytes Absolute       1.00 - 4.00 x10(3) uL    Monocytes Absolute      0.10 - 1.00 x10(3) uL    Eosinophils Absolute      0.00 - 0.70 x10(3) uL    Basophils Absolute      0.00 - 0.20 x10(3) uL    Immature Granulocyte Absolute      0.00 - 1.00 x10(3) uL    Neutrophils %      %    Lymphocytes %      %    Monocytes %      %    Eosinophils %      %    Basophils %      %    Immature Granulocyte %      %    Glucose      70 - 99 mg/dL    Sodium      136 - 145 mmol/L    Potassium      3.5 - 5.1 mmol/L    Chloride      98 - 112 mmol/L    Carbon Dioxide, Total      21.0 - 32.0 mmol/L    ANION GAP      0 - 18 mmol/L    BUN      7 - 18 mg/dL    CREATININE      0.55 - 1.02 mg/dL    CALCIUM      8.5 - 10.1 mg/dL    CALCULATED OSMOLALITY      275 - 295 mOsm/kg    eGFR NON-AFR. AMERICAN      >=60    eGFR       >=60    AST (SGOT)      15 - 37 U/L    ALT (SGPT)      13 - 56 U/L    ALKALINE PHOSPHATASE      37 - 98 U/L    Total Bilirubin      0.1 - 2.0 mg/dL    PROTEIN, TOTAL      6.4 - 8.2 g/dL    Albumin      3.4 - 5.0 g/dL    Globulin      2.8 - 4.4 g/dL    A/G Ratio      1.0 - 2.0    Color Urine      Yellow    Clarity Urine      Clear    Spec Gravity      1.001 - 1.030    Glucose Urine      Negative mg/dL    Bilirubin Urine      Negative    Ketones, UA      Negative mg/dL    Blood Urine      Negative    PH Urine      5.0 - 8.0    Protein Urine      Negative mg/dL    Urobilinogen Urine      <2.0 mg/dL    Nitrite Urine      Negative    Leukocyte Esterase Urine      Negative    WBC Urine      0 - 5 /HPF    RBC Urine      0 - 2 /HPF    Bacteria Urine      None Seen /HPF    SQUAM EPI CELLS UR      None Seen /HPF    RENAL TUBULAR EPITHELIAL CELLS      None Seen /HPF    TRANSITIONAL EPI CELLS      None Seen /HPF    YEAST URINE      None Seen /HPF    AMPHETAMINE URINE      Negative    BENZODIAZEPINES URINE      Negative    COCAINE URINE      Negative    CANNABINOID URINE      Negative    Ecstasy Urine      Negative    OPIATE URINE      Negative     Oxycodone Urine      Negative    CREATININE UR RANDOM      mg/dL    LEVETIRACETAM (KEPPRA)      12 - 46 ug/mL 55 (H)   LAMOTRIGINE      4.0 - 18.0 mcg/mL    VALPROIC ACID      50.0 - 100.0 ug/mL          Component      Latest Ref Rng & Units 1/21/2022   WBC      4.0 - 11.0 x10(3) uL 10.5   RBC      3.80 - 5.30 x10(6)uL 3.69 (L)   Hemoglobin      12.0 - 16.0 g/dL 10.3 (L)   Hematocrit      35.0 - 48.0 % 32.6 (L)   Platelet Count      150.0 - 450.0 10(3)uL 287.0   MCV      80.0 - 100.0 fL 88.3   MCH      26.0 - 34.0 pg 27.9   MCHC      31.0 - 37.0 g/dL 31.6   RDW      % 16.5   Prelim Neutrophil Abs      1.50 - 7.70 x10 (3) uL 7.73 (H)   Neutrophils Absolute      1.50 - 7.70 x10(3) uL 7.73 (H)   Lymphocytes Absolute      1.00 - 4.00 x10(3) uL 1.94   Monocytes Absolute      0.10 - 1.00 x10(3) uL 0.55   Eosinophils Absolute      0.00 - 0.70 x10(3) uL 0.20   Basophils Absolute      0.00 - 0.20 x10(3) uL 0.03   Immature Granulocyte Absolute      0.00 - 1.00 x10(3) uL 0.06   Neutrophils %      % 73.5   Lymphocytes %      % 18.5   Monocytes %      % 5.2   Eosinophils %      % 1.9   Basophils %      % 0.3   Immature Granulocyte %      % 0.6   Glucose      70 - 99 mg/dL 77   Sodium      136 - 145 mmol/L 140   Potassium      3.5 - 5.1 mmol/L 3.8   Chloride      98 - 112 mmol/L 108   Carbon Dioxide, Total      21.0 - 32.0 mmol/L 30.0   ANION GAP      0 - 18 mmol/L 2   BUN      7 - 18 mg/dL 12   CREATININE      0.55 - 1.02 mg/dL 0.55   CALCIUM      8.5 - 10.1 mg/dL 8.8   CALCULATED OSMOLALITY      275 - 295 mOsm/kg 289   eGFR NON-AFR. AMERICAN      >=60 132   eGFR       >=60 152   AST (SGOT)      15 - 37 U/L 22   ALT (SGPT)      13 - 56 U/L 22   ALKALINE PHOSPHATASE      37 - 98 U/L 114 (H)   Total Bilirubin      0.1 - 2.0 mg/dL 0.4   PROTEIN, TOTAL      6.4 - 8.2 g/dL 7.0   Albumin      3.4 - 5.0 g/dL 2.7 (L)   Globulin      2.8 - 4.4 g/dL 4.3   A/G Ratio      1.0 - 2.0 0.6 (L)   Color Urine      Yellow Yellow    Clarity Urine      Clear Hazy (A)   Spec Gravity      1.001 - 1.030 >1.030 (H)   Glucose Urine      Negative mg/dL Negative   Bilirubin Urine      Negative Negative   Ketones, UA      Negative mg/dL Negative   Blood Urine      Negative Large (A)   PH Urine      5.0 - 8.0 7.0   Protein Urine      Negative mg/dL 100 (A)   Urobilinogen Urine      <2.0 mg/dL <2.0   Nitrite Urine      Negative Negative   Leukocyte Esterase Urine      Negative Small (A)   WBC Urine      0 - 5 /HPF >50 (A)   RBC Urine      0 - 2 /HPF >10 (A)   Bacteria Urine      None Seen /HPF Rare (A)   SQUAM EPI CELLS UR      None Seen /HPF Few (A)   RENAL TUBULAR EPITHELIAL CELLS      None Seen /HPF None Seen   TRANSITIONAL EPI CELLS      None Seen /HPF None Seen   YEAST URINE      None Seen /HPF None Seen   LEVETIRACETAM (KEPPRA)      12 - 46 ug/mL 55 (H)     Prior as noted below  Component      Latest Ref Rng & Units 8/12/2021   LEVETIRACETAM (KEPPRA)      12 - 46 ug/mL 34       Imaging:  None new             Impression/ Plan:  Dotty Gross is a 29 year old female patient with PMHx significant for seizure disorder, who originally presented 6/2021, for evaluation and management.     Neurologic examination is normal and nonfocal     Patient endorses a history of seizures since 2012.  She has no recollection during these events.    Previously, her seizures were well controlled on Keppra at 1000 mg twice daily, with last seizure occurring approximately 1 year prior, and one prior to that occurring during second trimester of pregnancy in 2017.    However, she was having complications of hyperemesis gravidarum during first and second trimesters with most recent pregnancy.  She did not have any seizures during that time.  However, she had recurrent seizures and was increased on Keppra up to 1500 mg bid - she subsequently delivered her baby and had recurrent seizures, which suspect are breakthrough seizures and may be related to missing dose of medication  or receiving blood transfusion.  However, levels were therapeutic and she has had seizures on several occasions during and after pregnancy.     Given this most recent seizure cluster, she was previously recommended to start Lamictal and was doing well on dose up to 100 mg daily after following starter kit instructions; however, she did not refill the medication and stopped talking for ~ 2 weeks and presented to ED for admission 3/9/2022 following witnessed seizure in office with reports of 3 or 4 additional seizures that day.       She is now doing well and suspect her breakthrough seizures were due to missing anti-seizure medication as well as concurrent infectious process - she was doing well on Depakote and Lamictal aside from weight gain -  levels were in range as of 4/18/2022 - given weight gain, she was weaned off Depakote and started on Lamictal up to 100 mg bid along with Keppra 1500 mg bid.  Levels  done off Depakote and on Lamictal 100 mg bid were within normal limits; given improvement, she has been weaned down on Keppra to 1000 mg bid and was doing well.    Lamictal level was low range as of 1/30/2023 and patient had seizure 2/17/2023.  She has not had any recurrent seizures and repeat level 8/2023 was within range - she remains on Lamictal 100 mg bid and Keppra 1000 mg bid. Labs and levels unremarkable. She had recent onset of new severe headache - symptoms seem most consistent with migraine but she denies prior history of migraine and given this was worst headache of life, will check imaging to evaluate for secondary pathology  - MRI brain / MRA brain ordered without contrast. In the future, for when migraines, occur, recommend  take sumatriptan 50 mg (Imitrex) within first 30 minutes of symptoms starting; if not improved after 2 hours, take additional dose, and then stop taking; monitor for chest pain / tightness  1. Seizure disorder (HCC)  As noted above   - MRI BRAIN/MRA BRAIN  (CPT=70551/64274);  Future    2. Migraine without aura and without status migrainosus, not intractable  As noted above   - SUMAtriptan 50 MG Oral Tab; Use at onset; repeat once after 2 hours-ONLY 2 IN 24 HR MAX. This is a 30 day supply.  Dispense: 9 tablet; Refill: 0    3. Worst headache of life  As noted above   - MRI BRAIN/MRA BRAIN  (CPT=70551/65967); Future    Return in about 3 months (around 10/29/2024).    Yogi Alfonso MD, Neurology  [facility]  Pager [phone]  7/29/2024

## 2024-08-08 ENCOUNTER — TELEPHONE (OUTPATIENT)
Dept: NEUROLOGY | Facility: CLINIC | Age: 29
End: 2024-08-08

## 2024-08-08 DIAGNOSIS — G40.909 SEIZURE DISORDER (HCC): ICD-10-CM

## 2024-08-08 DIAGNOSIS — G43.009 MIGRAINE WITHOUT AURA AND WITHOUT STATUS MIGRAINOSUS, NOT INTRACTABLE: Primary | ICD-10-CM

## 2024-08-08 NOTE — TELEPHONE ENCOUNTER
Can we get imaging sooner? - was ordered; looks like not due till 9/2024 - would like to rule out secondary headache pathology     Otherwise, see eye doctor

## 2024-08-08 NOTE — TELEPHONE ENCOUNTER
S: Increased light sensitivity.    B: She had OV 7/29 following a migraine episode on 7/23, for which she received Toradol/Decadron injection.    A: Reports that since this migraine, her baseline light sensitivity has increased. She also reports having a migraine yesterday which was preceded by increased light sensitivity forcing her to use sunglasses. She ultimately used sumatriptan which helped with headache pain.    She has had Lasix about a year ago, but does regularly see eye doctor. Has not had acute visit for this issue.    R: Wants to let Dr. Alfonso know that she continues to have increased light sensitivity. Is there anything she can do?    MRI is scheduled for 9/4.

## 2024-08-08 NOTE — TELEPHONE ENCOUNTER
Patient calling she stated since the last visit with Dr. Alfonso she is been noticing  light sensitivity.

## 2024-08-08 NOTE — TELEPHONE ENCOUNTER
Patient notified. She is agreeable to try MRI/MRA at [facility] for a sooner appointment.    She states that her last eye exam was prior to light sensitivity changes. Encouraged a recheck for light sensitivity. Patient verbalized understanding.

## 2024-08-15 ENCOUNTER — TELEPHONE (OUTPATIENT)
Dept: NEUROLOGY | Facility: CLINIC | Age: 29
End: 2024-08-15

## 2024-08-15 DIAGNOSIS — G40.909 SEIZURE DISORDER (HCC): Primary | ICD-10-CM

## 2024-08-15 NOTE — TELEPHONE ENCOUNTER
Pt would like to talk to a nurse she is worry, just found out she is pregnant. She has an MRI scheduled, please call

## 2024-08-15 NOTE — TELEPHONE ENCOUNTER
Discussed with provider. Patient is to continue taking her medications: Keppra and Lamictal. Both are safe during pregnancy.    Ok to have MRI. Safe during pregnancy.     Patient to have medications levels drawn and then follow up in 4 weeks in office with provider.    Will monitor labs closely during pregnancy.    Information relayed to patient. Voiced understanding.    Information presented to  to schedule follow up with provider in 4 weeks.

## 2024-08-21 ENCOUNTER — OFFICE VISIT (OUTPATIENT)
Facility: CLINIC | Age: 29
End: 2024-08-21
Payer: COMMERCIAL

## 2024-08-21 VITALS
SYSTOLIC BLOOD PRESSURE: 110 MMHG | DIASTOLIC BLOOD PRESSURE: 72 MMHG | HEART RATE: 91 BPM | HEIGHT: 60 IN | WEIGHT: 175.63 LBS | BODY MASS INDEX: 34.48 KG/M2

## 2024-08-21 DIAGNOSIS — Z87.59 HISTORY OF HYPEREMESIS GRAVIDARUM: Primary | ICD-10-CM

## 2024-08-21 PROCEDURE — 3078F DIAST BP <80 MM HG: CPT

## 2024-08-21 PROCEDURE — 99213 OFFICE O/P EST LOW 20 MIN: CPT

## 2024-08-21 PROCEDURE — 3008F BODY MASS INDEX DOCD: CPT

## 2024-08-21 PROCEDURE — 3074F SYST BP LT 130 MM HG: CPT

## 2024-08-21 RX ORDER — ONDANSETRON 4 MG/1
4 TABLET, FILM COATED ORAL EVERY 8 HOURS PRN
Qty: 30 TABLET | Refills: 0 | Status: SHIPPED | OUTPATIENT
Start: 2024-08-21

## 2024-08-21 NOTE — PROGRESS NOTES
Dotty Gross is a 29 year old female  Patient's last menstrual period was 07/15/2024 (exact date).   Chief Complaint   Patient presents with    Gyn Problem     LMP July 15-   Wants to discuss early OB hyperemesis    .  She has nausea in the morning. She is coping well, wants anti emetic in case the nausea worsens. She is afraid hyperemesis will occur again like it did with the last pregnancy.    She has a history of seizure disorder and her neurologist was notified she is pregnant.     OBSTETRICS HISTORY:  OB History    Para Term  AB Living   4 2 2   1 2   SAB IAB Ectopic Multiple Live Births   1     0 2      # Outcome Date GA Lbr Hunter/2nd Weight Sex Type Anes PTL Lv   4 Current            3 Term 24 39w2d 07:00 / 00:25 7 lb 5.5 oz (3.33 kg) M NORMAL SPONT EPI N JAMES   2 Term 17 42w0d   F NORMAL SPONT   JAMES      Birth Comments: Hyperemesis    1 SAB  8w0d             Obstetric Comments   2022 - \"Noah\"        GYNE HISTORY:      History   Sexual Activity    Sexual activity: Yes    Partners: Male                 MEDICAL HISTORY:  Past Medical History:    Anemia    during pregnancy    Anemia affecting pregnancy in third trimester ([facility])    Cervical laceration    COVID-19    Disorder of musculoskeletal system during pregnancy ([facility])    Epilepsy ([facility])    1st grand mal seizure at 17    Female infertility    History of hyperemesis gravidarum    Hx of transfusion of packed red blood cells    Received 2 units packed red blood cells for postpartum hemorrhage - atonic & cervical laceration    Hyperemesis gravidarum ([facility])    Was put on Zofran - didn't help much.     Iron deficiency anemia during pregnancy, antepartum ([facility])    Consider IV iron if difficulty with tolerating PNV due to hyperemesis persists    Keloid scar    chest    Low maternal serum vitamin B12     (normal spontaneous vaginal delivery) ([facility])    Obesity (BMI 30-39.9)    Obesity affecting pregnancy in third trimester  ([facility])    8/20/2021 - Passed early 1 hour glucose test at 17w6d. Plan re-screening at 26-28 wk      Other immediate postpartum hemorrhage ([facility])    Pap smear for cervical cancer screening    Pap negative.     Pregnancy related nausea, antepartum ([facility])    Pubic bone pain    Request for sterilization    1/12/2022 Discussed permanent/irreversible, optional nature of sterilization. Patient expressed understanding. Medicaid consent form signed together.     Rubella non-immune status, antepartum ([facility])    MMR after delivery    Screening for genetic disease carrier status    Carrier Screen = Negative    Seizure disorder ([facility])    epilepsy    Seizure disorder during pregnancy in third trimester ([facility])    Trauma during pregnancy ([facility])    Uterine atony, postpartum, current hospitalization ([facility])    Vitamin D deficiency       SURGICAL HISTORY:  Past Surgical History:   Procedure Laterality Date    Cholecystectomy  2018    D & c  01/17/2022    D&c after delivery  01/17/2022    Other surgical history  2012    benign keloid bx chest area       SOCIAL HISTORY:  Social History     Socioeconomic History    Marital status:      Spouse name: Not on file    Number of children: Not on file    Years of education: Not on file    Highest education level: Not on file   Occupational History    Not on file   Tobacco Use    Smoking status: Never     Passive exposure: Never    Smokeless tobacco: Never   Vaping Use    Vaping status: Never Used   Substance and Sexual Activity    Alcohol use: Not Currently    Drug use: Never    Sexual activity: Yes     Partners: Male   Other Topics Concern    Caffeine Concern No    Exercise Yes    Seat Belt Yes    Special Diet No    Stress Concern No    Weight Concern Yes     Service Not Asked    Blood Transfusions Not Asked    Occupational Exposure Not Asked    Hobby Hazards Not Asked    Sleep Concern Not Asked    Back Care Not Asked    Bike Helmet Not Asked    Self-Exams Not Asked   Social History  Narrative    Not on file     Social Determinants of Health     Financial Resource Strain: Not on file   Food Insecurity: No Food Insecurity (11/21/2023)    [facility]    Food Insecurity     Currently or in the past 3 months, have you worried your food would run out before you had money to buy more?: No     In the past 12 months, have you run out of food or been unable to get more?: No   Transportation Needs: No Transportation Needs (11/21/2023)    [facility]    Transportation Needs     Currently or in the past 3 months, has lack of transportation kept you from medical appointments, getting food or medicine, or providing care to a family member?: Not on file     : Not on file     Medical Transportation Needs?: No     Daily Living Transportation Needs? [Peds Only] : Not on file   Stress: Not on file   Housing Stability: Low Risk  (8/7/2021)    [facility]    Housing Stability     Mortgage Payment Concerns?: Not on file     Number of Places Lived in the Last Year: Not on file     Unstable Housing?: Not on file       FAMILY HISTORY:  Family History   Problem Relation Age of Onset    No Known Problems Father     Obesity Mother     Other (Other) Daughter         severe GI sensitivities to multiple foods - no clear diagnosis    Diabetes Maternal Grandmother     No Known Problems Maternal Grandfather     No Known Problems Paternal Grandmother     No Known Problems Paternal Grandfather     No Known Problems Sister     No Known Problems Sister     No Known Problems Sister        MEDICATIONS:    Current Outpatient Medications:     ondansetron (ZOFRAN) 4 mg tablet, Take 1 tablet (4 mg total) by mouth every 8 (eight) hours as needed for Nausea., Disp: 30 tablet, Rfl: 0    SUMAtriptan 50 MG Oral Tab, Use at onset; repeat once after 2  hours-ONLY 2 IN 24 HR MAX. This is a 30 day supply., Disp: 9 tablet, Rfl: 0    lamoTRIgine 100 MG Oral Tab, Take 1 tablet (100 mg total) by mouth 2 (two) times daily., Disp: 180 tablet, Rfl: 3    levetiracetam 1000 MG Oral Tab, Take 1 tablet (1,000 mg total) by mouth 2 (two) times daily., Disp: 180 tablet, Rfl: 3    albuterol 108 (90 Base) MCG/ACT Inhalation Aero Soln, Inhale 1 puff into the lungs every 6 (six) hours as needed. (Patient not taking: Reported on 8/21/2024), Disp: , Rfl:     Drospirenone-Ethinyl Estradiol 3-0.02 MG Oral Tab, Take 1 tablet by mouth daily. (Patient not taking: Reported on 8/21/2024), Disp: , Rfl:     methylPREDNISolone (MEDROL) 4 MG Oral Tablet Therapy Pack, Take as directed, Disp: 1 each, Rfl: 0    ALLERGIES:    Allergies   Allergen Reactions    Shellfish-Derived Products SWELLING           Assessment & Plan:    . History of hyperemesis gravidarum    - ondansetron (ZOFRAN) 4 mg tablet; Take 1 tablet (4 mg total) by mouth every 8 (eight) hours as needed for Nausea.  Dispense: 30 tablet; Refill: 0

## 2024-08-22 ENCOUNTER — TELEPHONE (OUTPATIENT)
Dept: NEUROLOGY | Facility: CLINIC | Age: 29
End: 2024-08-22

## 2024-08-22 NOTE — TELEPHONE ENCOUNTER
MRI appointments are still on the books 9/4 at [facility]. Confirmed with MRI techs that only changes for pregnancies will be no contrast and an additional waiver; no special clearance is required.    Patient notified. All questions answered.

## 2024-08-22 NOTE — TELEPHONE ENCOUNTER
Please call patient [facility] her MRI's because she is pregnant.  Per patient Dr. Naty velásquez the MRI's please call to advice

## 2024-08-26 DIAGNOSIS — G43.009 MIGRAINE WITHOUT AURA AND WITHOUT STATUS MIGRAINOSUS, NOT INTRACTABLE: ICD-10-CM

## 2024-08-26 RX ORDER — SUMATRIPTAN 50 MG/1
TABLET, FILM COATED ORAL
Qty: 9 TABLET | Refills: 5 | Status: SHIPPED | OUTPATIENT
Start: 2024-08-26

## 2024-08-26 NOTE — TELEPHONE ENCOUNTER
Medication: Sumatriptan 50 mg     Date of last refill: 07/29/2024  [place]      Last office visit: 07/29/2024  Due back to clinic per last office note:    Date next office visit scheduled:    Future Appointments   Date Time Provider Department Center   9/4/2024  7:15 AM [facility] MR RM4 (3T WIDE) [facility]   9/13/2024  9:45 AM [facility] OB MOB US [facility]   9/13/2024 10:30 AM [facility] MD [facility] [place]   10/29/2024  2:00 PM Yogi Alfonso MD [facility]           Last OV note recommendation:    Impression/ Plan:  Dotty Gross is a 29 year old female patient with PMHx significant for seizure disorder, who originally presented 6/2021, for evaluation and management.     Neurologic examination is normal and nonfocal     Patient endorses a history of seizures since 2012.  She has no recollection during these events.    Previously, her seizures were well controlled on Keppra at 1000 mg twice daily, with last seizure occurring approximately 1 year prior, and one prior to that occurring during second trimester of pregnancy in 2017.     However, she was having complications of hyperemesis gravidarum during first and second trimesters with most recent pregnancy.  She did not have any seizures during that time.  However, she had recurrent seizures and was increased on Keppra up to 1500 mg bid - she subsequently delivered her baby and had recurrent seizures, which suspect are breakthrough seizures and may be related to missing dose of medication or receiving blood transfusion.  However, levels were therapeutic and she has had seizures on several occasions during and after pregnancy.     Given this most recent seizure cluster, she was previously recommended to start Lamictal and was doing well on dose up to 100 mg daily after following starter kit instructions; however, she did not refill the medication and stopped talking for ~ 2 weeks and presented  to ED for admission 3/9/2022 following witnessed seizure in office with reports of 3 or 4 additional seizures that day.       She is now doing well and suspect her breakthrough seizures were due to missing anti-seizure medication as well as concurrent infectious process - she was doing well on Depakote and Lamictal aside from weight gain -  levels were in range as of 4/18/2022 - given weight gain, she was weaned off Depakote and started on Lamictal up to 100 mg bid along with Keppra 1500 mg bid.  Levels  done off Depakote and on Lamictal 100 mg bid were within normal limits; given improvement, she has been weaned down on Keppra to 1000 mg bid and was doing well.    Lamictal level was low range as of 1/30/2023 and patient had seizure 2/17/2023.  She has not had any recurrent seizures and repeat level 8/2023 was within range - she remains on Lamictal 100 mg bid and Keppra 1000 mg bid. Labs and levels unremarkable. She had recent onset of new severe headache - symptoms seem most consistent with migraine but she denies prior history of migraine and given this was worst headache of life, will check imaging to evaluate for secondary pathology  - MRI brain / MRA brain ordered without contrast. In the future, for when migraines, occur, recommend  take sumatriptan 50 mg (Imitrex) within first 30 minutes of symptoms starting; if not improved after 2 hours, take additional dose, and then stop taking; monitor for chest pain / tightness  1. Seizure disorder (HCC)  As noted above   - MRI BRAIN/MRA BRAIN  (CPT=70551/03173); Future     2. Migraine without aura and without status migrainosus, not intractable  As noted above   - SUMAtriptan 50 MG Oral Tab; Use at onset; repeat once after 2 hours-ONLY 2 IN 24 HR MAX. This is a 30 day supply.  Dispense: 9 tablet; Refill: 0     3. Worst headache of life  As noted above   - MRI BRAIN/MRA BRAIN  (CPT=70551/76558); Future     Return in about 3 months (around 10/29/2024).

## 2024-09-01 LAB
LAMOTRIGINE: 4.7 MCG/ML (ref 2.5–15)
LEVETIRACETAM: 41 MCG/ML

## 2024-09-05 ENCOUNTER — HOSPITAL ENCOUNTER (EMERGENCY)
Facility: HOSPITAL | Age: 29
Discharge: HOME | End: 2024-09-05
Attending: EMERGENCY MEDICINE
Payer: MEDICAID

## 2024-09-05 ENCOUNTER — TELEPHONE (OUTPATIENT)
Dept: OBGYN CLINIC | Facility: CLINIC | Age: 29
End: 2024-09-05

## 2024-09-05 VITALS
SYSTOLIC BLOOD PRESSURE: 129 MMHG | OXYGEN SATURATION: 100 % | DIASTOLIC BLOOD PRESSURE: 79 MMHG | WEIGHT: 174 LBS | RESPIRATION RATE: 16 BRPM | BODY MASS INDEX: 34.16 KG/M2 | HEIGHT: 60 IN | TEMPERATURE: 98 F | HEART RATE: 71 BPM

## 2024-09-05 DIAGNOSIS — O21.0 HYPEREMESIS GRAVIDARUM (HCC): Primary | ICD-10-CM

## 2024-09-05 LAB
ALBUMIN SERPL-MCNC: 5 G/DL (ref 3.2–4.8)
ALBUMIN/GLOB SERPL: 1.6 {RATIO} (ref 1–2)
ALP LIVER SERPL-CCNC: 67 U/L
ALT SERPL-CCNC: 37 U/L
ANION GAP SERPL CALC-SCNC: 11 MMOL/L (ref 0–18)
AST SERPL-CCNC: 24 U/L (ref ?–34)
B-HCG SERPL-ACNC: ABNORMAL MIU/ML
BASOPHILS # BLD AUTO: 0.06 X10(3) UL (ref 0–0.2)
BASOPHILS NFR BLD AUTO: 0.5 %
BILIRUB SERPL-MCNC: 0.7 MG/DL (ref 0.3–1.2)
BILIRUB UR QL STRIP.AUTO: NEGATIVE
BUN BLD-MCNC: 10 MG/DL (ref 9–23)
CALCIUM BLD-MCNC: 10.2 MG/DL (ref 8.7–10.4)
CHLORIDE SERPL-SCNC: 104 MMOL/L (ref 98–112)
CO2 SERPL-SCNC: 22 MMOL/L (ref 21–32)
COLOR UR AUTO: YELLOW
CREAT BLD-MCNC: 0.73 MG/DL
EGFRCR SERPLBLD CKD-EPI 2021: 116 ML/MIN/1.73M2 (ref 60–?)
EOSINOPHIL # BLD AUTO: 0.17 X10(3) UL (ref 0–0.7)
EOSINOPHIL NFR BLD AUTO: 1.5 %
ERYTHROCYTE [DISTWIDTH] IN BLOOD BY AUTOMATED COUNT: 15.9 %
GLOBULIN PLAS-MCNC: 3.2 G/DL (ref 2–3.5)
GLUCOSE BLD-MCNC: 84 MG/DL (ref 70–99)
GLUCOSE UR STRIP.AUTO-MCNC: NORMAL MG/DL
HCT VFR BLD AUTO: 41.7 %
HGB BLD-MCNC: 13.7 G/DL
IMM GRANULOCYTES # BLD AUTO: 0.02 X10(3) UL (ref 0–1)
IMM GRANULOCYTES NFR BLD: 0.2 %
KETONES UR STRIP.AUTO-MCNC: >150 MG/DL
LEUKOCYTE ESTERASE UR QL STRIP.AUTO: 75
LYMPHOCYTES # BLD AUTO: 2.96 X10(3) UL (ref 1–4)
LYMPHOCYTES NFR BLD AUTO: 26.9 %
MCH RBC QN AUTO: 27.6 PG (ref 26–34)
MCHC RBC AUTO-ENTMCNC: 32.9 G/DL (ref 31–37)
MCV RBC AUTO: 84.1 FL
MONOCYTES # BLD AUTO: 0.89 X10(3) UL (ref 0.1–1)
MONOCYTES NFR BLD AUTO: 8.1 %
NEUTROPHILS # BLD AUTO: 6.92 X10 (3) UL (ref 1.5–7.7)
NEUTROPHILS # BLD AUTO: 6.92 X10(3) UL (ref 1.5–7.7)
NEUTROPHILS NFR BLD AUTO: 62.8 %
NITRITE UR QL STRIP.AUTO: NEGATIVE
OSMOLALITY SERPL CALC.SUM OF ELEC: 282 MOSM/KG (ref 275–295)
PH UR STRIP.AUTO: 6 [PH] (ref 5–8)
PLATELET # BLD AUTO: 337 10(3)UL (ref 150–450)
POTASSIUM SERPL-SCNC: 3.7 MMOL/L (ref 3.5–5.1)
PROT SERPL-MCNC: 8.2 G/DL (ref 5.7–8.2)
PROT UR STRIP.AUTO-MCNC: 50 MG/DL
RBC # BLD AUTO: 4.96 X10(6)UL
RBC UR QL AUTO: NEGATIVE
SODIUM SERPL-SCNC: 137 MMOL/L (ref 136–145)
SP GR UR STRIP.AUTO: >1.03 (ref 1–1.03)
UROBILINOGEN UR STRIP.AUTO-MCNC: 2 MG/DL
WBC # BLD AUTO: 11 X10(3) UL (ref 4–11)

## 2024-09-05 PROCEDURE — 80053 COMPREHEN METABOLIC PANEL: CPT | Performed by: EMERGENCY MEDICINE

## 2024-09-05 PROCEDURE — 96374 THER/PROPH/DIAG INJ IV PUSH: CPT

## 2024-09-05 PROCEDURE — 84702 CHORIONIC GONADOTROPIN TEST: CPT

## 2024-09-05 PROCEDURE — 96361 HYDRATE IV INFUSION ADD-ON: CPT

## 2024-09-05 PROCEDURE — 84702 CHORIONIC GONADOTROPIN TEST: CPT | Performed by: EMERGENCY MEDICINE

## 2024-09-05 PROCEDURE — 99284 EMERGENCY DEPT VISIT MOD MDM: CPT

## 2024-09-05 PROCEDURE — 85025 COMPLETE CBC W/AUTO DIFF WBC: CPT

## 2024-09-05 PROCEDURE — 80053 COMPREHEN METABOLIC PANEL: CPT

## 2024-09-05 PROCEDURE — 87086 URINE CULTURE/COLONY COUNT: CPT | Performed by: EMERGENCY MEDICINE

## 2024-09-05 PROCEDURE — 81001 URINALYSIS AUTO W/SCOPE: CPT | Performed by: EMERGENCY MEDICINE

## 2024-09-05 PROCEDURE — 85025 COMPLETE CBC W/AUTO DIFF WBC: CPT | Performed by: EMERGENCY MEDICINE

## 2024-09-05 RX ORDER — ONDANSETRON 2 MG/ML
4 INJECTION INTRAMUSCULAR; INTRAVENOUS ONCE
Status: COMPLETED | OUTPATIENT
Start: 2024-09-05 | End: 2024-09-05

## 2024-09-05 RX ORDER — DOXYLAMINE SUCCINATE AND PYRIDOXINE HYDROCHLORIDE, DELAYED RELEASE TABLETS 10 MG/10 MG 10; 10 MG/1; MG/1
2 TABLET, DELAYED RELEASE ORAL NIGHTLY
Qty: 120 TABLET | Refills: 0 | Status: SHIPPED | OUTPATIENT
Start: 2024-09-05

## 2024-09-05 NOTE — ED QUICK NOTES
Pt reports she is feeling better. Pt appears comfortable on cart. 1st liter of saline infusing. Report given to BRIGITTE Damon.

## 2024-09-05 NOTE — TELEPHONE ENCOUNTER
Delayed due to inpatient duties. Returned patient's page. Patient reports nausea and emesis. Unable to tolerate PO. Zofran at home not working. Patient advised to report to ED for further evaluation and management. Patient agreeable. All questions and concerns were addressed.     Roseline Nogueira MD   [facility] - OBGYN

## 2024-09-05 NOTE — ED INITIAL ASSESSMENT (HPI)
Not able to tolerate PO at home. Patient is approximately 8 weeks pregnant. Has first Ob appointment on . Taking Zofran without relief. Patient had terrible hyperemesis with last pregnancy. . Ob=[facility]. No vaginal bleeding or cramping.

## 2024-09-06 NOTE — ED PROVIDER NOTES
Patient Seen in: [facility]      History     Chief Complaint   Patient presents with    Vomiting     Stated Complaint: Hyperemesis, approximately 8 weeks pregnant    Subjective:   HPI    29-year-old woman approximately 8 weeks pregnant here for evaluation of multiple episodes nonbloody nonbilious vomiting over the past few days.  States had hyperemesis gravidarum with previous pregnancies states this feels similar.  Denies any focal abdominal pain any pelvic pain any vaginal bleeding loss of fluid, fevers any urinary symptoms any other complaints or concerns.  Has been taking tristan chews at home, mint, without any improvement in her symptoms.    Objective:   Past Medical History:    Anemia    during pregnancy    Anemia affecting pregnancy in third trimester ([facility])    Cervical laceration    COVID-19    Disorder of musculoskeletal system during pregnancy ([facility])    Epilepsy ([facility])    1st grand mal seizure at 17    Female infertility    History of hyperemesis gravidarum    Hx of transfusion of packed red blood cells    Received 2 units packed red blood cells for postpartum hemorrhage - atonic & cervical laceration    Hyperemesis gravidarum ([facility])    Was put on Zofran - didn't help much.     Hyperemesis gravidarum with dehydration ([facility])    Iron deficiency anemia during pregnancy, antepartum ([facility])    Consider IV iron if difficulty with tolerating PNV due to hyperemesis persists    Keloid scar    chest    Low maternal serum vitamin B12     (normal spontaneous vaginal delivery) ([facility])    Obesity (BMI 30-39.9)    Obesity affecting pregnancy in third trimester ([facility])    2021 - Passed early 1 hour glucose test at 17w6d. Plan re-screening at 26-28 wk      Other immediate postpartum hemorrhage ([facility])    Pap smear for cervical cancer screening    Pap negative.     Pregnancy related nausea, antepartum ([facility])    Pubic bone pain    Request for sterilization    2022 Discussed permanent/irreversible,  optional nature of sterilization. Patient expressed understanding. Medicaid consent form signed together.     Rubella non-immune status, antepartum (HCC)    MMR after delivery    Screening for genetic disease carrier status    Carrier Screen = Negative    Seizure disorder (HCC)    epilepsy    Seizure disorder during pregnancy in third trimester (HCC)    Trauma during pregnancy (HCC)    Uterine atony, postpartum, current hospitalization (HCC)    Vitamin D deficiency              Past Surgical History:   Procedure Laterality Date    Cholecystectomy  2018    D & c  01/17/2022    D&c after delivery  01/17/2022    Other surgical history  2012    benign keloid bx chest area                Social History     Socioeconomic History    Marital status:    Tobacco Use    Smoking status: Never     Passive exposure: Never    Smokeless tobacco: Never   Vaping Use    Vaping status: Never Used   Substance and Sexual Activity    Alcohol use: Not Currently    Drug use: Never    Sexual activity: Yes     Partners: Male   Other Topics Concern    Caffeine Concern No    Exercise Yes    Seat Belt Yes    Special Diet No    Stress Concern No    Weight Concern Yes     Social Determinants of Health     Food Insecurity: No Food Insecurity (11/21/2023)    Received from [facility], [facility]    Food Insecurity     Currently or in the past 3 months, have you worried your food would run out before you had money to buy more?: No     In the past 12 months, have you run out of food or been unable to get more?: No   Transportation Needs: No Transportation Needs (11/21/2023)    Received from [facility], [facility]    Transportation Needs     Medical Transportation Needs?: No    Received from [facility], [facility]    Housing Stability              Review of Systems    Positive for stated Chief Complaint: Vomiting    Other  systems are as noted in HPI.  Constitutional and vital signs reviewed.      All other systems reviewed and negative except as noted above.    Physical Exam     ED Triage Vitals   BP 09/05/24 1201 117/89   Pulse 09/05/24 1201 97   Resp 09/05/24 1201 18   Temp 09/05/24 1202 98.3 °F (36.8 °C)   Temp src 09/05/24 1202 Oral   SpO2 09/05/24 1201 96 %   O2 Device 09/05/24 1201 None (Room air)       Current Vitals:   Vital Signs  BP: 129/79  Pulse: 71  Resp: 16  Temp: 98.3 °F (36.8 °C)  Temp src: Oral  MAP (mmHg): 94    Oxygen Therapy  SpO2: 100 %  O2 Device: None (Room air)            Physical Exam        Physical Exam  Vitals signs and nursing note reviewed.   General:  Patient laying supine in the bed in no acute distress  Head: Normocephalic and atraumatic.   HEENT:  Mucous membranes are moist.   Cardiovascular:  Normal rate and regular rhythm.  No Edema  Pulmonary:  Pulmonary effort is normal.  Normal breath sounds. No wheezing, rhonchi or rales.   Abdominal: Soft nontender nondistended, normal bowel sounds, no guarding no rebound tenderness  Skin: Warm and dry  Neurological: Awake alert, speech is normal        ED Course     Labs Reviewed   COMP METABOLIC PANEL (14) - Abnormal; Notable for the following components:       Result Value    Albumin 5.0 (*)     All other components within normal limits   HCG, BETA SUBUNIT (QUANT PREGNANCY TEST) - Abnormal; Notable for the following components:    Hcg Quantitative 72,474.5 (*)     All other components within normal limits   URINALYSIS WITH CULTURE REFLEX - Abnormal; Notable for the following components:    Clarity Urine Turbid (*)     Spec Gravity >1.030 (*)     Ketones Urine >150 (*)     Protein Urine 50 (*)     Urobilinogen Urine 2 (*)     Leukocyte Esterase Urine 75 (*)     RBC Urine 3-5 (*)     Squamous Epi. Cells Moderate (*)     All other components within normal limits   CBC WITH DIFFERENTIAL WITH PLATELET   URINE CULTURE, ROUTINE                    MDM       29-year-old woman here for evaluation of vomiting.  Differential includes hyperemesis gravidarum, bowel obstruction, electrolyte abnormality.  Patient feeling much better after 2 L of IV fluids antiemetics is now tolerating p.o. has no focal abdominal tenderness on exam no pelvic discomfort.  Urinalysis with some leuk esterase, however not a clean sample urine culture pending patient has no urinary symptoms.  Will be discharged home with prescription for Diclegis she does have Zofran at home will follow-up with OB return precautions discussed she is in agreement with plan                                   Medical Decision Making      Disposition and Plan     Clinical Impression:  1. Hyperemesis gravidarum (HCC)         Disposition:  Discharge  9/5/2024  4:31 pm    Follow-up:  Roseline Nogueira MD  [address] [place] [address]   [address] [address] [phone]    Schedule an appointment as soon as possible for a visit in 1 day(s)  Return to ER immediately if symptoms worsen or change or any other new concerns          Medications Prescribed:  Discharge Medication List as of 9/5/2024  4:33 PM        START taking these medications    Details   doxylamine-pyridoxine 10-10 MG Oral Tab EC Take 2 tablets by mouth nightly., Normal, Disp-120 tablet, R-0

## 2024-09-12 PROBLEM — O99.013 ANEMIA AFFECTING PREGNANCY IN THIRD TRIMESTER (HCC): Status: RESOLVED | Noted: 2021-11-28 | Resolved: 2024-09-12

## 2024-09-12 PROBLEM — G40.909 EPILEPSY AFFECTING PREGNANCY IN FIRST TRIMESTER (HCC): Status: ACTIVE | Noted: 2024-09-12

## 2024-09-12 PROBLEM — O09.291 HISTORY OF POSTPARTUM HEMORRHAGE, CURRENTLY PREGNANT IN FIRST TRIMESTER (HCC): Status: ACTIVE | Noted: 2024-09-12

## 2024-09-12 PROBLEM — O99.351 EPILEPSY AFFECTING PREGNANCY IN FIRST TRIMESTER (HCC): Status: ACTIVE | Noted: 2024-09-12

## 2024-09-12 PROBLEM — O99.210 OBESITY COMPLICATING PREGNANCY (HCC): Status: ACTIVE | Noted: 2024-09-12

## 2024-09-12 PROBLEM — O21.0 HYPEREMESIS GRAVIDARUM (HCC): Status: ACTIVE | Noted: 2024-09-12

## 2024-09-13 ENCOUNTER — ULTRASOUND ENCOUNTER (OUTPATIENT)
Facility: CLINIC | Age: 29
End: 2024-09-13
Payer: MEDICAID

## 2024-09-13 ENCOUNTER — INITIAL PRENATAL (OUTPATIENT)
Facility: CLINIC | Age: 29
End: 2024-09-13
Payer: MEDICAID

## 2024-09-13 ENCOUNTER — TELEPHONE (OUTPATIENT)
Facility: CLINIC | Age: 29
End: 2024-09-13

## 2024-09-13 ENCOUNTER — HOSPITAL ENCOUNTER (EMERGENCY)
Facility: HOSPITAL | Age: 29
Discharge: HOME | End: 2024-09-13
Attending: STUDENT IN AN ORGANIZED HEALTH CARE EDUCATION/TRAINING PROGRAM
Payer: MEDICAID

## 2024-09-13 VITALS
SYSTOLIC BLOOD PRESSURE: 122 MMHG | BODY MASS INDEX: 32.2 KG/M2 | RESPIRATION RATE: 18 BRPM | OXYGEN SATURATION: 100 % | DIASTOLIC BLOOD PRESSURE: 69 MMHG | HEART RATE: 70 BPM | TEMPERATURE: 98 F | HEIGHT: 60 IN | WEIGHT: 164 LBS

## 2024-09-13 VITALS
BODY MASS INDEX: 32.2 KG/M2 | HEART RATE: 68 BPM | WEIGHT: 164 LBS | DIASTOLIC BLOOD PRESSURE: 80 MMHG | HEIGHT: 60 IN | SYSTOLIC BLOOD PRESSURE: 104 MMHG

## 2024-09-13 DIAGNOSIS — Z30.2 REQUEST FOR STERILIZATION: ICD-10-CM

## 2024-09-13 DIAGNOSIS — E66.09 OTHER OBESITY DUE TO EXCESS CALORIES AFFECTING PREGNANCY IN FIRST TRIMESTER (HCC): Primary | ICD-10-CM

## 2024-09-13 DIAGNOSIS — O21.0 HYPEREMESIS GRAVIDARUM (HCC): ICD-10-CM

## 2024-09-13 DIAGNOSIS — Z36.89 ENCOUNTER FOR FETAL ANATOMIC SURVEY (HCC): ICD-10-CM

## 2024-09-13 DIAGNOSIS — G40.909 EPILEPSY AFFECTING PREGNANCY IN FIRST TRIMESTER (HCC): ICD-10-CM

## 2024-09-13 DIAGNOSIS — Z34.81 PRENATAL CARE, SUBSEQUENT PREGNANCY, FIRST TRIMESTER (HCC): ICD-10-CM

## 2024-09-13 DIAGNOSIS — O09.291: ICD-10-CM

## 2024-09-13 DIAGNOSIS — O99.211 OTHER OBESITY DUE TO EXCESS CALORIES AFFECTING PREGNANCY IN FIRST TRIMESTER (HCC): Primary | ICD-10-CM

## 2024-09-13 DIAGNOSIS — Z86.39 HISTORY OF NON ANEMIC VITAMIN B12 DEFICIENCY: ICD-10-CM

## 2024-09-13 DIAGNOSIS — Z34.91: ICD-10-CM

## 2024-09-13 DIAGNOSIS — Z36.82 ENCOUNTER FOR (NT) NUCHAL TRANSLUCENCY SCAN (HCC): ICD-10-CM

## 2024-09-13 DIAGNOSIS — Z36.0 ENCOUNTER FOR ANTENATAL SCREENING FOR CHROMOSOMAL ANOMALIES (HCC): ICD-10-CM

## 2024-09-13 DIAGNOSIS — O99.351 EPILEPSY AFFECTING PREGNANCY IN FIRST TRIMESTER (HCC): ICD-10-CM

## 2024-09-13 DIAGNOSIS — O21.0 HYPEREMESIS GRAVIDARUM (HCC): Primary | ICD-10-CM

## 2024-09-13 DIAGNOSIS — Z86.39 HISTORY OF IRON DEFICIENCY: ICD-10-CM

## 2024-09-13 LAB
ALBUMIN SERPL-MCNC: 5.3 G/DL (ref 3.2–4.8)
ALBUMIN/GLOB SERPL: 1.4 {RATIO} (ref 1–2)
ALP LIVER SERPL-CCNC: 75 U/L
ALT SERPL-CCNC: 29 U/L
ANION GAP SERPL CALC-SCNC: 9 MMOL/L (ref 0–18)
AST SERPL-CCNC: 19 U/L (ref ?–34)
BASOPHILS # BLD AUTO: 0.06 X10(3) UL (ref 0–0.2)
BASOPHILS NFR BLD AUTO: 0.4 %
BILIRUB SERPL-MCNC: 0.7 MG/DL (ref 0.3–1.2)
BILIRUB UR QL CFM: NEGATIVE
BILIRUB UR QL CFM: NEGATIVE
BUN BLD-MCNC: 11 MG/DL (ref 9–23)
CALCIUM BLD-MCNC: 10.7 MG/DL (ref 8.7–10.4)
CHLORIDE SERPL-SCNC: 104 MMOL/L (ref 98–112)
CLARITY UR REFRACT.AUTO: CLEAR
CLARITY UR REFRACT.AUTO: CLEAR
CO2 SERPL-SCNC: 26 MMOL/L (ref 21–32)
COLOR UR AUTO: YELLOW
COLOR UR AUTO: YELLOW
CREAT BLD-MCNC: 0.73 MG/DL
CREAT UR-SCNC: 373.4 MG/DL
EGFRCR SERPLBLD CKD-EPI 2021: 116 ML/MIN/1.73M2 (ref 60–?)
EOSINOPHIL # BLD AUTO: 0.09 X10(3) UL (ref 0–0.7)
EOSINOPHIL NFR BLD AUTO: 0.6 %
ERYTHROCYTE [DISTWIDTH] IN BLOOD BY AUTOMATED COUNT: 15.6 %
GLOBULIN PLAS-MCNC: 3.9 G/DL (ref 2–3.5)
GLUCOSE (URINE DIPSTICK): NEGATIVE MG/DL
GLUCOSE BLD-MCNC: 97 MG/DL (ref 70–99)
GLUCOSE UR STRIP.AUTO-MCNC: 30 MG/DL
GLUCOSE UR STRIP.AUTO-MCNC: NORMAL MG/DL
HCT VFR BLD AUTO: 45.1 %
HGB BLD-MCNC: 15 G/DL
IMM GRANULOCYTES # BLD AUTO: 0.04 X10(3) UL (ref 0–1)
IMM GRANULOCYTES NFR BLD: 0.3 %
KETONES (URINE DIPSTICK): >=160 MG/DL
KETONES UR STRIP.AUTO-MCNC: >150 MG/DL
KETONES UR STRIP.AUTO-MCNC: >150 MG/DL
LEUKOCYTE ESTERASE UR QL STRIP.AUTO: 75
LEUKOCYTE ESTERASE UR QL STRIP.AUTO: NEGATIVE
LEUKOCYTES: NEGATIVE
LYMPHOCYTES # BLD AUTO: 2.52 X10(3) UL (ref 1–4)
LYMPHOCYTES NFR BLD AUTO: 16.8 %
MCH RBC QN AUTO: 27.7 PG (ref 26–34)
MCHC RBC AUTO-ENTMCNC: 33.3 G/DL (ref 31–37)
MCV RBC AUTO: 83.4 FL
MONOCYTES # BLD AUTO: 0.78 X10(3) UL (ref 0.1–1)
MONOCYTES NFR BLD AUTO: 5.2 %
MULTISTIX LOT#: ABNORMAL NUMERIC
NEUTROPHILS # BLD AUTO: 11.52 X10 (3) UL (ref 1.5–7.7)
NEUTROPHILS # BLD AUTO: 11.52 X10(3) UL (ref 1.5–7.7)
NEUTROPHILS NFR BLD AUTO: 76.7 %
NITRITE UR QL STRIP.AUTO: NEGATIVE
NITRITE UR QL STRIP.AUTO: NEGATIVE
NITRITE, URINE: NEGATIVE
OCCULT BLOOD: NEGATIVE
OSMOLALITY SERPL CALC.SUM OF ELEC: 287 MOSM/KG (ref 275–295)
PH UR STRIP.AUTO: 6 [PH] (ref 5–8)
PH UR STRIP.AUTO: 6 [PH] (ref 5–8)
PH, URINE: 6 (ref 4.5–8)
PLATELET # BLD AUTO: 374 10(3)UL (ref 150–450)
POTASSIUM SERPL-SCNC: 4.2 MMOL/L (ref 3.5–5.1)
PROT SERPL-MCNC: 9.2 G/DL (ref 5.7–8.2)
PROT UR STRIP.AUTO-MCNC: 100 MG/DL
PROT UR STRIP.AUTO-MCNC: 70 MG/DL
PROT UR-MCNC: 42.6 MG/DL (ref ?–14)
PROTEIN (URINE DIPSTICK): 100 MG/DL
RBC # BLD AUTO: 5.41 X10(6)UL
RBC UR QL AUTO: NEGATIVE
RBC UR QL AUTO: NEGATIVE
SODIUM SERPL-SCNC: 139 MMOL/L (ref 136–145)
SP GR UR STRIP.AUTO: >1.03 (ref 1–1.03)
SP GR UR STRIP.AUTO: >1.03 (ref 1–1.03)
SPECIFIC GRAVITY: >=1.03 (ref 1–1.03)
UROBILINOGEN UR STRIP.AUTO-MCNC: 4 MG/DL
UROBILINOGEN UR STRIP.AUTO-MCNC: 4 MG/DL
UROBILINOGEN,SEMI-QN: 1 MG/DL (ref 0–1.9)
WBC # BLD AUTO: 15 X10(3) UL (ref 4–11)

## 2024-09-13 PROCEDURE — 87086 URINE CULTURE/COLONY COUNT: CPT | Performed by: STUDENT IN AN ORGANIZED HEALTH CARE EDUCATION/TRAINING PROGRAM

## 2024-09-13 PROCEDURE — 82570 ASSAY OF URINE CREATININE: CPT | Performed by: OBSTETRICS & GYNECOLOGY

## 2024-09-13 PROCEDURE — 87591 N.GONORRHOEAE DNA AMP PROB: CPT | Performed by: OBSTETRICS & GYNECOLOGY

## 2024-09-13 PROCEDURE — 96374 THER/PROPH/DIAG INJ IV PUSH: CPT

## 2024-09-13 PROCEDURE — 85025 COMPLETE CBC W/AUTO DIFF WBC: CPT

## 2024-09-13 PROCEDURE — 0500F INITIAL PRENATAL CARE VISIT: CPT | Performed by: OBSTETRICS & GYNECOLOGY

## 2024-09-13 PROCEDURE — 76801 OB US < 14 WKS SINGLE FETUS: CPT | Performed by: OBSTETRICS & GYNECOLOGY

## 2024-09-13 PROCEDURE — 96361 HYDRATE IV INFUSION ADD-ON: CPT

## 2024-09-13 PROCEDURE — 99284 EMERGENCY DEPT VISIT MOD MDM: CPT

## 2024-09-13 PROCEDURE — 87086 URINE CULTURE/COLONY COUNT: CPT | Performed by: OBSTETRICS & GYNECOLOGY

## 2024-09-13 PROCEDURE — 80053 COMPREHEN METABOLIC PANEL: CPT | Performed by: STUDENT IN AN ORGANIZED HEALTH CARE EDUCATION/TRAINING PROGRAM

## 2024-09-13 PROCEDURE — 80053 COMPREHEN METABOLIC PANEL: CPT

## 2024-09-13 PROCEDURE — 84156 ASSAY OF PROTEIN URINE: CPT | Performed by: OBSTETRICS & GYNECOLOGY

## 2024-09-13 PROCEDURE — 81001 URINALYSIS AUTO W/SCOPE: CPT | Performed by: OBSTETRICS & GYNECOLOGY

## 2024-09-13 PROCEDURE — 81001 URINALYSIS AUTO W/SCOPE: CPT | Performed by: STUDENT IN AN ORGANIZED HEALTH CARE EDUCATION/TRAINING PROGRAM

## 2024-09-13 PROCEDURE — 81002 URINALYSIS NONAUTO W/O SCOPE: CPT | Performed by: OBSTETRICS & GYNECOLOGY

## 2024-09-13 PROCEDURE — 81001 URINALYSIS AUTO W/SCOPE: CPT

## 2024-09-13 PROCEDURE — 87491 CHLMYD TRACH DNA AMP PROBE: CPT | Performed by: OBSTETRICS & GYNECOLOGY

## 2024-09-13 PROCEDURE — 85025 COMPLETE CBC W/AUTO DIFF WBC: CPT | Performed by: STUDENT IN AN ORGANIZED HEALTH CARE EDUCATION/TRAINING PROGRAM

## 2024-09-13 RX ORDER — FAMOTIDINE 20 MG/1
20 TABLET, FILM COATED ORAL 2 TIMES DAILY
Qty: 60 TABLET | Refills: 5 | Status: SHIPPED | OUTPATIENT
Start: 2024-09-13

## 2024-09-13 RX ORDER — PROMETHAZINE HYDROCHLORIDE 25 MG/1
25 TABLET ORAL EVERY 6 HOURS PRN
Qty: 60 TABLET | Refills: 5 | Status: SHIPPED | OUTPATIENT
Start: 2024-09-13

## 2024-09-13 RX ORDER — ONDANSETRON 2 MG/ML
4 INJECTION INTRAMUSCULAR; INTRAVENOUS ONCE
Status: COMPLETED | OUTPATIENT
Start: 2024-09-13 | End: 2024-09-13

## 2024-09-13 RX ORDER — PROMETHAZINE HYDROCHLORIDE 25 MG/1
25 SUPPOSITORY RECTAL EVERY 6 HOURS PRN
Qty: 30 SUPPOSITORY | Refills: 5 | Status: SHIPPED | OUTPATIENT
Start: 2024-09-13

## 2024-09-13 RX ORDER — FOLIC ACID 1 MG/1
4 TABLET ORAL DAILY
Qty: 360 TABLET | Refills: 5 | Status: SHIPPED | OUTPATIENT
Start: 2024-09-13 | End: 2024-12-12

## 2024-09-13 NOTE — ED INITIAL ASSESSMENT (HPI)
Pt is 8 weeks pregnant,   Pt states that she was sent in by her ob gyn d/t abnormal results- ketones in urine and hyperemesis - reports vomiting every day x 2 weeks. Denies any pain or cramping. Denies vaginal bleeding.   Pt was seen in the ER a couple of weeks ago for similar symptoms - reports no improvement.

## 2024-09-13 NOTE — PROGRESS NOTES
[facility]  Obstetrics and Gynecology  History & Physical  Est    CC: Establish prenatal care     Chief Complaint   Patient presents with    Prenatal Care     FOB, LMP: 7/10/24, WAI: 25, GA: 9w2d, US performed today, confirmed viability. Per US, WAI is 25, GA is 8wks.     Wellness Visit     Last pap smear was 9/15/22, negative, no WWE since then, doesn't want to do a full annual, won't be able to sit through it, will be able to sit through a pelvic exam     Ob Problem     Discuss hyperemesis         Subjective:     HPI:  Dotty Gross is a 29 year old  female at 8w0d  who presents today to establish prenatal care.  Chaperone declined    24 Problem visit Tiarra Anderson APRN - discuss history of hyperemesis gravidarum. Patient's last menstrual period was 07/10/2024 (exact date).  She has a history of seizure disorder and her neurologist was notified she is pregnant. Rx Zofran     24 ED visit for nausea & vomiting. \"Has been taking tristan chews at home, mint, without any improvement in her symptoms.\" IV fluids. IV Zofran. Rx Diclegis. Zofran - at first was helping but stopped working.       Patient's last menstrual period was 07/10/2024 (exact date).   LMP - uncertain  Was taking OCP   Pregnancy was UNplanned.     Plan to keep/continue pregnancy? Y. Was thinking about termination last evening but doesn't think she can do it.    Folic acid prior to conceiving? N   Currently taking PNV containing iron? PNV is not staying down.     Nausea is better this time but still bad  Gagging & vomiting too many times to count.   Zofran - at first was helping but stopped working.   Started getting worse over the last few days  Diclegis - does help.   Able to keep crackers & seizure meds down  Extremely thirsty. Feels like \"cotton mouth\"    Symptoms this pregnancy:   Vaginal bleeding during pregnancy? Last Sat,  & then tapered off   Pelvic cramping/pain? No cramping.      Constipation? Y    Dysuria? N. Maybe 3-4 voids per day   Headaches? N   Mood - not great.     Partner/Father of the baby - Giorgio ()   -Medical conditions? N  -FHx of genetic diseases or birth defects? N     History of   -GDM? N  -GHTN/Pre-eclampsia? N   Fhx pre-eclampsia? N   - labor? N   -shoulder dystocia? N  -3rd of 4th degree laceration? N  -postpartum hemorrhage?  - postpartum hemorrhage (atony & cervical laceration) with transfusion ()  -Blood transfusion?      -Would accept blood transfusion? Y   -VTE? N  -Genital herpes? N   -other complications? Hyperemesis gravidarum, seizures during pregnancies, IV iron     Review of Systems: negative except per HPI     Patient Care Team:  Pcp, None as PCP - Lora Parham DO as Obstetrician (OBSTETRICS & GYNECOLOGY)  Yogi Alfonso MD (NEUROLOGY)  Taryn Florence MD as Obstetrician (OBSTETRICS & GYNECOLOGY)  Demetrice Rapp APRN (Nurse Practitioner Family)     OB:  OB History    Para Term  AB Living   3 2 2   0 2   SAB IAB Ectopic Multiple Live Births   0       2      # Outcome Date GA Lbr Hunter/2nd Weight Sex Type Anes PTL Lv   3 Current            2 Term 22 39w2d 07:00 / 00:25 7 lb 5.5 oz (3.33 kg) M NORMAL SPONT EPI N JAMES   1 Term 17 42w0d   F Vag-Spont   JAMES      Birth Comments: Hyperemesis       Obstetric Comments   2017 Hyperemesis gravidarum. Epilepsy. Had seizure during second trimester      2022 - \"Noah\" - Obesity, Hyperemesis gravidarum. Epilepsy. Several seizures at around 15-16 wk gestation. Declined aspirin.  IV iron x 3 doses (21 - 22) during pregnancy (not able to tolerate PO iron). Meconium, Variable Decels. 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        GYN:   3/1/22 Copper IUD inserted at postpartum visit  2022 - irregular menses. Copper IUD in place.  Started to get very heavy periods.   Copper IUD removed early    OCP - had been taking it for 4  months or so.      STD N  Abn pap? N  Pap neg 9/15/22 - up to date. Due 9/2025     PMH:   Past Medical History:   Diagnosis Date    Cervical laceration 01/18/2022    exam under anesthesia, repair of cervical laceration, evacuation of blood clots from uterus manually - Dr. Lora Benjamin, [facility].    COVID-19 2020    Depression 05/12/2022    Epilepsy ([facility]) 2012    1st grand mal seizure at 17    Female infertility 04/08/2021    Hx of transfusion of packed red blood cells 01/18/2022    Received 2 units packed red blood cells for postpartum hemorrhage - atonic & cervical laceration    Hyperemesis gravidarum ([facility]) 2017    Was put on Zofran - didn't help much.     Hyperemesis gravidarum ([facility]) 2021    Iron deficiency anemia during pregnancy, antepartum ([facility]) 08/12/2021    h/o IV iron x 3 doses (12/22/21 - 12/29/22) during pregnancy (not able to tolerate PO iron)    Keloid scar     chest    Low maternal serum vitamin B12 11/28/2021    Migraine without aura     Imitrex per neurologist    Obesity (BMI 30-39.9)     Other immediate postpartum hemorrhage ([facility]) 2022    exam under anesthesia, repair of cervical laceration, evacuation of blood clots from uterus manually - Dr. Lora Benjamin, [facility].    Pap smear for cervical cancer screening 09/15/2022    Pap neg 9/15/22    Pneumonia of both lower lobes 03/19/2024    Pubic bone pain 11/28/2021    Request for sterilization 01/12/2022 1/12/2022 Discussed permanent/irreversible, optional nature of sterilization. Patient expressed understanding. Medicaid consent form signed together.     Screening for genetic disease carrier status 09/10/2021    Carrier Screen = Negative    Trauma during pregnancy ([facility]) 12/13/2021    Vitamin D deficiency     Worst headache of life 07/23/2024 7/29/24 Neuro visit - MRI/MRA brain ordered due to \"worst headache of her life\" on 7/23/24 - steroids helped.        PSH:    Past Surgical History:   Procedure Laterality Date     Cholecystectomy  2018    Insert intrauterine device  03/01/2022    Copper IUD insertion at postpartum visit - Dr. Lora Gibson    Other surgical history  2012    benign keloid bx chest area    Other surgical history  01/17/2022    exam under anesthesia, repair of cervical laceration, evacuation of blood clots from uterus manually - Dr. Lora Benjamin, [facility].       MEDS:  Current Outpatient Medications on File Prior to Visit   Medication Sig Dispense Refill    doxylamine-pyridoxine 10-10 MG Oral Tab EC Take 2 tablets by mouth nightly. 120 tablet 0    SUMAtriptan 50 MG Oral Tab USE AT ONSET OF MIGRAINE. REPEAT ONCE AFTER 2 HOURS. MAX 2 IN 24 HOURS. MUST LAST 30 DAYS. 9 tablet 5    ondansetron (ZOFRAN) 4 mg tablet Take 1 tablet (4 mg total) by mouth every 8 (eight) hours as needed for Nausea. 30 tablet 0    lamoTRIgine 100 MG Oral Tab Take 1 tablet (100 mg total) by mouth 2 (two) times daily. 180 tablet 3    levetiracetam 1000 MG Oral Tab Take 1 tablet (1,000 mg total) by mouth 2 (two) times daily. 180 tablet 3    albuterol 108 (90 Base) MCG/ACT Inhalation Aero Soln Inhale 1 puff into the lungs every 6 (six) hours as needed. (Patient not taking: Reported on 8/21/2024)       No current facility-administered medications on file prior to visit.       Allergies:    Allergies   Allergen Reactions    Shellfish-Derived Products SWELLING       Immunizations:  Immunization History   Administered Date(s) Administered    Covid-19 Vaccine Pfizer 30 mcg/0.3 ml 03/26/2021, 04/16/2021    FLULAVAL 6 months & older 0.5 ml Prefilled syringe (98793) 10/05/2021    FLUZONE 6 months and older PFS 0.5 ml (36940) 10/05/2021    Hpv Virus Vaccine 9 Daisha Im 08/16/2023, 11/21/2023    MMR 01/18/2022    TDAP 11/02/2021   Pended Date(s) Pended    Hpv Virus Vaccine 9 Daisha Im 04/08/2021       Family Hx:   Family History   Problem Relation Age of Onset    Obesity Mother     No Known Problems Father     No Known Problems Sister     No Known  Problems Sister     No Known Problems Sister     Diabetes Maternal Grandmother     No Known Problems Maternal Grandfather     No Known Problems Paternal Grandmother     No Known Problems Paternal Grandfather     Other (Other) Daughter         severe GI sensitivities to multiple foods - no clear diagnosis    Diabetes Maternal Aunt     Birth Defects Neg     Genetic Disease Neg     Thyroid disease Neg     Breast Cancer Neg     Ovarian Cancer Neg     Colon Cancer Neg     DVT/VTE Neg        SocialHx:    Social History     Socioeconomic History    Marital status:    Tobacco Use    Smoking status: Never     Passive exposure: Never    Smokeless tobacco: Never   Vaping Use    Vaping status: Never Used   Substance and Sexual Activity    Alcohol use: Not Currently    Drug use: Never    Sexual activity: Yes     Partners: Male   Other Topics Concern    Caffeine Concern No    Exercise Yes    Seat Belt Yes    Special Diet No    Stress Concern No    Weight Concern Yes     Social Determinants of Health     Financial Resource Strain: High Risk (9/12/2024)    Financial Resource Strain     Difficulty of Paying Living Expenses: Not very hard     Med Affordability: Yes   Food Insecurity: No Food Insecurity (9/12/2024)    Food Insecurity     Food Insecurity: Never true   Transportation Needs: No Transportation Needs (9/12/2024)    Transportation Needs     Lack of Transportation: No   Stress: No Stress Concern Present (9/12/2024)    Stress     Feeling of Stress : No   Housing Stability: Low Risk  (9/12/2024)    Housing Stability     Housing Instability: No       Objective:   /80   Pulse 68   Ht 60\"   Wt 164 lb (74.4 kg)   LMP 07/10/2024 (Exact Date)   BMI 32.03 kg/m²   Estimated body mass index is 32.03 kg/m² as calculated from the following:    Height as of this encounter: 60\".    Weight as of this encounter: 164 lb (74.4 kg).    PE:  Gen: alert & oriented, gagging frequently & spitting into emesis bag   Head/Neck: neck  supple, thyroid non-enlarged, symmetric and without nodules  CV: RRR, no murmurs  Pulm: clear to auscultation bilaterally.   Breasts: patient declines   Abd: soft, non-tender, uterine fundus non-palpable, obese  Ext: non-tender, no edema  :  1) External Genitalia -  Normal appearing skin and hair distribution, no visible lesions.   2) Vagina:  mucosa appears pink, and well rugated.   3) Cervix:  smooth, with no visible lesions, erosions, or scars, os closed. No bleeding noted.   4) Uterus: Anteverted. Mobile. 9 week gestational size.   5) Adnexa/parametria:  Non tender adnexa. No masses.     Fetal Well Being Assessment:    9/13/2024  1st trimester OB ultrasound - transabdominal  Dating & viability, uncertain LMP    LMP 07/10/2024 WAI 04/16/2025 GA 9w2d  GA(AUA) 8w0d WAI(AUA) 04/25/2025    SINGLE VIABLE IUP SEEN  LMP= 07/10/2024 =9W2D(PT NOT SURE OF LMP)  CRL = 1.60 CM=8W0D  FHR =158 BPM  YOLK SAC SEEN  OVARIES normal   CERVIX CLOSED    Impression: Single live IUP measuring 8w0d not consistent with uncertain LMP. WAI 4/25/25 by today's ultrasound.     Depression Scale Total: 0 (9/13/2024 10:19 AM)    PHQ-2 not done in last 12 months! Please administer and refresh!  Last [place] Suicide Screening on 9/13/2024 was No Risk.       Based on the available data, low-dose aspirin is advised for women at high risk for preeclampsia. There is no consensus on the criteria that confer high risk. The [place] Preventive Services Task Force (USPSTF) risk criteria are reasonable.  USPSTF criteria for high risk include one or more of the following:   Previous pregnancy with preeclampsia, especially early onset and with an adverse outcome   Multifetal gestation  Chronic hypertension   Type 1 or 2 diabetes mellitus  Chronic kidney disease  Autoimmune disease (antiphospholipid syndrome, systemic lupus erythematosus)     Women with multiple moderate risk factors for preeclampsia are considered high risk, as well. Identification  of women with an appropriate combination of moderate risk factors to be considered high risk is subjective and determined case by case, as the data describing the magnitude of the association between each of these risk factors and development of preeclampsia vary widely and lack consistency. USPSTF criteria for moderate risk include:  Nulliparity  Obesity (body mass index >30 kg/m2)  Family history of preeclampsia in mother or sister  Age >=35 years  Sociodemographic characteristics (, low socioeconomic level)  Personal risk factors (eg, history of low birth weight or small for gestational age, previous adverse pregnancy outcome, >10 year pregnancy interval)    Assessment/Plan:     Dotty Gross is a 29 year old  female at 8w0d   - establish prenatal care.    Diagnoses and all orders for this visit:    Other obesity due to excess calories affecting pregnancy in first trimester (HCC)  -     PRENATAL PROFILE 1; Future  -     Glucose Tolerance, 50 gm (1 hr), Gestational (ADA); Future  -     Comp Metabolic Panel (14); Future  -     HCV Antibody; Future  -     Hemoglobin A1C; Future  -     Hep B Core AB, Tot; Future  -     Hepatitis B Surface Antibody; Future  -     TSH W Reflex To Free T4; Future  -     Urinalysis, Routine; Future  -     Uric Acid; Future  -     Urine Culture, Routine; Future  -     PROTEIN AND CREATININE, RANDOM URINE; Future  -     Chlamydia/Gc Amplification; Future  -     OP REFERRAL TO  CONSULT  -     OP REFERRAL TO  CONSULT    Epilepsy affecting pregnancy in first trimester (HCC)  -     folic acid 1 MG Oral Tab; Take 4 tablets (4 mg total) by mouth daily.  -     OP REFERRAL TO  CONSULT  -     OP REFERRAL TO  CONSULT    Hyperemesis gravidarum (HCC)  -     OP REFERRAL TO  CONSULT  -     OP REFERRAL TO  CONSULT  -     promethazine 25 MG Rectal Suppos; Place 1 suppository (25 mg total) rectally every 6 (six) hours as needed for  Nausea.    History of postpartum hemorrhage, currently pregnant in first trimester ([facility])  -     OP REFERRAL TO  CONSULT  -     OP REFERRAL TO  CONSULT    Encounter for  screening for chromosomal anomalies ([facility])  -     Panorama Prenatal Test Full Panel; Future    Encounter for (NT) nuchal translucency scan ([facility])  -     OP REFERRAL TO  CONSULT    Encounter for fetal anatomic survey ([facility])  -     OP REFERRAL TO  CONSULT    History of non anemic vitamin B12 deficiency  -     Vitamin B12; Future    History of iron deficiency  -     Ferritin; Future    Prenatal care, subsequent pregnancy, first trimester ([facility])  -     Urine Dip in office [69141]    Pregnancy with uncertain date of last menstrual period in first trimester, antepartum ([facility])  -     US OB 1st Trimester Abdominal <14 wks [34014]; Future    Request for sterilization    Other orders  -     famotidine 20 MG Oral Tab; Take 1 tablet (20 mg total) by mouth 2 (two) times daily.  -     promethazine 25 MG Oral Tab; Take 1 tablet (25 mg total) by mouth every 6 (six) hours as needed for Nausea.         Dating: WAI 25 by 8w0d not consistent with uncertain LMP   A+    -NIPS - will want to do. Orders   -NT ultrasound & early MFM consult strongly encouraged & ordered    -Carrier screen - negative   -COVID-19 vaccination encouraged.      Requests sterilization  -bilateral total salpingectomy at interval laparoscopy planned (or if  do at that time)  -sign Medicaid consent form at around 28-30 wk     Obesity (pre preg BMI 34) & h/o placental infarct   -aspirin 162 mg daily encouraged at 12 wk. Patient is going to consider. Having a hard time with hyperemesis.   -limit weight gain 11-20 lb   -early 1 hr GTT, A1c, TSH, CMP, uric acid, urine P/C   -L2 US with MFM   -growth US  -NSTs    Epilepsy (2012, 1st grand mal seizure at 17)   -Neuro Yogi Alfonso- aware she is pregnant   -H/o missed doses of her AEDs  -generalized  seizure 2/17/2023. At that time, she was on Keppra 1000 mg bid and Lamictal 100 mg bid. She had missed doses of Keppra and Lamictal the night prior to her seizure   -MRI/MRA brain   -ordered per neurologist 7/29/24 (neurologist ok with her having during pregnancy)  -has not done. Encouraged to schedule.    -Meds:  Lamotrigine (Lamictal) 100 mg BID  -8/23/24 level therapeutic. Recheck 3 months   Levetiracetam (Keppra) 1000 mg BID - 8/23    -8/23/24 level therapeutic. Recheck 3 months   -Neuro visit 7/29/24. RTC 10/29/24   -Folic acid 4 grams per day advised due to increased risk ONTD   -Last seizure -2/2023     Migraine without aura   -pre pregnancy: Imitrex  -7/29/24 Neuro visit - MRI/MRA brain ordered due to \"worst headache of her life\" on 7/23/24 - steroids helped.     Hyperemesis gravidarum  -H/o Hyperemesis in 2021-22 pregnancy:    Bonjesta - didn't help really initially but would help with sleep  Reglan - limited effect  Compazine - didn't help  Phenergan - seemed to help for a bit.  Zofran - has at home.   Trimethobenzamide - hasn't tried yet but has at home  -9/5 - Zofran not helping. ED visit - IVF, IV antiemetics -> tolerating PO. Rx Diclegis.   -9/12 - > 160 ketones in urine. Recommend ED for IV fluids. Diclegis helping some but not enough. Rx Phenergan rectal suppository & Pepcid. Will talk to clinical supervisor about home IV fluids     H/o iron deficiency anemia   -h/o IV iron x 3 doses (12/22/21 - 12/29/22) during pregnancy (not able to tolerate PO iron)     H/o postpartum hemorrhage (atony & cervical laceration) with transfusion (2022)  -aggressive antepartum anemia management   -uterotonics in room, consider TXA at delivery     H/o vitamin B12 deficiency  -vit B12 level     Pap neg 9/15/22 - up to date. Due 9/2025   Breast exam - patient declines today, 9/13/2024   Prenatal labs ordered   GC/CT   UA, Urine Cx  PNV with iron advised.   RTC 4 wk  To ED now for IV fluids.     Anne James MD   [facility] -  OBGYN

## 2024-09-13 NOTE — TELEPHONE ENCOUNTER
Dr. James requested patient to be set up for home IV fluids. We have no contact information on home health agencies that are contracted within her network or home health benefits listed.  Called patient and advised to contact insurance to inquire about home health benefits and home health agency that would be in-network. Understanding verbalized.   Per chart review, no insurance information available for patient at this time. RN unable to contact insurance to assist patient.

## 2024-09-13 NOTE — PATIENT INSTRUCTIONS
Congratulations!     Your Due Date is: Estimated Date of Delivery: 4/25/25      As a pregnant patient, if you are having a medical problem please CALL the office [phone] (do not \"[employer] message\") as we want to address your concerns immediately due to the pregnancy.     We share call with another [facility] of physicians - Vickie Gonzáles, Lulu Garcia, Roseline Nogueira, Stu Redmond.     We cannot guarantee that you will not see a male provider.     Should be taking high dose folic acid.   Must treat nausea aggressively.  Schedule MRI/MRA brain.     For nausea and vomiting:  -take vitamin B6 (25mg) +/- unisom (aka doxylamine) 12.5mg (this is half of a 25mg tablet) every night to PREVENT morning sickness. Buy these over the counter. Can also take these in the morning and in the afternoon, too, if needed.  -call if this is not effective, we can try a prescription nausea medication    Prenatal labs  -sooner is better   -these labs unfortunately CANNOT be done in our office at this time  -please go to your preferred lab ([facility], [facility], [facility], etc)    Prenatal vitamin   -make sure it CONTAINS IRON. The gummy vitamins frequently DO NOT CONTAIN IRON.     Genetic screening  2 types to consider - both OPTIONAL:   1) Screening of the fetus for chromosomal disorders:   -Multiple ways to screen for this.   -None of these tests are 100% accurate. If an abnormal result is obtained, further testing is advised.   -Most popular are:       () cell free DNA (also called \"NIPS\" or \"non-invasive prenatal screening\")   -blood draw only   -looks for fragments of placental DNA in maternal bloodstream   -placental DNA does NOT always match fetal DNA  -timing: must be at least 10w0d to be drawn   -where: done in our office (or with high risk OB/MFM)   -cost: genetics company checks insurance benefits & calls you with cost estimate prior to running the test(s)          () nuchal translucency (NT)  ultrasound   -measures thickness of fetal neck skin fold (looking for abnormal swelling)   -timinw0d - 13w6d   -where: high risk OB/MFM office. Cannot be done at our office.     2) Screening of the mother   -done to see if she is a carrier of a mutation that causes a disease, that she could pass along to the fetus. If she is positive for a mutation, generally it is recommended to screen the father of the fetus to see if he is a carrier for the same mutation to determine risk of the fetus having the disease caused by the mutation.   -most common diseases like this (\"recessive\" genetic diseases) are Cystic Fibrosis, Spinal Muscular Atrophy, Sickle Cell Disease or Thalassemia.  -CAN BE DONE ANYTIME, ideally even prior to pregnancy   -NOTE: all babies are tested at birth for ~30 of the most common of these diseases, so do not necessarily need to test the mother during pregnancy. Testing now is an optional & personal choice     Low dose aspirin  -recommended if 1 or more risk factors for preeclampsia  -seems to help reduce risk of preeclampsia  -recommended time to start is 11-12 weeks  -current [facility] recommendation is 1.5 tablets of the aspirin 81 mg tab. If you cannot split the tablet you can take 2.     AFP level   There is an optional blood test that we can perform on you called \"AFP level.\" It is a chemical in the bloodstream produced by the pregnancy. It is done between 15-20 weeks gestation. The ideal time for testing is actually 16-18 weeks gestation. If the level is abnormally elevated, this can indicate the presence of abnormalities of the development of the brain and spinal cord such as anencephaly (lack of brain development) and spina bifida (exposed spinal cord). These anomalies can generally be seen on ultrasound. It can also be elevated in cases of normal fetal anatomy and can indicate an abnormal placenta. This may or may not be able to be detected on ultrasound. Please think about whether or not you  would like to have this test done. When you decide when you would like to have this test done, please let us know. We must enter your exact gestational age and weight on the date of the blood draw for the test to be calculated accurately.      Fetal anatomy scan (\"20 week ultrasound\")  -can be done in our office (schedule with ) if no particular high risk feature or indication  -recommend having done with MFM (Maternal Fetal Medicine aka \"High Risk OB\") if higher risk e.g family history of birth defects, chronic hypertension, diabetes, advanced maternal age, etc.     Maternal Fetal Medicine (MFM)  [facility] [place], [address], [address]   Ph [phone]    [place]   [address]  [phone]    [place], [address]  Ph [phone]    [place]  [address] [facility], [address]  Ph [phone]     Prenatal classes   [email or web address]    Vaccines during pregnancy  Tdap - recommended each pregnancy in early 3rd trimester  Influenza - recommended each pregnancy during flu season  COVID-19 - new 2023 booster recommended during pregnancy  RSV (respiratory syncytial virus) - [id number] during RSV season, generally corresponding with flu season

## 2024-09-15 NOTE — ED PROVIDER NOTES
Patient Seen in: [facility] Emergency Department      History     Chief Complaint   Patient presents with    Vomiting    Pregnancy Issues     Stated Complaint: Hyperemesis - high ketones    Subjective:   HPI    Pt is a 29-year-old G4, P2 presents to the emergency room approximately 8 weeks pregnant currently with hyperemesis.  She was seen in the ED 2 weeks ago for similar symptoms.  She has had an ultrasound confirming an IUP.  Her OB told her to come to the ED for fluids as she had a urine specimen that showed significant ketones.  Patient denies any fever chills or any urinary symptoms.  She also denies any abdominal pain or pelvic cramping or vaginal bleeding.  She states she did have issues with hyperemesis in her prior pregnancies as well.    Objective:   Past Medical History:    Cervical laceration    exam under anesthesia, repair of cervical laceration, evacuation of blood clots from uterus manually - Dr. Lora Benjamin, [facility].    COVID-19    Depression    Epilepsy (HCC)    1st grand mal seizure at 17    Female infertility    Hx of transfusion of packed red blood cells    Received 2 units packed red blood cells for postpartum hemorrhage - atonic & cervical laceration    Hyperemesis gravidarum (HCC)    Was put on Zofran - didn't help much.     Hyperemesis gravidarum (HCC)    Iron deficiency anemia during pregnancy, antepartum (HCC)    h/o IV iron x 3 doses (12/22/21 - 12/29/22) during pregnancy (not able to tolerate PO iron)    Keloid scar    chest    Low maternal serum vitamin B12    Migraine without aura    Imitrex per neurologist    Obesity (BMI 30-39.9)    Other immediate postpartum hemorrhage (HCC)    exam under anesthesia, repair of cervical laceration, evacuation of blood clots from uterus manually - Dr. Lora Benjamin, [facility].    Pap smear for cervical cancer screening    Pap neg 9/15/22    Pneumonia of both lower lobes    Pubic bone pain    Request for sterilization     1/12/2022 Discussed permanent/irreversible, optional nature of sterilization. Patient expressed understanding. Medicaid consent form signed together.     Screening for genetic disease carrier status    Carrier Screen = Negative    Trauma during pregnancy (HCC)    Vitamin D deficiency    Worst headache of life    7/29/24 Neuro visit - MRI/MRA brain ordered due to \"worst headache of her life\" on 7/23/24 - steroids helped.              Past Surgical History:   Procedure Laterality Date    Cholecystectomy  2018    Insert intrauterine device  03/01/2022    Copper IUD insertion at postpartum visit - Dr. Lora Gibson    Other surgical history  2012    benign keloid bx chest area    Other surgical history  01/17/2022    exam under anesthesia, repair of cervical laceration, evacuation of blood clots from uterus manually - Dr. Lora Benjamin, [facility].                Social History     Socioeconomic History    Marital status:    Tobacco Use    Smoking status: Never     Passive exposure: Never    Smokeless tobacco: Never   Vaping Use    Vaping status: Never Used   Substance and Sexual Activity    Alcohol use: Not Currently    Drug use: Never    Sexual activity: Yes     Partners: Male   Other Topics Concern    Caffeine Concern No    Exercise Yes    Seat Belt Yes    Special Diet No    Stress Concern No    Weight Concern Yes     Social Determinants of Health     Financial Resource Strain: High Risk (9/12/2024)    Financial Resource Strain     Difficulty of Paying Living Expenses: Not very hard     Med Affordability: Yes   Food Insecurity: No Food Insecurity (9/12/2024)    Food Insecurity     Food Insecurity: Never true   Transportation Needs: No Transportation Needs (9/12/2024)    Transportation Needs     Lack of Transportation: No   Stress: No Stress Concern Present (9/12/2024)    Stress     Feeling of Stress : No   Housing Stability: Low Risk  (9/12/2024)    Housing Stability     Housing Instability: No               Review of Systems    Positive for stated Chief Complaint: Vomiting and Pregnancy Issues    Other systems are as noted in HPI.  Constitutional and vital signs reviewed.      All other systems reviewed and negative except as noted above.    Physical Exam     ED Triage Vitals [09/13/24 1133]   /77   Pulse 72   Resp 16   Temp 98.3 °F (36.8 °C)   Temp src Temporal   SpO2 98 %   O2 Device None (Room air)       Current Vitals:   Vital Signs  BP: 122/69  Pulse: 70  Resp: 18  Temp: 98.3 °F (36.8 °C)  Temp src: Temporal    Oxygen Therapy  SpO2: 100 %  O2 Device: None (Room air)            Physical Exam  Vitals and nursing note reviewed.   Constitutional:       General: She is not in acute distress.     Appearance: Normal appearance.   HENT:      Head: Normocephalic.      Nose: Nose normal.      Mouth/Throat:      Mouth: Mucous membranes are moist.   Eyes:      Extraocular Movements: Extraocular movements intact.      Pupils: Pupils are equal, round, and reactive to light.   Cardiovascular:      Rate and Rhythm: Normal rate and regular rhythm.      Pulses: Normal pulses.   Pulmonary:      Effort: Pulmonary effort is normal.   Abdominal:      General: Abdomen is flat. Bowel sounds are normal. There is no distension.      Palpations: Abdomen is soft.      Tenderness: There is no abdominal tenderness. There is no right CVA tenderness, left CVA tenderness, guarding or rebound.      Hernia: No hernia is present.   Musculoskeletal:         General: No swelling or tenderness. Normal range of motion.      Cervical back: Normal range of motion.   Skin:     General: Skin is warm and dry.   Neurological:      Mental Status: She is alert and oriented to person, place, and time. Mental status is at baseline.   Psychiatric:         Mood and Affect: Mood normal.               ED Course     Labs Reviewed   URINALYSIS WITH CULTURE REFLEX - Abnormal; Notable for the following components:       Result Value    Spec Gravity >1.030 (*)      Ketones Urine >150 (*)     Protein Urine 70 (*)     Urobilinogen Urine 4 (*)     Leukocyte Esterase Urine 75 (*)     Squamous Epi. Cells Few (*)     All other components within normal limits   CBC WITH DIFFERENTIAL WITH PLATELET - Abnormal; Notable for the following components:    WBC 15.0 (*)     RBC 5.41 (*)     Neutrophil Absolute Prelim 11.52 (*)     Neutrophil Absolute 11.52 (*)     All other components within normal limits   COMP METABOLIC PANEL (14) - Abnormal; Notable for the following components:    Calcium, Total 10.7 (*)     Total Protein 9.2 (*)     Albumin 5.3 (*)     Globulin  3.9 (*)     All other components within normal limits   ICTOTEST - Normal   RAINBOW DRAW LAVENDER   RAINBOW DRAW LIGHT GREEN   URINE CULTURE, ROUTINE                      MDM      Differential includes hyperemesis gravidarum, acute kidney injury, severe dehydration/metabolic derangement      Here patient is afebrile hemodynamically stable.  She has been given Zofran followed by fluid bolus.  She was p.o. trialed and noted some continued nausea but no active vomiting.  Patient was given a second fluid bolus.  She then informed nursing staff she felt good and ready to be discharged.  Her OB has prescribed her Pepcid rectal promethazine and oral promethazine as patient said likely does not Sal-Plant were not working for her.  Patient will be discharged home at this time.                                   Medical Decision Making      Disposition and Plan     Clinical Impression:  1. Hyperemesis gravidarum (HCC)         Disposition:  Discharge  9/13/2024  4:29 pm    Follow-up:  Anne James MD  [address] [place], [place] [address] [phone]    Follow up            Medications Prescribed:  Discharge Medication List as of 9/13/2024  4:33 PM

## 2024-09-16 LAB
C TRACH DNA SPEC QL NAA+PROBE: NEGATIVE
N GONORRHOEA DNA SPEC QL NAA+PROBE: NEGATIVE

## 2024-09-16 NOTE — TELEPHONE ENCOUNTER
[employer] - spoke to Susana  [phone]  [place] [phone]    IVF for hyperemesis    Discussed home health for IVF with agency, they are not able to take pt insurance and criteria of pt to have midline/central line access.     Updated pt on POC, Per patient able to keep food/fluid down since 9/13/24. Patient takes diclegis. She will  rest of her recommended RX today, it was out of stock. Will route to provider for recommendation and call pt back.     Patient verbalized understanding, agreed to and intend to comply with plan of care.

## 2024-09-22 ENCOUNTER — APPOINTMENT (OUTPATIENT)
Dept: ULTRASOUND IMAGING | Facility: HOSPITAL | Age: 29
End: 2024-09-22
Attending: EMERGENCY MEDICINE
Payer: MEDICAID

## 2024-09-22 ENCOUNTER — HOSPITAL ENCOUNTER (EMERGENCY)
Facility: HOSPITAL | Age: 29
Discharge: HOME | End: 2024-09-22
Attending: EMERGENCY MEDICINE
Payer: MEDICAID

## 2024-09-22 VITALS
HEIGHT: 60 IN | OXYGEN SATURATION: 100 % | WEIGHT: 170 LBS | DIASTOLIC BLOOD PRESSURE: 64 MMHG | TEMPERATURE: 98 F | BODY MASS INDEX: 33.38 KG/M2 | HEART RATE: 55 BPM | SYSTOLIC BLOOD PRESSURE: 106 MMHG | RESPIRATION RATE: 18 BRPM

## 2024-09-22 DIAGNOSIS — O20.8 SUBCHORIONIC HEMORRHAGE OF PLACENTA IN FIRST TRIMESTER (HCC): ICD-10-CM

## 2024-09-22 DIAGNOSIS — O20.0 THREATENED MISCARRIAGE (HCC): ICD-10-CM

## 2024-09-22 DIAGNOSIS — O21.0 HYPEREMESIS GRAVIDARUM (HCC): Primary | ICD-10-CM

## 2024-09-22 LAB
ALBUMIN SERPL-MCNC: 5 G/DL (ref 3.2–4.8)
ALBUMIN/GLOB SERPL: 1.6 {RATIO} (ref 1–2)
ALP LIVER SERPL-CCNC: 70 U/L
ALT SERPL-CCNC: 29 U/L
ANION GAP SERPL CALC-SCNC: 8 MMOL/L (ref 0–18)
AST SERPL-CCNC: 21 U/L (ref ?–34)
B-HCG SERPL-ACNC: ABNORMAL MIU/ML
BASOPHILS # BLD AUTO: 0.04 X10(3) UL (ref 0–0.2)
BASOPHILS NFR BLD AUTO: 0.3 %
BILIRUB SERPL-MCNC: 0.3 MG/DL (ref 0.3–1.2)
BILIRUB UR QL STRIP.AUTO: NEGATIVE
BUN BLD-MCNC: 11 MG/DL (ref 9–23)
CALCIUM BLD-MCNC: 10.3 MG/DL (ref 8.7–10.4)
CHLORIDE SERPL-SCNC: 104 MMOL/L (ref 98–112)
CO2 SERPL-SCNC: 26 MMOL/L (ref 21–32)
COLOR UR AUTO: YELLOW
CREAT BLD-MCNC: 0.72 MG/DL
EGFRCR SERPLBLD CKD-EPI 2021: 117 ML/MIN/1.73M2 (ref 60–?)
EOSINOPHIL # BLD AUTO: 0.1 X10(3) UL (ref 0–0.7)
EOSINOPHIL NFR BLD AUTO: 0.9 %
ERYTHROCYTE [DISTWIDTH] IN BLOOD BY AUTOMATED COUNT: 15 %
GLOBULIN PLAS-MCNC: 3.1 G/DL (ref 2–3.5)
GLUCOSE BLD-MCNC: 101 MG/DL (ref 70–99)
GLUCOSE UR STRIP.AUTO-MCNC: NORMAL MG/DL
HCT VFR BLD AUTO: 40.1 %
HGB BLD-MCNC: 13.7 G/DL
IMM GRANULOCYTES # BLD AUTO: 0.03 X10(3) UL (ref 0–1)
IMM GRANULOCYTES NFR BLD: 0.3 %
KETONES UR STRIP.AUTO-MCNC: 10 MG/DL
LEUKOCYTE ESTERASE UR QL STRIP.AUTO: 500
LYMPHOCYTES # BLD AUTO: 2 X10(3) UL (ref 1–4)
LYMPHOCYTES NFR BLD AUTO: 17.4 %
MCH RBC QN AUTO: 28.8 PG (ref 26–34)
MCHC RBC AUTO-ENTMCNC: 34.2 G/DL (ref 31–37)
MCV RBC AUTO: 84.4 FL
MONOCYTES # BLD AUTO: 0.7 X10(3) UL (ref 0.1–1)
MONOCYTES NFR BLD AUTO: 6.1 %
NEUTROPHILS # BLD AUTO: 8.64 X10 (3) UL (ref 1.5–7.7)
NEUTROPHILS # BLD AUTO: 8.64 X10(3) UL (ref 1.5–7.7)
NEUTROPHILS NFR BLD AUTO: 75 %
NITRITE UR QL STRIP.AUTO: NEGATIVE
OSMOLALITY SERPL CALC.SUM OF ELEC: 286 MOSM/KG (ref 275–295)
PH UR STRIP.AUTO: 6 [PH] (ref 5–8)
PLATELET # BLD AUTO: 314 10(3)UL (ref 150–450)
POTASSIUM SERPL-SCNC: 4.3 MMOL/L (ref 3.5–5.1)
PROT SERPL-MCNC: 8.1 G/DL (ref 5.7–8.2)
PROT UR STRIP.AUTO-MCNC: 30 MG/DL
RBC # BLD AUTO: 4.75 X10(6)UL
SODIUM SERPL-SCNC: 138 MMOL/L (ref 136–145)
SP GR UR STRIP.AUTO: >1.03 (ref 1–1.03)
UROBILINOGEN UR STRIP.AUTO-MCNC: 3 MG/DL
WBC # BLD AUTO: 11.5 X10(3) UL (ref 4–11)

## 2024-09-22 PROCEDURE — 80053 COMPREHEN METABOLIC PANEL: CPT | Performed by: EMERGENCY MEDICINE

## 2024-09-22 PROCEDURE — 96374 THER/PROPH/DIAG INJ IV PUSH: CPT

## 2024-09-22 PROCEDURE — 87086 URINE CULTURE/COLONY COUNT: CPT | Performed by: EMERGENCY MEDICINE

## 2024-09-22 PROCEDURE — 99284 EMERGENCY DEPT VISIT MOD MDM: CPT

## 2024-09-22 PROCEDURE — 76817 TRANSVAGINAL US OBSTETRIC: CPT | Performed by: EMERGENCY MEDICINE

## 2024-09-22 PROCEDURE — 76801 OB US < 14 WKS SINGLE FETUS: CPT | Performed by: EMERGENCY MEDICINE

## 2024-09-22 PROCEDURE — 85025 COMPLETE CBC W/AUTO DIFF WBC: CPT | Performed by: EMERGENCY MEDICINE

## 2024-09-22 PROCEDURE — 96375 TX/PRO/DX INJ NEW DRUG ADDON: CPT

## 2024-09-22 PROCEDURE — 96361 HYDRATE IV INFUSION ADD-ON: CPT

## 2024-09-22 PROCEDURE — 81001 URINALYSIS AUTO W/SCOPE: CPT | Performed by: EMERGENCY MEDICINE

## 2024-09-22 PROCEDURE — 84702 CHORIONIC GONADOTROPIN TEST: CPT | Performed by: EMERGENCY MEDICINE

## 2024-09-22 RX ORDER — METOCLOPRAMIDE HYDROCHLORIDE 5 MG/ML
10 INJECTION INTRAMUSCULAR; INTRAVENOUS ONCE
Status: COMPLETED | OUTPATIENT
Start: 2024-09-22 | End: 2024-09-22

## 2024-09-22 RX ORDER — DEXTROSE MONOHYDRATE AND SODIUM CHLORIDE 5; .9 G/100ML; G/100ML
INJECTION, SOLUTION INTRAVENOUS ONCE
Status: COMPLETED | OUTPATIENT
Start: 2024-09-22 | End: 2024-09-22

## 2024-09-22 RX ORDER — DIPHENHYDRAMINE HYDROCHLORIDE 50 MG/ML
25 INJECTION INTRAMUSCULAR; INTRAVENOUS ONCE
Status: COMPLETED | OUTPATIENT
Start: 2024-09-22 | End: 2024-09-22

## 2024-09-22 NOTE — ED PROVIDER NOTES
Patient Seen in: [facility] Emergency Department      History     Chief Complaint   Patient presents with    Pregnancy Issues    Nausea/vomiting    Abdominal Pain     Stated Complaint: 9w2d pregnant, lower abd pain since last night & nausea. denies bleeding.    Subjective:   HPI    Patient is approximately 9 weeks pregnant.  She has had hyperemesis with this pregnancy with nausea and vomiting.  That continues.  She developed crampy lower pelvic pain today.  She had an ultrasound previously at her OBs office.  Denies fevers or chills.  Difficulty eating secondary to hyperemesis.  Had a small amount of pelvic spotting as well.  No flank pain.  No dysuria.  No other specific complaints.  Patient is otherwise at her medical baseline.  Blood type is a positive    Objective:   Past Medical History:    Cervical laceration    exam under anesthesia, repair of cervical laceration, evacuation of blood clots from uterus manually - Dr. Lora Benjamin, [facility].    COVID-19    Depression    Epilepsy (HCC)    1st grand mal seizure at 17    Female infertility    Hx of transfusion of packed red blood cells    Received 2 units packed red blood cells for postpartum hemorrhage - atonic & cervical laceration    Hyperemesis gravidarum (HCC)    Was put on Zofran - didn't help much.     Hyperemesis gravidarum (HCC)    Iron deficiency anemia during pregnancy, antepartum (HCC)    h/o IV iron x 3 doses (12/22/21 - 12/29/22) during pregnancy (not able to tolerate PO iron)    Keloid scar    chest    Low maternal serum vitamin B12    Migraine without aura    Imitrex per neurologist    Obesity (BMI 30-39.9)    Other immediate postpartum hemorrhage (HCC)    exam under anesthesia, repair of cervical laceration, evacuation of blood clots from uterus manually - Dr. Lora Benjamin, [facility].    Pap smear for cervical cancer screening    Pap neg 9/15/22    Pneumonia of both lower lobes    Pubic bone pain    Request for  sterilization    1/12/2022 Discussed permanent/irreversible, optional nature of sterilization. Patient expressed understanding. Medicaid consent form signed together.     Screening for genetic disease carrier status    Carrier Screen = Negative    Trauma during pregnancy (HCC)    Vitamin D deficiency    Worst headache of life    7/29/24 Neuro visit - MRI/MRA brain ordered due to \"worst headache of her life\" on 7/23/24 - steroids helped.              Past Surgical History:   Procedure Laterality Date    Cholecystectomy  2018    Insert intrauterine device  03/01/2022    Copper IUD insertion at postpartum visit - Dr. Lora Gibson    Other surgical history  2012    benign keloid bx chest area    Other surgical history  01/17/2022    exam under anesthesia, repair of cervical laceration, evacuation of blood clots from uterus manually - Dr. Lora Benjamin, [facility].                Social History     Socioeconomic History    Marital status:    Tobacco Use    Smoking status: Never     Passive exposure: Never    Smokeless tobacco: Never   Vaping Use    Vaping status: Never Used   Substance and Sexual Activity    Alcohol use: Not Currently    Drug use: Never    Sexual activity: Yes     Partners: Male   Other Topics Concern    Caffeine Concern No    Exercise Yes    Seat Belt Yes    Special Diet No    Stress Concern No    Weight Concern Yes     Social Determinants of Health     Financial Resource Strain: High Risk (9/12/2024)    Financial Resource Strain     Difficulty of Paying Living Expenses: Not very hard     Med Affordability: Yes   Food Insecurity: No Food Insecurity (9/12/2024)    Food Insecurity     Food Insecurity: Never true   Transportation Needs: No Transportation Needs (9/12/2024)    Transportation Needs     Lack of Transportation: No   Stress: No Stress Concern Present (9/12/2024)    Stress     Feeling of Stress : No   Housing Stability: Low Risk  (9/12/2024)    Housing Stability     Housing  Instability: No              Review of Systems    Positive for stated Chief Complaint: Pregnancy Issues, Nausea/vomiting, and Abdominal Pain    Other systems are as noted in HPI.  Constitutional and vital signs reviewed.      All other systems reviewed and negative except as noted above.    Physical Exam     ED Triage Vitals [09/22/24 1249]   /70   Pulse 74   Resp 18   Temp 97.8 °F (36.6 °C)   Temp src Temporal   SpO2 98 %   O2 Device None (Room air)       Current Vitals:   Vital Signs  BP: 126/82  Pulse: 67  Resp: 18  Temp: 97.8 °F (36.6 °C)  Temp src: Temporal    Oxygen Therapy  SpO2: 100 %  O2 Device: None (Room air)            Physical Exam    General: Well-appearing patient of stated age resting comfortably  HEENT: Normocephalic atraumatic.  Nonicteric sclera.  Moist mucous membranes  Lungs: No tachypnea  Cardiac: No tachycardia  Abdomen: Suprapubic tenderness.  No significant right lower quadrant tenderness.  No guarding or rebound  Skin: No rashes, pallor  Neuro: No focal deficits.  Extremities: No cyanosis/edema okay yeah I mean she could be a while is what I today    ED Course     Labs Reviewed   COMP METABOLIC PANEL (14) - Abnormal; Notable for the following components:       Result Value    Glucose 101 (*)     Albumin 5.0 (*)     All other components within normal limits   CBC WITH DIFFERENTIAL WITH PLATELET - Abnormal; Notable for the following components:    WBC 11.5 (*)     Neutrophil Absolute Prelim 8.64 (*)     Neutrophil Absolute 8.64 (*)     All other components within normal limits   HCG, BETA SUBUNIT (QUANT PREGNANCY TEST) - Abnormal; Notable for the following components:    Hcg Quantitative 183,012.7 (*)     All other components within normal limits   URINALYSIS WITH CULTURE REFLEX - Abnormal; Notable for the following components:    Clarity Urine Turbid (*)     Spec Gravity >1.030 (*)     Ketones Urine 10 (*)     Blood Urine 2+ (*)     Protein Urine 30 (*)     Urobilinogen Urine 3 (*)      Leukocyte Esterase Urine 500 (*)     WBC Urine 6-10 (*)     RBC Urine 3-5 (*)     Bacteria Urine 1+ (*)     Squamous Epi. Cells Many (*)     All other components within normal limits   URINE CULTURE, ROUTINE        Pregnancy ultrasound: Live IUP.  Small subchorionic hemorrhage.     White count 11.5.  Hemoglobin normal.       Urine reviewed.  Probably contaminated.  Will send for culture.  Beta-hCG is 183,000.  Electrolytes noted.  White count 11.5.  MDM      Patient is in early pregnancy presents with nausea vomiting.  Has had hyperemesis in the past.  She also now has pelvic cramping.  Mild amount of spotting per patient.  Differential includes miscarriage, ectopic, appendicitis, hyperemesis, other.  Patient was hydrated.  Was treated with D5 normal saline.  Was treated with Reglan, Benadryl.  Will check UA, blood work.  Will proceed to ultrasound.  No right lower quadrant tenderness at this point.  Doubt appendicitis clinically.      Patient was assessed after fluids, Reglan and Benadryl.  Feels much better.  Mild suprapubic tenderness.        Discussed patient's ultrasound.  Small subchorionic hemorrhage.  Discussed threatened miscarriage.  Medications for her hyperemesis.  Her OB.  Return if increasing pain, new complaints.  Patient feels better and would like to be discharged.                     Medical Decision Making      Disposition and Plan     Clinical Impression:  1. Hyperemesis gravidarum (HCC)    2. Threatened miscarriage (HCC)    3. Subchorionic hemorrhage of placenta in first trimester (HCC)         Disposition:  Discharge  9/22/2024  5:10 pm    Follow-up:  Anne James MD  [address] [place] [id number]  [phone]    Follow up in 1 week(s)            Medications Prescribed:  Current Discharge Medication List

## 2024-09-22 NOTE — ED INITIAL ASSESSMENT (HPI)
Abdominal pain since yesterday with nausea and vomiting . Patient is 9 weeks pregnant . Denies any vaginal bleeding .

## 2024-09-22 NOTE — DISCHARGE INSTRUCTIONS
Continue medications as per your OB/GYN.  Return if increased pain, bleeding, new complaints.  Follow-up with your OB.

## 2024-09-24 ENCOUNTER — TELEPHONE (OUTPATIENT)
Facility: CLINIC | Age: 29
End: 2024-09-24

## 2024-09-24 NOTE — TELEPHONE ENCOUNTER
9 4/7 was in ED hyperemesis, had US showed subchorionic bleeding.     Continue taking prescribed medication from 9/13/24 visit with Zofran. If having symptoms of vaginal bleeding, cramping, unable to keep food/fluid down to call office.     Follow up appt 10/3/24    Patient verbalized understanding, agreed to and intend to comply with plan of care.

## 2024-09-30 ENCOUNTER — TELEPHONE (OUTPATIENT)
Facility: CLINIC | Age: 29
End: 2024-09-30

## 2024-09-30 RX ORDER — ONDANSETRON 4 MG/1
4 TABLET, FILM COATED ORAL EVERY 8 HOURS PRN
Qty: 90 TABLET | Refills: 5 | Status: SHIPPED | OUTPATIENT
Start: 2024-09-30

## 2024-09-30 NOTE — TELEPHONE ENCOUNTER
Patient is 10 weeks preg.  She was seen in ER for hyperemisis. They wanted  to admit her but she declined.

## 2024-09-30 NOTE — TELEPHONE ENCOUNTER
10 3/7 was seen in [facility] ER for hyperemisis. They wanted to admit her but she declined. Request IVF/zofran at cancer center.    Onset yesterday, unable to keep food/fluid down.     Has been in ER x 3 for hyperemesis  Last seen in office 9/13/24    Need RX Zofran refill- last dose end of August.    -Taking famotidine 1 TAB BID- unsure last time she took this medication.   -promethazine 1 TAB q6h - makes pt sleepy- pt has not taken this medication this morning  -doxylamine-pyridoxine 2 tabs nightly  -folic acid 1 MG Oral Tab, last taken this am  -promethazine 25 MG rectal - $300, pt did not  medication from pharmacy, too expensive.    Vomited x3 today, went to ER, was given IVF and Zofran. Now able to keep food/fluid down.   Advised to Eat frequent Small bland meals, reminder to take medication prescribed and antiemetic. Will route to [facility] provider. Zofran pended.     Patient verbalized understanding, agreed to and intend to comply with plan of care.      Follow up appt 10/3/24

## 2024-10-03 ENCOUNTER — TELEPHONE (OUTPATIENT)
Facility: CLINIC | Age: 29
End: 2024-10-03

## 2024-10-03 ENCOUNTER — ROUTINE PRENATAL (OUTPATIENT)
Facility: CLINIC | Age: 29
End: 2024-10-03
Payer: MEDICAID

## 2024-10-03 VITALS
HEIGHT: 60 IN | HEART RATE: 81 BPM | WEIGHT: 157.63 LBS | SYSTOLIC BLOOD PRESSURE: 110 MMHG | DIASTOLIC BLOOD PRESSURE: 70 MMHG | BODY MASS INDEX: 30.95 KG/M2

## 2024-10-03 DIAGNOSIS — K11.7 SIALORRHEA: ICD-10-CM

## 2024-10-03 DIAGNOSIS — G40.909 EPILEPSY AFFECTING PREGNANCY IN FIRST TRIMESTER (HCC): ICD-10-CM

## 2024-10-03 DIAGNOSIS — Z3A.10 10 WEEKS GESTATION OF PREGNANCY (HCC): ICD-10-CM

## 2024-10-03 DIAGNOSIS — Z34.81 PRENATAL CARE, SUBSEQUENT PREGNANCY, FIRST TRIMESTER (HCC): ICD-10-CM

## 2024-10-03 DIAGNOSIS — O20.8 SUBCHORIONIC HEMORRHAGE IN FIRST TRIMESTER (HCC): ICD-10-CM

## 2024-10-03 DIAGNOSIS — O09.291: ICD-10-CM

## 2024-10-03 DIAGNOSIS — O21.0 HYPEREMESIS GRAVIDARUM (HCC): Primary | ICD-10-CM

## 2024-10-03 DIAGNOSIS — O99.211 OTHER OBESITY DUE TO EXCESS CALORIES AFFECTING PREGNANCY IN FIRST TRIMESTER (HCC): ICD-10-CM

## 2024-10-03 DIAGNOSIS — Z86.39 HISTORY OF NON ANEMIC VITAMIN B12 DEFICIENCY: ICD-10-CM

## 2024-10-03 DIAGNOSIS — E66.09 OTHER OBESITY DUE TO EXCESS CALORIES AFFECTING PREGNANCY IN FIRST TRIMESTER (HCC): ICD-10-CM

## 2024-10-03 DIAGNOSIS — O99.351 EPILEPSY AFFECTING PREGNANCY IN FIRST TRIMESTER (HCC): ICD-10-CM

## 2024-10-03 PROCEDURE — 99214 OFFICE O/P EST MOD 30 MIN: CPT | Performed by: OBSTETRICS & GYNECOLOGY

## 2024-10-03 RX ORDER — ONDANSETRON 4 MG/1
4 TABLET, ORALLY DISINTEGRATING ORAL EVERY 8 HOURS PRN
Qty: 30 TABLET | Refills: 5 | Status: SHIPPED | OUTPATIENT
Start: 2024-10-03

## 2024-10-03 NOTE — TELEPHONE ENCOUNTER
Spoke with Patient regarding her SDOH questionnaire. Resources provided for finding a PCP and med affordability. Denies need for financial resources. Information printed. Understanding verbalized.

## 2024-10-03 NOTE — PROGRESS NOTES
Outpatient Maternal-Fetal Medicine Consultation    Dear Dr. James    Thank you for requesting a first trimester ultrasound and MFM consultation on your patient Dotty Gross.  As you are aware she is a 29 year old female  with a singletonpregnancy and an Estimated Date of Delivery: 25.  A maternal-fetal medicine consultation was requested secondary to first trimester screening obesity and seizure disorder .  Her prenatal records and labs were reviewed.    HISTORY  # 1 - Date: 17, Sex: Female, Weight: None, GA: 42w0d, Type: Vaginal, Spontaneous, Apgar1: None, Apgar5: None, Living: Living, Birth Comments: Hyperemesis     # 2 - Date: 22, Sex: Male, Weight: 7 lb 5.5 oz (3.33 kg), GA: 39w2d, Type: Normal spontaneous vaginal delivery, Apgar1: 8, Apgar5: 9, Living: Living, Birth Comments: None    # 3 - Date: None, Sex: None, Weight: None, GA: None, Type: None, Apgar1: None, Apgar5: None, Living: None, Birth Comments: None      Past Medical History  The patient  has a past medical history of Cervical laceration (2022), COVID-19 (), Depression (2022), Epilepsy ([facility]) (), Female infertility (2021), transfusion of packed red blood cells (2022), Hyperemesis gravidarum ([facility]) (), Hyperemesis gravidarum ([facility]) (), Iron deficiency anemia during pregnancy, antepartum ([facility]) (2021), Keloid scar, Low maternal serum vitamin B12 (2021), Migraine without aura, Obesity (BMI 30-39.9), Other immediate postpartum hemorrhage ([facility]) (), Pap smear for cervical cancer screening (09/15/2022), Pneumonia of both lower lobes (2024), Pubic bone pain (2021), Request for sterilization (2022), Screening for genetic disease carrier status (09/10/2021), Sialorrhea (10/03/2024), Subchorionic hemorrhage in first trimester (HCC) (2024), Trauma during pregnancy (HCC) (2021), Vitamin D deficiency, and Worst headache of life (2024).    Past  Surgical History  The patient  has a past surgical history that includes cholecystectomy (); other surgical history (); other surgical history (2022); and insert intrauterine device (2022).    Family History  The patient She indicated that her mother is alive. She indicated that her father is alive. She indicated that all of her three sisters are alive. She indicated that her maternal grandmother is . She indicated that her maternal grandfather is alive. She indicated that her paternal grandmother is alive. She indicated that her paternal grandfather is alive. She indicated that her daughter is alive. She indicated that the status of her neg is unknown. She indicated that her maternal aunt is alive.      Medications:   Current Outpatient Medications:     folic acid 1 MG Oral Tab, Take 4 tablets (4 mg total) by mouth daily., Disp: 360 tablet, Rfl: 5    famotidine 20 MG Oral Tab, Take 1 tablet (20 mg total) by mouth 2 (two) times daily., Disp: 60 tablet, Rfl: 5    promethazine 25 MG Oral Tab, Take 1 tablet (25 mg total) by mouth every 6 (six) hours as needed for Nausea., Disp: 60 tablet, Rfl: 5    doxylamine-pyridoxine 10-10 MG Oral Tab EC, Take 2 tablets by mouth nightly., Disp: 120 tablet, Rfl: 0    SUMAtriptan 50 MG Oral Tab, USE AT ONSET OF MIGRAINE. REPEAT ONCE AFTER 2 HOURS. MAX 2 IN 24 HOURS. MUST LAST 30 DAYS. (Patient taking differently: as needed. USE AT ONSET OF MIGRAINE. REPEAT ONCE AFTER 2 HOURS. MAX 2 IN 24 HOURS. MUST LAST 30 DAYS.), Disp: 9 tablet, Rfl: 5    ondansetron (ZOFRAN) 4 mg tablet, Take 1 tablet (4 mg total) by mouth every 8 (eight) hours as needed for Nausea., Disp: 30 tablet, Rfl: 0    albuterol 108 (90 Base) MCG/ACT Inhalation Aero Soln, Inhale 1 puff into the lungs every 6 (six) hours as needed., Disp: , Rfl:     lamoTRIgine 100 MG Oral Tab, Take 1 tablet (100 mg total) by mouth 2 (two) times daily., Disp: 180 tablet, Rfl: 3    levetiracetam 1000 MG Oral Tab,  Take 1 tablet (1,000 mg total) by mouth 2 (two) times daily., Disp: 180 tablet, Rfl: 3    ondansetron 4 MG Oral Tablet Dispersible, Take 1 tablet (4 mg total) by mouth every 8 (eight) hours as needed for Nausea., Disp: 30 tablet, Rfl: 5    ondansetron (ZOFRAN) 4 mg tablet, Take 1 tablet (4 mg total) by mouth every 8 (eight) hours as needed for Nausea., Disp: 90 tablet, Rfl: 5    promethazine 25 MG Rectal Suppos, Place 1 suppository (25 mg total) rectally every 6 (six) hours as needed for Nausea., Disp: 30 suppository, Rfl: 5  Allergies:   Allergies   Allergen Reactions    Shellfish-Derived Products SWELLING       PHYSICAL EXAMINATION:  /79 (BP Location: Right arm, Patient Position: Sitting, Cuff Size: adult)   Pulse 79   Ht 5' (1.524 m)   Wt 160 lb (72.6 kg)   LMP 07/10/2024 (Exact Date)   BMI 31.25 kg/m²   General: alert and oriented,no acute distress  Abdomen: gravid, soft, non-tender  Extremities: non-tender, no edema    OBSTETRIC ULTRASOUND  The patient had a first trimester ultrasound today which revealed normal first trimester anatomy with size consistent with dates.      Single IUP with cardiac activity 151 bpm  Amniotic fluid is normal.  Placenta location is anterior  The CRL is consistent with gestational age. Nasal bone present. The nuchal translucency measures 1.4 mm. This is within normal limits.   The maternal uterus and ovaries appear normal.    See PACS/Imaging Tab For Complete Ultrasound Report  I interpreted the results and reviewed them with the patient.    DISCUSSION  During her visit we discussed and reviewed the following issues:  CELL FREE FETAL DNA TESTING INFORMATION  Sequencing cfDNA is a screening test; it has false-positive and negative results and does not test for all genetic syndromes or all aneuploidies: it tests for trisomy 21, 18, and 13 and sometimes sex chromosome aneuploidy. Diagnostic procedures, such as amniocentesis, obtain fetal cells, and subsequent testing by  karyotyping or microarray can diagnose all aneuploidies; can distinguish between full trisomy and trisomy caused by an unbalanced chromosomal rearrangement; and can detect mosaicism and microdeletions/microduplications (microarray) and, via amniotic fluid AFP and acetylcholinesterase, open neural tube defects. If there are one or more structural anomalies on ultrasound examination, a microarray on amniocytes is the preferred test     Both the mother and the fetal-placental unit produce cfDNA. The primary source of so-called \"fetal\" cfDNA in the maternal circulation is thought to be apoptosis of placental cells (syncytiotrophoblast), while maternal hematopoietic cells are the source of most maternal cfDNA. A lesser source is apoptosis of fetal erythroblasts generating cfDNA in the fetal circulation, and these fragments can cross the placenta and enter the maternal circulation. Since the fetus and the placenta originate from a single fertilized egg, they are usually genetically identical, but differences between the placenta and fetus are important sources of discordant cfDNA test results (eg, confined placental mosaicism).  Trisomy 21, 18, and 13 -- cfDNA is the most sensitive screening option for these aneuploidies. Performance varies by trisomy and by methodology but is rather similar in both high- and low-risk women. Based on multiple meta-analyses, the consensus DRs and FPRs were as follows:  ?Trisomy 21 - DR 99.5 percent, FPR 0.05 percent   ?Trisomy 18 - DR 97.7 percent, FPR 0.04 percent   ?Trisomy 13 - DR 96.1 percent, FPR 0.06 percent     Low cell fraction  Cell-free DNA test failures may occur because of the complex laboratory processing procedures, early gestational age (less than 9-10 weeks), the types of laboratory methods, and the presence of a genetic condition, particularly trisomy 13 or 18 and are also seen more frequently in patients with high BMI, increasing maternal age, certain racial backgrounds  (seen more frequently in Black women and South  women in comparison to white women), and IVF pregnancies, 45 as well as maternal drug exposure (low-molecular-weight heparin)  Some aneuploidies also have a low cell fraction.     1 to 5 percent of cfDNA tests do not yield a result, and obese women are at increased risk of receiving a test failure.  The patient has three options in this setting:  Repeat the cfDNA test, if allowed by the laboratory (some failures, like large regions of homozygosity or dizygotic twins, will always cause the test to fail and repeat testing is not an option). Repeat testing, when allowed, is successful in 50 to 80 percent of cases .  Standard serum marker/ultrasound screening.  Invasive procedure (amniocentesis, CVS) and diagnostic testing (karyotyping/microarray).     False-positive cell-free DNA test results occur because of confined placental mosaicism, which can be associated with an increased risk of fetal growth restriction. High or low fetal fraction has been associated with adverse pregnancy outcomes in some studies.    Obtained cell free fetal DNA with OB (results pending), declined invasive testing    Anatomic Survey and MSAFP Screening    A complete fetal anatomic survey is recommended at 20-22 weeks.  In addition, second trimester maternal serum alpha-fetoprotein (MSAFP) is also advised to screen for fetal neural tube defects. Independent second trimester maternal serum screening for fetal aneuploidy is NOT advised after first trimester screening as this is associated with a markedly elevated false positive rate (15-20%).    Finally, it is important to note that invasive genetic testing remains the only method to definitively exclude the diagnosis of fetal aneuploidy.    OBESITY  This patient has obesity; her pre-gravid BMI is: 34.4  Obesity during pregnancy is associated with numerous maternal and  risks.  It is not clear whether obesity is a direct cause of  adverse pregnancy outcome or whether the association between obesity and adverse pregnancy outcome is due to factors such as diabetes mellitus.   Data suggest that obese women should be encouraged to undertake a weight reduction program (diet, exercise, behavior modification, and possibly bariatric surgery in some cases) prior to attempting to conceive.            Subfertility in obese women is most commonly related to ovulatory dysfunction, and, in some obese women, the ovulatory dysfunction is related to polycystic ovary syndrome (PCOS). It is also important to note that even among ovulatory women, increasing obesity is associated with decreasing spontaneous pregnancy rates.  The increased risk of miscarriage in obese women may be because such women often have PCOS or isolated insulin resistance.                 Due to its strong association with obesity in the general population, type 2 diabetes mellitus is one of the two most common medical complications of the obese . The increased risk of type 2 diabetes is primarily related to an exaggerated increase in insulin resistance in the obese state. It is reasonable to screen obese gravidas for undiagnosed pregestational diabetes in the first trimester.   Glucose intolerance associated with gestational diabetes generally resolves postpartum; however, obese women with a history of gestational diabetes have a two-fold increased prevalence of subsequent type 2 diabetes.           An association between obesity and hypertensive disorders during pregnancy has been consistently reported.  In particular, maternal weight and BMI are independent risk factors for preeclampsia.             Studies have found that the increased risk of  birth in obese gravidas is primarily associated with obesity-related medical and  complications, rather than an intrinsic predisposition to spontaneous  birth. Prevention of  birth in these patients,  therefore, should be directed toward prevention or management of medical and obstetrical complications.               Both prepregnancy obesity and excessive maternal weight gain before or during pregnancy contribute to an increased probability of  delivery.  It has also been hypothesized that obesity may lead to dystocia due to increased soft tissue deposition in the maternal pelvis.    delivery in the obese  is associated with numerous perioperative concerns, including emergency delivery, prolonged incision to delivery interval, blood loss >1000 mL, longer operative times, wound infection, thromboembolism, and endometritis.            Maternal obesity appears to be associated with a small increase in the absolute rate of some congenital anomalies, and the risk may increase with increasing maternal weight.  The risk of neural tube defects increased significantly with maternal weight.    The analysis found that overweight and obese pregnant women experienced significantly more stillbirths than normal weight women.      Increased  testing and Level 2 Ultrasound is recommended.     MATERNAL SEIZURE DISORDER  We discussed  the inter-relationship of maternal epilepsy, drug induced-embryopathy and antiepileptic drug metabolism and pharmacokinetics during pregnancy. About one-third of patients will have increase in frequency and the remainder experience no change or have less seizure activity.        When seizure activity increases during pregnancy, it is commonly related to deliberate patient non-compliance due to concern of teratogenic effects. There appears to be less recurrence of seizure activity when the patient is well controlled and fewer number of seizures occuring in the nine months prior to conception.       Most AED levels will decrease during pregnancy and can effect seizure frequency. The drug levels should be monitored regularly and adjusted appropriately.  AEDs are known to  cross the placenta and believed to have some degree of teratogenic effects. The rate of congenital malformations in patients using the drugs are about 2-3 times that of babies of non-epileptic mothers or about 6-8% in epileptic pregnancies.       Multiagent therapy has been shown to enhance malformation rates, thus, it should be avoided when possible.   We discussed the potential congenital anomalies such as cardiac defects, cleft lip and palate, spina bifida and mildly dysmorphic face and fingers associated with specific AED like Phenytoin and Carbamazepine. The AED that prevent seizures in a given patient should be the medication to be used during pregnancy, since there is no general concensus as to which AED is least or most teratogenic.  Over 90% of women with epilepsy will have normal or good pregnancy outcomes.             Most physicians recommend administration of prophylactic vitamin K1 (10 to 20 mg/day) during the last month of pregnancy to women treated with AEDs to protect the child against severe  bleeding due to a deficiency in vitamin K-dependent clotting factors. This practice is based upon limited data from multiple case reports and small case series that have suggested an increased risk of bleeding in the neonates of mothers taking AEDs. Enzyme-inducing AEDs, such as phenobarbital, phenytoin, and carbamazepine, cross the placenta and may increase the rate of oxidative degradation of vitamin K in the fetus, an effect that can be overcome by large doses of vitamin K.    LAMOTRIGINE  (LAMICTAL) USE IN PREGNANCY  There is a growing body of information the reproductive safety of lamotrigine (Lamictal), and this may be a useful alternative for some women. The International Lamotrigine Pregnancy Registry was created by [employer] in  to monitor pregnancies exposed to lamotrigine for the occurrence of major birth defects. Data from the Registry did not show an elevated risk of  malformations associated with lamotrigine exposure.    Other data from the [employer] [place] Anti[place]Epileptic Drug Registry indicates the prevalence of major malformations in a total of 564 children exposed to lamotrigine monotherapy was 2.7%; however, five infants had oral clefts, indicating a prevalence rate of 8.9 per 1000 births. In a comparison group of [id number],746 unexposed births, the prevalence rate for oral clefts was 0.37/1000, indicating a 24-fold increase in risk of oral cleft in infants exposed to lamotrigine. However, other registries have not demonstrated such a significant increase in risk for oral clefts. It is important to put this risk into perspective. If we assume that the findings from the [place] registry are true, the absolute risk of having a child with cleft lip or palate is about 0.9%.    LEVETIRACETAM (KEPPRA) USE IN PREGNANCY  Levetiracetam (Keppra) is an antiepileptic medication.  It crosses the placenta and adverse events have been observed in animal reproduction studies therefore it is a category C medication.  The [place] AED registry has published data from women taking levetiracetam and there was no increased rate of birth defects above the baseline risk of epileptic women observed.      Pregnant women may enroll themselves into the [place] AED registry by calling [phone] or online at http://www.aedpregnancyregistry.[email or web address]     Serum concentration decrease as the pregnancy progresses therefore the level should be monitored throughout the pregnancy.    IMPRESSION:  IUP at 12w0d  First Trimester Aneuploidy Screening Screening:  normal NT  Obtained cell free fetal DNA with OB (results pending), declined invasive testing  Obesity  Maternal seizure disorder    RECOMMENDATIONS:  Continue care with Dr. James  Level II at 20 weeks  Growth US & BPP at 32 weeks  Weekly NSTs at 36 weeks  11-20 lb weight gain for pregnancy  Continue antiepileptic medications and comanagement  with neurology    Thank you for allowing me to participate in the care of your patient.  Please do not hesitate to contact me if additional questions or concerns arise.      Yogi Hicks M.D.    40 minutes spent in review of records, patient consultation, documentation and coordination of care.  The relevant clinical matter(s) are summarized above.     Note to patient and family  The 21st Century Cures Act makes medical notes available to patients in the interest of transparency.  However, please be advised that this is a medical document.  It is intended as [id number] communication.  It is written and medical language may contain abbreviations or verbiage that are technical and unfamiliar.  It may appear blunt or direct.  Medical documents are intended to carry relevant information, facts as evident, and the clinical opinion of the practitioner.

## 2024-10-03 NOTE — PATIENT INSTRUCTIONS
We are going to contact home health to get working on setting this up  Please call Central Scheduling Phone: [phone] to schedule PICC/midline catheter placement.     Rx Zofran ODT was sent to pharmacy.

## 2024-10-03 NOTE — PROGRESS NOTES
ED follow up visit/OB    24 - Went to ED at [facility] for hyperemesis. Pt reports 1 liter of IV fluid given. Says they wanted to admit her, but she declined.     Wishes she would have been given 2 liters as she notices that when it is 2 liters at a time she feels very well after. Will have lots of energy and ability to be active, eat. Doesn't have to wake up to vomit. Overall less nausea overall.     Multiple ED visits this pregnancy at [facility] for hyperemesis - , , . The  was her 4th visit within 1 month.      Tolerating pretzels & saltine crackers. Craves some acidic foods - lemonade, cranberry juice - tried them watered down but did not go well. Doesn't tolerate carbonation well.     +Spitting a lot (didn't have with other pregnancy)    She looked into home health services for IV fluid infusions through Medicaid - limited options but gave information to our clinic RN, Demi. [facility]. Would prefer to stay out of the ED.     Phenergan suppository - did not , was very expensive   Phenergan PO at night - makes her sleepy  Diclegis at night   Has also been taking Zofran but ran out.   Getting hard to swallow pills - Asking for Zofran ODT  Taking famotidine - helps with the vomiting. Less painful   Overall keeping seizure meds down ok.   Folic acid - taking    Tiny pink discharge transiently this morning. No other bleeding. No cramping.     Vitals:    10/03/24 1404   BP: 110/70   Pulse: 81   Weight: 157 lb 9.6 oz (71.5 kg)   Height: 60\"   TWG -18 lb 6.4 oz (-8.346 kg)     29 year old  at 10w6d   WAI 25 by 8w0d not consistent with uncertain LMP   A+    PRENATAL LABS - not done yet. Reminded.     -NIPS - desires, will do today, 10/3/2024   -NT ultrasound & early MFM consult - has appt 10/11  -Carrier screen - negative   -COVID-19 vaccination encouraged.      Requests sterilization  -bilateral total salpingectomy at  interval laparoscopy planned (or if  do at that time)  -Sign Medicaid consent form at around 28-30 wk     Obesity (pre preg BMI 34) & h/o placental infarct   -aspirin 162 mg daily encouraged at 12 wk. Patient is going to consider. Having a hard time with hyperemesis.   -limit weight gain 11-20 lb   -early 1 hr GTT, A1c, TSH, CMP, uric acid, urine P/C ordered previously  -L2 US with MFM ordered previously  -growth US  -NSTs    Epilepsy (2012, 1st grand mal seizure at 17)   -Neuro Yogi Alfonso- aware she is pregnant   -H/o missed doses of her AEDs  -generalized seizure 2023. At that time, she was on Keppra 1000 mg bid and Lamictal 100 mg bid. She had missed doses of Keppra and Lamictal the night prior to her seizure   -MRI/MRA brain   -ordered per neurologist 24 (neurologist ok with her having during pregnancy)  -has not done. Encouraged to schedule.    -Meds:  Lamotrigine (Lamictal) 100 mg BID  -24 level therapeutic. Recheck 3 months   Levetiracetam (Keppra) 1000 mg BID -     -24 level therapeutic. Recheck 3 months   -Neuro visit 24. RTC 10/29/24   -Folic acid 4 grams per day advised due to increased risk ONTD   -Last seizure -2023     Migraine without aura   -pre pregnancy: Imitrex  -24 Neuro visit - MRI/MRA brain ordered due to \"worst headache of her life\" on 24 - steroids helped.     Hyperemesis gravidarum  -H/o Hyperemesis in  pregnancy:    Bonjesta - didn't help really initially but would help with sleep  Reglan - limited effect  Compazine - didn't help  Phenergan - seemed to help for a bit.  Zofran - has at home.   Trimethobenzamide - hasn't tried yet but has at home  - - ED visit at [facility] - Zofran not helping. IVF, IV antiemetics -> tolerating PO. Rx Diclegis.   - - > 160 ketones in urine. Recommend ED for IV fluids. Diclegis helping some but not enough. Rx Phenergan rectal suppository & Pepcid. Will talk to clinical supervisor about home  IV fluids   -9/13 ED visit at [facility] for hyperemesis  -9/22 ED visit at [facility] for hyperemesis  -9/30 ED visit at [facility] for hyperemesis. Pt reports 1 liter of IV fluid given. Pt declined admission.   -10/3 - 10w6d - Takes Diclegis, Phenergan, Zofran, Pepcid. Order for PICC/Midline. Order for IV fluids (2 liters NS) & IV Zofran at [facility] 3x/wk. Rx Zofran 4 mg ODT     H/o iron deficiency anemia   -h/o IV iron x 3 doses (12/22/21 - 12/29/22) during pregnancy (not able to tolerate PO iron)     H/o postpartum hemorrhage (atony & cervical laceration) with transfusion (2022)  -aggressive antepartum anemia management   -uterotonics in room, consider TXA at delivery     H/o vitamin B12 deficiency  -vit B12 level     Subchorionic hemorrhage  -noted on 9/22 US in the ED    RTC 4 wk

## 2024-10-03 NOTE — TELEPHONE ENCOUNTER
Patients name &  verified with patient   Lab tubes labeled   NIPT/Panorama screen lab drawn  Patient tolerated well. Test given to PSR for processing and send out.   [facility] information given and patient instructed to call in 7-10 days to discuss results. Patient verbalized understanding.

## 2024-10-03 NOTE — TELEPHONE ENCOUNTER
Left message for [facility] to call back. I am looking for clarification on their home health criteria - criteria for home health and this company request for total of 3 Medicaid pt referral to be able to accept this pt. to absorb cost.

## 2024-10-04 NOTE — TELEPHONE ENCOUNTER
Spoke with patient and advised orders for IV fluids have been sent to [facility]. Understanding verbalized. No other questions at this time.

## 2024-10-09 ENCOUNTER — TELEPHONE (OUTPATIENT)
Dept: NEUROLOGY | Facility: CLINIC | Age: 29
End: 2024-10-09

## 2024-10-09 ENCOUNTER — ROUTINE PRENATAL (OUTPATIENT)
Facility: CLINIC | Age: 29
End: 2024-10-09
Payer: MEDICAID

## 2024-10-09 ENCOUNTER — LAB ENCOUNTER (OUTPATIENT)
Dept: LAB | Age: 29
End: 2024-10-09
Attending: OBSTETRICS & GYNECOLOGY
Payer: MEDICAID

## 2024-10-09 VITALS
BODY MASS INDEX: 31.41 KG/M2 | HEART RATE: 82 BPM | DIASTOLIC BLOOD PRESSURE: 58 MMHG | HEIGHT: 60 IN | SYSTOLIC BLOOD PRESSURE: 106 MMHG | WEIGHT: 160 LBS

## 2024-10-09 DIAGNOSIS — E66.09 OTHER OBESITY DUE TO EXCESS CALORIES AFFECTING PREGNANCY IN FIRST TRIMESTER (HCC): ICD-10-CM

## 2024-10-09 DIAGNOSIS — G40.909 SEIZURE DISORDER (HCC): Primary | ICD-10-CM

## 2024-10-09 DIAGNOSIS — Z86.39 HISTORY OF NON ANEMIC VITAMIN B12 DEFICIENCY: ICD-10-CM

## 2024-10-09 DIAGNOSIS — Z3A.11 11 WEEKS GESTATION OF PREGNANCY (HCC): Primary | ICD-10-CM

## 2024-10-09 DIAGNOSIS — O99.211 OTHER OBESITY DUE TO EXCESS CALORIES AFFECTING PREGNANCY IN FIRST TRIMESTER (HCC): ICD-10-CM

## 2024-10-09 DIAGNOSIS — Z86.39 HISTORY OF IRON DEFICIENCY: ICD-10-CM

## 2024-10-09 LAB
ALBUMIN SERPL-MCNC: 4 G/DL (ref 3.2–4.8)
ALBUMIN/GLOB SERPL: 1.2 {RATIO} (ref 1–2)
ALP LIVER SERPL-CCNC: 77 U/L
ALT SERPL-CCNC: 29 U/L
ANION GAP SERPL CALC-SCNC: 7 MMOL/L (ref 0–18)
ANTIBODY SCREEN: NEGATIVE
AST SERPL-CCNC: 17 U/L (ref ?–34)
BASOPHILS # BLD AUTO: 0.03 X10(3) UL (ref 0–0.2)
BASOPHILS NFR BLD AUTO: 0.4 %
BILIRUB SERPL-MCNC: 0.4 MG/DL (ref 0.3–1.2)
BUN BLD-MCNC: 6 MG/DL (ref 9–23)
CALCIUM BLD-MCNC: 9.2 MG/DL (ref 8.7–10.4)
CHLORIDE SERPL-SCNC: 103 MMOL/L (ref 98–112)
CO2 SERPL-SCNC: 25 MMOL/L (ref 21–32)
CREAT BLD-MCNC: 0.53 MG/DL
DEPRECATED HBV CORE AB SER IA-ACNC: 27 NG/ML
EGFRCR SERPLBLD CKD-EPI 2021: 130 ML/MIN/1.73M2 (ref 60–?)
EOSINOPHIL # BLD AUTO: 0.1 X10(3) UL (ref 0–0.7)
EOSINOPHIL NFR BLD AUTO: 1.3 %
ERYTHROCYTE [DISTWIDTH] IN BLOOD BY AUTOMATED COUNT: 14.7 %
EST. AVERAGE GLUCOSE BLD GHB EST-MCNC: 105 MG/DL (ref 68–126)
FASTING STATUS PATIENT QL REPORTED: YES
GLOBULIN PLAS-MCNC: 3.4 G/DL (ref 2–3.5)
GLUCOSE BLD-MCNC: 78 MG/DL (ref 70–99)
HBA1C MFR BLD: 5.3 % (ref ?–5.7)
HBV CORE AB SERPL QL IA: NONREACTIVE
HBV SURFACE AB SER QL: REACTIVE
HBV SURFACE AB SERPL IA-ACNC: 244.44 MIU/ML
HBV SURFACE AG SER-ACNC: <0.1 [IU]/L
HBV SURFACE AG SERPL QL IA: NONREACTIVE
HCT VFR BLD AUTO: 38.1 %
HCV AB SERPL QL IA: NONREACTIVE
HGB BLD-MCNC: 12.9 G/DL
IMM GRANULOCYTES # BLD AUTO: 0.03 X10(3) UL (ref 0–1)
IMM GRANULOCYTES NFR BLD: 0.4 %
LYMPHOCYTES # BLD AUTO: 2.32 X10(3) UL (ref 1–4)
LYMPHOCYTES NFR BLD AUTO: 30.6 %
MCH RBC QN AUTO: 29 PG (ref 26–34)
MCHC RBC AUTO-ENTMCNC: 33.9 G/DL (ref 31–37)
MCV RBC AUTO: 85.6 FL
MONOCYTES # BLD AUTO: 0.56 X10(3) UL (ref 0.1–1)
MONOCYTES NFR BLD AUTO: 7.4 %
NEUTROPHILS # BLD AUTO: 4.54 X10 (3) UL (ref 1.5–7.7)
NEUTROPHILS # BLD AUTO: 4.54 X10(3) UL (ref 1.5–7.7)
NEUTROPHILS NFR BLD AUTO: 59.9 %
OSMOLALITY SERPL CALC.SUM OF ELEC: 276 MOSM/KG (ref 275–295)
PLATELET # BLD AUTO: 262 10(3)UL (ref 150–450)
POTASSIUM SERPL-SCNC: 3.2 MMOL/L (ref 3.5–5.1)
PROT SERPL-MCNC: 7.4 G/DL (ref 5.7–8.2)
RBC # BLD AUTO: 4.45 X10(6)UL
RH BLOOD TYPE: POSITIVE
RUBV IGG SER QL: POSITIVE
RUBV IGG SER-ACNC: 22 IU/ML (ref 10–?)
SODIUM SERPL-SCNC: 135 MMOL/L (ref 136–145)
T PALLIDUM AB SER QL IA: NONREACTIVE
TSI SER-ACNC: 0.65 MIU/ML (ref 0.55–4.78)
URATE SERPL-MCNC: 3.8 MG/DL
VIT B12 SERPL-MCNC: 370 PG/ML (ref 211–911)
WBC # BLD AUTO: 7.6 X10(3) UL (ref 4–11)

## 2024-10-09 PROCEDURE — 84443 ASSAY THYROID STIM HORMONE: CPT

## 2024-10-09 PROCEDURE — 99214 OFFICE O/P EST MOD 30 MIN: CPT

## 2024-10-09 PROCEDURE — 86780 TREPONEMA PALLIDUM: CPT

## 2024-10-09 PROCEDURE — 86762 RUBELLA ANTIBODY: CPT

## 2024-10-09 PROCEDURE — 86803 HEPATITIS C AB TEST: CPT

## 2024-10-09 PROCEDURE — 83036 HEMOGLOBIN GLYCOSYLATED A1C: CPT

## 2024-10-09 PROCEDURE — 86704 HEP B CORE ANTIBODY TOTAL: CPT

## 2024-10-09 PROCEDURE — 86901 BLOOD TYPING SEROLOGIC RH(D): CPT

## 2024-10-09 PROCEDURE — 80053 COMPREHEN METABOLIC PANEL: CPT

## 2024-10-09 PROCEDURE — 86706 HEP B SURFACE ANTIBODY: CPT

## 2024-10-09 PROCEDURE — 85025 COMPLETE CBC W/AUTO DIFF WBC: CPT

## 2024-10-09 PROCEDURE — 82607 VITAMIN B-12: CPT

## 2024-10-09 PROCEDURE — 82728 ASSAY OF FERRITIN: CPT

## 2024-10-09 PROCEDURE — 87389 HIV-1 AG W/HIV-1&-2 AB AG IA: CPT

## 2024-10-09 PROCEDURE — 86900 BLOOD TYPING SEROLOGIC ABO: CPT

## 2024-10-09 PROCEDURE — 36415 COLL VENOUS BLD VENIPUNCTURE: CPT

## 2024-10-09 PROCEDURE — 86850 RBC ANTIBODY SCREEN: CPT

## 2024-10-09 PROCEDURE — 84550 ASSAY OF BLOOD/URIC ACID: CPT

## 2024-10-09 PROCEDURE — 87340 HEPATITIS B SURFACE AG IA: CPT

## 2024-10-09 NOTE — TELEPHONE ENCOUNTER
Per epic review provider wants Lamotrigine and Keppra level checked in three months post 9/3/24.     Shared with patient the plan. She expressed understanding. Labs are to be ordered to [facility].

## 2024-10-09 NOTE — PROGRESS NOTES
Aureliano 11w5d     Hyperemesis improved - able to hold liquids down and eats two small meals. - has gained 3 lbs since last visit - does not need the IV fluids right now.   -has MFM appointment this Friday   -flu declines for now, wants at next visit     WAI 25 by 8w0d not consistent with uncertain LMP   A+     PRENATAL LABS - not done yet. Reminded.      -NIPS - results pending   -NT ultrasound & early MFM consult - has appt 10/11  -Carrier screen - negative        Requests sterilization  -bilateral total salpingectomy at interval laparoscopy planned (or if  do at that time)  -Sign Medicaid consent form at around 28-30 wk      Obesity (pre preg BMI 34) & h/o placental infarct   -aspirin 162 mg daily encouraged at 12 wk. Patient is going to consider. Having a hard time with hyperemesis.   -limit weight gain 11-20 lb   -early 1 hr GTT, A1c, TSH, CMP, uric acid, urine P/C ordered previously  -L2 US with MFM ordered previously  -growth US  -NSTs     Epilepsy (, 1st grand mal seizure at 17)   -Neuro Yogi Alfonso- aware she is pregnant   -H/o missed doses of her AEDs  -generalized seizure 2023. At that time, she was on Keppra 1000 mg bid and Lamictal 100 mg bid. She had missed doses of Keppra and Lamictal the night prior to her seizure   -MRI/MRA brain   -ordered per neurologist 24 (neurologist ok with her having during pregnancy)  -has not done. Encouraged to schedule.    -Meds:  Lamotrigine (Lamictal) 100 mg BID  -24 level therapeutic. Recheck 3 months   Levetiracetam (Keppra) 1000 mg BID -                 -24 level therapeutic. Recheck 3 months   -Neuro visit 24. RTC 10/29/24   -Folic acid 4 grams per day advised due to increased risk ONTD   -Last seizure -2023      Migraine without aura   -pre pregnancy: Imitrex  -24 Neuro visit - MRI/MRA brain ordered due to \"worst headache of her life\" on 24 - steroids helped.      Hyperemesis gravidarum  -H/o Hyperemesis  in 2021-22 pregnancy:                Bonjesta - didn't help really initially but would help with sleep  Reglan - limited effect  Compazine - didn't help  Phenergan - seemed to help for a bit.  Zofran - has at home.   Trimethobenzamide - hasn't tried yet but has at home  -9/5 - ED visit at [facility] - Zofran not helping. IVF, IV antiemetics -> tolerating PO. Rx Diclegis.   -9/12 - > 160 ketones in urine. Recommend ED for IV fluids. Diclegis helping some but not enough. Rx Phenergan rectal suppository & Pepcid. Will talk to clinical supervisor about home IV fluids   -9/13 ED visit at [facility] for hyperemesis  -9/22 ED visit at [place] for hyperemesis  -9/30 ED visit at [facility] for hyperemesis. Pt reports 1 liter of IV fluid given. Pt declined admission.   -10/3 - 10w6d - Takes Diclegis, Phenergan, Zofran, Pepcid. Order for PICC/Midline. Order for IV fluids (2 liters NS) [facility]. Rx Zofran 4 mg ODT      H/o iron deficiency anemia   -h/o IV iron x 3 doses (12/22/21 - 12/29/22) during pregnancy (not able to tolerate PO iron)      H/o postpartum hemorrhage (atony & cervical laceration) with transfusion (2022)  -aggressive antepartum anemia management   -uterotonics in room, consider TXA at delivery      H/o vitamin B12 deficiency  -vit B12 level      Subchorionic hemorrhage  -noted on 9/22 US in the ED

## 2024-10-11 ENCOUNTER — ULTRASOUND ENCOUNTER (OUTPATIENT)
Dept: PERINATAL CARE | Facility: HOSPITAL | Age: 29
End: 2024-10-11
Attending: OBSTETRICS & GYNECOLOGY
Payer: MEDICAID

## 2024-10-11 VITALS
SYSTOLIC BLOOD PRESSURE: 115 MMHG | BODY MASS INDEX: 31.41 KG/M2 | HEIGHT: 60 IN | WEIGHT: 160 LBS | HEART RATE: 79 BPM | DIASTOLIC BLOOD PRESSURE: 79 MMHG

## 2024-10-11 DIAGNOSIS — Z36.9 FIRST TRIMESTER SCREENING (HCC): ICD-10-CM

## 2024-10-11 DIAGNOSIS — O09.291: ICD-10-CM

## 2024-10-11 DIAGNOSIS — O99.351 EPILEPSY AFFECTING PREGNANCY IN FIRST TRIMESTER (HCC): ICD-10-CM

## 2024-10-11 DIAGNOSIS — G40.909 EPILEPSY AFFECTING PREGNANCY IN FIRST TRIMESTER (HCC): ICD-10-CM

## 2024-10-11 DIAGNOSIS — O20.8 SUBCHORIONIC HEMORRHAGE IN FIRST TRIMESTER (HCC): ICD-10-CM

## 2024-10-11 DIAGNOSIS — O99.211 OTHER OBESITY DUE TO EXCESS CALORIES AFFECTING PREGNANCY IN FIRST TRIMESTER (HCC): ICD-10-CM

## 2024-10-11 DIAGNOSIS — Z34.81 PRENATAL CARE, SUBSEQUENT PREGNANCY, FIRST TRIMESTER (HCC): ICD-10-CM

## 2024-10-11 DIAGNOSIS — O99.211 OTHER OBESITY AFFECTING PREGNANCY IN FIRST TRIMESTER (HCC): ICD-10-CM

## 2024-10-11 DIAGNOSIS — E66.89 OTHER OBESITY AFFECTING PREGNANCY IN FIRST TRIMESTER (HCC): ICD-10-CM

## 2024-10-11 DIAGNOSIS — K11.7 SIALORRHEA: ICD-10-CM

## 2024-10-11 DIAGNOSIS — E66.09 OTHER OBESITY DUE TO EXCESS CALORIES AFFECTING PREGNANCY IN FIRST TRIMESTER (HCC): ICD-10-CM

## 2024-10-11 DIAGNOSIS — Z36.9 FIRST TRIMESTER SCREENING (HCC): Primary | ICD-10-CM

## 2024-10-11 DIAGNOSIS — Z86.39 HISTORY OF NON ANEMIC VITAMIN B12 DEFICIENCY: ICD-10-CM

## 2024-10-11 PROCEDURE — 76813 OB US NUCHAL MEAS 1 GEST: CPT | Performed by: OBSTETRICS & GYNECOLOGY

## 2024-10-14 DIAGNOSIS — O99.351 EPILEPSY AFFECTING PREGNANCY IN FIRST TRIMESTER (HCC): ICD-10-CM

## 2024-10-14 DIAGNOSIS — Z86.39 HISTORY OF NON ANEMIC VITAMIN B12 DEFICIENCY: ICD-10-CM

## 2024-10-14 DIAGNOSIS — K11.7 SIALORRHEA: ICD-10-CM

## 2024-10-14 DIAGNOSIS — Z34.81 PRENATAL CARE, SUBSEQUENT PREGNANCY, FIRST TRIMESTER (HCC): ICD-10-CM

## 2024-10-14 DIAGNOSIS — O99.211 OTHER OBESITY DUE TO EXCESS CALORIES AFFECTING PREGNANCY IN FIRST TRIMESTER (HCC): ICD-10-CM

## 2024-10-14 DIAGNOSIS — O09.291: ICD-10-CM

## 2024-10-14 DIAGNOSIS — E66.09 OTHER OBESITY DUE TO EXCESS CALORIES AFFECTING PREGNANCY IN FIRST TRIMESTER (HCC): ICD-10-CM

## 2024-10-14 DIAGNOSIS — G40.909 EPILEPSY AFFECTING PREGNANCY IN FIRST TRIMESTER (HCC): ICD-10-CM

## 2024-10-14 DIAGNOSIS — O20.8 SUBCHORIONIC HEMORRHAGE IN FIRST TRIMESTER (HCC): ICD-10-CM

## 2024-10-18 PROBLEM — E87.6 HYPOKALEMIA DUE TO EXCESSIVE GASTROINTESTINAL LOSS OF POTASSIUM: Status: ACTIVE | Noted: 2024-10-18

## 2024-10-18 PROBLEM — E61.1 IRON DEFICIENCY: Status: ACTIVE | Noted: 2024-10-18

## 2024-10-21 ENCOUNTER — TELEPHONE (OUTPATIENT)
Facility: CLINIC | Age: 29
End: 2024-10-21

## 2024-10-21 DIAGNOSIS — O20.8 SUBCHORIONIC HEMORRHAGE IN FIRST TRIMESTER (HCC): ICD-10-CM

## 2024-10-21 DIAGNOSIS — Z34.81 PRENATAL CARE, SUBSEQUENT PREGNANCY, FIRST TRIMESTER (HCC): ICD-10-CM

## 2024-10-21 DIAGNOSIS — O09.291: ICD-10-CM

## 2024-10-21 DIAGNOSIS — O99.211 OTHER OBESITY DUE TO EXCESS CALORIES AFFECTING PREGNANCY IN FIRST TRIMESTER (HCC): Primary | ICD-10-CM

## 2024-10-21 DIAGNOSIS — G40.909 EPILEPSY AFFECTING PREGNANCY IN FIRST TRIMESTER (HCC): ICD-10-CM

## 2024-10-21 DIAGNOSIS — O99.351 EPILEPSY AFFECTING PREGNANCY IN FIRST TRIMESTER (HCC): ICD-10-CM

## 2024-10-21 DIAGNOSIS — E66.09 OTHER OBESITY DUE TO EXCESS CALORIES AFFECTING PREGNANCY IN FIRST TRIMESTER (HCC): Primary | ICD-10-CM

## 2024-10-21 DIAGNOSIS — Z86.39 HISTORY OF NON ANEMIC VITAMIN B12 DEFICIENCY: ICD-10-CM

## 2024-10-21 DIAGNOSIS — K11.7 SIALORRHEA: ICD-10-CM

## 2024-10-21 NOTE — TELEPHONE ENCOUNTER
Patient calling to speak to the nurse about lab results and patient states a potential order for IV iron. Please advise.

## 2024-10-22 PROBLEM — O99.012 ANEMIA COMPLICATING PREGNANCY IN SECOND TRIMESTER (HCC): Status: ACTIVE | Noted: 2024-10-22

## 2024-10-23 ENCOUNTER — LAB ENCOUNTER (OUTPATIENT)
Dept: LAB | Age: 29
End: 2024-10-23
Attending: OBSTETRICS & GYNECOLOGY
Payer: MEDICAID

## 2024-10-23 DIAGNOSIS — E66.09 OTHER OBESITY DUE TO EXCESS CALORIES AFFECTING PREGNANCY IN FIRST TRIMESTER (HCC): ICD-10-CM

## 2024-10-23 DIAGNOSIS — O99.211 OTHER OBESITY DUE TO EXCESS CALORIES AFFECTING PREGNANCY IN FIRST TRIMESTER (HCC): ICD-10-CM

## 2024-10-23 LAB — GLUCOSE 1H P GLC SERPL-MCNC: 134 MG/DL

## 2024-10-23 PROCEDURE — 36415 COLL VENOUS BLD VENIPUNCTURE: CPT

## 2024-10-23 PROCEDURE — 82950 GLUCOSE TEST: CPT

## 2024-10-25 ENCOUNTER — APPOINTMENT (OUTPATIENT)
Dept: HEMATOLOGY/ONCOLOGY | Facility: HOSPITAL | Age: 29
End: 2024-10-25
Attending: OBSTETRICS & GYNECOLOGY
Payer: MEDICAID

## 2024-10-28 ENCOUNTER — OFFICE VISIT (OUTPATIENT)
Dept: HEMATOLOGY/ONCOLOGY | Facility: HOSPITAL | Age: 29
End: 2024-10-28
Attending: OBSTETRICS & GYNECOLOGY
Payer: MEDICAID

## 2024-10-28 VITALS
DIASTOLIC BLOOD PRESSURE: 68 MMHG | SYSTOLIC BLOOD PRESSURE: 104 MMHG | HEART RATE: 85 BPM | OXYGEN SATURATION: 95 % | RESPIRATION RATE: 16 BRPM | TEMPERATURE: 97 F

## 2024-10-28 DIAGNOSIS — O99.012 ANEMIA COMPLICATING PREGNANCY IN SECOND TRIMESTER (HCC): Primary | ICD-10-CM

## 2024-10-28 PROCEDURE — 96374 THER/PROPH/DIAG INJ IV PUSH: CPT

## 2024-10-28 NOTE — PROGRESS NOTES
Pt here for venofer . Pt denies any issues or concerns.      Ordering Provider: Jacob  Order Exp: 4 more doses remaining      Pt tolerated infusion without difficulty or complaint. Reviewed next apt date/time: yes      Education Record  Learner:  Patient  Disease / Diagnosis: iron deficiency   Barriers / Limitations:  None  Method:  Discussion  General Topics:  Plan of care reviewed  Outcome:  Shows understanding     Patient in treatment center for venofer. Patient tolerated without issue and left in stable condition. Future appointments in place.

## 2024-10-29 ENCOUNTER — OFFICE VISIT (OUTPATIENT)
Dept: NEUROLOGY | Facility: CLINIC | Age: 29
End: 2024-10-29
Payer: MEDICAID

## 2024-10-29 VITALS
HEIGHT: 60 IN | SYSTOLIC BLOOD PRESSURE: 112 MMHG | RESPIRATION RATE: 16 BRPM | HEART RATE: 68 BPM | DIASTOLIC BLOOD PRESSURE: 68 MMHG | WEIGHT: 160 LBS | BODY MASS INDEX: 31.41 KG/M2

## 2024-10-29 DIAGNOSIS — Z3A.15 15 WEEKS GESTATION OF PREGNANCY (HCC): ICD-10-CM

## 2024-10-29 DIAGNOSIS — G40.909 SEIZURE DISORDER (HCC): Primary | ICD-10-CM

## 2024-10-29 DIAGNOSIS — G43.009 MIGRAINE WITHOUT AURA AND WITHOUT STATUS MIGRAINOSUS, NOT INTRACTABLE: ICD-10-CM

## 2024-10-29 PROCEDURE — 99214 OFFICE O/P EST MOD 30 MIN: CPT | Performed by: OTHER

## 2024-10-29 NOTE — PROGRESS NOTES
[facility] Progress Note    HPI  Chief Complaint   Patient presents with    Seizures     3 month follow up       As per my initial H&P from 6/16/2021,   \"   Dotty Gross is a 25 year old female, who presents for evaluation of seizures.      Patient states she has history of seizures.  She states these usually last 30 seconds and she does not recall them but is told she has a grunting sound and then shakes and loses awareness; denies auras of odd tastes, smells or sounds; has some post ictal confusion and is sore after seizures; may bite tongue and jaw muscles are sore.      She states last seizure was July 29, 2020.  She states she previously had COVID July 18-19, with extensive weight loss and nausea/emesis.  She denied lack of taste and smell and denied fevers or headaches.      She states when she had her last seizure, she was taking Keppra but was not able to keep down medication; was on 750 mg bid and was increased up to 1000 mg bid.  Since this time, she has not had any seizures.  She is currently 9 weeks pregnant and has remained on Keppra; her first pregnancy was in 2017 - had seizure during second trimester.     She has been doing well currently but has been having hyperemesis gravidarum.   Otherwise, patient denies any recent weight change, fevers, chills, double vision/ blurry vision / loss of vision, chest pain, palpitations, shortness of breath, rashes, joint pains, bowel / bladder incontinence or mood issues. \"    Prior notes as below 3/17/2022    Patient at time of last visit was unable to seen formally and was sent to ER as she had breakthrough seizures - states she does not recall coming to office but admits she was out of Lamictal for the prior 2 weeks - she had titrated up to 100 mg dose but never got this refilled and then started to have more confusion and was not as quick to respond; she had 4 seizures reportedly on day of admission 3/9/2022.  She was admitted for workup and  had EEG which was abnormal. She was treated with Depakote 500 mg q8 hrs and Lamictal was restarted at 25 mg bid.  She was discharged home with Depakote 500 mg bid with plan to increase Lamictal to 50 mg bid after 1 week, which she has not done yet.  She remains on Keppra 1500 mg bid.      She denies any recurrent seizures and denies any auras of odd tastes, smells or sounds; she feels alert and back to her usual self.     Prior notes as below: 4/19/2022  Patient last seen 3/17/2022.  Since last visit, she has increased lamotrigine (Lamictal) up to 50 mg bid and reduced valproic acid (Depakote) to 250 mg AM / 500 mg PM- her last level was done 3/21/2022 with valproic acid level elevated and lamotrigine within normal range. She was on Depakote 500 mg bid and Lamictal 50 mg bid at the time and advised to have repeat levels done in ~2 weeks but results not available at this time.  Currently, she is doing well with no seizures and denies any rash, balance issues, double vision, nausea; she denies any auras of odd tastes, smells or sounds and denies recurrent seizures recently.   She has not been able to lose weight since her pregnancy but denies tremor or balance issues.            Prior notes as below 6/8/2022.  Patient last seen 4/19/2022.  Since last visit, she has been on Depakote 250 mg AM/ 500 mg PM and Lamictal 50 mg bid along with Keppra 1500 mg bid.  On this regimen, she denies any seizures and denies any auras of odd tastes,smells, sounds or dizzy spells or episodes of loss of awareness. She has noted some continued weight gain but denies any other side effect of rash, double vision, nausea/emesis or balance issues/falls.            Prior notes as per 7/13/2022.  Patient last seen 6/8/2022.  She is now off Depakote and remains on Keppra 1500 mg bid and Lamictal 50 mg bid; she denies any seizures and denies any auras of odd tastes,smells, sounds or dizzy spells but had an episode a couple of weeks ago where she  briefly \"zoned out\" for a matter of seconds, after she stopped taking the Depakote but has not had any since this time; She denies other  episodes of loss of awareness. She denies other side effect of rash, double vision, nausea/emesis or balance issues/falls.  She recently had levels checked.        Prior notes as per 10/17/2022.  Patient last seen 7/13/2022.  She is still on Keppra 1500 mg bid and is now on Lamictal 100 mg bid.  She has been doing well on this dose.  She denies any episodes of \"zoning out\" and denies auras of odd tastes, smells or sounds.  She denies any episodes of speech arrest and denies other side effects of rash, double vision, nausea/emeiss or balance issues/falls. She has been going to weight loss clinic and is now on fluoxetine 20 mg daily but denies any significant improvement.      Prior notes as per 1/17/2023.  Patient last seen 10/17/2022. Since last visit, she has been weaned down on Keppra currently at 1000 mg bid.  She has remained on Lamictal 100 mg bid and denies any seizures or episodes of loss of awareness, or speech arrest or auras of odd tastes, smells or sounds.  She denies side effects of rash, balance issues, double vision, or nausea.  She has been working with weight loss clinic but is not on fluoxetine or topiramate at this time.         Prior notes as per 7/31/2023.  Patient last seen 1/17/2023.  Patient since last visit had a generalized seizure 2/17/2023.  At that time, she was on Keppra 1000 mg bid and Lamictal 100 mg bid.  She had missed doses of Keppra and Lamictal the night prior to her seizure and did not have recurrent seizures; no auras of odd tastes, smells or sounds.  She admitted to poor sleep and increased stress at time of seizure.  She was recommended to have levels checked but has not done so yet.  Currently, she is doing well with no recurrent seizures and denies auras of odd tastes, smells or sounds or episodes of loss of awareness or speech arrest.   She denies side effects of rash, balance issues, double vision or nausea.        Prior notes as per 1/29/2024.  Patient last seen 7/31/2023.  Since last visit, she has been doing well.  She remains on Lamictal 100 mg bid and Keppra 1000 mg bid and denies any seizures.  She denies missing any doses of medication and has been sleeping better as her child is sleeping through the night ( no issues for child age 4). She denies any auras of odd tastes, smells or sounds or episodes of speech arrest or loss of awareness; no issues with waking in the AM having wet the bed or bitten her tongue.  She additionally denies any side effects of rash, balance issues, double vision or nausea.  She has been driving without issues.       Prior notes as per 5/13/2024.  Patient last seen 1/29/2024.  She was doing well on Keppra 1000 mg bid and Lamictal 100 mg bid. However, in the past few weeks she has been tired during the day despite getting good sleep. She admits to some weight gain and wakes up in the middle of the night coughing.  She may fall asleep in a dark room but not when driving. She has been having episodes in the past 2 weeks where she has difficulty with concentration and has episodes of \"staring off\" but denies loss of awareness.      She denies being tested for sleep apnea but has been gaining weight; she denies issues when driving but notes this mainly when she is conversing with others; denies auras of odd tastes, smells or sounds. She notes when she coughs she has a \"bleach taste\" in her mouth.     Of note, she had pneumonia 3/2024. She has some headaches in the front of the head as well recently; she is light sensitive; no recent head trauma.        Prior notes as per 7/29/2024.  Patient last seen 5/13/2024. In terms of seizures, she is doing well on Keppra 1000 mg bid and Lamcital 100 mg bid - denies seizures or auras but had migraine and severe headache 7/23/2024 - having headaches when she got to work with pain in  head and did not improve with Tylenol. She then noted light sensitivity and nausea and went to lie in a dark room with some brief improvement. She denied loss of awareness and denied associated focal numbness/tingling/weakness. She was given steroid pack with improvement. She felt this headache was the worst of her life.           Patient last seen 7/29/2024. Since last visit, she has not been able to have MRI due to finding out she was pregnant. She is currently 15 weeks gestation and states she has been taking Keppra 1000 mg bid and Lamictal 100 mg bid. She states she had hyperemesis gravidarum in first trimester but is improving. She is no longer having severe headaches but states she has only rare migraines for which sumatriptan has helped. She has not been having any seizures and denies auras of odd tastes, smells or sounds.  She is following with maternal-fetal-medicine service and has been doing well.       Past Medical History:    Cervical laceration    exam under anesthesia, repair of cervical laceration, evacuation of blood clots from uterus manually - Dr. Lora Benjamin, [facility]    COVID-19    Depression    Epilepsy (HCC)    1st grand mal seizure at 17    Female infertility    Hx of transfusion of packed red blood cells    Received 2 units packed red blood cells for postpartum hemorrhage - atonic & cervical laceration    Hyperemesis gravidarum (HCC)    Was put on Zofran - didn't help much.     Hyperemesis gravidarum (HCC)    Iron deficiency anemia during pregnancy, antepartum (HCC)    h/o IV iron x 3 doses (12/22/21 - 12/29/22) during pregnancy (not able to tolerate PO iron)    Keloid scar    chest    Low maternal serum vitamin B12    Migraine without aura    Imitrex per neurologist    Obesity (BMI 30-39.9)    Other immediate postpartum hemorrhage (HCC)    exam under anesthesia, repair of cervical laceration, evacuation of blood clots from uterus manually - Dr. Lora Benjamin [facility]     Pap smear for cervical cancer screening    Pap neg 9/15/22    Pneumonia of both lower lobes    Pubic bone pain    Request for sterilization    1/12/2022 Discussed permanent/irreversible, optional nature of sterilization. Patient expressed understanding. Medicaid consent form signed together.     Screening for genetic disease carrier status    Carrier Screen = Negative    Sialorrhea    Subchorionic hemorrhage in first trimester (HCC)    Trauma during pregnancy (HCC)    Vitamin D deficiency    Worst headache of life    7/29/24 Neuro visit - MRI/MRA brain ordered due to \"worst headache of her life\" on 7/23/24 - steroids helped.     Past Surgical History:   Procedure Laterality Date    Cholecystectomy  2018    Insert intrauterine device  03/01/2022    Copper IUD insertion at postpartum visit - Dr. Lora Gibson    Other surgical history  2012    benign keloid bx chest area    Other surgical history  01/17/2022    exam under anesthesia, repair of cervical laceration, evacuation of blood clots from uterus manually - Dr. Lora Benjamin, [facility].     Family History   Problem Relation Age of Onset    Obesity Mother     No Known Problems Father     No Known Problems Sister     No Known Problems Sister     No Known Problems Sister     Diabetes Maternal Grandmother     No Known Problems Maternal Grandfather     No Known Problems Paternal Grandmother     No Known Problems Paternal Grandfather     Other (Other) Daughter         severe GI sensitivities to multiple foods - no clear diagnosis    Diabetes Maternal Aunt     Birth Defects Neg     Genetic Disease Neg     Thyroid disease Neg     Breast Cancer Neg     Ovarian Cancer Neg     Colon Cancer Neg     DVT/VTE Neg      Social History     Socioeconomic History    Marital status:    Tobacco Use    Smoking status: Never     Passive exposure: Never    Smokeless tobacco: Never   Vaping Use    Vaping status: Never Used   Substance and Sexual Activity    Alcohol use:  Never    Drug use: Never    Sexual activity: Yes     Partners: Male   Other Topics Concern    Caffeine Concern No    Exercise Yes    Seat Belt Yes    Special Diet No    Stress Concern No    Weight Concern Yes     Allergies   Allergen Reactions    Shellfish-Derived Products SWELLING         Current Outpatient Medications:     potassium chloride 20 MEQ Oral Tab CR, Take 1 tablet (20 mEq total) by mouth 2 (two) times daily for 15 days., Disp: 30 tablet, Rfl: 0    ondansetron 4 MG Oral Tablet Dispersible, Take 1 tablet (4 mg total) by mouth every 8 (eight) hours as needed for Nausea., Disp: 30 tablet, Rfl: 5    ondansetron (ZOFRAN) 4 mg tablet, Take 1 tablet (4 mg total) by mouth every 8 (eight) hours as needed for Nausea., Disp: 90 tablet, Rfl: 5    folic acid 1 MG Oral Tab, Take 4 tablets (4 mg total) by mouth daily., Disp: 360 tablet, Rfl: 5    famotidine 20 MG Oral Tab, Take 1 tablet (20 mg total) by mouth 2 (two) times daily., Disp: 60 tablet, Rfl: 5    promethazine 25 MG Oral Tab, Take 1 tablet (25 mg total) by mouth every 6 (six) hours as needed for Nausea., Disp: 60 tablet, Rfl: 5    promethazine 25 MG Rectal Suppos, Place 1 suppository (25 mg total) rectally every 6 (six) hours as needed for Nausea., Disp: 30 suppository, Rfl: 5    doxylamine-pyridoxine 10-10 MG Oral Tab EC, Take 2 tablets by mouth nightly., Disp: 120 tablet, Rfl: 0    SUMAtriptan 50 MG Oral Tab, USE AT ONSET OF MIGRAINE. REPEAT ONCE AFTER 2 HOURS. MAX 2 IN 24 HOURS. MUST LAST 30 DAYS. (Patient taking differently: as needed. USE AT ONSET OF MIGRAINE. REPEAT ONCE AFTER 2 HOURS. MAX 2 IN 24 HOURS. MUST LAST 30 DAYS.), Disp: 9 tablet, Rfl: 5    ondansetron (ZOFRAN) 4 mg tablet, Take 1 tablet (4 mg total) by mouth every 8 (eight) hours as needed for Nausea., Disp: 30 tablet, Rfl: 0    albuterol 108 (90 Base) MCG/ACT Inhalation Aero Soln, Inhale 1 puff into the lungs every 6 (six) hours as needed., Disp: , Rfl:     lamoTRIgine 100 MG Oral Tab, Take  1 tablet (100 mg total) by mouth 2 (two) times daily., Disp: 180 tablet, Rfl: 3    levetiracetam 1000 MG Oral Tab, Take 1 tablet (1,000 mg total) by mouth 2 (two) times daily., Disp: 180 tablet, Rfl: 3    Review of Systems:  No chest pain or palpitations; otherwise as noted in HPI.    Exam:  /68 (BP Location: Left arm, Patient Position: Sitting, Cuff Size: adult)   Pulse 68   Resp 16   Ht 60\"   Wt 160 lb (72.6 kg)   LMP 07/10/2024 (Exact Date)   BMI 31.25 kg/m²   Estimated body mass index is 31.25 kg/m² as calculated from the following:    Height as of this encounter: 60\".    Weight as of this encounter: 160 lb (72.6 kg).    Gen: well developed, well nourished, no acute distress  HEENT: normocephalic  Heart; normal S1/S2, regular rate and rhythm  Lungs: clear to auscultation bilaterally  Extremities: no edema, peripheral pulses intact    Neck: supple, full range of motion; no carotid bruits    Neuro:  Mental status:  Orientation: Alert and oriented to person, place, time  Speech Fluent and conversational    CN: PERRL, EOMI with no nystagmaus, VFF, smile symmetric, sensation intact, tongue and palate midline, SCM intact, otherwise, CN 2-12 intact  Motor: 5/5 strength throughout, tone normal  DTR: 2+ symmetric throughout, toes downgoing bilaterally, no clonus; +tromners in UE bilaterall;y   Sensory: intact to light touch throughout; decreased to pin R great to up to mid foot; vibration 6 sec both feet great toes   Coord: FNF intact with no tremor or dysmetria; rapid alternating movements intact bilaterally  Romberg: absent  Gait: normal casual, heel, toe and tandem gait    Labs:  New    Component      Latest Ref Rng 6/1/2024   WBC      3.8 - 10.8 Thousand/uL 7.7    RBC      3.80 - 5.10 Million/uL 5.22 (H)    Hemoglobin      11.7 - 15.5 g/dL 14.0    Hematocrit      35.0 - 45.0 % 44.9    MCV      80.0 - 100.0 fL 86.0    MCH      27.0 - 33.0 pg 26.8 (L)    MCHC      32.0 - 36.0 g/dL 31.2 (L)    RDW      11.0  - 15.0 % 13.6    Platelet Count      140 - 400 Thousand/uL 392    MPV      7.5 - 12.5 fL 10.3    Neutrophils Absolute      1,500 - 7,800 cells/uL 3,781    Lymphocytes Absolute      850 - 3,900 cells/uL 3,011    Monocytes Absolute      200 - 950 cells/uL 593    Eosinophils Absolute      15 - 500 cells/uL 262    Basophils Absolute      0 - 200 cells/uL 54    Neutrophils %      % 49.1    Lymphocytes %      % 39.1    Monocytes %      % 7.7    Eosinophils %      % 3.4    Basophils %      % 0.7    Glucose      65 - 99 mg/dL 76    BUN      7 - 25 mg/dL 14    CREATININE      0.50 - 0.96 mg/dL 0.64    EGFR      > OR = 60 mL/min/1.73m2 125    BUN/CREATININE RATIO      6 - 22 (calc) SEE NOTE:    Sodium      135 - 146 mmol/L 137    Potassium      3.5 - 5.3 mmol/L 4.8    Chloride      98 - 110 mmol/L 102    Carbon Dioxide, Total      20 - 32 mmol/L 27    CALCIUM      8.6 - 10.2 mg/dL 9.9    PROTEIN, TOTAL      6.1 - 8.1 g/dL 8.1    Albumin      3.6 - 5.1 g/dL 4.5    Globulin      1.9 - 3.7 g/dL (calc) 3.6    A/G Ratio      1.0 - 2.5 (calc) 1.3    Total Bilirubin      0.2 - 1.2 mg/dL 0.4    Alkaline Phosphatase      31 - 125 U/L 81    AST (SGOT)      10 - 30 U/L 22    ALT (SGPT)      6 - 29 U/L 29    LAMOTRIGINE      2.5 - 15.0 mcg/mL 6.1    LEVETIRACETAM      mcg/mL 45.5           Prior as noted below    Component      Latest Ref Rng 8/7/2023   Lamotrigine Lvl      2.0 - 20.0 ug/mL 4.6      From EMR    12/6/2023    CMP: normal range with exception of ALT 38 (minimally elevated)  CBC: normal range    1/25/2023:   From Outside records    Lamotrigine: 1.5 (low)     1/11/2023: 3.3 (mildly low)     Prior as noted below    Component      Latest Ref Rng & Units 8/2/2022   LAMOTRIGINE      4.0 - 18.0 mcg/mL 5.3       Prior as noted below     Component      Latest Ref Rng & Units 7/9/2022   LAMOTRIGINE      4.0 - 18.0 mcg/mL 3.1 (L)       Prior as noted below  Component      Latest Ref Rng & Units 4/18/2022   LAMOTRIGINE      4.0 - 18.0  mcg/mL 6.7   VALPROIC ACID      50.0 - 100.0 mg/L  64.6     Prior as noted below    Component      Latest Ref Rng & Units 3/21/2022   LAMOTRIGINE      4.0 - 18.0 mcg/mL 5.8   VALPROIC ACID      50.0 - 100.0 mg/L  131.6 (H)     Prior as noted below    Component      Latest Ref Rng & Units 3/11/2022 3/9/2022 2/17/2022   WBC      4.0 - 11.0 x10(3) uL  13.4 (H)    RBC      3.80 - 5.30 x10(6)uL  4.70    Hemoglobin      12.0 - 16.0 g/dL  13.1    Hematocrit      35.0 - 48.0 %  41.2    Platelet Count      150.0 - 450.0 10(3)uL  324.0    MCV      80.0 - 100.0 fL  87.7    MCH      26.0 - 34.0 pg  27.9    MCHC      31.0 - 37.0 g/dL  31.8    RDW      %  14.0    Prelim Neutrophil Abs      1.50 - 7.70 x10 (3) uL  11.28 (H)    Neutrophils Absolute      1.50 - 7.70 x10(3) uL  11.28 (H)    Lymphocytes Absolute      1.00 - 4.00 x10(3) uL  1.62    Monocytes Absolute      0.10 - 1.00 x10(3) uL  0.40    Eosinophils Absolute      0.00 - 0.70 x10(3) uL  0.04    Basophils Absolute      0.00 - 0.20 x10(3) uL  0.02    Immature Granulocyte Absolute      0.00 - 1.00 x10(3) uL  0.05    Neutrophils %      %  84.1    Lymphocytes %      %  12.1    Monocytes %      %  3.0    Eosinophils %      %  0.3    Basophils %      %  0.1    Immature Granulocyte %      %  0.4    Glucose      70 - 99 mg/dL  113 (H)    Sodium      136 - 145 mmol/L  139    Potassium      3.5 - 5.1 mmol/L  4.0    Chloride      98 - 112 mmol/L  109    Carbon Dioxide, Total      21.0 - 32.0 mmol/L  23.0    ANION GAP      0 - 18 mmol/L  7    BUN      7 - 18 mg/dL  11    CREATININE      0.55 - 1.02 mg/dL  0.80    CALCIUM      8.5 - 10.1 mg/dL  9.6    CALCULATED OSMOLALITY      275 - 295 mOsm/kg  288    eGFR NON-AFR. AMERICAN      >=60  104    eGFR       >=60  119    AST (SGOT)      15 - 37 U/L  28    ALT (SGPT)      13 - 56 U/L  51    ALKALINE PHOSPHATASE      37 - 98 U/L  75    Total Bilirubin      0.1 - 2.0 mg/dL  0.3    PROTEIN, TOTAL      6.4 - 8.2 g/dL  8.8 (H)     Albumin      3.4 - 5.0 g/dL  4.2    Globulin      2.8 - 4.4 g/dL  4.6 (H)    A/G Ratio      1.0 - 2.0  0.9 (L)    Color Urine      Yellow  Yellow    Clarity Urine      Clear  Cloudy (A)    Spec Gravity      1.001 - 1.030  1.024    Glucose Urine      Negative mg/dL  Negative    Bilirubin Urine      Negative  Negative    Ketones, UA      Negative mg/dL  Trace (A)    Blood Urine      Negative  Large (A)    PH Urine      5.0 - 8.0  5.0    Protein Urine      Negative mg/dL  30 (A)    Urobilinogen Urine      <2.0 mg/dL  <2.0    Nitrite Urine      Negative  Negative    Leukocyte Esterase Urine      Negative  Large (A)    WBC Urine      0 - 5 /HPF  11-20 (A)    RBC Urine      0 - 2 /HPF  >10 (A)    Bacteria Urine      None Seen /HPF  Rare (A)    SQUAM EPI CELLS UR      None Seen /HPF  Few (A)    RENAL TUBULAR EPITHELIAL CELLS      None Seen /HPF  None Seen    TRANSITIONAL EPI CELLS      None Seen /HPF  None Seen    YEAST URINE      None Seen /HPF  None Seen    AMPHETAMINE URINE      Negative  Negative    BENZODIAZEPINES URINE      Negative  Negative    COCAINE URINE      Negative  Negative    CANNABINOID URINE      Negative  Negative    Ecstasy Urine      Negative  Negative    OPIATE URINE      Negative  Negative    Oxycodone Urine      Negative  Negative    CREATININE UR RANDOM      mg/dL  168.00    LEVETIRACETAM (KEPPRA)      12 - 46 ug/mL      LAMOTRIGINE      4.0 - 18.0 mcg/mL   2.9 (L)   VALPROIC ACID      50.0 - 100.0 ug/mL 80.6       Component      Latest Ref Rng & Units 1/21/2022   WBC      4.0 - 11.0 x10(3) uL    RBC      3.80 - 5.30 x10(6)uL    Hemoglobin      12.0 - 16.0 g/dL    Hematocrit      35.0 - 48.0 %    Platelet Count      150.0 - 450.0 10(3)uL    MCV      80.0 - 100.0 fL    MCH      26.0 - 34.0 pg    MCHC      31.0 - 37.0 g/dL    RDW      %    Prelim Neutrophil Abs      1.50 - 7.70 x10 (3) uL    Neutrophils Absolute      1.50 - 7.70 x10(3) uL    Lymphocytes Absolute      1.00 - 4.00 x10(3) uL     Monocytes Absolute      0.10 - 1.00 x10(3) uL    Eosinophils Absolute      0.00 - 0.70 x10(3) uL    Basophils Absolute      0.00 - 0.20 x10(3) uL    Immature Granulocyte Absolute      0.00 - 1.00 x10(3) uL    Neutrophils %      %    Lymphocytes %      %    Monocytes %      %    Eosinophils %      %    Basophils %      %    Immature Granulocyte %      %    Glucose      70 - 99 mg/dL    Sodium      136 - 145 mmol/L    Potassium      3.5 - 5.1 mmol/L    Chloride      98 - 112 mmol/L    Carbon Dioxide, Total      21.0 - 32.0 mmol/L    ANION GAP      0 - 18 mmol/L    BUN      7 - 18 mg/dL    CREATININE      0.55 - 1.02 mg/dL    CALCIUM      8.5 - 10.1 mg/dL    CALCULATED OSMOLALITY      275 - 295 mOsm/kg    eGFR NON-AFR. AMERICAN      >=60    eGFR       >=60    AST (SGOT)      15 - 37 U/L    ALT (SGPT)      13 - 56 U/L    ALKALINE PHOSPHATASE      37 - 98 U/L    Total Bilirubin      0.1 - 2.0 mg/dL    PROTEIN, TOTAL      6.4 - 8.2 g/dL    Albumin      3.4 - 5.0 g/dL    Globulin      2.8 - 4.4 g/dL    A/G Ratio      1.0 - 2.0    Color Urine      Yellow    Clarity Urine      Clear    Spec Gravity      1.001 - 1.030    Glucose Urine      Negative mg/dL    Bilirubin Urine      Negative    Ketones, UA      Negative mg/dL    Blood Urine      Negative    PH Urine      5.0 - 8.0    Protein Urine      Negative mg/dL    Urobilinogen Urine      <2.0 mg/dL    Nitrite Urine      Negative    Leukocyte Esterase Urine      Negative    WBC Urine      0 - 5 /HPF    RBC Urine      0 - 2 /HPF    Bacteria Urine      None Seen /HPF    SQUAM EPI CELLS UR      None Seen /HPF    RENAL TUBULAR EPITHELIAL CELLS      None Seen /HPF    TRANSITIONAL EPI CELLS      None Seen /HPF    YEAST URINE      None Seen /HPF    AMPHETAMINE URINE      Negative    BENZODIAZEPINES URINE      Negative    COCAINE URINE      Negative    CANNABINOID URINE      Negative    Ecstasy Urine      Negative    OPIATE URINE      Negative    Oxycodone Urine       Negative    CREATININE UR RANDOM      mg/dL    LEVETIRACETAM (KEPPRA)      12 - 46 ug/mL 55 (H)   LAMOTRIGINE      4.0 - 18.0 mcg/mL    VALPROIC ACID      50.0 - 100.0 ug/mL          Component      Latest Ref Rng & Units 1/21/2022   WBC      4.0 - 11.0 x10(3) uL 10.5   RBC      3.80 - 5.30 x10(6)uL 3.69 (L)   Hemoglobin      12.0 - 16.0 g/dL 10.3 (L)   Hematocrit      35.0 - 48.0 % 32.6 (L)   Platelet Count      150.0 - 450.0 10(3)uL 287.0   MCV      80.0 - 100.0 fL 88.3   MCH      26.0 - 34.0 pg 27.9   MCHC      31.0 - 37.0 g/dL 31.6   RDW      % 16.5   Prelim Neutrophil Abs      1.50 - 7.70 x10 (3) uL 7.73 (H)   Neutrophils Absolute      1.50 - 7.70 x10(3) uL 7.73 (H)   Lymphocytes Absolute      1.00 - 4.00 x10(3) uL 1.94   Monocytes Absolute      0.10 - 1.00 x10(3) uL 0.55   Eosinophils Absolute      0.00 - 0.70 x10(3) uL 0.20   Basophils Absolute      0.00 - 0.20 x10(3) uL 0.03   Immature Granulocyte Absolute      0.00 - 1.00 x10(3) uL 0.06   Neutrophils %      % 73.5   Lymphocytes %      % 18.5   Monocytes %      % 5.2   Eosinophils %      % 1.9   Basophils %      % 0.3   Immature Granulocyte %      % 0.6   Glucose      70 - 99 mg/dL 77   Sodium      136 - 145 mmol/L 140   Potassium      3.5 - 5.1 mmol/L 3.8   Chloride      98 - 112 mmol/L 108   Carbon Dioxide, Total      21.0 - 32.0 mmol/L 30.0   ANION GAP      0 - 18 mmol/L 2   BUN      7 - 18 mg/dL 12   CREATININE      0.55 - 1.02 mg/dL 0.55   CALCIUM      8.5 - 10.1 mg/dL 8.8   CALCULATED OSMOLALITY      275 - 295 mOsm/kg 289   eGFR NON-AFR. AMERICAN      >=60 132   eGFR       >=60 152   AST (SGOT)      15 - 37 U/L 22   ALT (SGPT)      13 - 56 U/L 22   ALKALINE PHOSPHATASE      37 - 98 U/L 114 (H)   Total Bilirubin      0.1 - 2.0 mg/dL 0.4   PROTEIN, TOTAL      6.4 - 8.2 g/dL 7.0   Albumin      3.4 - 5.0 g/dL 2.7 (L)   Globulin      2.8 - 4.4 g/dL 4.3   A/G Ratio      1.0 - 2.0 0.6 (L)   Color Urine      Yellow Yellow   Clarity Urine       Clear Hazy (A)   Spec Gravity      1.001 - 1.030 >1.030 (H)   Glucose Urine      Negative mg/dL Negative   Bilirubin Urine      Negative Negative   Ketones, UA      Negative mg/dL Negative   Blood Urine      Negative Large (A)   PH Urine      5.0 - 8.0 7.0   Protein Urine      Negative mg/dL 100 (A)   Urobilinogen Urine      <2.0 mg/dL <2.0   Nitrite Urine      Negative Negative   Leukocyte Esterase Urine      Negative Small (A)   WBC Urine      0 - 5 /HPF >50 (A)   RBC Urine      0 - 2 /HPF >10 (A)   Bacteria Urine      None Seen /HPF Rare (A)   SQUAM EPI CELLS UR      None Seen /HPF Few (A)   RENAL TUBULAR EPITHELIAL CELLS      None Seen /HPF None Seen   TRANSITIONAL EPI CELLS      None Seen /HPF None Seen   YEAST URINE      None Seen /HPF None Seen   LEVETIRACETAM (KEPPRA)      12 - 46 ug/mL 55 (H)     Prior as noted below  Component      Latest Ref Rng & Units 8/12/2021   LEVETIRACETAM (KEPPRA)      12 - 46 ug/mL 34       Imaging:  None new             Impression/ Plan:  Dotty Gross is a 29 year old female patient with PMHx significant for seizure disorder, who originally presented 6/2021, for evaluation and management.     Neurologic examination is normal and nonfocal     Patient endorses a history of seizures since 2012.  She has no recollection during these events.    Previously, her seizures were well controlled on Keppra at 1000 mg twice daily, with last seizure occurring approximately 1 year prior, and one prior to that occurring during second trimester of pregnancy in 2017.    However, she was having complications of hyperemesis gravidarum during first and second trimesters with most recent pregnancy.  She did not have any seizures during that time.  However, she had recurrent seizures and was increased on Keppra up to 1500 mg bid - she subsequently delivered her baby and had recurrent seizures, which suspect are breakthrough seizures and may be related to missing dose of medication or receiving blood  transfusion.  However, levels were therapeutic and she has had seizures on several occasions during and after pregnancy.     Given this most recent seizure cluster, she was previously recommended to start Lamictal and was doing well on dose up to 100 mg daily after following starter kit instructions; however, she did not refill the medication and stopped talking for ~ 2 weeks and presented to ED for admission 3/9/2022 following witnessed seizure in office with reports of 3 or 4 additional seizures that day.       She is now doing well and suspect her breakthrough seizures were due to missing anti-seizure medication as well as concurrent infectious process - she was doing well on Depakote and Lamictal aside from weight gain -  levels were in range as of 4/18/2022 - given weight gain, she was weaned off Depakote and started on Lamictal up to 100 mg bid along with Keppra 1500 mg bid.  Levels  done off Depakote and on Lamictal 100 mg bid were within normal limits; given improvement, she has been weaned down on Keppra to 1000 mg bid and was doing well.    Lamictal level was low range as of 1/30/2023 and patient had seizure 2/17/2023.  She has not had any recurrent seizures and repeat level 8/2023 was within range - she remains on Lamictal 100 mg bid and Keppra 1000 mg bid. Labs and levels unremarkable. She had new severe headache - symptoms seem most consistent with migraine but she denies prior history of migraine and given this was worst headache of life, MRI brain / MRA brain ordered without contrast previously         Patient has not been able to have MRI due to finding out she was pregnant. She is currently 15 weeks gestation but headaches now resolved - will defer imaging at this time as headaches resolved.      She states she has been taking Keppra 1000 mg bid and Lamictal 100 mg bid. She states she had hyperemesis gravidarum in first trimester but is improving. She is no longer having severe headaches but states  she has only rare migraines for which sumatriptan has helped. She has not been having any seizures and denies auras of odd tastes, smells or sounds.  She is following with maternal-fetal-medicine service and has been doing well.  She has migraines and is taking sumatriptan PRN for abortive therapy - will continue; otherwise, check levels periodically during pregnancy and patient advised to notify office with any new seizures and stressed importance of maintaining adequate levels during pregnancy.    1. Seizure disorder (HCC)  As noted above     2. Migraine without aura and without status migrainosus, not intractable  As noted above     3. 15 weeks gestation of pregnancy (HCC)  As noted above    Return in about 3 months (around 1/29/2025).    Yogi Alfonso MD, Neurology  [facility]  Pager [phone]  10/29/2024

## 2024-10-30 ENCOUNTER — OFFICE VISIT (OUTPATIENT)
Dept: HEMATOLOGY/ONCOLOGY | Facility: HOSPITAL | Age: 29
End: 2024-10-30
Attending: OBSTETRICS & GYNECOLOGY
Payer: MEDICAID

## 2024-10-30 VITALS
DIASTOLIC BLOOD PRESSURE: 79 MMHG | RESPIRATION RATE: 18 BRPM | HEART RATE: 79 BPM | TEMPERATURE: 98 F | OXYGEN SATURATION: 96 % | SYSTOLIC BLOOD PRESSURE: 113 MMHG

## 2024-10-30 DIAGNOSIS — O99.012 ANEMIA COMPLICATING PREGNANCY IN SECOND TRIMESTER (HCC): Primary | ICD-10-CM

## 2024-10-30 PROCEDURE — 96374 THER/PROPH/DIAG INJ IV PUSH: CPT

## 2024-10-30 NOTE — PROGRESS NOTES
Pt here for venofer. Pt denies any issues or concerns.      Ordering Provider: Jacob  Order Exp: after 5th dose     Pt tolerated infusion without difficulty or complaint. Reviewed next apt date/time: 11/1 at 2pm      Education Record  Learner:  Patient  Disease / Diagnosis: anemia  Barriers / Limitations:  None  Method:  Reinforcement  General Topics:  Medication and Plan of care reviewed  Outcome:  Shows understanding

## 2024-11-01 ENCOUNTER — APPOINTMENT (OUTPATIENT)
Dept: HEMATOLOGY/ONCOLOGY | Facility: HOSPITAL | Age: 29
End: 2024-11-01
Attending: OBSTETRICS & GYNECOLOGY
Payer: MEDICAID

## 2024-11-04 ENCOUNTER — OFFICE VISIT (OUTPATIENT)
Dept: HEMATOLOGY/ONCOLOGY | Facility: HOSPITAL | Age: 29
End: 2024-11-04
Attending: OBSTETRICS & GYNECOLOGY
Payer: MEDICAID

## 2024-11-04 VITALS
RESPIRATION RATE: 14 BRPM | OXYGEN SATURATION: 98 % | DIASTOLIC BLOOD PRESSURE: 67 MMHG | HEIGHT: 60 IN | SYSTOLIC BLOOD PRESSURE: 109 MMHG | HEART RATE: 78 BPM | WEIGHT: 161.38 LBS | BODY MASS INDEX: 31.68 KG/M2 | TEMPERATURE: 98 F

## 2024-11-04 DIAGNOSIS — O99.012 ANEMIA COMPLICATING PREGNANCY IN SECOND TRIMESTER (HCC): Primary | ICD-10-CM

## 2024-11-04 PROCEDURE — 96374 THER/PROPH/DIAG INJ IV PUSH: CPT

## 2024-11-04 NOTE — PROGRESS NOTES
Pt here for Venofer . Pt denies any issues or concerns.      Ordering Provider: dr robison  Order Exp: 11-9-24     Pt tolerated infusion without difficulty or complaint. Reviewed next apt date/time: 11-6-24      Education Record  Learner:  Patient  Disease / Diagnosis: AMINA  Barriers / Limitations:  None  Method:  Brief focused  General Topics:  Plan of care reviewed  Outcome:  Shows understanding

## 2024-11-06 ENCOUNTER — ROUTINE PRENATAL (OUTPATIENT)
Facility: CLINIC | Age: 29
End: 2024-11-06
Payer: MEDICAID

## 2024-11-06 ENCOUNTER — OFFICE VISIT (OUTPATIENT)
Dept: HEMATOLOGY/ONCOLOGY | Facility: HOSPITAL | Age: 29
End: 2024-11-06
Attending: OBSTETRICS & GYNECOLOGY
Payer: MEDICAID

## 2024-11-06 VITALS
BODY MASS INDEX: 32 KG/M2 | HEIGHT: 60 IN | SYSTOLIC BLOOD PRESSURE: 100 MMHG | WEIGHT: 163 LBS | DIASTOLIC BLOOD PRESSURE: 62 MMHG | HEART RATE: 98 BPM

## 2024-11-06 VITALS
SYSTOLIC BLOOD PRESSURE: 111 MMHG | DIASTOLIC BLOOD PRESSURE: 64 MMHG | OXYGEN SATURATION: 97 % | HEART RATE: 80 BPM | RESPIRATION RATE: 16 BRPM | TEMPERATURE: 98 F

## 2024-11-06 DIAGNOSIS — Z34.82 PRENATAL CARE, SUBSEQUENT PREGNANCY IN SECOND TRIMESTER (HCC): Primary | ICD-10-CM

## 2024-11-06 DIAGNOSIS — O99.211 OTHER OBESITY DUE TO EXCESS CALORIES AFFECTING PREGNANCY IN FIRST TRIMESTER (HCC): ICD-10-CM

## 2024-11-06 DIAGNOSIS — O99.351 EPILEPSY AFFECTING PREGNANCY IN FIRST TRIMESTER (HCC): ICD-10-CM

## 2024-11-06 DIAGNOSIS — N89.8 VAGINAL DISCHARGE: ICD-10-CM

## 2024-11-06 DIAGNOSIS — G40.909 EPILEPSY AFFECTING PREGNANCY IN FIRST TRIMESTER (HCC): ICD-10-CM

## 2024-11-06 DIAGNOSIS — O99.012 ANEMIA COMPLICATING PREGNANCY IN SECOND TRIMESTER (HCC): Primary | ICD-10-CM

## 2024-11-06 DIAGNOSIS — Z3A.15 15 WEEKS GESTATION OF PREGNANCY (HCC): ICD-10-CM

## 2024-11-06 DIAGNOSIS — R39.9 UTI SYMPTOMS: ICD-10-CM

## 2024-11-06 DIAGNOSIS — E66.09 OTHER OBESITY DUE TO EXCESS CALORIES AFFECTING PREGNANCY IN FIRST TRIMESTER (HCC): ICD-10-CM

## 2024-11-06 LAB
BILIRUBIN: NEGATIVE
GLUCOSE (URINE DIPSTICK): NEGATIVE MG/DL
KETONES (URINE DIPSTICK): NEGATIVE MG/DL
MULTISTIX LOT#: ABNORMAL NUMERIC
NITRITE, URINE: NEGATIVE
PH, URINE: 6 (ref 4.5–8)
PROTEIN (URINE DIPSTICK): 30 MG/DL
SPECIFIC GRAVITY: 1.02 (ref 1–1.03)
UROBILINOGEN,SEMI-QN: 0.2 MG/DL (ref 0–1.9)

## 2024-11-06 PROCEDURE — 81514 NFCT DS BV&VAGINITIS DNA ALG: CPT | Performed by: OBSTETRICS & GYNECOLOGY

## 2024-11-06 PROCEDURE — 81002 URINALYSIS NONAUTO W/O SCOPE: CPT | Performed by: OBSTETRICS & GYNECOLOGY

## 2024-11-06 PROCEDURE — 96374 THER/PROPH/DIAG INJ IV PUSH: CPT

## 2024-11-06 PROCEDURE — 99214 OFFICE O/P EST MOD 30 MIN: CPT | Performed by: OBSTETRICS & GYNECOLOGY

## 2024-11-06 PROCEDURE — 87086 URINE CULTURE/COLONY COUNT: CPT | Performed by: OBSTETRICS & GYNECOLOGY

## 2024-11-06 NOTE — PROGRESS NOTES
Patient is feeling much better, noticed increased vaginal discharge and burning- vag culture done    WAI 25 by 8w0d not consistent with uncertain LMP        -NIPS -Neg Girl  -NT NL  -Carrier screen - negative        Requests sterilization  -bilateral total salpingectomy at interval laparoscopy planned (or if  do at that time)  -Sign Medicaid consent form at around 28-30 wk      Obesity (pre preg BMI 34) & h/o placental infarct   -aspirin 162 mg daily encouraged at 12 wk. Patient is going to consider. Having a hard time with hyperemesis.   -limit weight gain 11-20 lb   -early 1 hr GTT, A1c, TSH, CMP, uric acid, urine P/C normal  -L2 US with MFM scheduled  -growth US  -NSTs     Epilepsy (, 1st grand mal seizure at 17)   -Neuro Yogi Alfonso- aware she is pregnant   -H/o missed doses of her AEDs  -generalized seizure 2023. At that time, she was on Keppra 1000 mg bid and Lamictal 100 mg bid. She had missed doses of Keppra and Lamictal the night prior to her seizure   -MRI/MRA brain   -ordered per neurologist 24 (neurologist ok with her having during pregnancy)  -has not done. Encouraged to schedule.    -Meds:  Lamotrigine (Lamictal) 100 mg BID  -24 level therapeutic. Recheck 3 months   Levetiracetam (Keppra) 1000 mg BID -                 -24 level therapeutic. Recheck 3 months   -Neuro visit 24., 10/29/24 , next in December  -Folic acid 4 grams per day advised due to increased risk ONTD   -Last seizure -2023      Migraine without aura   -pre pregnancy: Imitrex  -24 Neuro visit - MRI/MRA brain ordered due to \"worst headache of her life\" on 24 - steroids helped.      Hyperemesis gravidarum  -improved  - currently taking Diclegis and reglan      H/o iron deficiency anemia   -h/o IV iron x 3 doses (21 - 22) during pregnancy (not able to tolerate PO iron)   - receiving IV iron, dose #4 today for iron deficienc     H/o postpartum hemorrhage (atony &  cervical laceration) with transfusion (2022)  -aggressive antepartum anemia management   -uterotonics in room, consider TXA at delivery

## 2024-11-06 NOTE — PROGRESS NOTES
Pt here for venofer . Pt denies any issues or concerns.      Ordering Provider: Jacob   Order Exp: 1 more dose remains      Pt tolerated infusion without difficulty or complaint. Reviewed next apt date/time: yes      Education Record  Learner:  Patient  Disease / Diagnosis: iron deficiency   Barriers / Limitations:  None  Method:  Discussion  General Topics:  Plan of care reviewed  Outcome:  Shows understanding    Patient in treatment center for Venofer. Patient feeling well with no new complaints or concerns. Patient tolerated without issue and left in stable condition.

## 2024-11-07 LAB
BV BACTERIA DNA VAG QL NAA+PROBE: NEGATIVE
C GLABRATA DNA VAG QL NAA+PROBE: NEGATIVE
C KRUSEI DNA VAG QL NAA+PROBE: NEGATIVE
CANDIDA DNA VAG QL NAA+PROBE: POSITIVE
T VAGINALIS DNA VAG QL NAA+PROBE: NEGATIVE

## 2024-11-14 RX ORDER — TERCONAZOLE 8 MG/G
1 CREAM VAGINAL NIGHTLY
Qty: 45 G | Refills: 0 | Status: SHIPPED | OUTPATIENT
Start: 2024-11-14 | End: 2024-11-21

## 2024-11-22 NOTE — PROGRESS NOTES
Outpatient Maternal-Fetal Medicine Follow-Up    Dear Dr. James    Thank you for requesting an ultrasound and maternal-fetal medicine consultation on your patient Dotty Gross.  As you are aware she is a 29 year old female  with a verduzco pregnancy and an Estimated Date of Delivery: 25.  She returned to maternal-fetal medicine today for a follow-up visit.  Her history was reviewed from her prior visit and there were no interval changes.    Antepartum Risk Factors  Obesity  Maternal seizure disorder    PHYSICAL EXAMINATION:  /73 (BP Location: Right arm, Patient Position: Sitting, Cuff Size: adult)   Pulse 88   Ht 5' (1.524 m)   Wt 166 lb (75.3 kg)   LMP 07/10/2024 (Exact Date)   BMI 32.42 kg/m²   General: alert and oriented, no acute distress  Abdomen: gravid, soft, non-tender  Extremities: non-tender, no edema    OBSTETRIC ULTRASOUND  The patient had a level II ultrasound today which revealed size consistent with dates and a normal detailed anatomic survey.      Ultrasound Findings:  Single IUP in cephalic presentation.    Placenta is anterior.   A 3 vessel cord is noted.  Cardiac activity is present at 135 bpm   g ( 0 lb 12 oz);   MVP is 5.9 cm .     The fetal measurements are consistent with the established EDC. No ultrasound evidence of structural abnormalities are seen today. The nasal bone is present. No ultrasound evidence of markers for aneuploidy are seen. She understands that ultrasound exam cannot exclude genetic abnormalities and that genetic testing is recommended. The limitations of ultrasound were discussed.     Uterus and adnexa appeared normal  today on US    I interpreted the results and reviewed them with the patient.    DISCUSSION  During her visit we discussed and reviewed the following issues:  OBESITY  See prior MFM notes for a detailed review.    MATERNAL SEIZURE DOISORDER  See prior [facility] notes for a detailed review.    IMPRESSION:  IUP at  20w0d  Obesity  Maternal seizure disorder    RECOMMENDATIONS:  Continue care with Dr. James  Growth US & BPP at 32 weeks  Weekly NSTs at 36 weeks  11-20 lb weight gain for pregnancy  Continue antiepileptic medications and comanagement with neurology    Thank you for allowing me to participate in the care of your patient.  Please do not hesitate to contact me if additional questions or concerns arise.      Yogi Hicks M.D.    40 minutes spent in review of records, patient consultation, documentation and coordination of care.  The relevant clinical matter(s) are summarized above.     Note to patient and family  The 21st Century Cures Act makes medical notes available to patients in the interest of transparency.  However, please be advised that this is a medical document.  It is intended as [id number] communication.  It is written and medical language may contain abbreviations or verbiage that are technical and unfamiliar.  It may appear blunt or direct.  Medical documents are intended to carry relevant information, facts as evident, and the clinical opinion of the practitioner.

## 2024-12-06 ENCOUNTER — OFFICE VISIT (OUTPATIENT)
Dept: PERINATAL CARE | Facility: HOSPITAL | Age: 29
End: 2024-12-06
Attending: OBSTETRICS & GYNECOLOGY
Payer: MEDICAID

## 2024-12-06 ENCOUNTER — ROUTINE PRENATAL (OUTPATIENT)
Facility: CLINIC | Age: 29
End: 2024-12-06
Payer: MEDICAID

## 2024-12-06 ENCOUNTER — LAB ENCOUNTER (OUTPATIENT)
Dept: LAB | Age: 29
End: 2024-12-06
Attending: OBSTETRICS & GYNECOLOGY
Payer: MEDICAID

## 2024-12-06 VITALS
HEART RATE: 82 BPM | HEIGHT: 60 IN | WEIGHT: 165 LBS | BODY MASS INDEX: 32.39 KG/M2 | DIASTOLIC BLOOD PRESSURE: 60 MMHG | SYSTOLIC BLOOD PRESSURE: 110 MMHG

## 2024-12-06 VITALS
SYSTOLIC BLOOD PRESSURE: 111 MMHG | WEIGHT: 166 LBS | HEART RATE: 88 BPM | BODY MASS INDEX: 32.59 KG/M2 | DIASTOLIC BLOOD PRESSURE: 73 MMHG | HEIGHT: 60 IN

## 2024-12-06 DIAGNOSIS — G40.909 EPILEPSY AFFECTING PREGNANCY IN SECOND TRIMESTER (HCC): Primary | ICD-10-CM

## 2024-12-06 DIAGNOSIS — E66.09 OTHER OBESITY DUE TO EXCESS CALORIES AFFECTING PREGNANCY IN SECOND TRIMESTER (HCC): ICD-10-CM

## 2024-12-06 DIAGNOSIS — O21.0 HYPEREMESIS GRAVIDARUM (HCC): ICD-10-CM

## 2024-12-06 DIAGNOSIS — Z13.0 SCREENING, ANEMIA, DEFICIENCY, IRON: ICD-10-CM

## 2024-12-06 DIAGNOSIS — O99.212 OTHER OBESITY DUE TO EXCESS CALORIES AFFECTING PREGNANCY IN SECOND TRIMESTER (HCC): ICD-10-CM

## 2024-12-06 DIAGNOSIS — G40.909 SEIZURE DISORDER (HCC): ICD-10-CM

## 2024-12-06 DIAGNOSIS — O99.352 EPILEPSY AFFECTING PREGNANCY IN SECOND TRIMESTER (HCC): Primary | ICD-10-CM

## 2024-12-06 DIAGNOSIS — Z34.81 PRENATAL CARE, SUBSEQUENT PREGNANCY, FIRST TRIMESTER (HCC): ICD-10-CM

## 2024-12-06 DIAGNOSIS — Z86.39 HISTORY OF NON ANEMIC VITAMIN B12 DEFICIENCY: ICD-10-CM

## 2024-12-06 DIAGNOSIS — O20.8 SUBCHORIONIC HEMORRHAGE IN FIRST TRIMESTER (HCC): ICD-10-CM

## 2024-12-06 DIAGNOSIS — Z34.82 PRENATAL CARE, SUBSEQUENT PREGNANCY IN SECOND TRIMESTER (HCC): ICD-10-CM

## 2024-12-06 DIAGNOSIS — E66.09 OTHER OBESITY DUE TO EXCESS CALORIES AFFECTING PREGNANCY IN SECOND TRIMESTER (HCC): Primary | ICD-10-CM

## 2024-12-06 DIAGNOSIS — Z13.1 SCREENING FOR DIABETES MELLITUS: ICD-10-CM

## 2024-12-06 DIAGNOSIS — O99.352 EPILEPSY AFFECTING PREGNANCY IN SECOND TRIMESTER (HCC): ICD-10-CM

## 2024-12-06 DIAGNOSIS — O99.212 OTHER OBESITY AFFECTING PREGNANCY IN SECOND TRIMESTER (HCC): ICD-10-CM

## 2024-12-06 DIAGNOSIS — Z87.59 HISTORY OF POSTPARTUM HEMORRHAGE: ICD-10-CM

## 2024-12-06 DIAGNOSIS — G40.909 EPILEPSY AFFECTING PREGNANCY IN SECOND TRIMESTER (HCC): ICD-10-CM

## 2024-12-06 DIAGNOSIS — Z23 NEED FOR VACCINATION: ICD-10-CM

## 2024-12-06 DIAGNOSIS — O99.212 OTHER OBESITY DUE TO EXCESS CALORIES AFFECTING PREGNANCY IN SECOND TRIMESTER (HCC): Primary | ICD-10-CM

## 2024-12-06 DIAGNOSIS — E66.89 OTHER OBESITY AFFECTING PREGNANCY IN SECOND TRIMESTER (HCC): ICD-10-CM

## 2024-12-06 DIAGNOSIS — K11.7 SIALORRHEA: ICD-10-CM

## 2024-12-06 DIAGNOSIS — E61.1 IRON DEFICIENCY: ICD-10-CM

## 2024-12-06 DIAGNOSIS — O09.292 HISTORY OF POSTPARTUM HEMORRHAGE, CURRENTLY PREGNANT IN SECOND TRIMESTER (HCC): ICD-10-CM

## 2024-12-06 LAB
ALBUMIN SERPL-MCNC: 4.1 G/DL (ref 3.2–4.8)
ALBUMIN/GLOB SERPL: 1.5 {RATIO} (ref 1–2)
ALP LIVER SERPL-CCNC: 59 U/L
ALT SERPL-CCNC: <7 U/L
ANION GAP SERPL CALC-SCNC: 6 MMOL/L (ref 0–18)
AST SERPL-CCNC: 10 U/L (ref ?–34)
BASOPHILS # BLD AUTO: 0.03 X10(3) UL (ref 0–0.2)
BASOPHILS NFR BLD AUTO: 0.3 %
BILIRUB SERPL-MCNC: 0.3 MG/DL (ref 0.3–1.2)
BUN BLD-MCNC: 10 MG/DL (ref 9–23)
CALCIUM BLD-MCNC: 9.6 MG/DL (ref 8.7–10.4)
CHLORIDE SERPL-SCNC: 107 MMOL/L (ref 98–112)
CO2 SERPL-SCNC: 26 MMOL/L (ref 21–32)
CREAT BLD-MCNC: 0.58 MG/DL
EGFRCR SERPLBLD CKD-EPI 2021: 127 ML/MIN/1.73M2 (ref 60–?)
EOSINOPHIL # BLD AUTO: 0.15 X10(3) UL (ref 0–0.7)
EOSINOPHIL NFR BLD AUTO: 1.3 %
ERYTHROCYTE [DISTWIDTH] IN BLOOD BY AUTOMATED COUNT: 14.6 %
EST. AVERAGE GLUCOSE BLD GHB EST-MCNC: 100 MG/DL (ref 68–126)
FASTING STATUS PATIENT QL REPORTED: NO
GLOBULIN PLAS-MCNC: 2.8 G/DL (ref 2–3.5)
GLUCOSE BLD-MCNC: 85 MG/DL (ref 70–99)
HBA1C MFR BLD: 5.1 % (ref ?–5.7)
HCT VFR BLD AUTO: 36.1 %
HGB BLD-MCNC: 12.1 G/DL
IMM GRANULOCYTES # BLD AUTO: 0.05 X10(3) UL (ref 0–1)
IMM GRANULOCYTES NFR BLD: 0.4 %
LYMPHOCYTES # BLD AUTO: 2.16 X10(3) UL (ref 1–4)
LYMPHOCYTES NFR BLD AUTO: 19 %
MCH RBC QN AUTO: 30.1 PG (ref 26–34)
MCHC RBC AUTO-ENTMCNC: 33.5 G/DL (ref 31–37)
MCV RBC AUTO: 89.8 FL
MONOCYTES # BLD AUTO: 0.57 X10(3) UL (ref 0.1–1)
MONOCYTES NFR BLD AUTO: 5 %
NEUTROPHILS # BLD AUTO: 8.41 X10 (3) UL (ref 1.5–7.7)
NEUTROPHILS # BLD AUTO: 8.41 X10(3) UL (ref 1.5–7.7)
NEUTROPHILS NFR BLD AUTO: 74 %
OSMOLALITY SERPL CALC.SUM OF ELEC: 286 MOSM/KG (ref 275–295)
PLATELET # BLD AUTO: 333 10(3)UL (ref 150–450)
POTASSIUM SERPL-SCNC: 4.2 MMOL/L (ref 3.5–5.1)
PROT SERPL-MCNC: 6.9 G/DL (ref 5.7–8.2)
RBC # BLD AUTO: 4.02 X10(6)UL
SODIUM SERPL-SCNC: 139 MMOL/L (ref 136–145)
WBC # BLD AUTO: 11.4 X10(3) UL (ref 4–11)

## 2024-12-06 PROCEDURE — 90656 IIV3 VACC NO PRSV 0.5 ML IM: CPT | Performed by: OBSTETRICS & GYNECOLOGY

## 2024-12-06 PROCEDURE — 36415 COLL VENOUS BLD VENIPUNCTURE: CPT

## 2024-12-06 PROCEDURE — 80177 DRUG SCRN QUAN LEVETIRACETAM: CPT

## 2024-12-06 PROCEDURE — 80175 DRUG SCREEN QUAN LAMOTRIGINE: CPT

## 2024-12-06 PROCEDURE — 76811 OB US DETAILED SNGL FETUS: CPT | Performed by: OBSTETRICS & GYNECOLOGY

## 2024-12-06 PROCEDURE — 99214 OFFICE O/P EST MOD 30 MIN: CPT | Performed by: OBSTETRICS & GYNECOLOGY

## 2024-12-06 PROCEDURE — 80053 COMPREHEN METABOLIC PANEL: CPT

## 2024-12-06 PROCEDURE — 85025 COMPLETE CBC W/AUTO DIFF WBC: CPT

## 2024-12-06 PROCEDURE — 83036 HEMOGLOBIN GLYCOSYLATED A1C: CPT

## 2024-12-06 PROCEDURE — 90471 IMMUNIZATION ADMIN: CPT | Performed by: OBSTETRICS & GYNECOLOGY

## 2024-12-06 NOTE — PROGRESS NOTES
Pt here for level III ultrasound/doctor consult  + FM  Pt states feeling occas pelvic discomfort. Pt denies uterine cramping, vag bleeding and leaking fluid.

## 2024-12-06 NOTE — PROGRESS NOTES
BOBO - 20w0d     Chief Complaint   Patient presents with    Prenatal Care      L2 US done today just prior to visit - normal     +FM. No ctx, cramping bleeding. HA better.     Pubic bone sore at times. Declines PT    Nausea - pretty minimal, intermittent, early in the morning. On a bad day will just vomit stomach acid/bile up to 4 times a day. No longer spitting.     Diclegis - not anymore  Reglan - PRN on the bad days.   Zofran ODT -PRN  Famotidine - BID - helps with the vomiting. Less painful   Overall keeping seizure meds down ok.   Folic acid - taking    Vitals:    24 0943   BP: 110/60   Pulse: 82   Weight: 165 lb (74.8 kg)   Height: 60\"     TWG -11 lb (-4.99 kg)     29 year old  at 20w0d   WAI 25 by 8w0d not consistent with uncertain LMP   A+    PRENATAL LABS - done 10/9    Partner Giorgio  -NIPS low risk, GIRL   -NT normal   -Carrier screen - negative   -COVID-19 vaccination encouraged.    -Flu vaccine  - done   -1 hr GTT - order at next visit   -A1c, & CBC ordered  -3rd trim HIV & syphilis - discuss at next     Requests sterilization  -bilateral total salpingectomy at interval laparoscopy planned (or if  do at that time)  -Sign Medicaid consent form at around 28-30 wk     Obesity (pre preg BMI 34) & h/o placental infarct   -aspirin 162 mg daily encouraged at 12 wk. Patient is going to consider. Having a hard time with hyperemesis.   -limit weight gain 11-20 lb   -Early 1 hr GTT borderline 134 at 13w5d.   -Early A1c, TSH, CMP, uric acid- ok  -Baseline urine P/C ratio 0.114 at 8w0d = normal   -L2 US normal    -growth US with BPP at 32 wk - has appt. Ordered  -NST weekly at 36 wk     Epilepsy (, 1st grand mal seizure at 17)   -Neuro Yogi Alfonso- aware she is pregnant   -H/o missed doses of her AEDs  -generalized seizure 2023. At that time, she was on Keppra 1000 mg bid and Lamictal 100 mg bid. She had missed doses of Keppra and Lamictal the night prior to her seizure    -MRI/MRA brain   -ordered per neurologist 7/29/24 (neurologist ok with her having during pregnancy)  -has not been done. Neurologist aware. Ok to defer as HA better.     -Meds:  Lamotrigine (Lamictal) 100 mg BID  -8/23/24 level therapeutic. Recheck 3 months    Levetiracetam (Keppra) 1000 mg BID - 8/23    -8/23/24 level therapeutic. Recheck 3 months   -Neuro visit 10/29/24. RTC 3 months (1/29/25)  -Folic acid 4 grams per day advised due to increased risk ONTD   -Last seizure - 2/2023     Migraine without aura   -pre pregnancy: Imitrex PRN  -7/29/24 Neuro visit - MRI/MRA brain ordered due to \"worst headache of her life\" on 7/23/24 - not done. Steroids helped.   -10/29 Neuro visit - MRI/MRA without contrast not done. Deferred as HA resolved. Continue PRN Imitrex.RTC 3 months (1/29/25)    Hyperemesis gravidarum  -H/o Hyperemesis in 2021-22 pregnancy:    Bonjesta - didn't help really initially but would help with sleep  Reglan - limited effect  Compazine - didn't help  Phenergan - seemed to help for a bit.  Zofran - has at home.   Trimethobenzamide - hasn't tried yet but has at home  -9/5 - ED visit at [facility] - Zofran not helping. IVF, IV antiemetics -> tolerating PO. Rx Diclegis.   -9/12 - > 160 ketones in urine. Recommend ED for IV fluids. Diclegis helping some but not enough. Rx Phenergan rectal suppository & Pepcid. Will talk to clinical supervisor about home IV fluids   -9/13 ED visit at [facility] for hyperemesis  -9/22 ED visit at [facility] for hyperem[facility]  -9/30 ED visit at [facility] for hyperemesis. Pt reports 1 liter of IV fluid given. Pt declined admission.   -10/3 - 10w6d - Takes Diclegis, Phenergan, Zofran, Pepcid. Order for PICC/Midline. Order for IV fluids (2 liters NS) & [facility]. Rx Zofran 4 mg ODT   -10/9 Not needing IV fluids right now.     Hypokalemia  -10/9 K 3.2 -> Rx Kdur & advised magnesium glycinate or topical magnesium  -12/6/2024 Doing better. Will recheck CMP     H/o iron  deficiency anemia   -h/o IV iron x 3 doses (12/22/21 - 12/29/22) during pregnancy (not able to tolerate PO iron)   -10/9 Hb 12.9, ferritin 27. Not able to tolerate her PNV due to hyperemesis. Elected to proceed with IV iron.    -IV iron infusions x 4 (10/18/24-11/6/24) - does feel better     H/o postpartum hemorrhage (atony & cervical laceration) with transfusion (2022)  -aggressive antepartum anemia management   -uterotonics in room, consider TXA at delivery     H/o vitamin B12 deficiency  -10/9 vit B12 370 - low normal. Vit B12 1000 mcg daily advised    Subchorionic hemorrhage  -noted on 9/22 US in the ED    RTC 4 wk

## 2024-12-09 LAB
LAMOTRIGINE LVL: 1.7 UG/ML
LEVETIRACETAM LVL: 19.5 UG/ML

## 2024-12-11 ENCOUNTER — TELEPHONE (OUTPATIENT)
Dept: NEUROLOGY | Facility: CLINIC | Age: 29
End: 2024-12-11

## 2024-12-11 DIAGNOSIS — G40.909 SEIZURE DISORDER (HCC): ICD-10-CM

## 2024-12-11 NOTE — TELEPHONE ENCOUNTER
Patient is requesting the results of Lamotrigine and Levetiracetam levels.      Routing to provider for review.

## 2024-12-18 RX ORDER — LAMOTRIGINE 150 MG/1
150 TABLET ORAL 2 TIMES DAILY
Qty: 180 TABLET | Refills: 0 | Status: SHIPPED | OUTPATIENT
Start: 2024-12-18

## 2024-12-18 NOTE — TELEPHONE ENCOUNTER
Patient is 22 weeks pregnant.     Denies seizures or auras.     Patient denies missing any doses. Seizure medication is as follows: Keppra 1000 mg BID and Lamotrigine 100 mg BID.     Please advise on dose changes and next lab draw.

## 2024-12-18 NOTE — TELEPHONE ENCOUNTER
Patient calling to get lab results. Patient concerned because levels say they are low and she is pregnant. Please advise if any adjustments are to be made with medication.

## 2024-12-18 NOTE — TELEPHONE ENCOUNTER
Continue same dose of Keppra    Increase Lamotrigine to 150 mg bid - repeat levels in 3rd trimester ~ 8 weeks and make sure to follow up with levels after delivery as well.

## 2024-12-18 NOTE — TELEPHONE ENCOUNTER
Called and shared with patient Dr. Alfonso's order   \"Increase Lamotrigine to 150 mg bid - repeat levels in 3rd trimester ~ 8 weeks and make sure to follow up with levels after delivery as well.\"    Patient expressed understanding. Prescription sent to patient's pharmacy.

## 2025-01-08 ENCOUNTER — ROUTINE PRENATAL (OUTPATIENT)
Facility: CLINIC | Age: 30
End: 2025-01-08
Payer: MEDICAID

## 2025-01-08 VITALS
SYSTOLIC BLOOD PRESSURE: 118 MMHG | BODY MASS INDEX: 34.55 KG/M2 | HEART RATE: 97 BPM | WEIGHT: 176 LBS | HEIGHT: 60 IN | DIASTOLIC BLOOD PRESSURE: 62 MMHG

## 2025-01-08 DIAGNOSIS — Z13.1 DIABETES MELLITUS SCREENING: Primary | ICD-10-CM

## 2025-01-08 PROCEDURE — 99214 OFFICE O/P EST MOD 30 MIN: CPT

## 2025-01-08 NOTE — PROGRESS NOTES
Aureliano 24w5d    She is doing well, no complaints   -1 hr gtt discussed and ordered  -tdap discussed, ask at next visit     WAI 25 by 8w0d not consistent with uncertain LMP   A+     PRENATAL LABS - done 10/9     Partner Giorgio  -NIPS low risk, GIRL   -NT normal   -Carrier screen - negative   -COVID-19 vaccination encouraged.    -Flu vaccine  - done   -A1c, & CBC -NL   -3rd trim HIV & syphilis - discuss at next      Requests sterilization  -bilateral total salpingectomy at interval laparoscopy planned (or if  do at that time)  -Sign Medicaid consent form at around 28-30 wk      Obesity (pre preg BMI 34) & h/o placental infarct   -aspirin 162 mg daily encouraged at 12 wk. Patient is going to consider. Having a hard time with hyperemesis.   -limit weight gain 11-20 lb   -Early 1 hr GTT borderline 134 at 13w5d.   -Early A1c, TSH, CMP, uric acid- ok  -Baseline urine P/C ratio 0.114 at 8w0d = normal   -L2 US normal    -growth US with BPP at 32 wk - has appt. Ordered  -NST weekly at 36 wk      Epilepsy (, 1st grand mal seizure at 17)   -Neuro Yogi Alfonso- aware she is pregnant   -H/o missed doses of her AEDs  -generalized seizure 2023. At that time, she was on Keppra 1000 mg bid and Lamictal 100 mg bid. She had missed doses of Keppra and Lamictal the night prior to her seizure   -MRI/MRA brain   -ordered per neurologist 24 (neurologist ok with her having during pregnancy)  -has not been done. Neurologist aware. Ok to defer as HA better.     -Meds:  Lamotrigine (Lamictal) 100 mg BID  -24 level therapeutic.   - increased Lamictal to    150 mg bid   Levetiracetam (Keppra) 1000 mg BID -    -Neuro visit 10/29/24. RTC 3 months (25)  -Folic acid 4 grams per day advised due to increased risk ONTD   -Last seizure - 2023      Migraine without aura   -pre pregnancy: Imitrex PRN  -24 Neuro visit - MRI/MRA brain ordered due to \"worst headache of her life\" on 24 - not  done. Steroids helped.   -10/29 Neuro visit - MRI/MRA without contrast not done. Deferred as HA resolved. Continue PRN Imitrex.RTC 3 months (1/29/25)     Hyperemesis gravidarum  -H/o Hyperemesis in 2021-22 pregnancy:                Bonjesta - didn't help really initially but would help with sleep  Reglan - limited effect  Compazine - didn't help  Phenergan - seemed to help for a bit.  Zofran - has at home.   Trimethobenzamide - hasn't tried yet but has at home  -9/5 - ED visit at [facility] - Zofran not helping. IVF, IV antiemetics -> tolerating PO. Rx Diclegis.   -9/12 - > 160 ketones in urine. Recommend ED for IV fluids. Diclegis helping some but not enough. Rx Phenergan rectal suppository & Pepcid. Will talk to clinical supervisor about home IV fluids   -9/13 ED visit at [facility] for hyperemesis  -9/22 ED visit at [place] for hyperemesis  -9/30 ED visit at [facility] for hyperemesis. Pt reports 1 liter of IV fluid given. Pt declined admission.   -10/3 - 10w6d - Takes Diclegis, Phenergan, Zofran, Pepcid. Order for PICC/Midline. Order for IV fluids (2 liters NS) & [facility]. Rx Zofran 4 mg ODT   -10/9 Not needing IV fluids right now.   -she is only nauseous in the morning, able to tolerate foods and liquids      Hypokalemia  -10/9 K 3.2 -> Rx Kdur & advised magnesium glycinate or topical magnesium  -12/6/2024 Doing better. recheck CMP -NL      H/o iron deficiency anemia   -h/o IV iron x 3 doses (12/22/21 - 12/29/22) during pregnancy (not able to tolerate PO iron)   -10/9 Hb 12.9, ferritin 27. Not able to tolerate her PNV due to hyperemesis. Elected to proceed with IV iron.    -IV iron infusions x 4 (10/18/24-11/6/24) - does feel better      H/o postpartum hemorrhage (atony & cervical laceration) with transfusion (2022)  -aggressive antepartum anemia management   -uterotonics in room, consider TXA at delivery      H/o vitamin B12 deficiency  -10/9 vit B12 370 - low normal. Vit B12 1000 mcg daily advised

## 2025-01-29 ENCOUNTER — OFFICE VISIT (OUTPATIENT)
Dept: NEUROLOGY | Facility: CLINIC | Age: 30
End: 2025-01-29
Payer: MEDICAID

## 2025-01-29 VITALS
DIASTOLIC BLOOD PRESSURE: 59 MMHG | BODY MASS INDEX: 34.55 KG/M2 | RESPIRATION RATE: 16 BRPM | HEART RATE: 98 BPM | SYSTOLIC BLOOD PRESSURE: 114 MMHG | HEIGHT: 60 IN | WEIGHT: 176 LBS

## 2025-01-29 DIAGNOSIS — G40.909 SEIZURE DISORDER (HCC): ICD-10-CM

## 2025-01-29 DIAGNOSIS — Z3A.28 28 WEEKS GESTATION OF PREGNANCY (HCC): Primary | ICD-10-CM

## 2025-01-29 PROCEDURE — 99214 OFFICE O/P EST MOD 30 MIN: CPT | Performed by: OTHER

## 2025-01-29 RX ORDER — LEVETIRACETAM 1000 MG/1
1000 TABLET ORAL 2 TIMES DAILY
Qty: 180 TABLET | Refills: 1 | Status: SHIPPED | OUTPATIENT
Start: 2025-01-29

## 2025-01-29 RX ORDER — LAMOTRIGINE 150 MG/1
150 TABLET ORAL 2 TIMES DAILY
Qty: 180 TABLET | Refills: 1 | Status: SHIPPED | OUTPATIENT
Start: 2025-01-29

## 2025-01-29 RX ORDER — POTASSIUM CHLORIDE 1500 MG/1
20 TABLET, EXTENDED RELEASE ORAL 2 TIMES DAILY
COMMUNITY
Start: 2024-11-04 | End: 2025-01-29

## 2025-01-29 NOTE — PATIENT INSTRUCTIONS
Refill policies:    Allow 2-3 business days for refills; controlled substances may take longer.  Contact your pharmacy at least 5 days prior to running out of medication and have them send an electronic request or submit request through the “request refill” option in your [employer] account.  Refills are not addressed on weekends; covering physicians do not authorize routine medications on weekends.  No narcotics or controlled substances are refilled after noon on Fridays or by on call physicians.  By law, narcotics must be electronically prescribed.  A 30 day supply with no refills is the maximum allowed.  If your prescription is due for a refill, you may be due for a follow up appointment.  To best provide you care, patients receiving routine medications need to be seen at least once a year.  Patients receiving narcotic/controlled substance medications need to be seen at least once every 3 months.  In the event that your preferred pharmacy does not have the requested medication in stock (e.g. Backordered), it is your responsibility to find another pharmacy that has the requested medication available.  We will gladly send a new prescription to that pharmacy at your request.    Scheduling Tests:    If your physician has ordered radiology tests such as MRI or CT scans, please contact Central Scheduling at [phone] right away to schedule the test.  Once scheduled, the [facility] Centralized Referral Team will work with your insurance carrier to obtain pre-certification or prior authorization.  Depending on your insurance carrier, approval may take 3-10 days.  It is highly recommended patients assure they have received an authorization before having a test performed.  If test is done without insurance authorization, patient may be responsible for the entire amount billed.      Precertification and Prior Authorizations:  If your physician has recommended that you have a procedure or additional testing performed the [facility]  Centralized Referral Team will contact your insurance carrier to obtain pre-certification or prior authorization.    You are strongly encouraged to contact your insurance carrier to verify that your procedure/test has been approved and is a COVERED benefit.  Although the [facility] Centralized Referral Team does its due diligence, the insurance carrier gives the disclaimer that \"Although the procedure is authorized, this does not guarantee payment.\"    Ultimately the patient is responsible for payment.   Thank you for your understanding in this matter.  Paperwork Completion:  If you require FMLA or disability paperwork for your recovery, please make sure to either drop it off or have it faxed to our office at [phone]. Be sure the form has your name and date of birth on it.  The form will be faxed to our Forms Department and they will complete it for you.  There is a 25$ fee for all forms that need to be filled out.  Please be aware there is a 10-14 day turnaround time.  You will need to sign a release of information (MINOO) form if your paperwork does not come with one.  You may call the Forms Department with any questions at [phone].  Their fax number is [phone].

## 2025-01-29 NOTE — PROGRESS NOTES
[facility] Progress Note    HPI  Chief Complaint   Patient presents with    Seizure Disorder     3 month follow up and no recents seizures       As per my initial H&P from 6/16/2021,   \"   Dotty Gross is a 25 year old female, who presents for evaluation of seizures.      Patient states she has history of seizures.  She states these usually last 30 seconds and she does not recall them but is told she has a grunting sound and then shakes and loses awareness; denies auras of odd tastes, smells or sounds; has some post ictal confusion and is sore after seizures; may bite tongue and jaw muscles are sore.      She states last seizure was July 29, 2020.  She states she previously had COVID July 18-19, with extensive weight loss and nausea/emesis.  She denied lack of taste and smell and denied fevers or headaches.      She states when she had her last seizure, she was taking Keppra but was not able to keep down medication; was on 750 mg bid and was increased up to 1000 mg bid.  Since this time, she has not had any seizures.  She is currently 9 weeks pregnant and has remained on Keppra; her first pregnancy was in 2017 - had seizure during second trimester.     She has been doing well currently but has been having hyperemesis gravidarum.   Otherwise, patient denies any recent weight change, fevers, chills, double vision/ blurry vision / loss of vision, chest pain, palpitations, shortness of breath, rashes, joint pains, bowel / bladder incontinence or mood issues. \"    Prior notes as below 3/17/2022    Patient at time of last visit was unable to seen formally and was sent to ER as she had breakthrough seizures - states she does not recall coming to office but admits she was out of Lamictal for the prior 2 weeks - she had titrated up to 100 mg dose but never got this refilled and then started to have more confusion and was not as quick to respond; she had 4 seizures reportedly on day of admission 3/9/2022.   She was admitted for workup and had EEG which was abnormal. She was treated with Depakote 500 mg q8 hrs and Lamictal was restarted at 25 mg bid.  She was discharged home with Depakote 500 mg bid with plan to increase Lamictal to 50 mg bid after 1 week, which she has not done yet.  She remains on Keppra 1500 mg bid.      She denies any recurrent seizures and denies any auras of odd tastes, smells or sounds; she feels alert and back to her usual self.     Prior notes as below: 4/19/2022  Patient last seen 3/17/2022.  Since last visit, she has increased lamotrigine (Lamictal) up to 50 mg bid and reduced valproic acid (Depakote) to 250 mg AM / 500 mg PM- her last level was done 3/21/2022 with valproic acid level elevated and lamotrigine within normal range. She was on Depakote 500 mg bid and Lamictal 50 mg bid at the time and advised to have repeat levels done in ~2 weeks but results not available at this time.  Currently, she is doing well with no seizures and denies any rash, balance issues, double vision, nausea; she denies any auras of odd tastes, smells or sounds and denies recurrent seizures recently.   She has not been able to lose weight since her pregnancy but denies tremor or balance issues.            Prior notes as below 6/8/2022.  Patient last seen 4/19/2022.  Since last visit, she has been on Depakote 250 mg AM/ 500 mg PM and Lamictal 50 mg bid along with Keppra 1500 mg bid.  On this regimen, she denies any seizures and denies any auras of odd tastes,smells, sounds or dizzy spells or episodes of loss of awareness. She has noted some continued weight gain but denies any other side effect of rash, double vision, nausea/emesis or balance issues/falls.            Prior notes as per 7/13/2022.  Patient last seen 6/8/2022.  She is now off Depakote and remains on Keppra 1500 mg bid and Lamictal 50 mg bid; she denies any seizures and denies any auras of odd tastes,smells, sounds or dizzy spells but had an episode  a couple of weeks ago where she briefly \"zoned out\" for a matter of seconds, after she stopped taking the Depakote but has not had any since this time; She denies other  episodes of loss of awareness. She denies other side effect of rash, double vision, nausea/emesis or balance issues/falls.  She recently had levels checked.        Prior notes as per 10/17/2022.  Patient last seen 7/13/2022.  She is still on Keppra 1500 mg bid and is now on Lamictal 100 mg bid.  She has been doing well on this dose.  She denies any episodes of \"zoning out\" and denies auras of odd tastes, smells or sounds.  She denies any episodes of speech arrest and denies other side effects of rash, double vision, nausea/emeiss or balance issues/falls. She has been going to weight loss clinic and is now on fluoxetine 20 mg daily but denies any significant improvement.      Prior notes as per 1/17/2023.  Patient last seen 10/17/2022. Since last visit, she has been weaned down on Keppra currently at 1000 mg bid.  She has remained on Lamictal 100 mg bid and denies any seizures or episodes of loss of awareness, or speech arrest or auras of odd tastes, smells or sounds.  She denies side effects of rash, balance issues, double vision, or nausea.  She has been working with weight loss clinic but is not on fluoxetine or topiramate at this time.         Prior notes as per 7/31/2023.  Patient last seen 1/17/2023.  Patient since last visit had a generalized seizure 2/17/2023.  At that time, she was on Keppra 1000 mg bid and Lamictal 100 mg bid.  She had missed doses of Keppra and Lamictal the night prior to her seizure and did not have recurrent seizures; no auras of odd tastes, smells or sounds.  She admitted to poor sleep and increased stress at time of seizure.  She was recommended to have levels checked but has not done so yet.  Currently, she is doing well with no recurrent seizures and denies auras of odd tastes, smells or sounds or episodes of loss of  awareness or speech arrest.  She denies side effects of rash, balance issues, double vision or nausea.        Prior notes as per 1/29/2024.  Patient last seen 7/31/2023.  Since last visit, she has been doing well.  She remains on Lamictal 100 mg bid and Keppra 1000 mg bid and denies any seizures.  She denies missing any doses of medication and has been sleeping better as her child is sleeping through the night ( no issues for child age 4). She denies any auras of odd tastes, smells or sounds or episodes of speech arrest or loss of awareness; no issues with waking in the AM having wet the bed or bitten her tongue.  She additionally denies any side effects of rash, balance issues, double vision or nausea.  She has been driving without issues.       Prior notes as per 5/13/2024.  Patient last seen 1/29/2024.  She was doing well on Keppra 1000 mg bid and Lamictal 100 mg bid. However, in the past few weeks she has been tired during the day despite getting good sleep. She admits to some weight gain and wakes up in the middle of the night coughing.  She may fall asleep in a dark room but not when driving. She has been having episodes in the past 2 weeks where she has difficulty with concentration and has episodes of \"staring off\" but denies loss of awareness.      She denies being tested for sleep apnea but has been gaining weight; she denies issues when driving but notes this mainly when she is conversing with others; denies auras of odd tastes, smells or sounds. She notes when she coughs she has a \"bleach taste\" in her mouth.     Of note, she had pneumonia 3/2024. She has some headaches in the front of the head as well recently; she is light sensitive; no recent head trauma.        Prior notes as per 7/29/2024.  Patient last seen 5/13/2024. In terms of seizures, she is doing well on Keppra 1000 mg bid and Lamcital 100 mg bid - denies seizures or auras but had migraine and severe headache 7/23/2024 - having headaches when  she got to work with pain in head and did not improve with Tylenol. She then noted light sensitivity and nausea and went to lie in a dark room with some brief improvement. She denied loss of awareness and denied associated focal numbness/tingling/weakness. She was given steroid pack with improvement. She felt this headache was the worst of her life.           Prior notes as per 10/29/2024.  Patient last seen 7/29/2024. Since last visit, she has not been able to have MRI due to finding out she was pregnant. She is currently 15 weeks gestation and states she has been taking Keppra 1000 mg bid and Lamictal 100 mg bid. She states she had hyperemesis gravidarum in first trimester but is improving. She is no longer having severe headaches but states she has only rare migraines for which sumatriptan has helped. She has not been having any seizures and denies auras of odd tastes, smells or sounds.  She is following with maternal-fetal-medicine service and has been doing well.       Patient last seen 10/29/2024.  Patient since last visit has been doing well. She is now 28 weeks pregnant and her level of lamotrigine was mildly low - her dose was increased ~12/11 from 100 mg bid to 150 mg bid; she remains on Keppra 1000 mg bid and denies any seizures or auras of odd tastes, smells or sounds; no major side effects on medication; migraines have not been occurring since last visit but she has Tylenol and sumatriptan if needed; she has not had to use either of these medications, however. She follows with maternal-fetal-medicine and has been doing well - less hyperemesis gravidarum as well.       Past Medical History:    Cervical laceration    exam under anesthesia, repair of cervical laceration, evacuation of blood clots from uterus manually - Dr. Lora Benjamin, [facility].    COVID-19    Depression    Epilepsy (HCC)    1st grand mal seizure at 17    Female infertility    Hx of transfusion of packed red blood cells     Received 2 units packed red blood cells for postpartum hemorrhage - atonic & cervical laceration    Hyperemesis gravidarum (HCC)    Was put on Zofran - didn't help much.     Hyperemesis gravidarum (HCC)    Iron deficiency anemia during pregnancy, antepartum (HCC)    h/o IV iron x 3 doses (12/22/21 - 12/29/22) during pregnancy (not able to tolerate PO iron)    Keloid scar    chest    Low maternal serum vitamin B12    Migraine without aura    Imitrex per neurologist    Obesity (BMI 30-39.9)    Other immediate postpartum hemorrhage (HCC)    exam under anesthesia, repair of cervical laceration, evacuation of blood clots from uterus manually - Dr. Lora Benjamin, [facility].    Pap smear for cervical cancer screening    Pap neg 9/15/22    Pneumonia of both lower lobes    Pubic bone pain    Request for sterilization    1/12/2022 Discussed permanent/irreversible, optional nature of sterilization. Patient expressed understanding. Medicaid consent form signed together.     Screening for genetic disease carrier status    Carrier Screen = Negative    Sialorrhea    Subchorionic hemorrhage in first trimester (HCC)    Trauma during pregnancy (HCC)    Vitamin D deficiency    Worst headache of life    7/29/24 Neuro visit - MRI/MRA brain ordered due to \"worst headache of her life\" on 7/23/24 - steroids helped.     Past Surgical History:   Procedure Laterality Date    Cholecystectomy  2018    Insert intrauterine device  03/01/2022    Copper IUD insertion at postpartum visit - Dr. Lora Gibson    Other surgical history  2012    benign keloid bx chest area    Other surgical history  01/17/2022    exam under anesthesia, repair of cervical laceration, evacuation of blood clots from uterus manually - Dr. Lora Benjamin, [facility].     Family History   Problem Relation Age of Onset    Obesity Mother     No Known Problems Father     No Known Problems Sister     No Known Problems Sister     No Known Problems Sister      Diabetes Maternal Grandmother     No Known Problems Maternal Grandfather     No Known Problems Paternal Grandmother     No Known Problems Paternal Grandfather     Other (Other) Daughter         severe GI sensitivities to multiple foods - no clear diagnosis    Diabetes Maternal Aunt     Birth Defects Neg     Genetic Disease Neg     Thyroid disease Neg     Breast Cancer Neg     Ovarian Cancer Neg     Colon Cancer Neg     DVT/VTE Neg      Social History     Socioeconomic History    Marital status:    Tobacco Use    Smoking status: Never     Passive exposure: Never    Smokeless tobacco: Never   Vaping Use    Vaping status: Never Used   Substance and Sexual Activity    Alcohol use: Never    Drug use: Never    Sexual activity: Yes     Partners: Male   Other Topics Concern    Caffeine Concern No    Exercise Yes    Seat Belt Yes    Special Diet No    Stress Concern No    Weight Concern Yes     Allergies   Allergen Reactions    Shellfish-Derived Products SWELLING         Current Outpatient Medications:     lamoTRIgine 150 MG Oral Tab, Take 1 tablet (150 mg total) by mouth 2 (two) times daily., Disp: 180 tablet, Rfl: 1    levetiracetam 1000 MG Oral Tab, Take 1 tablet (1,000 mg total) by mouth 2 (two) times daily., Disp: 180 tablet, Rfl: 1    ondansetron 4 MG Oral Tablet Dispersible, Take 1 tablet (4 mg total) by mouth every 8 (eight) hours as needed for Nausea., Disp: 30 tablet, Rfl: 5    famotidine 20 MG Oral Tab, Take 1 tablet (20 mg total) by mouth 2 (two) times daily., Disp: 60 tablet, Rfl: 5    promethazine 25 MG Oral Tab, Take 1 tablet (25 mg total) by mouth every 6 (six) hours as needed for Nausea., Disp: 60 tablet, Rfl: 5    doxylamine-pyridoxine 10-10 MG Oral Tab EC, Take 2 tablets by mouth nightly., Disp: 120 tablet, Rfl: 0    SUMAtriptan 50 MG Oral Tab, USE AT ONSET OF MIGRAINE. REPEAT ONCE AFTER 2 HOURS. MAX 2 IN 24 HOURS. MUST LAST 30 DAYS., Disp: 9 tablet, Rfl: 5    albuterol 108 (90 Base) MCG/ACT  Inhalation Aero Soln, Inhale 1 puff into the lungs every 6 (six) hours as needed., Disp: , Rfl:     Review of Systems:  No chest pain or palpitations; otherwise as noted in HPI.    Exam:  /59 (BP Location: Left arm, Patient Position: Sitting, Cuff Size: large)   Pulse 98   Resp 16   Ht 60\"   Wt 176 lb (79.8 kg)   LMP 07/10/2024 (Exact Date)   BMI 34.37 kg/m²   Estimated body mass index is 34.37 kg/m² as calculated from the following:    Height as of this encounter: 60\".    Weight as of this encounter: 176 lb (79.8 kg).    Gen: well developed, well nourished, no acute distress  HEENT: normocephalic  Heart; normal S1/S2, regular rate and rhythm  Lungs: clear to auscultation bilaterally  Extremities: no edema, peripheral pulses intact    Neck: supple, full range of motion; no carotid bruits    Neuro:  Mental status:  Orientation: Alert and oriented to person, place, time  Speech Fluent and conversational    CN: PERRL, EOMI with no nystagmaus, VFF, smile symmetric, sensation intact, tongue and palate midline, SCM intact, otherwise, CN 2-12 intact  Motor: 5/5 strength throughout, tone normal  DTR: 2+ symmetric throughout, toes downgoing bilaterally, no clonus; +tromners in UE bilaterally   Sensory: intact to light touch throughout  Coord: FNF intact with no tremor or dysmetria; rapid alternating movements intact bilaterally  Romberg: absent  Gait: normal casual gait    Labs:  New    Component      Latest Ref Rng 12/6/2024   Lamotrigine Lvl      2.0 - 20.0 ug/mL 1.7 (L)    Levetiracetam Lvl      10.0 - 40.0 ug/mL 19.5       Component      Latest Ref Rng 12/6/2024   WBC      4.0 - 11.0 x10(3) uL 11.4 (H)    RBC      3.80 - 5.30 x10(6)uL 4.02    Hemoglobin      12.0 - 16.0 g/dL 12.1    Hematocrit      35.0 - 48.0 % 36.1    Platelet Count      150.0 - 450.0 10(3)uL 333.0    MCV      80.0 - 100.0 fL 89.8    MCH      26.0 - 34.0 pg 30.1    MCHC      31.0 - 37.0 g/dL 33.5    RDW      % 14.6    Prelim Neutrophil  Abs      1.50 - 7.70 x10 (3) uL 8.41 (H)    Neutrophils Absolute      1.50 - 7.70 x10(3) uL 8.41 (H)    Lymphocytes Absolute      1.00 - 4.00 x10(3) uL 2.16    Monocytes Absolute      0.10 - 1.00 x10(3) uL 0.57    Eosinophils Absolute      0.00 - 0.70 x10(3) uL 0.15    Basophils Absolute      0.00 - 0.20 x10(3) uL 0.03    Immature Granulocyte Absolute      0.00 - 1.00 x10(3) uL 0.05    Neutrophils %      % 74.0    Lymphocytes %      % 19.0    Monocytes %      % 5.0    Eosinophils %      % 1.3    Basophils %      % 0.3    Immature Granulocyte %      % 0.4    Glucose      70 - 99 mg/dL 85    Sodium      136 - 145 mmol/L 139    Potassium      3.5 - 5.1 mmol/L 4.2    Chloride      98 - 112 mmol/L 107    Carbon Dioxide, Total      21.0 - 32.0 mmol/L 26.0    ANION GAP      0 - 18 mmol/L 6    BUN      9 - 23 mg/dL 10    CREATININE      0.55 - 1.02 mg/dL 0.58    CALCIUM      8.7 - 10.4 mg/dL 9.6    CALCULATED OSMOLALITY      275 - 295 mOsm/kg 286    EGFR      >=60 mL/min/1.73m2 127    AST (SGOT)      <34 U/L 10    ALT (SGPT)      10 - 49 U/L <7 (L)    ALKALINE PHOSPHATASE      37 - 98 U/L 59    Total Bilirubin      0.3 - 1.2 mg/dL 0.3    PROTEIN, TOTAL      5.7 - 8.2 g/dL 6.9    Albumin      3.2 - 4.8 g/dL 4.1    Globulin      2.0 - 3.5 g/dL 2.8    A/G Ratio      1.0 - 2.0  1.5    Patient Fasting for CMP? No    HEMOGLOBIN A1c      <5.7 % 5.1    ESTIMATED AVERAGE GLUCOSE      68 - 126 mg/dL 100         Prior as noted below    Component      Latest Ref Rng 6/1/2024   WBC      3.8 - 10.8 Thousand/uL 7.7    RBC      3.80 - 5.10 Million/uL 5.22 (H)    Hemoglobin      11.7 - 15.5 g/dL 14.0    Hematocrit      35.0 - 45.0 % 44.9    MCV      80.0 - 100.0 fL 86.0    MCH      27.0 - 33.0 pg 26.8 (L)    MCHC      32.0 - 36.0 g/dL 31.2 (L)    RDW      11.0 - 15.0 % 13.6    Platelet Count      140 - 400 Thousand/uL 392    MPV      7.5 - 12.5 fL 10.3    Neutrophils Absolute      1,500 - 7,800 cells/uL 3,781    Lymphocytes Absolute       850 - 3,900 cells/uL 3,011    Monocytes Absolute      200 - 950 cells/uL 593    Eosinophils Absolute      15 - 500 cells/uL 262    Basophils Absolute      0 - 200 cells/uL 54    Neutrophils %      % 49.1    Lymphocytes %      % 39.1    Monocytes %      % 7.7    Eosinophils %      % 3.4    Basophils %      % 0.7    Glucose      65 - 99 mg/dL 76    BUN      7 - 25 mg/dL 14    CREATININE      0.50 - 0.96 mg/dL 0.64    EGFR      > OR = 60 mL/min/1.73m2 125    BUN/CREATININE RATIO      6 - 22 (calc) SEE NOTE:    Sodium      135 - 146 mmol/L 137    Potassium      3.5 - 5.3 mmol/L 4.8    Chloride      98 - 110 mmol/L 102    Carbon Dioxide, Total      20 - 32 mmol/L 27    CALCIUM      8.6 - 10.2 mg/dL 9.9    PROTEIN, TOTAL      6.1 - 8.1 g/dL 8.1    Albumin      3.6 - 5.1 g/dL 4.5    Globulin      1.9 - 3.7 g/dL (calc) 3.6    A/G Ratio      1.0 - 2.5 (calc) 1.3    Total Bilirubin      0.2 - 1.2 mg/dL 0.4    Alkaline Phosphatase      31 - 125 U/L 81    AST (SGOT)      10 - 30 U/L 22    ALT (SGPT)      6 - 29 U/L 29    LAMOTRIGINE      2.5 - 15.0 mcg/mL 6.1    LEVETIRACETAM      mcg/mL 45.5           Prior as noted below    Component      Latest Ref Rng 8/7/2023   Lamotrigine Lvl      2.0 - 20.0 ug/mL 4.6      From EMR    12/6/2023    CMP: normal range with exception of ALT 38 (minimally elevated)  CBC: normal range    1/25/2023:   From Outside records    Lamotrigine: 1.5 (low)     1/11/2023: 3.3 (mildly low)     Prior as noted below    Component      Latest Ref Rng & Units 8/2/2022   LAMOTRIGINE      4.0 - 18.0 mcg/mL 5.3       Prior as noted below     Component      Latest Ref Rng & Units 7/9/2022   LAMOTRIGINE      4.0 - 18.0 mcg/mL 3.1 (L)       Prior as noted below  Component      Latest Ref Rng & Units 4/18/2022   LAMOTRIGINE      4.0 - 18.0 mcg/mL 6.7   VALPROIC ACID      50.0 - 100.0 mg/L  64.6     Prior as noted below    Component      Latest Ref Rng & Units 3/21/2022   LAMOTRIGINE      4.0 - 18.0 mcg/mL 5.8    VALPROIC ACID      50.0 - 100.0 mg/L  131.6 (H)     Prior as noted below    Component      Latest Ref Rng & Units 3/11/2022 3/9/2022 2/17/2022   WBC      4.0 - 11.0 x10(3) uL  13.4 (H)    RBC      3.80 - 5.30 x10(6)uL  4.70    Hemoglobin      12.0 - 16.0 g/dL  13.1    Hematocrit      35.0 - 48.0 %  41.2    Platelet Count      150.0 - 450.0 10(3)uL  324.0    MCV      80.0 - 100.0 fL  87.7    MCH      26.0 - 34.0 pg  27.9    MCHC      31.0 - 37.0 g/dL  31.8    RDW      %  14.0    Prelim Neutrophil Abs      1.50 - 7.70 x10 (3) uL  11.28 (H)    Neutrophils Absolute      1.50 - 7.70 x10(3) uL  11.28 (H)    Lymphocytes Absolute      1.00 - 4.00 x10(3) uL  1.62    Monocytes Absolute      0.10 - 1.00 x10(3) uL  0.40    Eosinophils Absolute      0.00 - 0.70 x10(3) uL  0.04    Basophils Absolute      0.00 - 0.20 x10(3) uL  0.02    Immature Granulocyte Absolute      0.00 - 1.00 x10(3) uL  0.05    Neutrophils %      %  84.1    Lymphocytes %      %  12.1    Monocytes %      %  3.0    Eosinophils %      %  0.3    Basophils %      %  0.1    Immature Granulocyte %      %  0.4    Glucose      70 - 99 mg/dL  113 (H)    Sodium      136 - 145 mmol/L  139    Potassium      3.5 - 5.1 mmol/L  4.0    Chloride      98 - 112 mmol/L  109    Carbon Dioxide, Total      21.0 - 32.0 mmol/L  23.0    ANION GAP      0 - 18 mmol/L  7    BUN      7 - 18 mg/dL  11    CREATININE      0.55 - 1.02 mg/dL  0.80    CALCIUM      8.5 - 10.1 mg/dL  9.6    CALCULATED OSMOLALITY      275 - 295 mOsm/kg  288    eGFR NON-AFR. AMERICAN      >=60  104    eGFR       >=60  119    AST (SGOT)      15 - 37 U/L  28    ALT (SGPT)      13 - 56 U/L  51    ALKALINE PHOSPHATASE      37 - 98 U/L  75    Total Bilirubin      0.1 - 2.0 mg/dL  0.3    PROTEIN, TOTAL      6.4 - 8.2 g/dL  8.8 (H)    Albumin      3.4 - 5.0 g/dL  4.2    Globulin      2.8 - 4.4 g/dL  4.6 (H)    A/G Ratio      1.0 - 2.0  0.9 (L)    Color Urine      Yellow  Yellow    Clarity Urine       Clear  Cloudy (A)    Spec Gravity      1.001 - 1.030  1.024    Glucose Urine      Negative mg/dL  Negative    Bilirubin Urine      Negative  Negative    Ketones, UA      Negative mg/dL  Trace (A)    Blood Urine      Negative  Large (A)    PH Urine      5.0 - 8.0  5.0    Protein Urine      Negative mg/dL  30 (A)    Urobilinogen Urine      <2.0 mg/dL  <2.0    Nitrite Urine      Negative  Negative    Leukocyte Esterase Urine      Negative  Large (A)    WBC Urine      0 - 5 /HPF  11-20 (A)    RBC Urine      0 - 2 /HPF  >10 (A)    Bacteria Urine      None Seen /HPF  Rare (A)    SQUAM EPI CELLS UR      None Seen /HPF  Few (A)    RENAL TUBULAR EPITHELIAL CELLS      None Seen /HPF  None Seen    TRANSITIONAL EPI CELLS      None Seen /HPF  None Seen    YEAST URINE      None Seen /HPF  None Seen    AMPHETAMINE URINE      Negative  Negative    BENZODIAZEPINES URINE      Negative  Negative    COCAINE URINE      Negative  Negative    CANNABINOID URINE      Negative  Negative    Ecstasy Urine      Negative  Negative    OPIATE URINE      Negative  Negative    Oxycodone Urine      Negative  Negative    CREATININE UR RANDOM      mg/dL  168.00    LEVETIRACETAM (KEPPRA)      12 - 46 ug/mL      LAMOTRIGINE      4.0 - 18.0 mcg/mL   2.9 (L)   VALPROIC ACID      50.0 - 100.0 ug/mL 80.6       Component      Latest Ref Rng & Units 1/21/2022   WBC      4.0 - 11.0 x10(3) uL    RBC      3.80 - 5.30 x10(6)uL    Hemoglobin      12.0 - 16.0 g/dL    Hematocrit      35.0 - 48.0 %    Platelet Count      150.0 - 450.0 10(3)uL    MCV      80.0 - 100.0 fL    MCH      26.0 - 34.0 pg    MCHC      31.0 - 37.0 g/dL    RDW      %    Prelim Neutrophil Abs      1.50 - 7.70 x10 (3) uL    Neutrophils Absolute      1.50 - 7.70 x10(3) uL    Lymphocytes Absolute      1.00 - 4.00 x10(3) uL    Monocytes Absolute      0.10 - 1.00 x10(3) uL    Eosinophils Absolute      0.00 - 0.70 x10(3) uL    Basophils Absolute      0.00 - 0.20 x10(3) uL    Immature Granulocyte  Absolute      0.00 - 1.00 x10(3) uL    Neutrophils %      %    Lymphocytes %      %    Monocytes %      %    Eosinophils %      %    Basophils %      %    Immature Granulocyte %      %    Glucose      70 - 99 mg/dL    Sodium      136 - 145 mmol/L    Potassium      3.5 - 5.1 mmol/L    Chloride      98 - 112 mmol/L    Carbon Dioxide, Total      21.0 - 32.0 mmol/L    ANION GAP      0 - 18 mmol/L    BUN      7 - 18 mg/dL    CREATININE      0.55 - 1.02 mg/dL    CALCIUM      8.5 - 10.1 mg/dL    CALCULATED OSMOLALITY      275 - 295 mOsm/kg    eGFR NON-AFR. AMERICAN      >=60    eGFR       >=60    AST (SGOT)      15 - 37 U/L    ALT (SGPT)      13 - 56 U/L    ALKALINE PHOSPHATASE      37 - 98 U/L    Total Bilirubin      0.1 - 2.0 mg/dL    PROTEIN, TOTAL      6.4 - 8.2 g/dL    Albumin      3.4 - 5.0 g/dL    Globulin      2.8 - 4.4 g/dL    A/G Ratio      1.0 - 2.0    Color Urine      Yellow    Clarity Urine      Clear    Spec Gravity      1.001 - 1.030    Glucose Urine      Negative mg/dL    Bilirubin Urine      Negative    Ketones, UA      Negative mg/dL    Blood Urine      Negative    PH Urine      5.0 - 8.0    Protein Urine      Negative mg/dL    Urobilinogen Urine      <2.0 mg/dL    Nitrite Urine      Negative    Leukocyte Esterase Urine      Negative    WBC Urine      0 - 5 /HPF    RBC Urine      0 - 2 /HPF    Bacteria Urine      None Seen /HPF    SQUAM EPI CELLS UR      None Seen /HPF    RENAL TUBULAR EPITHELIAL CELLS      None Seen /HPF    TRANSITIONAL EPI CELLS      None Seen /HPF    YEAST URINE      None Seen /HPF    AMPHETAMINE URINE      Negative    BENZODIAZEPINES URINE      Negative    COCAINE URINE      Negative    CANNABINOID URINE      Negative    Ecstasy Urine      Negative    OPIATE URINE      Negative    Oxycodone Urine      Negative    CREATININE UR RANDOM      mg/dL    LEVETIRACETAM (KEPPRA)      12 - 46 ug/mL 55 (H)   LAMOTRIGINE      4.0 - 18.0 mcg/mL    VALPROIC ACID      50.0 - 100.0  ug/mL          Component      Latest Ref Rng & Units 1/21/2022   WBC      4.0 - 11.0 x10(3) uL 10.5   RBC      3.80 - 5.30 x10(6)uL 3.69 (L)   Hemoglobin      12.0 - 16.0 g/dL 10.3 (L)   Hematocrit      35.0 - 48.0 % 32.6 (L)   Platelet Count      150.0 - 450.0 10(3)uL 287.0   MCV      80.0 - 100.0 fL 88.3   MCH      26.0 - 34.0 pg 27.9   MCHC      31.0 - 37.0 g/dL 31.6   RDW      % 16.5   Prelim Neutrophil Abs      1.50 - 7.70 x10 (3) uL 7.73 (H)   Neutrophils Absolute      1.50 - 7.70 x10(3) uL 7.73 (H)   Lymphocytes Absolute      1.00 - 4.00 x10(3) uL 1.94   Monocytes Absolute      0.10 - 1.00 x10(3) uL 0.55   Eosinophils Absolute      0.00 - 0.70 x10(3) uL 0.20   Basophils Absolute      0.00 - 0.20 x10(3) uL 0.03   Immature Granulocyte Absolute      0.00 - 1.00 x10(3) uL 0.06   Neutrophils %      % 73.5   Lymphocytes %      % 18.5   Monocytes %      % 5.2   Eosinophils %      % 1.9   Basophils %      % 0.3   Immature Granulocyte %      % 0.6   Glucose      70 - 99 mg/dL 77   Sodium      136 - 145 mmol/L 140   Potassium      3.5 - 5.1 mmol/L 3.8   Chloride      98 - 112 mmol/L 108   Carbon Dioxide, Total      21.0 - 32.0 mmol/L 30.0   ANION GAP      0 - 18 mmol/L 2   BUN      7 - 18 mg/dL 12   CREATININE      0.55 - 1.02 mg/dL 0.55   CALCIUM      8.5 - 10.1 mg/dL 8.8   CALCULATED OSMOLALITY      275 - 295 mOsm/kg 289   eGFR NON-AFR. AMERICAN      >=60 132   eGFR       >=60 152   AST (SGOT)      15 - 37 U/L 22   ALT (SGPT)      13 - 56 U/L 22   ALKALINE PHOSPHATASE      37 - 98 U/L 114 (H)   Total Bilirubin      0.1 - 2.0 mg/dL 0.4   PROTEIN, TOTAL      6.4 - 8.2 g/dL 7.0   Albumin      3.4 - 5.0 g/dL 2.7 (L)   Globulin      2.8 - 4.4 g/dL 4.3   A/G Ratio      1.0 - 2.0 0.6 (L)   Color Urine      Yellow Yellow   Clarity Urine      Clear Hazy (A)   Spec Gravity      1.001 - 1.030 >1.030 (H)   Glucose Urine      Negative mg/dL Negative   Bilirubin Urine      Negative Negative   Ketones, UA       Negative mg/dL Negative   Blood Urine      Negative Large (A)   PH Urine      5.0 - 8.0 7.0   Protein Urine      Negative mg/dL 100 (A)   Urobilinogen Urine      <2.0 mg/dL <2.0   Nitrite Urine      Negative Negative   Leukocyte Esterase Urine      Negative Small (A)   WBC Urine      0 - 5 /HPF >50 (A)   RBC Urine      0 - 2 /HPF >10 (A)   Bacteria Urine      None Seen /HPF Rare (A)   SQUAM EPI CELLS UR      None Seen /HPF Few (A)   RENAL TUBULAR EPITHELIAL CELLS      None Seen /HPF None Seen   TRANSITIONAL EPI CELLS      None Seen /HPF None Seen   YEAST URINE      None Seen /HPF None Seen   LEVETIRACETAM (KEPPRA)      12 - 46 ug/mL 55 (H)     Prior as noted below  Component      Latest Ref Rng & Units 8/12/2021   LEVETIRACETAM (KEPPRA)      12 - 46 ug/mL 34       Imaging:  None new             Impression/ Plan:  Dotty Gross is a 29 year old female patient with PMHx significant for seizure disorder, who originally presented 6/2021, for evaluation and management.     Neurologic examination is normal and nonfocal     Patient endorses a history of seizures since 2012.  She has no recollection during these events.    Previously, her seizures were well controlled on Keppra at 1000 mg twice daily, with last seizure occurring approximately 1 year prior, and one prior to that occurring during second trimester of pregnancy in 2017.    However, she was having complications of hyperemesis gravidarum during first and second trimesters with most recent pregnancy.  She did not have any seizures during that time.  However, she had recurrent seizures and was increased on Keppra up to 1500 mg bid - she subsequently delivered her baby and had recurrent seizures, which suspect are breakthrough seizures and may be related to missing dose of medication or receiving blood transfusion.  However, levels were therapeutic and she has had seizures on several occasions during and after pregnancy.     Given this most recent seizure cluster,  she was previously recommended to start Lamictal and was doing well on dose up to 100 mg daily after following starter kit instructions; however, she did not refill the medication and stopped talking for ~ 2 weeks and presented to ED for admission 3/9/2022 following witnessed seizure in office with reports of 3 or 4 additional seizures that day.       She is now doing well and suspect her breakthrough seizures were due to missing anti-seizure medication as well as concurrent infectious process - she was doing well on Depakote and Lamictal aside from weight gain -  levels were in range as of 4/18/2022 - given weight gain, she was weaned off Depakote and started on Lamictal up to 100 mg bid along with Keppra 1500 mg bid.  Levels  done off Depakote and on Lamictal 100 mg bid were within normal limits; given improvement, she has been weaned down on Keppra to 1000 mg bid and was doing well.    Lamictal level was low range as of 1/30/2023 and patient had seizure 2/17/2023.  She has not had any recurrent seizures and repeat level 8/2023 was within range.  She had new severe headache - symptoms seem most consistent with migraine but she denies prior history of migraine and given this was worst headache of life, MRI brain / MRA brain ordered without contrast previously     Patient has not been able to have MRI due to finding out she was pregnant but headaches resolved and will hold off on imaging.       She is now 28 weeks gestation and Lamictal level was mildly low as of 12/6/2024- now increased from 100 mg bid to 150 mg bid as of 12/11/2024 and remains on Keppra 1000 mg bid - will plan to check levels now during 3rd trimester and counseled patient will likely have levels checked shortly after delivery as well as toxicity could arise shortly after delivery due to reduction in clearance of lamotrigine which was occurring during pregnancy. She has been doing well however and has less severe hyperemesis gravidarum; headaches  have resolved as well and she has not had to take sumatriptan - She has not been having any seizures and denies auras of odd tastes, smells or sounds.  She is following with maternal-fetal-medicine service and has been doing well.  She has migraines and is advised ok to take sumatriptan PRN for abortive therapy - will continue; otherwise, check levels periodically during pregnancy and patient advised to notify office with any new seizures and stressed importance of maintaining adequate levels during pregnancy.    1. Seizure disorder (HCC)  As noted above   - lamoTRIgine 150 MG Oral Tab; Take 1 tablet (150 mg total) by mouth 2 (two) times daily.  Dispense: 180 tablet; Refill: 1  - levetiracetam 1000 MG Oral Tab; Take 1 tablet (1,000 mg total) by mouth 2 (two) times daily.  Dispense: 180 tablet; Refill: 1    2. 28 weeks gestation of pregnancy (HCC)  As noted above     Return in about 3 months (around 4/29/2025).    30 total minutes spent, including chart review, discussion of pertinent labs and imaging with patient with plan of care /discussion as noted above; patient allowed to ask questions and all questions answered to the best of my ability     Yogi Alfonso MD, Neurology  [facility]  Pager [phone]  1/29/2025

## 2025-02-04 ENCOUNTER — LAB ENCOUNTER (OUTPATIENT)
Dept: LAB | Age: 30
End: 2025-02-04
Payer: MEDICAID

## 2025-02-04 DIAGNOSIS — Z13.1 DIABETES MELLITUS SCREENING: Primary | ICD-10-CM

## 2025-02-04 DIAGNOSIS — Z13.1 DIABETES MELLITUS SCREENING: ICD-10-CM

## 2025-02-04 DIAGNOSIS — G40.909 SEIZURE DISORDER (HCC): ICD-10-CM

## 2025-02-04 LAB — GLUCOSE 1H P GLC SERPL-MCNC: 138 MG/DL

## 2025-02-04 PROCEDURE — 80175 DRUG SCREEN QUAN LAMOTRIGINE: CPT

## 2025-02-04 PROCEDURE — 36415 COLL VENOUS BLD VENIPUNCTURE: CPT

## 2025-02-04 PROCEDURE — 82950 GLUCOSE TEST: CPT

## 2025-02-04 NOTE — PROGRESS NOTES
Aureliano 28w5d    Doing well. +FM, no Ucx, VB, LOF.     WAI 25 by 8w0d not consistent with uncertain LMP   A+     PRENATAL LABS - done 10/9     Partner Giorgio  -NIPS low risk, GIRL   -NT normal   -Carrier screen - negative   -COVID-19 vaccination encouraged.    -Flu vaccine  - done   -A1c, & CBC -NL   -1hr gTT: 138, 3hr gTT ordered   -3rd trim HIV & syphilis: ordered   -Tdap: administered today     Requests sterilization  -bilateral total salpingectomy at interval laparoscopy planned (or if  do at that time)  -Medicaid consent form signed 2025     Obesity (pre preg BMI 34) & h/o placental infarct   -aspirin 162 mg daily encouraged at 12 wk. Patient is going to consider. Having a hard time with hyperemesis.   -limit weight gain 11-20 lb   -Early 1 hr GTT borderline 134 at 13w5d.   -Early A1c, TSH, CMP, uric acid- ok  -Baseline urine P/C ratio 0.114 at 8w0d = normal   -L2 US normal    -growth US with BPP at 32 wk: scheduled   -NST weekly at 36 wk      Epilepsy (2012, 1st grand mal seizure at 17)   -Neuro Yogi Alfonso- aware she is pregnant   -H/o missed doses of her AEDs  -generalized seizure 2023. At that time, she was on Keppra 1000 mg bid and Lamictal 100 mg bid. She had missed doses of Keppra and Lamictal the night prior to her seizure   -MRI/MRA brain   -ordered per neurologist 24 (neurologist ok with her having during pregnancy)  -has not been done. Neurologist aware. Ok to defer as HA better.     -Meds:  Lamotrigine (Lamictal) 100 mg BID  -24 level therapeutic.   - increased Lamictal to    150 mg bid   Levetiracetam (Keppra) 1000 mg BID -    -Neuro visit 10/29/24. RTC 3 months (25)  -Folic acid 4 grams per day advised due to increased risk ONTD   -Last seizure - 2023      Migraine without aura   -pre pregnancy: Imitrex PRN  -24 Neuro visit - MRI/MRA brain ordered due to \"worst headache of her life\" on 24 - not done. Steroids helped.   -10/29  Neuro visit - MRI/MRA without contrast not done. Deferred as HA resolved. Continue PRN Imitrex.RTC 3 months (1/29/25)     Hyperemesis gravidarum  -H/o Hyperemesis in 2021-22 pregnancy:                Bonjesta - didn't help really initially but would help with sleep  Reglan - limited effect  Compazine - didn't help  Phenergan - seemed to help for a bit.  Zofran - has at home.   Trimethobenzamide - hasn't tried yet but has at home  -9/5 - ED visit at [facility] - Zofran not helping. IVF, IV antiemetics -> tolerating PO. Rx Diclegis.   -9/12 - > 160 ketones in urine. Recommend ED for IV fluids. Diclegis helping some but not enough. Rx Phenergan rectal suppository & Pepcid. Will talk to clinical supervisor about home IV fluids   -9/13 ED visit at [facility] for hyperemesis  -9/22 ED visit at [facility] for hyperemesis  -9/30 ED visit at [facility] for hyperemesis. Pt reports 1 liter of IV fluid given. Pt declined admission.   -10/3 - 10w6d - Takes Diclegis, Phenergan, Zofran, Pepcid. Order for PICC/Midline. Order for IV fluids (2 liters NS) & [facility]. Rx Zofran 4 mg ODT   -10/9 Not needing IV fluids right now.   -she is only nauseous in the morning, able to tolerate foods and liquids      Hypokalemia  -10/9 K 3.2 -> Rx Kdur & advised magnesium glycinate or topical magnesium  -12/6/2024 Doing better. recheck CMP -NL      H/o iron deficiency anemia   -h/o IV iron x 3 doses (12/22/21 - 12/29/22) during pregnancy (not able to tolerate PO iron)   -10/9 Hb 12.9, ferritin 27. Not able to tolerate her PNV due to hyperemesis. Elected to proceed with IV iron.    -IV iron infusions x 4 (10/18/24-11/6/24) - does feel better      H/o postpartum hemorrhage (atony & cervical laceration) with transfusion (2022)  -aggressive antepartum anemia management   -uterotonics in room, consider TXA at delivery      H/o vitamin B12 deficiency  -10/9 vit B12 370 - low normal. Vit B12 1000 mcg daily advised

## 2025-02-05 ENCOUNTER — ROUTINE PRENATAL (OUTPATIENT)
Facility: CLINIC | Age: 30
End: 2025-02-05
Payer: MEDICAID

## 2025-02-05 VITALS
BODY MASS INDEX: 35.77 KG/M2 | HEIGHT: 60 IN | DIASTOLIC BLOOD PRESSURE: 66 MMHG | WEIGHT: 182.19 LBS | SYSTOLIC BLOOD PRESSURE: 112 MMHG | HEART RATE: 86 BPM

## 2025-02-05 DIAGNOSIS — O99.353 EPILEPSY AFFECTING PREGNANCY IN THIRD TRIMESTER (HCC): ICD-10-CM

## 2025-02-05 DIAGNOSIS — G40.909 EPILEPSY AFFECTING PREGNANCY IN THIRD TRIMESTER (HCC): ICD-10-CM

## 2025-02-05 DIAGNOSIS — O09.93 SUPERVISION OF HIGH RISK PREGNANCY IN THIRD TRIMESTER (HCC): Primary | ICD-10-CM

## 2025-02-05 DIAGNOSIS — Z3A.28 28 WEEKS GESTATION OF PREGNANCY (HCC): ICD-10-CM

## 2025-02-05 PROCEDURE — 99213 OFFICE O/P EST LOW 20 MIN: CPT | Performed by: STUDENT IN AN ORGANIZED HEALTH CARE EDUCATION/TRAINING PROGRAM

## 2025-02-05 PROCEDURE — 90471 IMMUNIZATION ADMIN: CPT | Performed by: STUDENT IN AN ORGANIZED HEALTH CARE EDUCATION/TRAINING PROGRAM

## 2025-02-05 PROCEDURE — 90715 TDAP VACCINE 7 YRS/> IM: CPT | Performed by: STUDENT IN AN ORGANIZED HEALTH CARE EDUCATION/TRAINING PROGRAM

## 2025-02-05 NOTE — PROGRESS NOTES
Pt aware of results & recommendations for a 3 hr gtt. She has an appt this morning and will discuss further with provider. Verbalized understanding.

## 2025-02-10 LAB — LAMOTRIGINE LVL: 3.4 UG/ML

## 2025-02-18 ENCOUNTER — ROUTINE PRENATAL (OUTPATIENT)
Facility: CLINIC | Age: 30
End: 2025-02-18
Payer: MEDICAID

## 2025-02-18 VITALS
WEIGHT: 184 LBS | HEART RATE: 81 BPM | SYSTOLIC BLOOD PRESSURE: 110 MMHG | BODY MASS INDEX: 36.12 KG/M2 | HEIGHT: 60 IN | DIASTOLIC BLOOD PRESSURE: 66 MMHG

## 2025-02-18 DIAGNOSIS — Z13.1 DIABETES MELLITUS SCREENING: Primary | ICD-10-CM

## 2025-02-18 PROCEDURE — 99214 OFFICE O/P EST MOD 30 MIN: CPT

## 2025-02-18 NOTE — PROGRESS NOTES
Aureliano 30w4d     She is doing well, baby active       WAI 25 by 8w0d not consistent with uncertain LMP   A+     PRENATAL LABS - done 10/9     Partner Giorgio  -NIPS low risk, GIRL   -NT normal   -Carrier screen - negative   -COVID-19 vaccination encouraged.    -Flu vaccine  - done   -A1c, & CBC -NL   -1hr gTT: 138, 3hr gTT ordered - pt declines, A1c ordered today   -3rd trim HIV & syphilis: reminded pt   -Tdap: done 2025     Requests sterilization  -bilateral total salpingectomy at interval laparoscopy planned (or if  do at that time)  -Medicaid consent form signed 2025     Obesity (pre preg BMI 34) & h/o placental infarct   -aspirin 162 mg daily encouraged at 12 wk. Patient is going to consider. Having a hard time with hyperemesis.   -limit weight gain 11-20 lb   -Early 1 hr GTT borderline 134 at 13w5d.   -Early A1c, TSH, CMP, uric acid- ok  -Baseline urine P/C ratio 0.114 at 8w0d = normal   -L2 US normal    -growth US with BPP at 32 wk: scheduled   -NST weekly at 36 wk      Epilepsy (, 1st grand mal seizure at 17)   -Neuro Yogi Alfonso- aware she is pregnant   -H/o missed doses of her AEDs  -generalized seizure 2023. At that time, she was on Keppra 1000 mg bid and Lamictal 100 mg bid. She had missed doses of Keppra and Lamictal the night prior to her seizure   -MRI/MRA brain   -ordered per neurologist 24 (neurologist ok with her having during pregnancy)  -has not been done. Neurologist aware. Ok to defer as HA better.     -Meds:  Lamotrigine (Lamictal) 100 mg BID  -24 level therapeutic.   - increased Lamictal to    150 mg bid   Levetiracetam (Keppra) 1000 mg BID -    -Neuro visit 10/29/24.  Had office visit 25- no further rec.- RTC 3 months (25)  -Folic acid 4 grams per day advised due to increased risk ONTD   -Last seizure - 2023      Migraine without aura   -pre pregnancy: Imitrex PRN  -24 Neuro visit - MRI/MRA brain ordered due to  \"worst headache of her life\" on 7/23/24 - not done. Steroids helped.   -10/29 Neuro visit - MRI/MRA without contrast not done. Deferred as HA resolved. Continue PRN Imitrex.  1/29/25- had office, no new recommendations: RTC 3 months (4/29/2025)     Hyperemesis gravidarum  -H/o Hyperemesis in 2021-22 pregnancy:                Bonjesta - didn't help really initially but would help with sleep  Reglan - limited effect  Compazine - didn't help  Phenergan - seemed to help for a bit.  Zofran - has at home.   Trimethobenzamide - hasn't tried yet but has at home  -9/5 - ED visit at [facility] - Zofran not helping. IVF, IV antiemetics -> tolerating PO. Rx Diclegis.   -9/12 - > 160 ketones in urine. Recommend ED for IV fluids. Diclegis helping some but not enough. Rx Phenergan rectal suppository & Pepcid. Will talk to clinical supervisor about home IV fluids   -9/13 ED visit at [facility] for hyperemesis  -9/22 ED visit at [facility] for hyperemesis  -9/30 ED visit at [facility] for hyperemesis. Pt reports 1 liter of IV fluid given. Pt declined admission.   -10/3 - 10w6d - Takes Diclegis, Phenergan, Zofran, Pepcid. Order for PICC/Midline. Order for IV fluids (2 liters NS) & [facility]. Rx Zofran 4 mg ODT   -10/9 Not needing IV fluids right now.   -she is only nauseous in the morning, able to tolerate foods and liquids      Hypokalemia  -10/9 K 3.2 -> Rx Kdur & advised magnesium glycinate or topical magnesium  -12/6/2024 Doing better. recheck CMP -NL      H/o iron deficiency anemia   -h/o IV iron x 3 doses (12/22/21 - 12/29/22) during pregnancy (not able to tolerate PO iron)   -10/9 Hb 12.9, ferritin 27. Not able to tolerate her PNV due to hyperemesis. Elected to proceed with IV iron.    -IV iron infusions x 4 (10/18/24-11/6/24) - does feel better      H/o postpartum hemorrhage (atony & cervical laceration) with transfusion (2022)  -aggressive antepartum anemia management   -uterotonics in room, consider TXA at delivery       H/o vitamin B12 deficiency  -10/9 vit B12 370 - low normal. Vit B12 1000 mcg daily advised

## 2025-02-20 ENCOUNTER — LAB ENCOUNTER (OUTPATIENT)
Dept: LAB | Age: 30
End: 2025-02-20
Payer: MEDICAID

## 2025-02-20 DIAGNOSIS — Z13.1 DIABETES MELLITUS SCREENING: ICD-10-CM

## 2025-02-20 DIAGNOSIS — O09.93 SUPERVISION OF HIGH RISK PREGNANCY IN THIRD TRIMESTER (HCC): ICD-10-CM

## 2025-02-20 LAB
EST. AVERAGE GLUCOSE BLD GHB EST-MCNC: 105 MG/DL (ref 68–126)
HBA1C MFR BLD: 5.3 % (ref ?–5.7)
T PALLIDUM AB SER QL IA: NONREACTIVE

## 2025-02-20 PROCEDURE — 36415 COLL VENOUS BLD VENIPUNCTURE: CPT

## 2025-02-20 PROCEDURE — 86780 TREPONEMA PALLIDUM: CPT

## 2025-02-20 PROCEDURE — 83036 HEMOGLOBIN GLYCOSYLATED A1C: CPT

## 2025-02-20 PROCEDURE — 87389 HIV-1 AG W/HIV-1&-2 AB AG IA: CPT

## 2025-02-26 NOTE — PROGRESS NOTES
Outpatient Maternal-Fetal Medicine Follow-Up    Dear Dr. James    Thank you for requesting an ultrasound and maternal-fetal medicine consultation on your patient Dotty Gross.  As you are aware she is a 29 year old female  with a verduzco pregnancy and an Estimated Date of Delivery: 25.  She returned to maternal-fetal medicine today for a follow-up visit.  Her history was reviewed from her prior visit and there were no interval changes.  She passed her GDM screen.    Antepartum Risk Factors  Obesity  Maternal seizure disorder    PHYSICAL EXAMINATION:  /73 (BP Location: Right arm, Patient Position: Sitting, Cuff Size: adult)   Pulse 96   Ht 5' (1.524 m)   Wt 186 lb (84.4 kg)   LMP 07/10/2024 (Exact Date)   BMI 36.33 kg/m²   General: alert and oriented, no acute distress  Abdomen: gravid, soft, non-tender  Extremities: non-tender, no edema    OBSTETRIC ULTRASOUND  The patient had a follow up fetal ultrasound today which I interpreted the results and reviewed them with the patient.      Ultrasound Findings:  Single IUP in cephalic presentation.    Placenta is anterior.   A 3 vessel cord is noted.  Cardiac activity is present at 138 bpm  EFW 2328 g ( 5 lb 2 oz); 76% AC 98 th %ile.    MIHCAELA is  19.2 cm.  MVP is 6.7 cm  BPP is 8/8.     The fetal measurements are consistent with established EDC. No gross ultrasound evidence of structural abnormalities are seen today. The patient understands that ultrasound cannot rule out all structural and chromosomal abnormalities.     See the imaging tab for the images and complete report.    DISCUSSION  During her visit we discussed and reviewed the following issues:  OBESITY  See prior [facility] notes for a detailed review.    MATERNAL SEIZURE DOISORDER  See prior [facility] notes for a detailed review.    We discussed that she passed her GDM screen but just at the upper range of normal.  The baby is growing a little bit larger than her other children.  We reviewed  healthy nutrition with lean proteins, vegetables, whole grains and to limit ultra processed foods and foods with added sugar.    We reviewed the signs and symptoms of preeclampsia,  labor and monitoring fetal movement.  We discussed reasons for her to call her physician.    We discussed the recommended plan of care based on her  risk factors.  Dotty and her significant other, Giorgio, had their questions answered to their satisfaction.      IMPRESSION:  IUP at 32w0d  Normal fetal growth and BPP  Obesity  Maternal seizure disorder    RECOMMENDATIONS:  Continue care with Dr. James  Weekly NSTs at 36 weeks  11-20 lb weight gain for pregnancy  Continue antiepileptic medications and comanagement with neurology    Thank you for allowing me to participate in the care of your patient.  Please do not hesitate to contact me if additional questions or concerns arise.      Fariba He M.D.    30 minutes spent in review of records, patient consultation, documentation and coordination of care.  The relevant clinical matter(s) are summarized above.     Note to patient and family  The 21st Century Cures Act makes medical notes available to patients in the interest of transparency.  However, please be advised that this is a medical document.  It is intended as [id number]-[id number] communication.  It is written and medical language may contain abbreviations or verbiage that are technical and unfamiliar.  It may appear blunt or direct.  Medical documents are intended to carry relevant information, facts as evident, and the clinical opinion of the practitioner.

## 2025-02-28 ENCOUNTER — OFFICE VISIT (OUTPATIENT)
Dept: PERINATAL CARE | Facility: HOSPITAL | Age: 30
End: 2025-02-28
Attending: OBSTETRICS & GYNECOLOGY
Payer: MEDICAID

## 2025-02-28 VITALS
WEIGHT: 186 LBS | HEIGHT: 60 IN | HEART RATE: 96 BPM | SYSTOLIC BLOOD PRESSURE: 112 MMHG | BODY MASS INDEX: 36.52 KG/M2 | DIASTOLIC BLOOD PRESSURE: 73 MMHG

## 2025-02-28 DIAGNOSIS — G40.909 MATERNAL SEIZURE DISORDER DURING PREGNANCY IN THIRD TRIMESTER (HCC): Primary | ICD-10-CM

## 2025-02-28 DIAGNOSIS — G40.909 MATERNAL SEIZURE DISORDER DURING PREGNANCY IN THIRD TRIMESTER (HCC): ICD-10-CM

## 2025-02-28 DIAGNOSIS — E66.09 OTHER OBESITY DUE TO EXCESS CALORIES AFFECTING PREGNANCY IN THIRD TRIMESTER (HCC): ICD-10-CM

## 2025-02-28 DIAGNOSIS — O99.213 OTHER OBESITY DUE TO EXCESS CALORIES AFFECTING PREGNANCY IN THIRD TRIMESTER (HCC): ICD-10-CM

## 2025-02-28 DIAGNOSIS — O99.353 MATERNAL SEIZURE DISORDER DURING PREGNANCY IN THIRD TRIMESTER (HCC): Primary | ICD-10-CM

## 2025-02-28 DIAGNOSIS — O99.353 MATERNAL SEIZURE DISORDER DURING PREGNANCY IN THIRD TRIMESTER (HCC): ICD-10-CM

## 2025-02-28 PROCEDURE — 76819 FETAL BIOPHYS PROFIL W/O NST: CPT

## 2025-02-28 PROCEDURE — 76816 OB US FOLLOW-UP PER FETUS: CPT | Performed by: OBSTETRICS & GYNECOLOGY

## 2025-03-06 ENCOUNTER — ROUTINE PRENATAL (OUTPATIENT)
Facility: CLINIC | Age: 30
End: 2025-03-06
Payer: MEDICAID

## 2025-03-06 VITALS
WEIGHT: 186 LBS | DIASTOLIC BLOOD PRESSURE: 70 MMHG | BODY MASS INDEX: 36.52 KG/M2 | SYSTOLIC BLOOD PRESSURE: 110 MMHG | HEIGHT: 60 IN | HEART RATE: 91 BPM

## 2025-03-06 DIAGNOSIS — G40.909 EPILEPSY AFFECTING PREGNANCY IN THIRD TRIMESTER (HCC): ICD-10-CM

## 2025-03-06 DIAGNOSIS — Z3A.32 32 WEEKS GESTATION OF PREGNANCY (HCC): ICD-10-CM

## 2025-03-06 DIAGNOSIS — Z34.90 PRENATAL CARE, ANTEPARTUM (HCC): Primary | ICD-10-CM

## 2025-03-06 DIAGNOSIS — O99.213 OTHER OBESITY DUE TO EXCESS CALORIES AFFECTING PREGNANCY IN THIRD TRIMESTER (HCC): ICD-10-CM

## 2025-03-06 DIAGNOSIS — O21.0 HYPEREMESIS GRAVIDARUM (HCC): ICD-10-CM

## 2025-03-06 DIAGNOSIS — O99.353 EPILEPSY AFFECTING PREGNANCY IN THIRD TRIMESTER (HCC): ICD-10-CM

## 2025-03-06 DIAGNOSIS — O09.293 HISTORY OF POSTPARTUM HEMORRHAGE, CURRENTLY PREGNANT IN THIRD TRIMESTER (HCC): ICD-10-CM

## 2025-03-06 DIAGNOSIS — E66.09 OTHER OBESITY DUE TO EXCESS CALORIES AFFECTING PREGNANCY IN THIRD TRIMESTER (HCC): ICD-10-CM

## 2025-03-06 PROCEDURE — 99214 OFFICE O/P EST MOD 30 MIN: CPT | Performed by: STUDENT IN AN ORGANIZED HEALTH CARE EDUCATION/TRAINING PROGRAM

## 2025-03-06 NOTE — PROGRESS NOTES
Aureliano 32.6    +FM, no LOF, no VB, no ctx  Lots of pressure - trip to [facility] and going up and down stairs is uncomfortable.  Just toughens it out.  Discussed belly band.  Doing stretching.      WAI 25 by 8w0d not consistent with uncertain LMP      Partner Giorgio  -NIPS low risk, GIRL   -NT normal   -Carrier screen - negative   -COVID-19 vaccination encouraged.    -Flu vaccine  - done   -A1c, & CBC -NL   -1hr gTT: 138, 3hr gTT ordered - pt declines, A1c 5.3  -3rd trim HIV & syphilis neg  -Tdap: done 2025     Requests sterilization  -bilateral total salpingectomy at interval laparoscopy planned (or if  do at that time)  -Medicaid consent form signed 2025     Obesity (pre preg BMI 34) & h/o placental infarct   -aspirin 162 mg daily encouraged at 12 wk. Patient is going to consider. Having a hard time with hyperemesis.   -limit weight gain 11-20 lb   -Early 1 hr GTT borderline 134 at 13w5d.   -Early A1c, TSH, CMP, uric acid- ok  -Baseline urine P/C ratio 0.114 at 8w0d = normal   -L2 US normal    -: EFW 2328 g ( 5 lb 2 oz); 76% AC 98 th %ile.    MICHAELA is  19.2 cm.  MVP is 6.7 cm  BPP is 8/8.   -NST weekly at 36 wk      Epilepsy (2012, 1st grand mal seizure at 17)   -Neuro Yogi Alfonso- aware she is pregnant   -H/o missed doses of her AEDs  -generalized seizure 2023. At that time, she was on Keppra 1000 mg bid and Lamictal 100 mg bid. She had missed doses of Keppra and Lamictal the night prior to her seizure   -MRI/MRA brain   -ordered per neurologist 24 (neurologist ok with her having during pregnancy)  -has not been done. Neurologist aware. Ok to defer as HA better.     -Meds:  Lamotrigine (Lamictal) 100 mg BID  -24 level therapeutic.   - increased Lamictal to    150 mg bid   Levetiracetam (Keppra) 1000 mg BID -    -Neuro visit 10/29/24.  Had office visit 25- no further rec.- RTC 3 months (25)  -Folic acid 4 grams per day advised due to increased risk  ONTD   -Last seizure - 2/2023      Migraine without aura   -pre pregnancy: Imitrex PRN  -7/29/24 Neuro visit - MRI/MRA brain ordered due to \"worst headache of her life\" on 7/23/24 - not done. Steroids helped.   -10/29 Neuro visit - MRI/MRA without contrast not done. Deferred as HA resolved. Continue PRN Imitrex.  1/29/25- had office, no new recommendations: RTC 3 months (4/29/2025)     Hyperemesis gravidarum  -H/o Hyperemesis in 2021-22 pregnancy:                Bonjesta - didn't help really initially but would help with sleep  Reglan - limited effect  Compazine - didn't help  Phenergan - seemed to help for a bit.  Zofran - has at home.   Trimethobenzamide - hasn't tried yet but has at home  -9/5 - ED visit at [facility] - Zofran not helping. IVF, IV antiemetics -> tolerating PO. Rx Diclegis.   -9/12 - > 160 ketones in urine. Recommend ED for IV fluids. Diclegis helping some but not enough. Rx Phenergan rectal suppository & Pepcid. Will talk to clinical supervisor about home IV fluids   -9/13 ED visit at [facility] for hyperemesis  -9/22 ED visit at [facility] for hyperemesis  -9/30 ED visit at [facility] for hyperemesis. Pt reports 1 liter of IV fluid given. Pt declined admission.   -10/3 - 10w6d - Takes Diclegis, Phenergan, Zofran, Pepcid. Order for PICC/Midline. Order for IV fluids (2 liters NS) & [facility]. Rx Zofran 4 mg ODT   -10/9 Not needing IV fluids right now.   -she is only nauseous in the morning, able to tolerate foods and liquids      Hypokalemia  -10/9 K 3.2 -> Rx Kdur & advised magnesium glycinate or topical magnesium  -12/6/2024 Doing better. recheck CMP -NL      H/o iron deficiency anemia   -h/o IV iron x 3 doses (12/22/21 - 12/29/22) during pregnancy (not able to tolerate PO iron)   -10/9 Hb 12.9, ferritin 27. Not able to tolerate her PNV due to hyperemesis. Elected to proceed with IV iron.    -IV iron infusions x 4 (10/18/24-11/6/24) - does feel better      H/o postpartum hemorrhage (atony  & cervical laceration) with transfusion (2022)  -aggressive antepartum anemia management   -uterotonics in room, consider TXA at delivery      H/o vitamin B12 deficiency  -10/9 vit B12 370 - low normal. Vit B12 1000 mcg daily advised

## 2025-03-10 NOTE — ED INITIAL ASSESSMENT (HPI)
Arrives via [facility] [address] [facility] from [facility] after having a seizure. Gordon Pimentel forward in the bathroom and had an approximate 4 minute seizure so was sent here. Had a seizure this morning as well. Has been nonverbal for EMS. Placed in c-collar PTA. Your referral was placed to hematology/oncology for patient.     We were unable to contact patient for scheduling, so the referral is being canceled.  Should patient call back to our office, we will be certain to schedule an appointment with her.     We are routing this message to your office for awareness.

## 2025-03-11 ENCOUNTER — TELEPHONE (OUTPATIENT)
Facility: CLINIC | Age: 30
End: 2025-03-11

## 2025-03-11 NOTE — TELEPHONE ENCOUNTER
Telephone Encounter    On-call MD received page about vaginal bleeding in pregnancy. Pt is 33w4d. Woke up at around 0300 to use the restroom and noticed some bleeding, a bit more than spotting but nothing super intense or heavy. Placed a panty liner. Something similar happened a few hours later. Then this morning when she went to restroom no bleeding on panty liner but just pink tinged when she wiped. Has some cramping but feels less than period cramps. Frequent fetal movement. Has doppler at home and checked heart tones and was normal. No LOF. No other vaginal complaints (discharge, itching, etc). Nothing in vagina last 24 hours, no trauma to belly. Advised patient to watch symptoms today and if symptoms return or worsen or if decreased FM, call office back.

## 2025-03-17 ENCOUNTER — HOSPITAL ENCOUNTER (OUTPATIENT)
Facility: HOSPITAL | Age: 30
Discharge: HOME | End: 2025-03-17
Attending: STUDENT IN AN ORGANIZED HEALTH CARE EDUCATION/TRAINING PROGRAM | Admitting: STUDENT IN AN ORGANIZED HEALTH CARE EDUCATION/TRAINING PROGRAM
Payer: MEDICAID

## 2025-03-17 VITALS
SYSTOLIC BLOOD PRESSURE: 118 MMHG | DIASTOLIC BLOOD PRESSURE: 75 MMHG | WEIGHT: 186 LBS | HEART RATE: 95 BPM | BODY MASS INDEX: 36.52 KG/M2 | HEIGHT: 60 IN | RESPIRATION RATE: 16 BRPM | TEMPERATURE: 98 F

## 2025-03-17 PROCEDURE — 59025 FETAL NON-STRESS TEST: CPT | Performed by: STUDENT IN AN ORGANIZED HEALTH CARE EDUCATION/TRAINING PROGRAM

## 2025-03-18 NOTE — NST
Nonstress Test   Patient: Dotty Gross    Gestation: 34w3d    NST:       Variability: Moderate           Accelerations: Yes           Decelerations: None            Baseline: 125 BPM           Uterine Irritability: No           Contractions: Not present                                        Acoustic Stimulator: No           Nonstress Test Interpretation: Reactive           Nonstress Test Second Interpretation: Reactive                     Additional Comments

## 2025-03-18 NOTE — PROGRESS NOTES
Pt  EDC 27 presents to L&D with c/o feeling less movement with baby, states is feeling the baby move but feel that the movements are \"different\". Pt also states that at 1730 notice wetness to underwear and increased moisture to perineum. Pt denies ucs or vag bleeding. EFM applied.

## 2025-03-18 NOTE — PROGRESS NOTES
Discharge instructions given to pt and discussed. Pt verb understanding and agreeable to POC. Pt escorted out by this RN with instructions in hand in stable condition.

## 2025-03-19 ENCOUNTER — TELEPHONE (OUTPATIENT)
Facility: CLINIC | Age: 30
End: 2025-03-19

## 2025-03-19 ENCOUNTER — ROUTINE PRENATAL (OUTPATIENT)
Facility: CLINIC | Age: 30
End: 2025-03-19
Payer: MEDICAID

## 2025-03-19 VITALS
BODY MASS INDEX: 37.07 KG/M2 | DIASTOLIC BLOOD PRESSURE: 70 MMHG | SYSTOLIC BLOOD PRESSURE: 116 MMHG | WEIGHT: 188.81 LBS | HEIGHT: 60 IN | HEART RATE: 88 BPM

## 2025-03-19 DIAGNOSIS — O99.353 EPILEPSY AFFECTING PREGNANCY IN THIRD TRIMESTER (HCC): ICD-10-CM

## 2025-03-19 DIAGNOSIS — O99.013 MATERNAL IRON DEFICIENCY ANEMIA AFFECTING PREGNANCY IN THIRD TRIMESTER, ANTEPARTUM (HCC): ICD-10-CM

## 2025-03-19 DIAGNOSIS — O46.93 VAGINAL BLEEDING IN PREGNANCY, THIRD TRIMESTER (HCC): ICD-10-CM

## 2025-03-19 DIAGNOSIS — O21.0 HYPEREMESIS GRAVIDARUM (HCC): ICD-10-CM

## 2025-03-19 DIAGNOSIS — E66.09 OTHER OBESITY DUE TO EXCESS CALORIES AFFECTING PREGNANCY IN THIRD TRIMESTER (HCC): Primary | ICD-10-CM

## 2025-03-19 DIAGNOSIS — G40.909 EPILEPSY AFFECTING PREGNANCY IN THIRD TRIMESTER (HCC): ICD-10-CM

## 2025-03-19 DIAGNOSIS — D50.9 MATERNAL IRON DEFICIENCY ANEMIA AFFECTING PREGNANCY IN THIRD TRIMESTER, ANTEPARTUM (HCC): ICD-10-CM

## 2025-03-19 DIAGNOSIS — O99.213 OTHER OBESITY DUE TO EXCESS CALORIES AFFECTING PREGNANCY IN THIRD TRIMESTER (HCC): Primary | ICD-10-CM

## 2025-03-19 DIAGNOSIS — Z13.1 SCREENING FOR DIABETES MELLITUS: ICD-10-CM

## 2025-03-19 DIAGNOSIS — Z34.83 PRENATAL CARE, SUBSEQUENT PREGNANCY IN THIRD TRIMESTER (HCC): ICD-10-CM

## 2025-03-19 PROBLEM — O42.90 AMNIOTIC FLUID LEAKING (HCC): Status: ACTIVE | Noted: 2025-03-19

## 2025-03-19 PROCEDURE — 59025 FETAL NON-STRESS TEST: CPT | Performed by: OBSTETRICS & GYNECOLOGY

## 2025-03-19 PROCEDURE — 99214 OFFICE O/P EST MOD 30 MIN: CPT | Performed by: OBSTETRICS & GYNECOLOGY

## 2025-03-19 NOTE — PROGRESS NOTES
BOBO 34w5d - Medicaid    Chief Complaint   Patient presents with    Prenatal Care   Chaperone declines     Since last BOBO visit  -3/11/25 Pt wrote in [email or web address] message & called On call OB regarding waking up at 33w4d with some light vaginal bleeding, more than spotting. Occurred a few times. Very slight cramping.   -3/17/25 L&D triage visit c/o fetal movements feel \"different\"     Lots of pressure, more with movement.  Still vomiting - will brush teeth about 5 min or so after brushes her teeth - will have some vomiting yellow/green. Nausea will last a few hours. Sometimes lingers all day but sometimes goes away    Diclegis - not anymore  Reglan - hasn't needed to take this.   Zofran ODT - has not taken for awhile. Seemed to make the salivation worse   Famotidine -  BID - helps with the vomiting. Less painful .  Promethazine (phenergan) - did take a few times, but not for awhile.   Overall keeping - seizure meds down ok.   Folic acid -  taking    +FM.  contractions, leaking fluid, vaginal bleeding, headache, vision changes, epigastric pain.      The last vaginal bleeding was on 3/11/25 & just a little the last day on 3/12/25. No recent intercourse. Sometimes will see brown discharge. Last time yesterday. Cramps come & go & pelvic pressure. Thinks they are contractions. Some are stronger, some more like menstrual cramping.     Vitals:    25 1043   BP: 116/70   Pulse: 88   Weight: 188 lb 12.8 oz (85.6 kg)   Height: 60\"     TWG 12 lb 12.8 oz (5.806 kg)     Gen A&O, NAD  Abd soft, obese, NT  Speculum exam - normal appearing white vaginal discharge. Cervix visually closed  SVE - fingertip (cannot get a finger through), 50%/-3, cephalic. Soft consistency    29 year old  at 34w5d   WAI 25 by 8w0d not consistent with uncertain LMP   A+    Partner Giorgio  -NIPS low risk, GIRL   -NT normal   -Carrier screen - negative   -COVID-19 vaccination encouraged at previous   -Flu vaccine  - done   -1hr gTT: 138, 3hr  gTT ordered - pt declines, A1c 5.3 on . Recheck A1c  -A1c, & CBC ok   -3rd trim HIV & syphilis done  -Tdap done     IOL at 39 wk recommended. Pt in agreement. Message sent to  for cytotec, but will likely be ok for AM induction - can adjust type of IOL as we progress over the next few weeks.     Requests sterilization  -bilateral total salpingectomy at interval laparoscopy planned (or if  do at that time)  -Medicaid consent form signed 2025     Vaginal bleeding episode  -3/11/25 Pt wrote in [employer] message & called On call OB regarding waking up at 33w4d with some light vaginal bleeding, more than spotting. Occurred a few times. Very slight cramping.   -3/17/25 L&D triage visit c/o fetal movements feel \"different\". NST reactive   -3/19/2025 - 34w5d. Description c/w possible recent mild placental abruption. Cervix almost fingertip/50%/-3. Soft  -Weekly NST initiated now   -recommend check growth US with BPP at 35-36 wk can do in our office.  -Check CBC, KB, PT, PTT, fibrinogen      Obesity (pre preg BMI 34) & h/o placental infarct   -aspirin 162 mg daily encouraged at 12 wk. Patient is going to consider. Having a hard time with hyperemesis.   -limit weight gain 11-20 lb   -Early 1 hr GTT borderline 134 at 13w5d.   -Early A1c, TSH, CMP, uric acid- ok  -Baseline urine P/C ratio 0.114 at 8w0d = normal   -L2 US normal    -growth US with BPP at 32 wk   : EFW 76% AC 98% MICHAELA is  19.2 cm. MVP is 6.7 cm BPP is 8/8.   -NST weekly at 36 wk     Epilepsy (, 1st grand mal seizure at 17)   -Neuro Yogi Alfonso- aware she is pregnant   -H/o missed doses of her AEDs  -generalized seizure 2023. At that time, she was on Keppra 1000 mg bid and Lamictal 100 mg bid. She had missed doses of Keppra and Lamictal the night prior to her seizure   -MRI/MRA brain   -ordered per neurologist 24 (neurologist ok with her having during pregnancy)  -has not been done. Neurologist aware. Ok to defer as  HA better.     -Meds:  Lamotrigine (Lamictal) 100 mg BID  -8/23/24 level therapeutic. Recheck 3 months    12/11 - increased Lamictal to 150 mg bid   Levetiracetam (Keppra) 1000 mg BID - 8/23    -8/23/24 level therapeutic. Recheck 3 months   -Neuro visit 10/29/24. RTC 3 months (1/29/25)  -Neuro visit 1/29/25- no further rec.- RTC 3 months (4/29/25)   -Folic acid 4 grams per day advised due to increased risk ONTD   -Last seizure - 2/2023     Migraine without aura   -pre pregnancy: Imitrex PRN  -7/29/24 Neuro visit - MRI/MRA brain ordered due to \"worst headache of her life\" on 7/23/24 - not done. Steroids helped.   -10/29 Neuro visit - MRI/MRA without contrast not done. Deferred as HA resolved. Continue PRN Imitrex.RTC 3 months (1/29/25)    Hyperemesis gravidarum  -H/o Hyperemesis in 2021-22 pregnancy:    Bonjesta - didn't help really initially but would help with sleep  Reglan - limited effect  Compazine - didn't help  Phenergan - seemed to help for a bit.  Zofran - has at home.   Trimethobenzamide - hasn't tried yet but has at home  -9/5 - ED visit at [facility] - Zofran not helping. IVF, IV antiemetics -> tolerating PO. Rx Diclegis.   -9/12 - > 160 ketones in urine. Recommend ED for IV fluids. Diclegis helping some but not enough. Rx Phenergan rectal suppository & Pepcid. Will talk to clinical supervisor about home IV fluids   -9/13 ED visit at [facility] for hyperemesis  -9/[facility]  -9/30 ED visit at [facility] for hyperemesis. Pt reports 1 liter of IV fluid given. Pt declined admission.   -10/3 - 10w6d - Takes Diclegis, Phenergan, Zofran, Pepcid. Order for PICC/Midline. Order for IV fluids (2 liters NS) & [facility]. Rx Zofran 4 mg ODT   -10/9 Not needing IV fluids right now.   -she is only nauseated in the morning, able to tolerate foods and liquids     Hypokalemia  -10/9 K 3.2 -> Rx Kdur & advised magnesium glycinate or topical magnesium  -12/6/2024 Doing better. Recheck  CMP normal     H/o iron deficiency anemia   -h/o IV iron x 3 doses (12/22/21 - 12/29/22) during pregnancy (not able to tolerate PO iron)   -10/9 Hb 12.9, ferritin 27. Not able to tolerate her PNV due to hyperemesis. Elected to proceed with IV iron.    -IV iron infusions x 4 (10/18/24-11/6/24) - does feel better     H/o postpartum hemorrhage (atony & cervical laceration) with transfusion (2022)  -aggressive antepartum anemia management   -uterotonics in room, consider TXA at delivery     H/o vitamin B12 deficiency  -10/9 vit B12 370 - low normal. Vit B12 1000 mcg daily advised    Subchorionic hemorrhage  -noted on 9/22 US in the ED    RTC 1 wk with NST

## 2025-03-20 ENCOUNTER — TELEPHONE (OUTPATIENT)
Facility: CLINIC | Age: 30
End: 2025-03-20

## 2025-03-20 NOTE — TELEPHONE ENCOUNTER
Breast Pump order was received from [facility].  Order form was placed in Dr. Anne James's bin for signature.

## 2025-03-21 ENCOUNTER — LAB ENCOUNTER (OUTPATIENT)
Dept: LAB | Age: 30
End: 2025-03-21
Attending: OBSTETRICS & GYNECOLOGY
Payer: MEDICAID

## 2025-03-21 DIAGNOSIS — Z13.1 SCREENING FOR DIABETES MELLITUS: ICD-10-CM

## 2025-03-21 DIAGNOSIS — O46.93 VAGINAL BLEEDING IN PREGNANCY, THIRD TRIMESTER (HCC): ICD-10-CM

## 2025-03-21 LAB
BASOPHILS # BLD AUTO: 0.04 X10(3) UL (ref 0–0.2)
BASOPHILS NFR BLD AUTO: 0.5 %
EOSINOPHIL # BLD AUTO: 0.17 X10(3) UL (ref 0–0.7)
EOSINOPHIL NFR BLD AUTO: 2.1 %
ERYTHROCYTE [DISTWIDTH] IN BLOOD BY AUTOMATED COUNT: 13.2 %
EST. AVERAGE GLUCOSE BLD GHB EST-MCNC: 117 MG/DL (ref 68–126)
HBA1C MFR BLD: 5.7 % (ref ?–5.7)
HCT VFR BLD AUTO: 36.6 %
HGB BLD-MCNC: 12 G/DL
IMM GRANULOCYTES # BLD AUTO: 0.02 X10(3) UL (ref 0–1)
IMM GRANULOCYTES NFR BLD: 0.2 %
KLEIHAUER BETKE RESULT: NEGATIVE
LYMPHOCYTES # BLD AUTO: 1.91 X10(3) UL (ref 1–4)
LYMPHOCYTES NFR BLD AUTO: 23.5 %
MCH RBC QN AUTO: 29.1 PG (ref 26–34)
MCHC RBC AUTO-ENTMCNC: 32.8 G/DL (ref 31–37)
MCV RBC AUTO: 88.8 FL
MONOCYTES # BLD AUTO: 0.5 X10(3) UL (ref 0.1–1)
MONOCYTES NFR BLD AUTO: 6.2 %
NEUTROPHILS # BLD AUTO: 5.49 X10 (3) UL (ref 1.5–7.7)
NEUTROPHILS # BLD AUTO: 5.49 X10(3) UL (ref 1.5–7.7)
NEUTROPHILS NFR BLD AUTO: 67.5 %
PLATELET # BLD AUTO: 320 10(3)UL (ref 150–450)
RBC # BLD AUTO: 4.12 X10(6)UL
WBC # BLD AUTO: 8.1 X10(3) UL (ref 4–11)

## 2025-03-21 PROCEDURE — 85384 FIBRINOGEN ACTIVITY: CPT

## 2025-03-21 PROCEDURE — 85025 COMPLETE CBC W/AUTO DIFF WBC: CPT

## 2025-03-21 PROCEDURE — 85460 HEMOGLOBIN FETAL: CPT

## 2025-03-21 PROCEDURE — 36415 COLL VENOUS BLD VENIPUNCTURE: CPT

## 2025-03-21 PROCEDURE — 85730 THROMBOPLASTIN TIME PARTIAL: CPT

## 2025-03-21 PROCEDURE — 83036 HEMOGLOBIN GLYCOSYLATED A1C: CPT

## 2025-03-21 PROCEDURE — 85610 PROTHROMBIN TIME: CPT

## 2025-03-22 LAB
APTT PPP: 30.1 SECONDS (ref 23–36)
FIBRINOGEN PPP-MCNC: 625 MG/DL (ref 200–480)
INR BLD: 0.99 (ref 0.8–1.2)
PROTHROMBIN TIME: 13.2 SECONDS (ref 11.6–14.8)

## 2025-03-25 ENCOUNTER — TELEPHONE (OUTPATIENT)
Facility: CLINIC | Age: 30
End: 2025-03-25

## 2025-03-25 DIAGNOSIS — R73.09 ELEVATED HEMOGLOBIN A1C: Primary | ICD-10-CM

## 2025-03-25 PROBLEM — O24.410 DIET CONTROLLED GESTATIONAL DIABETES MELLITUS (GDM) IN THIRD TRIMESTER (HCC): Status: ACTIVE | Noted: 2025-03-25

## 2025-03-25 RX ORDER — BLOOD-GLUCOSE METER
1 EACH MISCELLANEOUS 4 TIMES DAILY
Qty: 1 KIT | Refills: 0 | Status: SHIPPED | OUTPATIENT
Start: 2025-03-25

## 2025-03-25 RX ORDER — BLOOD SUGAR DIAGNOSTIC
STRIP MISCELLANEOUS
Qty: 100 STRIP | Refills: 2 | Status: SHIPPED | OUTPATIENT
Start: 2025-03-25

## 2025-03-25 RX ORDER — AVOBENZONE, HOMOSALATE, OCTISALATE, OCTOCRYLENE 30; 40; 45; 26 MG/ML; MG/ML; MG/ML; MG/ML
CREAM TOPICAL
Qty: 100 EACH | Refills: 2 | Status: SHIPPED | OUTPATIENT
Start: 2025-03-25

## 2025-03-25 NOTE — TELEPHONE ENCOUNTER
Spoke with patient. Dr. James recommended she schedule with diabetic education and start checking her blood sugars due to elevated A1c. She is scheduled with diabetic education next week but would like to start checking her blood sugars right away. Aware I will get a message to our on call provider as Dr. James is out of the office. Will call once supplies have been sent. Orders pended. Verbalized understanding.

## 2025-03-25 NOTE — TELEPHONE ENCOUNTER
Patient spoke with RN regarding test results, requesting to speak with her again for further questions.

## 2025-03-25 NOTE — PROGRESS NOTES
Patient seen Dr. James's recommendation, will schedule appointment with [facility] ED. Patient verbalized understanding, agreed to and intend to comply with plan of care.

## 2025-03-26 NOTE — TELEPHONE ENCOUNTER
Called pharmacy regarding PA for sara strips being closed. Pharmacy was able to prescribe with out authorization. Patient aware to Check blood sugar 4 times a day. Fasting and 2 hours after each meal. Understanding verbalized.

## 2025-03-28 ENCOUNTER — ROUTINE PRENATAL (OUTPATIENT)
Facility: CLINIC | Age: 30
End: 2025-03-28
Payer: MEDICAID

## 2025-03-28 ENCOUNTER — ULTRASOUND ENCOUNTER (OUTPATIENT)
Facility: CLINIC | Age: 30
End: 2025-03-28
Payer: MEDICAID

## 2025-03-28 VITALS
BODY MASS INDEX: 37.5 KG/M2 | HEART RATE: 92 BPM | WEIGHT: 191 LBS | DIASTOLIC BLOOD PRESSURE: 60 MMHG | SYSTOLIC BLOOD PRESSURE: 112 MMHG | HEIGHT: 60 IN

## 2025-03-28 DIAGNOSIS — O99.213 OTHER OBESITY DUE TO EXCESS CALORIES AFFECTING PREGNANCY IN THIRD TRIMESTER (HCC): Primary | ICD-10-CM

## 2025-03-28 DIAGNOSIS — E66.09 OTHER OBESITY DUE TO EXCESS CALORIES AFFECTING PREGNANCY IN THIRD TRIMESTER (HCC): Primary | ICD-10-CM

## 2025-03-28 DIAGNOSIS — Z3A.36 36 WEEKS GESTATION OF PREGNANCY (HCC): ICD-10-CM

## 2025-03-28 PROCEDURE — 87081 CULTURE SCREEN ONLY: CPT

## 2025-03-28 PROCEDURE — 99214 OFFICE O/P EST MOD 30 MIN: CPT

## 2025-03-28 PROCEDURE — 87150 DNA/RNA AMPLIFIED PROBE: CPT

## 2025-03-28 RX ORDER — FOLIC ACID 1 MG/1
TABLET ORAL DAILY
COMMUNITY

## 2025-03-28 NOTE — PROGRESS NOTES
Aureliano 36w0d     She is doing well, no complaints   Sve today closed/50/-3 cephalic   GBS today     NST reactive       WAI 25 by 8w0d not consistent with uncertain LMP   A+     Partner Giorgio  -NIPS low risk, GIRL   -NT normal   -Carrier screen - negative   -COVID-19 vaccination encouraged at previous   -Flu vaccine  - done   -1hr gTT: 138, 3hr gTT ordered - pt declines, A1c 5.3 on . Recheck A1c  -A1c, & CBC ok   -3rd trim HIV & syphilis done  -Tdap done      IOL at 39 wk recommended. Pt in agreement. Scheduled for 25     Requests sterilization  -bilateral total salpingectomy at interval laparoscopy planned (or if  do at that time)  -Medicaid consent form signed 2025      Vaginal bleeding episode  -3/11/25 Pt wrote in [employer] message & called On call OB regarding waking up at 33w4d with some light vaginal bleeding, more than spotting. Occurred a few times. Very slight cramping.   -3/17/25 L&D triage visit c/o fetal movements feel \"different\". NST reactive   -3/19/2025 - 34w5d. Description c/w possible recent mild placental abruption. Cervix almost fingertip/50%/-3. Soft  -Weekly NST initiated now   -recommend check growth US with BPP at 35-36 wk can do in our office.  -Check CBC, KB, PT, PTT, fibrinogen       Obesity (pre preg BMI 34) & h/o placental infarct   -aspirin 162 mg daily encouraged at 12 wk. Patient is going to consider. Having a hard time with hyperemesis.   -limit weight gain 11-20 lb   -Early 1 hr GTT borderline 134 at 13w5d.   -Early A1c, TSH, CMP, uric acid- ok  -Baseline urine P/C ratio 0.114 at 8w0d = normal   -L2 US normal    -growth US with BPP at 32 wk               : EFW 76% AC 98% MICHAELA is  19.2 cm. MVP is 6.7 cm BPP is 8/8.   -NST weekly at 36 wk      Epilepsy (, 1st grand mal seizure at 17)   -Neuro Yogi Alfonso- aware she is pregnant   -H/o missed doses of her AEDs  -generalized seizure 2023. At that time, she was on Keppra 1000 mg bid and  Lamictal 100 mg bid. She had missed doses of Keppra and Lamictal the night prior to her seizure   -MRI/MRA brain   -ordered per neurologist 7/29/24 (neurologist ok with her having during pregnancy)  -has not been done. Neurologist aware. Ok to defer as HA better.     -Meds:  Lamotrigine (Lamictal) 100 mg BID  -8/23/24 level therapeutic. Recheck 3 months    12/11 - increased Lamictal to 150 mg bid   Levetiracetam (Keppra) 1000 mg BID - 8/23                -8/23/24 level therapeutic. Recheck 3 months   -Neuro visit 10/29/24. RTC 3 months (1/29/25)  -Neuro visit 1/29/25- no further rec.- RTC 3 months (4/29/25)   -Folic acid 4 grams per day advised due to increased risk ONTD   -Last seizure - 2/2023      Migraine without aura   -pre pregnancy: Imitrex PRN  -7/29/24 Neuro visit - MRI/MRA brain ordered due to \"worst headache of her life\" on 7/23/24 - not done. Steroids helped.   -10/29 Neuro visit - MRI/MRA without contrast not done. Deferred as HA resolved. Continue PRN Imitrex.RTC 3 months (1/29/25)     Hyperemesis gravidarum  -H/o Hyperemesis in 2021-22 pregnancy:                Bonjesta - didn't help really initially but would help with sleep  Reglan - limited effect  Compazine - didn't help  Phenergan - seemed to help for a bit.  Zofran - has at home.   Trimethobenzamide - hasn't tried yet but has at home  -9/5 - ED visit at [facility] - Zofran not helping. IVF, IV antiemetics -> tolerating PO. Rx Diclegis.   -9/12 - > 160 ketones in urine. Recommend ED for IV fluids. Diclegis helping some but not enough. Rx Phenergan rectal suppository & Pepcid. Will talk to clinical supervisor about home IV fluids   -[facility]  -9/22 ED visit at [facility] for hyperemesis  -9/30 ED visit at [facility] for hyperemesis. Pt reports 1 liter of IV fluid given. Pt declined admission.   -10/3 - 10w6d - Takes Diclegis, Phenergan, Zofran, Pepcid. Order for PICC/Midline. Order for IV fluids (2 liters NS) & [facility]fran at  [facility] 3[facility]. Rx Zofran 4 mg ODT   -10/9 Not needing IV fluids right now.   -she is only nauseated in the morning, able to tolerate foods and liquids      Hypokalemia  -10/9 K 3.2 -> Rx Kdur & advised magnesium glycinate or topical magnesium  -12/6/2024 Doing better. Recheck CMP normal      H/o iron deficiency anemia   -h/o IV iron x 3 doses (12/22/21 - 12/29/22) during pregnancy (not able to tolerate PO iron)   -10/9 Hb 12.9, ferritin 27. Not able to tolerate her PNV due to hyperemesis. Elected to proceed with IV iron.    -IV iron infusions x 4 (10/18/24-11/6/24) - does feel better      H/o postpartum hemorrhage (atony & cervical laceration) with transfusion (2022)  -aggressive antepartum anemia management   -uterotonics in room, consider TXA at delivery      H/o vitamin B12 deficiency  -10/9 vit B12 370 - low normal. Vit B12 1000 mcg daily advised     Subchorionic hemorrhage  -noted on 9/22 US in the ED     RTC 1 wk with NST

## 2025-03-30 LAB — GROUP B STREP BY PCR FOR PCR OVT: POSITIVE

## 2025-04-01 ENCOUNTER — TELEMEDICINE (OUTPATIENT)
Facility: CLINIC | Age: 30
End: 2025-04-01
Payer: MEDICAID

## 2025-04-01 DIAGNOSIS — O24.410 DIET CONTROLLED GESTATIONAL DIABETES MELLITUS (GDM) IN THIRD TRIMESTER (HCC): Primary | ICD-10-CM

## 2025-04-01 PROCEDURE — 99211 OFF/OP EST MAY X REQ PHY/QHP: CPT | Performed by: DIETITIAN, REGISTERED

## 2025-04-01 NOTE — PROGRESS NOTES
Dotty Gross   1995 was seen for Gestational Diabetes Counseling: Individual    Date: 2025  Referring Provider: Anne James Start time: 12:30pm End time: 1:10pm    This visit is conducted using Telemedicine with live, interactive audio and video.  Patient verbally consents to Telemedicine visit.  Patient understands and accepts financial responsibility for any deductible, co-insurance and/or co-pays associated with this service.        Assessment: LMP 07/10/2024 (Exact Date)   Weight:   Wt Readings from Last 6 Encounters:   25 191 lb   25 188 lb 12.8 oz   25 186 lb   25 186 lb   25 186 lb   25 184 lb         Ms. Gross presents today for initial gestational diabetes education. She hyperemesis mostly in morning, mentos. Shellfish allergy noted.      Estimated Date of Delivery: 25   Gestation: 36w4d  First trimester: 1-13  Second trimester: -  Third trimester: 28-40    History:     OB History    Para Term  AB Living   3 2 2 0 0 2   SAB IAB Ectopic Multiple Live Births   0 0 0 0 2   Obstetric Comments   2017 Hyperemesis gravidarum. Epilepsy. Had seizure during second trimester. Did have vaginal bleeding during the pregnancy around 34-35 weeks or so.       2022 - \"Noah\" - Obesity, Hyperemesis gravidarum. Epilepsy. Several seizures at around 15-16 wk gestation. Declined aspirin.  IV iron x 3 doses (21 - 22) during pregnancy (not able to tolerate PO iron). Meconium, Variable Decels. 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         GDM Screen:   No results found for: \"GLUFG\", \"GLU1G\", \"GLU2G\", \"GLU3G\"     GLUCOSE 1HR OB   Date Value Ref Range Status   2025 138 (H) See comment mg/dL Final     Comment:     If the plasma glucose level measured after 1 hour is >=130, 135 or 140 mg/dl proceed to \"Glucose Tolerance, 100 gm (0 hr, 1 hr, 2hr, 3hr), Gestational (ADA)\" test on a separate day, as clinically  indicated.       10/23/2024 134 (H) See comment mg/dL Final     Comment:     If the plasma glucose level measured after 1 hour is >=130, 135 or 140 mg/dl proceed to \"Glucose Tolerance, 100 gm (0 hr, 1 hr, 2hr, 3hr), Gestational (ADA)\" test on a separate day, as clinically indicated.       10/27/2021 101 <135 mg/dL Final        History of GDM:  No  Family history of Type 2 Diabetes: one aunt     Diet & Exercise:     Obtained usual diet history:      TYPICAL  FOOD  NOTES   Breakfast  [place] protein acia bowl- almonds, bluerry, strawberry and banana          Lunch  Leftovers, quesadilla, chicken salad with hot sauce and toastada          Dinner  meat, beef pork chicken and sides like rice or beans    Mexican soup- tomato based      oil fried foods   Snacks  Fruit strawberries, pickly pears, green apples    Cheese its- gluten free household          Beverages  teas, craving soda, cucumber lemonade, pineapple water, water 2 bottles   Drinking alkaline water from [employer]- adding lemon and lime       cut out soda and juice   Eating out  on weekends occasionally [place] restaurant              Current physical activity: Stairs in house, taking care of 3 years old; walking 30 minutes; youtube pregnancy exercises  Sleep: horrible during last two months, pelvic pain   Already has meter and checking:  Fasting 80s, 86 highest  89 after meals      Education:     GDM Overview:  Reviewed gestational diabetes as diagnosis including target blood glucose values.  Benefits, risks, and management options for improving/maintaining glucose control to mother/baby discussed.    Healthy Eating:  Discussed nutrition concepts for pregnancy/healthy eating and effects of food on BG value.  Timing of meals; what is a carbohydrate, protein, fat.  Taught: Carb counting, label reading, meal planning.  Suggested minimal carb intake of 175 gm.    Being Active:  Benefits and effects of activity on BG discussed.  Reviewed types of recommended  activity, duration, precautions, and when to call MD.    Monitoring:  Instructed on how to use glucose monitor/proper lancet disposal. Checking schedules are:   Fastin-95 mg/dL, Call MD is >105 md/dL twice in 1 week   2 Hour Post Prandial:  Less than 120 md/dL, Call MD if >140 md/dL twice in 1 week.    Pt came in with a a glucose meter:  Yes  Instructed /demonstrated ability to perform blood glucose checking on: self      Taking Medication:  Reviewed when medication might be indicated.    Reducing Risk:  Effects of elevated blood glucose on mother/baby reviewed.  Discussed management (hyperglycemia, hypoglycemia, sick day, other) and when to call provider.  Post pregnancy management/prevention of Type 2 DM, and increased risk of having diabetes later in life reviewed.    Healthy Coping:  Family involvement/social support encouraged.  Identification of lifestyle behaviors willing to change discussed.    Training Tools Provided:   handout.  BG Log Sheets    Recommendations:      1. Follow recommended meal plan.   2. Begin checking fasting glucose and 2 hour after meals   3. Bring glucose / food log to next visit with diabetes educator. Bring glucose log sheets to MD office visits.   4. Encouraged activity if no restrictions.   5. Encouraged Dotty to contact the diabetes center with any questions or concerns.    Glucose meter kit, test strips and lancets sent to preferred pharmacy.    Patient verbalized understanding and has no further questions at this time.  Emailed/written materials provided for all topics covered.    Scheduled pt to follow-up x 1 with the [facility] 2025   Future Appointments   Date Time Provider Department Center   2025  9:45 AM Lynn Nagel MD [facility] [place]   4/10/2025  9:45 AM Elizabeth Stephenson MD [facility] [place]   2025  9:00 AM Eusebia Ocmapo RD [facility]   2025 11:00 AM Elizabeth Stephenson MD [facility]  [facility] [place]   4/20/2025  8:30 PM L & D ROOM A  [facility]   4/21/2025  8:30 AM L & D ROOM A  [facility]   4/29/2025  9:40 AM Yogi Alfonso MD [facility] [place]       Eusebia Ocampo RD, , LD, CDCES

## 2025-04-01 NOTE — PATIENT INSTRUCTIONS
Blood Glucose Goals    Start checking your blood sugars 4 times/day:  1.) Fasting (before your first meal of the day)  2.) 2 hours after breakfast  3.) 2 hours after lunch  4.) 2 hours after dinner    Bring your recorded glucose log sheet and food log sheet to your follow up visit in 2 weeks for review.    Ranges:   Fasting: less than 95 mg/dL  2 hours after meal: less than 120 mg/dL    Food Goals    Healthy, well rounded diet with plenty of water.    At least 175 grams of carbohydrates a day.    If 3 full meals is too much for you, aim for small frequent snacks.     Avoid going longer than 5 hours between meals/snacks.    Meal Plan:  Spreading out our carbohydrates throughout the day and adding in small amounts of movement after meals can help us deal with the glucose better.     Breakfast: 30 grams of carbohydrates.  AM Snack: 15 grams of carbohydrates.  Lunch: 45 grams of carbohydrates.  PM Snack: 15 grams of carbohydrates.  Dinner: 45 grams of carbohydrates.  Bedtime Snack: 30 grams of carbohydrates.    Eusebia Ocampo, RD, , LD, CDCES(she/her/hers)  Diabetes Educator  [facility]  [email or web address]  [phone] phone  [phone] Fax

## 2025-04-04 ENCOUNTER — ROUTINE PRENATAL (OUTPATIENT)
Facility: CLINIC | Age: 30
End: 2025-04-04
Payer: MEDICAID

## 2025-04-04 VITALS
DIASTOLIC BLOOD PRESSURE: 64 MMHG | WEIGHT: 190 LBS | HEART RATE: 87 BPM | SYSTOLIC BLOOD PRESSURE: 118 MMHG | BODY MASS INDEX: 37 KG/M2

## 2025-04-04 DIAGNOSIS — G40.909 EPILEPSY AFFECTING PREGNANCY IN THIRD TRIMESTER (HCC): ICD-10-CM

## 2025-04-04 DIAGNOSIS — Z3A.37 37 WEEKS GESTATION OF PREGNANCY (HCC): ICD-10-CM

## 2025-04-04 DIAGNOSIS — Z34.80 PRENATAL CARE, SUBSEQUENT PREGNANCY, ANTEPARTUM (HCC): Primary | ICD-10-CM

## 2025-04-04 DIAGNOSIS — O99.213 OTHER OBESITY DUE TO EXCESS CALORIES AFFECTING PREGNANCY IN THIRD TRIMESTER (HCC): ICD-10-CM

## 2025-04-04 DIAGNOSIS — O99.013 MATERNAL IRON DEFICIENCY ANEMIA AFFECTING PREGNANCY IN THIRD TRIMESTER, ANTEPARTUM (HCC): ICD-10-CM

## 2025-04-04 DIAGNOSIS — E66.09 OTHER OBESITY DUE TO EXCESS CALORIES AFFECTING PREGNANCY IN THIRD TRIMESTER (HCC): ICD-10-CM

## 2025-04-04 DIAGNOSIS — O99.353 EPILEPSY AFFECTING PREGNANCY IN THIRD TRIMESTER (HCC): ICD-10-CM

## 2025-04-04 DIAGNOSIS — D50.9 MATERNAL IRON DEFICIENCY ANEMIA AFFECTING PREGNANCY IN THIRD TRIMESTER, ANTEPARTUM (HCC): ICD-10-CM

## 2025-04-04 PROCEDURE — 99213 OFFICE O/P EST LOW 20 MIN: CPT | Performed by: STUDENT IN AN ORGANIZED HEALTH CARE EDUCATION/TRAINING PROGRAM

## 2025-04-04 PROCEDURE — 59025 FETAL NON-STRESS TEST: CPT | Performed by: STUDENT IN AN ORGANIZED HEALTH CARE EDUCATION/TRAINING PROGRAM

## 2025-04-04 NOTE — PROGRESS NOTES
Aureliano 37w0d     She is doing well, no complaints   /-3 moderate consistency, posterior after cervical sweep was done     NST reactive       WAI 25 by 8w0d not consistent with uncertain LMP   A+     Partner Giorgio  -NIPS low risk, GIRL   -NT normal   -Carrier screen - negative   -COVID-19 vaccination encouraged at previous   -Flu vaccine - - done   -1hr gTT: 138, 3hr gTT ordered - pt declines, A1c 5.3 on . Recheck A1c  -A1c, & CBC ok   -3rd trim HIV & syphilis done  -Tdap done   -GBS positive      IOL at 39 wk recommended. Pt in agreement. Scheduled for 25     Requests sterilization  -bilateral total salpingectomy at interval laparoscopy planned (or if  do at that time)  -Medicaid consent form signed 2025      Vaginal bleeding episode  -3/11/25 Pt wrote in [employer] message & called [facility] regarding waking up at 33w4d with some light vaginal bleeding, more than spotting. Occurred a few times. Very slight cramping.   -3/17/25 L&D triage visit c/o fetal movements feel \"different\". NST reactive   -3/19/2025 - 34w5d. Description c/w possible recent mild placental abruption. Cervix almost fingertip/50%/-3. Soft  -Weekly NST initiated now   -recommend check growth US with BPP at 35-36 wk can do in our office.  -Check CBC, KB, PT, PTT, fibrinogen       Obesity (pre preg BMI 34) & h/o placental infarct   -aspirin 162 mg daily encouraged at 12 wk. Patient is going to consider. Having a hard time with hyperemesis.   -limit weight gain 11-20 lb   -Early 1 hr GTT borderline 134 at 13w5d.   -Early A1c, TSH, CMP, uric acid- ok  -Baseline urine P/C ratio 0.114 at 8w0d = normal   -L2 US normal    -growth US with BPP at 32 wk               : EFW 76% AC 98% MICHAELA is  19.2 cm. MVP is 6.7 cm BPP is 8/8.   -NST weekly at 36 wk      Epilepsy (, 1st grand mal seizure at 17)   -Neuro Yogi Alfonso- aware she is pregnant   -H/o missed doses of her AEDs  -generalized seizure 2023. At that  time, she was on Keppra 1000 mg bid and Lamictal 100 mg bid. She had missed doses of Keppra and Lamictal the night prior to her seizure   -MRI/MRA brain   -ordered per neurologist 7/29/24 (neurologist ok with her having during pregnancy)  -has not been done. Neurologist aware. Ok to defer as HA better.     -Meds:  Lamotrigine (Lamictal) 100 mg BID  -8/23/24 level therapeutic. Recheck 3 months    12/11 - increased Lamictal to 150 mg bid   Levetiracetam (Keppra) 1000 mg BID - 8/23                -8/23/24 level therapeutic. Recheck 3 months   -Neuro visit 10/29/24. RTC 3 months (1/29/25)  -Neuro visit 1/29/25- no further rec.- RTC 3 months (4/29/25)   -Folic acid 4 grams per day advised due to increased risk ONTD   -Last seizure - 2/2023      Migraine without aura   -pre pregnancy: Imitrex PRN  -7/29/24 Neuro visit - MRI/MRA brain ordered due to \"worst headache of her life\" on 7/23/24 - not done. Steroids helped.   -10/29 Neuro visit - MRI/MRA without contrast not done. Deferred as HA resolved. Continue PRN Imitrex.RTC 3 months (1/29/25)     Hyperemesis gravidarum  -H/o Hyperemesis in 2021-22 pregnancy:                Bonjesta - didn't help really initially but would help with sleep  Reglan - limited effect  Compazine - didn't help  Phenergan - seemed to help for a bit.  Zofran - has at home.   Trimethobenzamide - hasn't tried yet but has at home  -9/5 - ED visit at [facility] - Zofran not helping. IVF, IV antiemetics -> tolerating PO. Rx Diclegis.   -9/12 - > 160 ketones in urine. Recommend ED for IV fluids. Diclegis helping some but not enough. Rx Phenergan rectal suppository & Pepcid. Will talk to clinical supervisor about home IV fluids   -9/13 ED visit at [facility] for hyperemesis  -9/22 ED visit at [place] for hyperemesis  -9/30 ED visit at [facility] for hyperemesis. Pt reports 1 liter of IV fluid given. Pt declined admission.   -10/3 - 10w6d - Takes Diclegis, Phenergan, Zofran, Pepcid. Order for PICC/Midline. Order for IV  fluids (2 liters NS) & [facility]. Rx Zofran 4 mg ODT   -10/9 Not needing IV fluids right now.   -she is only nauseated in the morning, able to tolerate foods and liquids      H/o iron deficiency anemia   -h/o IV iron x 3 doses (12/22/21 - 12/29/22) during pregnancy (not able to tolerate PO iron)   -10/9 Hb 12.9, ferritin 27. Not able to tolerate her PNV due to hyperemesis. Elected to proceed with IV iron.    -IV iron infusions x 4 (10/18/24-11/6/24) - does feel better      H/o postpartum hemorrhage (atony & cervical laceration) with transfusion (2022)  -aggressive antepartum anemia management   -uterotonics in room, consider TXA at delivery      H/o vitamin B12 deficiency  -10/9 vit B12 370 - low normal. Vit B12 1000 mcg daily advised        RTC 1 wk with NST

## 2025-04-09 ENCOUNTER — ROUTINE PRENATAL (OUTPATIENT)
Facility: CLINIC | Age: 30
End: 2025-04-09
Payer: MEDICAID

## 2025-04-09 VITALS
HEART RATE: 102 BPM | HEIGHT: 60 IN | WEIGHT: 191 LBS | DIASTOLIC BLOOD PRESSURE: 64 MMHG | BODY MASS INDEX: 37.5 KG/M2 | SYSTOLIC BLOOD PRESSURE: 106 MMHG

## 2025-04-09 DIAGNOSIS — G40.909 EPILEPSY AFFECTING PREGNANCY IN THIRD TRIMESTER (HCC): ICD-10-CM

## 2025-04-09 DIAGNOSIS — E66.09 CLASS 1 OBESITY DUE TO EXCESS CALORIES WITHOUT SERIOUS COMORBIDITY WITH BODY MASS INDEX (BMI) OF 33.0 TO 33.9 IN ADULT: ICD-10-CM

## 2025-04-09 DIAGNOSIS — E66.811 CLASS 1 OBESITY DUE TO EXCESS CALORIES WITHOUT SERIOUS COMORBIDITY WITH BODY MASS INDEX (BMI) OF 33.0 TO 33.9 IN ADULT: ICD-10-CM

## 2025-04-09 DIAGNOSIS — Z3A.37 37 WEEKS GESTATION OF PREGNANCY (HCC): ICD-10-CM

## 2025-04-09 DIAGNOSIS — O24.410 DIET CONTROLLED GESTATIONAL DIABETES MELLITUS (GDM) IN THIRD TRIMESTER (HCC): ICD-10-CM

## 2025-04-09 DIAGNOSIS — O99.353 EPILEPSY AFFECTING PREGNANCY IN THIRD TRIMESTER (HCC): ICD-10-CM

## 2025-04-09 DIAGNOSIS — Z34.83 PRENATAL CARE, SUBSEQUENT PREGNANCY IN THIRD TRIMESTER (HCC): Primary | ICD-10-CM

## 2025-04-09 PROCEDURE — 59025 FETAL NON-STRESS TEST: CPT | Performed by: OBSTETRICS & GYNECOLOGY

## 2025-04-09 PROCEDURE — 99214 OFFICE O/P EST MOD 30 MIN: CPT | Performed by: OBSTETRICS & GYNECOLOGY

## 2025-04-09 NOTE — PROGRESS NOTES
Patient has no new complaints    NST reactive     Cx: 1-/-3    WAI 25 by 8w0d not consistent with uncertain LMP     -NIPS low risk, GIRL   -NT normal   -Carrier screen - negative     -1hr gTT: 138, 3hr GTT - pt declines, A1c 5.3 on . Recheck A1c - 5.7 on 3/28- patient has been checking her b.s for the past week and report them all being normal   -A1c, & CBC ok   -3rd trim HIV & syphilis done  -Tdap done   -GBS positive      IOL at 39 wk recommended. Pt in agreement. Scheduled for 25     Requests sterilization  -bilateral total salpingectomy at interval laparoscopy planned (or if  do at that time)  -Medicaid consent form signed 2025      Vaginal bleeding episode  -3/11/25 Pt wrote in [employer] message & called [facility] regarding waking up at 33w4d with some light vaginal bleeding, more than spotting. Occurred a few times. Very slight cramping.   -3/17/25 L&D triage visit c/o fetal movements feel \"different\". NST reactive   -3/19/2025 - 34w5d. Description c/w possible recent mild placental abruption. Cervix almost fingertip/50%/-3. Soft  -Weekly NST initiated now   - growth US with BPP 3/28 Single viable IUP in cephalic presentation    bpm   EFW 3165g (7lb0oz) ± 462g   EFW 73%, AC 96%   MICHAELA 15.4 cm   -Check CBC, KB, PT, PTT, fibrinogen  - NL     Obesity (pre preg BMI 34) & h/o placental infarct   -aspirin 162 mg daily encouraged at 12 wk. Patient is going to consider. Having a hard time with hyperemesis.   -limit weight gain 11-20 lb   -Early 1 hr GTT borderline 134 at 13w5d.   -Early A1c, TSH, CMP, uric acid- ok  -Baseline urine P/C ratio 0.114 at 8w0d = normal   -L2 US normal    -growth US with BPP at 32 wk               : EFW 76% AC 98% MICHAELA is  19.2 cm. MVP is 6.7 cm BPP is 8/8.   -NST weekly at 36 wk      Epilepsy (, 1st grand mal seizure at 17)   -Neuro Yogi Alfonso- aware she is pregnant   -H/o missed doses of her AEDs  -generalized seizure 2023. At that  time, she was on Keppra 1000 mg bid and Lamictal 100 mg bid. She had missed doses of Keppra and Lamictal the night prior to her seizure   -MRI/MRA brain   -ordered per neurologist 7/29/24 (neurologist ok with her having during pregnancy)  -has not been done. Neurologist aware. Ok to defer as HA better.     -Meds:  Lamotrigine (Lamictal) 100 mg BID  -8/23/24 level therapeutic. Recheck 3 months    12/11 - increased Lamictal to 150 mg bid   Levetiracetam (Keppra) 1000 mg BID - 8/23                -8/23/24 level therapeutic. Recheck 3 months   -Neuro visit 10/29/24. RTC 3 months (1/29/25)  -Neuro visit 1/29/25- no further rec.- RTC 3 months (4/29/25)   -Folic acid 4 grams per day advised due to increased risk ONTD   -Last seizure - 2/2023      Migraine without aura   -pre pregnancy: Imitrex PRN  -7/29/24 Neuro visit - MRI/MRA brain ordered due to \"worst headache of her life\" on 7/23/24 - not done. Steroids helped.   -10/29 Neuro visit - MRI/MRA without contrast not done. Deferred as HA resolved. Continue PRN Imitrex.RTC 3 months (1/29/25)       H/o iron deficiency anemia   -h/o IV iron x 3 doses (12/22/21 - 12/29/22) during pregnancy (not able to tolerate PO iron)   -10/9 Hb 12.9, ferritin 27. Not able to tolerate her PNV due to hyperemesis. Elected to proceed with IV iron.    -IV iron infusions x 4 (10/18/24-11/6/24) - does feel better      H/o postpartum hemorrhage (atony & cervical laceration) with transfusion (2022)  -aggressive antepartum anemia management   -uterotonics in room, consider TXA at delivery      H/o vitamin B12 deficiency  -10/9 vit B12 370 - low normal. Vit B12 1000 mcg daily advised

## 2025-04-11 ENCOUNTER — HOSPITAL ENCOUNTER (OUTPATIENT)
Facility: HOSPITAL | Age: 30
Discharge: HOME | End: 2025-04-11
Attending: OBSTETRICS & GYNECOLOGY | Admitting: OBSTETRICS & GYNECOLOGY
Payer: MEDICAID

## 2025-04-11 ENCOUNTER — PATIENT MESSAGE (OUTPATIENT)
Facility: CLINIC | Age: 30
End: 2025-04-11

## 2025-04-11 VITALS
WEIGHT: 191 LBS | BODY MASS INDEX: 37.5 KG/M2 | RESPIRATION RATE: 16 BRPM | HEIGHT: 60 IN | TEMPERATURE: 98 F | DIASTOLIC BLOOD PRESSURE: 61 MMHG | SYSTOLIC BLOOD PRESSURE: 117 MMHG | HEART RATE: 93 BPM

## 2025-04-11 PROBLEM — Z3A.38 38 WEEKS GESTATION OF PREGNANCY (HCC): Status: ACTIVE | Noted: 2025-04-11

## 2025-04-11 PROBLEM — N93.9 VAGINAL SPOTTING: Status: ACTIVE | Noted: 2025-04-11

## 2025-04-11 PROBLEM — O99.820 GBS (GROUP B STREPTOCOCCUS CARRIER), +RV CULTURE, CURRENTLY PREGNANT (HCC): Status: ACTIVE | Noted: 2025-04-11

## 2025-04-11 PROCEDURE — 59025 FETAL NON-STRESS TEST: CPT | Performed by: OBSTETRICS & GYNECOLOGY

## 2025-04-11 NOTE — DISCHARGE INSTRUCTIONS
Discharge Instructions    Diet: Diabetic  Activity: Normal activity         General Instructions    Call your OB doctor if: Fluid leaking from your vagina;Uterine contractions increasing in intensity and frequency;Vaginal bleeding;Vaginal or rectal pressure;Temperature greater than 100F;Decrease in fetal movement;Uterine contractions 10 minutes or closer for 1 to 2 hours  Early labor comfort measures: Relax, sleep, take a warm bath or shower for 30 minutes or less;Drink fluids and eat small light meals;Take a walk

## 2025-04-11 NOTE — NST
Nonstress Test   Patient: Dotty Gross    Gestation: 38w0d    NST:       Variability: Moderate           Accelerations: Yes           Decelerations: None            Baseline: 125 BPM           Uterine Irritability: No           Contractions: Not present                                        Acoustic Stimulator: No           Nonstress Test Interpretation: Reactive                                 Additional Comments

## 2025-04-11 NOTE — PROGRESS NOTES
Pt is a 29 year old female admitted to TR5/TRG5-A.     Chief Complaint   Patient presents with     Complication     Bloody discharge since 1700 yesterday. Positive fetal movement. Denies LOF.       Pt is  38w0d intra-uterine pregnancy.  History obtained. Oriented to room, staff, and plan of care.   no nausea/no vomiting/no diarrhea/no abdominal pain

## 2025-04-11 NOTE — TELEPHONE ENCOUNTER
Spoke with patient. Please see mychart messages. She states she has been experiencing pelvic pressure and bloody discharge since yesterday. +FM. Not saturating pads. Pressure comes and goes but is painful. Sent to L&D to be evaluated. L&D aware. Understanding verbalized.

## 2025-04-11 NOTE — NST
NST    29 year old  at 38w0d c/o some bloody vaginal discharge since 1700 yesterday 4/10/25 and contractions. +FM. No leaking amniotic fluid.     Pregnancy complicated by Obesity. Epilepsy. Hyperemesis gravidarum. Iron deficiency s/p IV iron infusions x 4 (10/18/24-24), history of postpartum hemorrhage (atony & cervical laceration) with transfusion (), Vaginal bleeding episode at 33w4d suspicious for mild placental abruption, GBS carrier    Vitals:    25 1244 25 1249   BP:  117/61   BP Location:  Left arm   Pulse:  93   Resp:  16   Temp:  98.3 °F (36.8 °C)   TempSrc:  Oral   Weight: 191 lb (86.6 kg)    Height: 60\"        bpm, mod jose, +accels, no decels  Starks quiet   SVE 1/50/-3 at 1252 at 38w0d per triage RN    Reactive NST  Not in labor  Stable for discharge home.     Anne James MD

## 2025-04-12 ENCOUNTER — ANESTHESIA (OUTPATIENT)
Dept: OBGYN UNIT | Facility: HOSPITAL | Age: 30
End: 2025-04-12
Payer: MEDICAID

## 2025-04-12 ENCOUNTER — ANESTHESIA EVENT (OUTPATIENT)
Dept: OBGYN UNIT | Facility: HOSPITAL | Age: 30
End: 2025-04-12
Payer: MEDICAID

## 2025-04-12 ENCOUNTER — TELEPHONE (OUTPATIENT)
Facility: CLINIC | Age: 30
End: 2025-04-12

## 2025-04-12 ENCOUNTER — HOSPITAL ENCOUNTER (INPATIENT)
Facility: HOSPITAL | Age: 30
LOS: 1 days | Discharge: HOME | End: 2025-04-13
Attending: STUDENT IN AN ORGANIZED HEALTH CARE EDUCATION/TRAINING PROGRAM | Admitting: STUDENT IN AN ORGANIZED HEALTH CARE EDUCATION/TRAINING PROGRAM
Payer: MEDICAID

## 2025-04-12 PROBLEM — Z34.90 PREGNANCY (HCC): Status: ACTIVE | Noted: 2025-04-12

## 2025-04-12 LAB
ANTIBODY SCREEN: NEGATIVE
BASOPHILS # BLD AUTO: 0.03 X10(3) UL (ref 0–0.2)
BASOPHILS NFR BLD AUTO: 0.3 %
EOSINOPHIL # BLD AUTO: 0.07 X10(3) UL (ref 0–0.7)
EOSINOPHIL NFR BLD AUTO: 0.7 %
ERYTHROCYTE [DISTWIDTH] IN BLOOD BY AUTOMATED COUNT: 13.4 %
GLUCOSE BLD-MCNC: 83 MG/DL (ref 70–99)
HCT VFR BLD AUTO: 39.8 % (ref 35–48)
HGB BLD-MCNC: 13.2 G/DL (ref 12–16)
IMM GRANULOCYTES # BLD AUTO: 0.03 X10(3) UL (ref 0–1)
IMM GRANULOCYTES NFR BLD: 0.3 %
LYMPHOCYTES # BLD AUTO: 1.74 X10(3) UL (ref 1–4)
LYMPHOCYTES NFR BLD AUTO: 17.3 %
MCH RBC QN AUTO: 28.3 PG (ref 26–34)
MCHC RBC AUTO-ENTMCNC: 33.2 G/DL (ref 31–37)
MCV RBC AUTO: 85.4 FL (ref 80–100)
MONOCYTES # BLD AUTO: 0.61 X10(3) UL (ref 0.1–1)
MONOCYTES NFR BLD AUTO: 6.1 %
NEUTROPHILS # BLD AUTO: 7.59 X10 (3) UL (ref 1.5–7.7)
NEUTROPHILS # BLD AUTO: 7.59 X10(3) UL (ref 1.5–7.7)
NEUTROPHILS NFR BLD AUTO: 75.3 %
PLATELET # BLD AUTO: 306 10(3)UL (ref 150–450)
RBC # BLD AUTO: 4.66 X10(6)UL (ref 3.8–5.3)
RH BLOOD TYPE: POSITIVE
T PALLIDUM AB SER QL IA: NONREACTIVE
WBC # BLD AUTO: 10.1 X10(3) UL (ref 4–11)

## 2025-04-12 PROCEDURE — 59409 OBSTETRICAL CARE: CPT | Performed by: STUDENT IN AN ORGANIZED HEALTH CARE EDUCATION/TRAINING PROGRAM

## 2025-04-12 RX ORDER — LIDOCAINE HYDROCHLORIDE 20 MG/ML
5 INJECTION, SOLUTION EPIDURAL; INFILTRATION; INTRACAUDAL; PERINEURAL AS NEEDED
Status: DISCONTINUED | OUTPATIENT
Start: 2025-04-12 | End: 2025-04-12

## 2025-04-12 RX ORDER — DOCUSATE SODIUM 100 MG/1
100 CAPSULE, LIQUID FILLED ORAL
Status: DISCONTINUED | OUTPATIENT
Start: 2025-04-12 | End: 2025-04-14

## 2025-04-12 RX ORDER — AMMONIA 15 % (W/V)
0.3 AMPUL (EA) INHALATION AS NEEDED
Status: DISCONTINUED | OUTPATIENT
Start: 2025-04-12 | End: 2025-04-14

## 2025-04-12 RX ORDER — SODIUM CHLORIDE 9 MG/ML
10 INJECTION, SOLUTION INTRAMUSCULAR; INTRAVENOUS; SUBCUTANEOUS AS NEEDED
Status: DISCONTINUED | OUTPATIENT
Start: 2025-04-12 | End: 2025-04-12

## 2025-04-12 RX ORDER — METHYLERGONOVINE MALEATE 0.2 MG/ML
0.2 INJECTION INTRAVENOUS ONCE
Status: COMPLETED | OUTPATIENT
Start: 2025-04-12 | End: 2025-04-12

## 2025-04-12 RX ORDER — SODIUM CHLORIDE, SODIUM LACTATE, POTASSIUM CHLORIDE, CALCIUM CHLORIDE 600; 310; 30; 20 MG/100ML; MG/100ML; MG/100ML; MG/100ML
INJECTION, SOLUTION INTRAVENOUS CONTINUOUS
Status: DISCONTINUED | OUTPATIENT
Start: 2025-04-12 | End: 2025-04-12

## 2025-04-12 RX ORDER — ONDANSETRON 2 MG/ML
4 INJECTION INTRAMUSCULAR; INTRAVENOUS EVERY 6 HOURS PRN
Status: DISCONTINUED | OUTPATIENT
Start: 2025-04-12 | End: 2025-04-12

## 2025-04-12 RX ORDER — BUPIVACAINE HCL/0.9 % NACL/PF 0.25 %
5 PLASTIC BAG, INJECTION (ML) EPIDURAL AS NEEDED
Status: DISCONTINUED | OUTPATIENT
Start: 2025-04-12 | End: 2025-04-12

## 2025-04-12 RX ORDER — ROPIVACAINE HYDROCHLORIDE 5 MG/ML
30 INJECTION, SOLUTION EPIDURAL; INFILTRATION; PERINEURAL AS NEEDED
Status: DISCONTINUED | OUTPATIENT
Start: 2025-04-12 | End: 2025-04-12

## 2025-04-12 RX ORDER — TERBUTALINE SULFATE 1 MG/ML
0.25 INJECTION SUBCUTANEOUS AS NEEDED
Status: DISCONTINUED | OUTPATIENT
Start: 2025-04-12 | End: 2025-04-12

## 2025-04-12 RX ORDER — CHOLECALCIFEROL (VITAMIN D3) 25 MCG
1 TABLET,CHEWABLE ORAL DAILY
Status: DISCONTINUED | OUTPATIENT
Start: 2025-04-12 | End: 2025-04-14

## 2025-04-12 RX ORDER — TRANEXAMIC ACID 10 MG/ML
INJECTION, SOLUTION INTRAVENOUS
Status: DISCONTINUED
Start: 2025-04-12 | End: 2025-04-12 | Stop reason: WASHOUT

## 2025-04-12 RX ORDER — IBUPROFEN 600 MG/1
600 TABLET, FILM COATED ORAL EVERY 6 HOURS
Status: DISCONTINUED | OUTPATIENT
Start: 2025-04-12 | End: 2025-04-14

## 2025-04-12 RX ORDER — BUPIVACAINE HYDROCHLORIDE 2.5 MG/ML
30 INJECTION, SOLUTION EPIDURAL; INFILTRATION; INTRACAUDAL; PERINEURAL AS NEEDED
Status: DISCONTINUED | OUTPATIENT
Start: 2025-04-12 | End: 2025-04-12

## 2025-04-12 RX ORDER — BISACODYL 10 MG
10 SUPPOSITORY, RECTAL RECTAL ONCE AS NEEDED
Status: DISCONTINUED | OUTPATIENT
Start: 2025-04-12 | End: 2025-04-14

## 2025-04-12 RX ORDER — ACETAMINOPHEN 500 MG
500 TABLET ORAL EVERY 6 HOURS PRN
Status: DISCONTINUED | OUTPATIENT
Start: 2025-04-12 | End: 2025-04-12

## 2025-04-12 RX ORDER — NALBUPHINE HYDROCHLORIDE 10 MG/ML
2.5 INJECTION INTRAMUSCULAR; INTRAVENOUS; SUBCUTANEOUS
Status: DISCONTINUED | OUTPATIENT
Start: 2025-04-12 | End: 2025-04-12

## 2025-04-12 RX ORDER — LAMOTRIGINE 150 MG/1
150 TABLET ORAL 2 TIMES DAILY
Status: DISCONTINUED | OUTPATIENT
Start: 2025-04-12 | End: 2025-04-14

## 2025-04-12 RX ORDER — LIDOCAINE HYDROCHLORIDE AND EPINEPHRINE 15; 5 MG/ML; UG/ML
5 INJECTION, SOLUTION EPIDURAL AS NEEDED
Status: DISCONTINUED | OUTPATIENT
Start: 2025-04-12 | End: 2025-04-12

## 2025-04-12 RX ORDER — MISOPROSTOL 200 UG/1
TABLET ORAL
Status: DISCONTINUED
Start: 2025-04-12 | End: 2025-04-12 | Stop reason: WASHOUT

## 2025-04-12 RX ORDER — LEVETIRACETAM 500 MG/1
1000 TABLET ORAL 2 TIMES DAILY
Status: DISCONTINUED | OUTPATIENT
Start: 2025-04-12 | End: 2025-04-14

## 2025-04-12 RX ORDER — SIMETHICONE 80 MG
80 TABLET,CHEWABLE ORAL 3 TIMES DAILY PRN
Status: DISCONTINUED | OUTPATIENT
Start: 2025-04-12 | End: 2025-04-14

## 2025-04-12 RX ORDER — CITRIC ACID/SODIUM CITRATE 334-500MG
30 SOLUTION, ORAL ORAL AS NEEDED
Status: DISCONTINUED | OUTPATIENT
Start: 2025-04-12 | End: 2025-04-12

## 2025-04-12 RX ORDER — DEXTROSE, SODIUM CHLORIDE, SODIUM LACTATE, POTASSIUM CHLORIDE, AND CALCIUM CHLORIDE 5; .6; .31; .03; .02 G/100ML; G/100ML; G/100ML; G/100ML; G/100ML
INJECTION, SOLUTION INTRAVENOUS AS NEEDED
Status: DISCONTINUED | OUTPATIENT
Start: 2025-04-12 | End: 2025-04-12

## 2025-04-12 RX ORDER — METHYLERGONOVINE MALEATE 0.2 MG/ML
INJECTION INTRAVENOUS
Status: DISCONTINUED
Start: 2025-04-12 | End: 2025-04-12 | Stop reason: WASHOUT

## 2025-04-12 RX ORDER — OXYTOCIN 10 [USP'U]/ML
INJECTION, SOLUTION INTRAMUSCULAR; INTRAVENOUS
Status: DISCONTINUED
Start: 2025-04-12 | End: 2025-04-12 | Stop reason: WASHOUT

## 2025-04-12 RX ORDER — ACETAMINOPHEN 500 MG
500 TABLET ORAL EVERY 6 HOURS PRN
Status: DISCONTINUED | OUTPATIENT
Start: 2025-04-12 | End: 2025-04-14

## 2025-04-12 RX ORDER — LIDOCAINE HYDROCHLORIDE AND EPINEPHRINE 15; 5 MG/ML; UG/ML
INJECTION, SOLUTION EPIDURAL AS NEEDED
Status: DISCONTINUED | OUTPATIENT
Start: 2025-04-12 | End: 2025-04-12 | Stop reason: SURG

## 2025-04-12 RX ORDER — IBUPROFEN 600 MG/1
600 TABLET, FILM COATED ORAL ONCE AS NEEDED
Status: DISCONTINUED | OUTPATIENT
Start: 2025-04-12 | End: 2025-04-12

## 2025-04-12 RX ORDER — ACETAMINOPHEN 500 MG
1000 TABLET ORAL EVERY 6 HOURS PRN
Status: DISCONTINUED | OUTPATIENT
Start: 2025-04-12 | End: 2025-04-12

## 2025-04-12 RX ORDER — ACETAMINOPHEN 500 MG
1000 TABLET ORAL EVERY 6 HOURS PRN
Status: DISCONTINUED | OUTPATIENT
Start: 2025-04-12 | End: 2025-04-14

## 2025-04-12 RX ORDER — CARBOPROST TROMETHAMINE 250 UG/ML
INJECTION, SOLUTION INTRAMUSCULAR
Status: DISCONTINUED
Start: 2025-04-12 | End: 2025-04-12 | Stop reason: WASHOUT

## 2025-04-12 RX ADMIN — LIDOCAINE HYDROCHLORIDE AND EPINEPHRINE 3 ML: 15; 5 INJECTION, SOLUTION EPIDURAL at 14:39:00

## 2025-04-12 NOTE — H&P
[facility]  Obstetrics and Gynecology  History & Physical    Dotty Gross Patient Status:  Inpatient    1995 MRN [id number]   Location [facility] LABOR & DELIVERY Attending Taryn Florence MD   Hospital Day 0 PCP None Pcp     The  [employer] Cures Act makes medical notes like these available to patients in the interest of transparency. Please be advised this is a medical document. Medical documents are intended to carry relevant information, facts as evident, and the clinical opinion of the practitioner. The medical note is intended as peer to peer communication and may appear blunt or direct. It is written in medical language and may contain abbreviations or verbiage that are unfamiliar.       CC: Patient is here for PROM    SUBJECTIVE:    Dotty Gross is a 29 year old  female at 38w1d who is being admitted for PROM.   Her current obstetrical history is significant for BMI 34, epilepsy, fe deficiency anemia     Pt reports large gush of fluid this AM  CTX not strong when admitted, feeling more now that pitocin started      WAI Confirmation  LMP: Patient's last menstrual period was 07/10/2024 (exact date).  WAI: 2025, by Ultrasound       Obstetric History:   OB History    Para Term  AB Living   3 2 2  0 2   SAB IAB Ectopic Multiple Live Births   0    2      # Outcome Date GA Lbr Hunter/2nd Weight Sex Type Anes PTL Lv   3 Current            2 Term 22 39w2d 07:00 / 00:25 7 lb 5.5 oz (3.33 kg) M NORMAL SPONT EPI N JAMES   1 Term 17 42w0d  6 lb 10 oz (3.005 kg) F Vag-Spont   JAMES      Birth Comments: Hyperemesis       Obstetric Comments   2017 Hyperemesis gravidarum. Epilepsy. Had seizure during second trimester. Did have vaginal bleeding during the pregnancy around 34-35 weeks or so.       2022 - \"Noah\" - Obesity, Hyperemesis gravidarum. Epilepsy. Several seizures at around 15-16 wk gestation. Declined aspirin.  IV iron x 3 doses (21 - 22)  during pregnancy (not able to tolerate PO iron). Meconium, Variable Decels. 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       Current - Obesity. Epilepsy. Hyperemesis gravidarum. Iron deficiency s/p IV iron infusions x 4 (10/18/24-24), history of postpartum hemorrhage (atony & cervical laceration) with transfusion (), Vaginal bleeding episode at 33w4d suspicious for mild placental abruption, GBS carrier. Bloody vaginal discharge at 38w0d      Past Medical History: Past Medical History[1]  Past Social History: Past Surgical History[2]  Family History: Family History[3]  Social History:   Social History     Tobacco Use    Smoking status: Never     Passive exposure: Never    Smokeless tobacco: Never   Substance Use Topics    Alcohol use: Never       Home Meds: Prescriptions Prior to Admission[4]  Allergies: Allergies[5]    OBJECTIVE:    Temp:  [98.2 °F (36.8 °C)] 98.2 °F (36.8 °C)  Pulse:  [94] 94  Resp:  [18] 18  BP: (124)/(78) 124/78  Body mass index is 37.3 kg/m².    General: NAD  FHT: 140s/minimal with some periods of moderate variability/no decels/no accels  TOCO: irregular    SVE: 4/60/-3 per RN    Prenatal Labs Brief Review   Blood Type:   Lab Results   Component Value Date    ABO A 2025    RH Positive 2025     GBS:  Positive      Inpatient labs:  Lab Results   Component Value Date    WBC 10.1 2025    HGB 13.2 2025    HCT 39.8 2025    .0 2025    GLU 83 2025       36wk US:  WAI 2025 GA 36w0d   GA(AUA) 36w5d WAI(AUA) 2025     Single viable IUP in cephalic presentation    bpm   EFW 3165g (7lb0oz) ± 462g   EFW 73%, AC 96%   MICHAELA 15.4 cm   Placenta anterior. No evidence of concealed abruption noted.     ASSESSMENT/ PLAN:    Dotty Gross is a 29 year old  female at 38w1d Estimated Date of Delivery: 25 who is being admitted for PROM.      1. Labor: not in active labor on admission, minimal cervical change  over 2 hours, started pitocin  2. Fetal monitoring: CEFM  3. GBS: pos - ampicillin ordered, receiving  4. Pain: plans epidural    Risks, benefits, alternatives and possible complications have been discussed in detail with the patient.  Pre-admission, admission, and post admission procedures and expectations were discussed in detail.  All questions answered, all appropriate consents will be signed at the Hospital. Admission is planned for today.  anticipated.    Taryn Florence MD             [1]   Past Medical History:   Cervical laceration    exam under anesthesia, repair of cervical laceration, evacuation of blood clots from uterus manually - Dr. Lora Benjamin, [facility].    COVID-19    Depression    Epilepsy (HCC)    1st grand mal seizure at 17    Female infertility    Gestational diabetes (HCC)    Hx of transfusion of packed red blood cells    Received 2 units packed red blood cells for postpartum hemorrhage - atonic & cervical laceration    Hyperemesis gravidarum (HCC)    Was put on Zofran - didn't help much.     Hyperemesis gravidarum (HCC)    Iron deficiency anemia during pregnancy, antepartum (HCC)    h/o IV iron x 3 doses (21 - 22) during pregnancy (not able to tolerate PO iron)    Keloid scar    chest    Low maternal serum vitamin B12    Migraine without aura    Imitrex per neurologist    Obesity (BMI 30-39.9)    Other immediate postpartum hemorrhage (HCC)    exam under anesthesia, repair of cervical laceration, [facility]    Pap smear for cervical cancer screening    Pap neg 9/15/22    Pneumonia of both lower lobes    Pubic bone pain    Request for sterilization    2022 Discussed permanent/irreversible, optional nature of sterilization. Patient expressed understanding. Medicaid consent form signed together.     Screening for genetic disease carrier status    Carrier Screen = Negative    Sialorrhea     Subchorionic hemorrhage in first trimester (HCC)    Trauma during pregnancy (HCC)    Vitamin D deficiency    Worst headache of life    7/29/24 Neuro visit - MRI/MRA brain ordered due to \"worst headache of her life\" on 7/23/24 - steroids helped.   [2]   Past Surgical History:  Procedure Laterality Date    Cholecystectomy  2018    Insert intrauterine device  03/01/2022    Copper IUD insertion at postpartum visit - Dr. Lora Gibson    Other surgical history  2012    benign keloid bx chest area    Other surgical history  01/17/2022    exam under anesthesia, repair of cervical laceration, evacuation of blood clots from uterus manually - Dr. Lora Benjamin, [facility].   [3]   Family History  Problem Relation Age of Onset    Obesity Mother     No Known Problems Father     No Known Problems Sister     No Known Problems Sister     No Known Problems Sister     Diabetes Maternal Grandmother     No Known Problems Maternal Grandfather     No Known Problems Paternal Grandmother     No Known Problems Paternal Grandfather     Other (Other) Daughter         severe GI sensitivities to multiple foods - no clear diagnosis    Diabetes Maternal Aunt     Birth Defects Neg     Genetic Disease Neg     Thyroid disease Neg     Breast Cancer Neg     Ovarian Cancer Neg     Colon Cancer Neg     DVT/VTE Neg    [4]   Medications Prior to Admission   Medication Sig Dispense Refill Last Dose/Taking    folic acid 1 MG Oral Tab Take by mouth in the morning.   4/12/2025    lamoTRIgine 150 MG Oral Tab Take 1 tablet (150 mg total) by mouth 2 (two) times daily. 180 tablet 1 4/12/2025    levetiracetam 1000 MG Oral Tab Take 1 tablet (1,000 mg total) by mouth 2 (two) times daily. 180 tablet 1 4/12/2025    Blood Glucose Monitoring Suppl (ONETOUCH VERIO) w/Device Does not apply Kit 1 each in the morning, at noon, in the evening, and at bedtime. Check blood sugar 4 times a day. Fasting and 2 hours after each meal. 1 kit 0     Glucose Blood (ONETOUCH  VERIO) In Vitro Strip Check blood sugar 4 times a day. Fasting and 2 hours after each meal. 100 strip 2     Lancets Does not apply Misc Check blood sugar 4 times a day. Fasting and 2 hours after each meal. 100 each 2     [] folic acid 1 MG Oral Tab Take 4 tablets (4 mg total) by mouth daily. 360 tablet 5     famotidine 20 MG Oral Tab Take 1 tablet (20 mg total) by mouth 2 (two) times daily. 60 tablet 5     SUMAtriptan 50 MG Oral Tab USE AT ONSET OF MIGRAINE. REPEAT ONCE AFTER 2 HOURS. MAX 2 IN 24 HOURS. MUST LAST 30 DAYS. 9 tablet 5    [5]   Allergies  Allergen Reactions    Shellfish-Derived Products SWELLING

## 2025-04-12 NOTE — PLAN OF CARE
Problem: BIRTH - VAGINAL/ SECTION  Goal: Fetal and maternal status remain reassuring during the birth process  Description: INTERVENTIONS:- Monitor vital signs- Monitor fetal heart rate- Monitor uterine activity- Monitor labor progression (vaginal delivery)- DVT prophylaxis (C/S delivery)- Surgical antibiotic prophylaxis (C/S delivery)  Outcome: Progressing     Problem: PAIN - ADULT  Goal: Verbalizes/displays adequate comfort level or patient's stated pain goal  Description: INTERVENTIONS:- Encourage pt to monitor pain and request assistance- Assess pain using appropriate pain scale- Administer analgesics based on type and severity of pain and evaluate response- Implement non-pharmacological measures as appropriate and evaluate response- Consider cultural and social influences on pain and pain management- Manage/alleviate anxiety- Utilize distraction and/or relaxation techniques- Monitor for opioid side effects- Notify MD/LIP if interventions unsuccessful or patient reports new pain- Anticipate increased pain with activity and pre-medicate as appropriate  Outcome: Progressing     Problem: ANXIETY  Goal: Will report anxiety at manageable levels  Description: INTERVENTIONS:- Administer medication as ordered- Teach and rehearse alternative coping skills- Provide emotional support with 1:1 interaction with staff  Outcome: Progressing     Problem: Diabetes/Glucose Control  Goal: Glucose maintained within prescribed range  Description: INTERVENTIONS:- Monitor Blood Glucose as ordered- Assess for signs and symptoms of hyperglycemia and hypoglycemia- Administer ordered medications to maintain glucose within target range- Assess barriers to adequate nutritional intake and initiate nutrition consult as needed- Instruct patient on self management of diabetes  Outcome: Progressing

## 2025-04-12 NOTE — ANESTHESIA PROCEDURE NOTES
Labor Analgesia    Date/Time: 4/12/2025 2:30 PM    Performed by: Brody Newell DO  Authorized by: Brody Newell DO      General Information and Staff    Start Time:  4/12/2025 2:30 PM  End Time:  4/12/2025 2:39 PM  Anesthesiologist:  Brody Newell DO  Performed by:  Anesthesiologist  Patient Location:  OB  Site Identification: surface landmarks    Reason for Block: labor epidural    Preanesthetic Checklist: patient identified, IV checked, risks and benefits discussed, monitors and equipment checked, pre-op evaluation, timeout performed, IV bolus, anesthesia consent and sterile technique used      Procedure Details    Patient Position:  Sitting  Prep: ChloraPrep    Monitoring:  Heart rate and continuous pulse ox  Approach:  Midline    Epidural Needle    Injection Technique:  JASON saline  Needle Type:  Tuohy  Needle Gauge:  17 G  Needle Length:  3.375 in  Location:  L3-4    Spinal Needle      Catheter    Catheter Type:  End hole  Catheter Size:  19 G  Test Dose:  Negative    Assessment      Additional Comments

## 2025-04-12 NOTE — ANESTHESIA PREPROCEDURE EVALUATION
PRE-OP EVALUATION    Patient Name: Dotty Gross    Admit Diagnosis: Pregnancy (HCC)    Pre-op Diagnosis: * No surgery found *        Anesthesia Procedure: LABOR ANALGESIA    * Surgery not found *    Pre-op vitals reviewed.  Temp: 98.2 °F (36.8 °C)  Pulse: 97  Resp: 18  BP: 94/57  SpO2: 98 %  Body mass index is 37.3 kg/m².    Current medications reviewed.  Hospital Medications:  Current Medications[1]    Outpatient Medications:   Prescriptions Prior to Admission[2]    Allergies: Shellfish-derived products      Anesthesia Evaluation    Patient summary reviewed.    Anesthetic Complications  (-) history of anesthetic complications         GI/Hepatic/Renal    Negative GI/hepatic/renal ROS.                             Cardiovascular    Negative cardiovascular ROS.    Exercise tolerance: good     MET: >4                                           Endo/Other      (+) diabetes  gestational,                          Pulmonary    Negative pulmonary ROS.                       Neuro/Psych      (+) depression                                Past Surgical History[3]  Social Hx on file[4]  History   Drug Use Unknown     Available pre-op labs reviewed.  Lab Results   Component Value Date    WBC 10.1 04/12/2025    RBC 4.66 04/12/2025    HGB 13.2 04/12/2025    HCT 39.8 04/12/2025    MCV 85.4 04/12/2025    MCH 28.3 04/12/2025    MCHC 33.2 04/12/2025    RDW 13.4 04/12/2025    .0 04/12/2025     Lab Results   Component Value Date    GLU 83 04/12/2025            Airway      Mallampati: II  Mouth opening: >3 FB  TM distance: 4 - 6 cm  Neck ROM: full Cardiovascular      Rhythm: regular  Rate: normal     Dental    Dentition appears grossly intact         Pulmonary      Breath sounds clear to auscultation bilaterally.               Other findings              ASA: 2   Plan: epidural  NPO status verified and           Plan/risks discussed with: patient (Risks of epidural discussed including bleeding, infection and nerve  damage.)                Present on Admission:  **None**             [1]    lactated ringers infusion   Intravenous Continuous    dextrose in lactated ringers 5% infusion   Intravenous PRN    lactated ringers IV bolus 500 mL  500 mL Intravenous PRN    acetaminophen (Tylenol Extra Strength) tab 500 mg  500 mg Oral Q6H PRN    acetaminophen (Tylenol Extra Strength) tab 1,000 mg  1,000 mg Oral Q6H PRN    ibuprofen (Motrin) tab 600 mg  600 mg Oral Once PRN    ondansetron (Zofran) 4 MG/2ML injection 4 mg  4 mg Intravenous Q6H PRN    oxyTOCIN in sodium chloride 0.9% (Pitocin) 30 Units/500mL infusion premix  62.5-900 kylie-units/min Intravenous Continuous    terbutaline (Brethine) 1 MG/ML injection 0.25 mg  0.25 mg Subcutaneous PRN    sodium citrate-citric acid (Bicitra) 500-334 MG/5ML oral solution 30 mL  30 mL Oral PRN    ropivacaine (Naropin) 0.5% injection  30 mL Injection PRN    [COMPLETED] ampicillin (Omnipen) 2 g in sodium chloride 0.9% 100 mL IVPB-KIAN  2 g Intravenous Once    Followed by    ampicillin (Omnipen) 1 g in sodium chloride 0.9% 100mL IVPB-KIAN  1 g Intravenous Q4H    oxyTOCIN in sodium chloride 0.9% (Pitocin) 30 Units/500mL infusion premix  0.5-20 kylie-units/min Intravenous Continuous    lactated ringers IV bolus 1,000 mL  1,000 mL Intravenous Once    fentaNYL-bupivacaine 2 mcg/mL-0.125% in sodium chloride 0.9% 100 mL EPIDURAL infusion premix  12 mL/hr Epidural Continuous    fentaNYL (Sublimaze) 50 mcg/mL injection 100 mcg  100 mcg Epidural Once    lidocaine 1.5%-EPINEPHrine 1:200,000 (Xylocaine-Epinephrine) injection  5 mL Injection PRN    bupivacaine PF (Marcaine) 0.25% injection  30 mL Injection PRN    lidocaine PF (Xylocaine-MPF) 2% injection  5 mL Injection PRN    sodium chloride 0.9% PF injection 10 mL  10 mL Injection PRN    ePHEDrine (PF) 25 MG/5 ML injection 5 mg  5 mg Intravenous PRN    nalbuphine (Nubain) 10 mg/mL injection 2.5 mg  2.5 mg Intravenous Q15 Min PRN   [2]   Medications Prior to  Admission   Medication Sig Dispense Refill Last Dose/Taking    folic acid 1 MG Oral Tab Take by mouth in the morning.   2025    lamoTRIgine 150 MG Oral Tab Take 1 tablet (150 mg total) by mouth 2 (two) times daily. 180 tablet 1 2025    levetiracetam 1000 MG Oral Tab Take 1 tablet (1,000 mg total) by mouth 2 (two) times daily. 180 tablet 1 2025    Blood Glucose Monitoring Suppl (ONETOUCH VERIO) w/Device Does not apply Kit 1 each in the morning, at noon, in the evening, and at bedtime. Check blood sugar 4 times a day. Fasting and 2 hours after each meal. 1 kit 0     Glucose Blood (ONETOUCH VERIO) In Vitro Strip Check blood sugar 4 times a day. Fasting and 2 hours after each meal. 100 strip 2     Lancets Does not apply Misc Check blood sugar 4 times a day. Fasting and 2 hours after each meal. 100 each 2     [] folic acid 1 MG Oral Tab Take 4 tablets (4 mg total) by mouth daily. 360 tablet 5     famotidine 20 MG Oral Tab Take 1 tablet (20 mg total) by mouth 2 (two) times daily. 60 tablet 5     SUMAtriptan 50 MG Oral Tab USE AT ONSET OF MIGRAINE. REPEAT ONCE AFTER 2 HOURS. MAX 2 IN 24 HOURS. MUST LAST 30 DAYS. 9 tablet 5    [3]   Past Surgical History:  Procedure Laterality Date    Cholecystectomy      Insert intrauterine device  2022    Copper IUD insertion at postpartum visit - Dr. Lora Gibson    Other surgical history  2012    benign keloid bx chest area    Other surgical history  2022    exam under anesthesia, repair of cervical laceration, evacuation of blood clots from uterus manually - Dr. Lora Benjamin, [facility].   [4]   Social History  Socioeconomic History    Marital status:    Tobacco Use    Smoking status: Never     Passive exposure: Never    Smokeless tobacco: Never   Vaping Use    Vaping status: Never Used   Substance and Sexual Activity    Alcohol use: Never    Drug use: Never    Sexual activity: Yes     Partners: Male   Other Topics Concern     Caffeine Concern No    Exercise Yes    Seat Belt Yes    Special Diet No    Stress Concern No    Weight Concern Yes

## 2025-04-12 NOTE — PROGRESS NOTES
EFMs applied, FHTs 155. Pt states her water broke at 0926 this morning. Pt states she continues to have large gushes of fluid. Pt states she has been cramping since her water broke. Pt denies VB. Pt reports +FM. Pt is GDM, diet controlled. Pt denies any other problems with this pregnancy. Pt has a Hx of a cervical lac and PPH with her last pregnancy. Pt states she thinks she had to have a blood transfusion after the hemorrhage. Pt denies any other problems with this or previous pregnancies. Pt has anemia and depression. Pt denies any other medical problems. Admission assessment initiated at this time. SSE not done due to pt grossly ruptured upon exam.

## 2025-04-12 NOTE — TELEPHONE ENCOUNTER
On call MD    Pt called because thinks her water broke  Last night throughout the night noticed leaking a small amount of fluid on/off  Then this morning cleared her throat and a large amount of fluid came out  Doesn't smell like urine, keeps leaking out  Pt advised to come to L&D

## 2025-04-13 LAB
BASOPHILS # BLD AUTO: 0.09 X10(3) UL (ref 0–0.2)
BASOPHILS NFR BLD AUTO: 0.4 %
EOSINOPHIL # BLD AUTO: 0.14 X10(3) UL (ref 0–0.7)
EOSINOPHIL NFR BLD AUTO: 0.6 %
ERYTHROCYTE [DISTWIDTH] IN BLOOD BY AUTOMATED COUNT: 13.6 %
HCT VFR BLD AUTO: 35.9 % (ref 35–48)
HGB BLD-MCNC: 11.9 G/DL (ref 12–16)
IMM GRANULOCYTES # BLD AUTO: 0.09 X10(3) UL (ref 0–1)
IMM GRANULOCYTES NFR BLD: 0.4 %
LYMPHOCYTES # BLD AUTO: 2.68 X10(3) UL (ref 1–4)
LYMPHOCYTES NFR BLD AUTO: 11.4 %
MCH RBC QN AUTO: 28.7 PG (ref 26–34)
MCHC RBC AUTO-ENTMCNC: 33.1 G/DL (ref 31–37)
MCV RBC AUTO: 86.7 FL (ref 80–100)
MONOCYTES # BLD AUTO: 1.47 X10(3) UL (ref 0.1–1)
MONOCYTES NFR BLD AUTO: 6.3 %
NEUTROPHILS # BLD AUTO: 18.99 X10 (3) UL (ref 1.5–7.7)
NEUTROPHILS # BLD AUTO: 18.99 X10(3) UL (ref 1.5–7.7)
NEUTROPHILS NFR BLD AUTO: 80.9 %
PLATELET # BLD AUTO: 254 10(3)UL (ref 150–450)
RBC # BLD AUTO: 4.14 X10(6)UL (ref 3.8–5.3)
WBC # BLD AUTO: 23.5 X10(3) UL (ref 4–11)

## 2025-04-13 RX ORDER — IBUPROFEN 600 MG/1
600 TABLET, FILM COATED ORAL EVERY 6 HOURS
Qty: 40 TABLET | Refills: 0 | Status: SHIPPED | OUTPATIENT
Start: 2025-04-13

## 2025-04-13 RX ORDER — ACETAMINOPHEN 500 MG
1000 TABLET ORAL EVERY 6 HOURS PRN
Qty: 40 TABLET | Refills: 0 | Status: SHIPPED | OUTPATIENT
Start: 2025-04-13

## 2025-04-13 RX ORDER — PSEUDOEPHEDRINE HCL 30 MG
100 TABLET ORAL 2 TIMES DAILY PRN
Qty: 40 CAPSULE | Refills: 0 | Status: SHIPPED | OUTPATIENT
Start: 2025-04-13

## 2025-04-13 NOTE — PLAN OF CARE

## 2025-04-13 NOTE — PLAN OF CARE
Problem: POSTPARTUM  Goal: Long Term Goal:Experiences normal postpartum course  Description: INTERVENTIONS:- Assess and monitor vital signs and lab values.- Assess fundus and lochia.- Provide ice/sitz baths for perineum discomfort.- Monitor healing of incision/episiotomy/laceration, and assess for signs and symptoms of infection and hematoma.- Assess bladder function and monitor for bladder distention.- Provide/instruct/assist with pericare as needed.- Provide VTE prophylaxis as needed.- Monitor bowel function.- Encourage ambulation and provide assistance as needed.- Assess and monitor emotional status and provide social service/psych resources as needed.- Utilize standard precautions and use personal protective equipment as indicated. Ensure aseptic care of all intravenous lines and invasive tubes/drains.- Obtain immunization and exposure to communicable diseases history.  2025 by Shannon Denise RN  Outcome: Completed  2025 by Shannon Denise RN  Outcome: Progressing  Goal: Experiences normal breast weaning course  Description: INTERVENTIONS:- Assess for and manage engorgement.- Instruct on breast care.- Provide comfort measures.  2025 by Shannon Denise RN  Outcome: Completed  2025 by Shannon Denise RN  Outcome: Progressing  Goal: Appropriate maternal -  bonding  Description: INTERVENTIONS:- Assess caregiver- interactions.- Assess caregiver's emotional status and coping mechanisms.- Encourage caregiver to participate in  daily care.- Assess support systems available to mother/family.- Provide /case management support as needed.  2025 by Shannon Denise RN  Outcome: Completed  2025 by Shannon Denise RN  Outcome: Progressing

## 2025-04-13 NOTE — PROGRESS NOTES
Admitted to mother/baby room 1113 in stable condition. Postpartum education initiated. Bed in low position, call light in reach. Instructed pt to call for assistance when getting out of bed. Updated on plan of care. Voiced understanding.

## 2025-04-13 NOTE — L&D DELIVERY NOTE
Renato, Girl [[id number]]      Labor Events     labor?: No   steroids?: None  Antibiotics received during labor?: Yes  Antibiotics (enter # doses in comment): ampicillin  Rupture date/time: 2025 0926     Rupture type: SROM  Fluid color: Clear  Labor type: Spontaneous Onset of Labor  Augmentation: Oxytocin       Labor Event Times    Labor onset date/time: 2025 1530  Dilation complete date/time: 2025 1930  Start pushing date/time: 2025 19:31       [place] Presentation    Presentation: Vertex  Position: Left Occiput Anterior       Operative Delivery    Operative Vaginal Delivery: N/A                Shoulder Dystocia    Shoulder Dystocia: N/A       Anesthesia    Method: Epidural              [place] Delivery      Head delivery date/time: 2025 19:32:44   Delivery date/time:  25 19:32:47   Delivery type: Normal spontaneous vaginal delivery    Details:     Delivery location: delivery room       Delivery Providers    Delivering Clinician: Taryn Florence MD   Delivery personnel:  Provider Role   Argelia Hogan RN Baby Nurse   Desiree Malone RN Delivery Nurse             Cord    Vessels: 3 Vessels  Complications: None  Timed cord clamping: Yes  Time in sec: 60  Cord blood disposition: to lab  Gases sent?: No       Resuscitation    Method: None        Measurements      Weight: 3350 g 7 lb 6.2 oz Length: 48.3 cm     Head circum.: 35 cm Chest circum.: 33 cm      Abdominal circum.: 31 cm           Placenta    Date/time: 2025 19:35  Removal: Spontaneous  Appearance: Intact  Disposition: held for future pathology       Apgars    Living status: Living   Apgar Scoring Key:    0 1 2    Skin color Blue or pale Acrocyanotic Completely pink    Heart rate Absent <100 bpm >100 bpm    Reflex irritability No response Grimace Cry or active withdrawal    Muscle tone Limp Some flexion Active motion    Respiratory effort Absent Weak cry; hypoventilation Good, crying               1 Minute:  5 Minute:  10 Minute:  15 Minute:  20 Minute:      Skin color: 1  1       Heart rate: 2  2       Reflex irritablity: 2  2       Muscle tone: 2  2       Respiratory effort: 2  2       Total: 9  9          Apgars assigned by: MAURI ODOM RN   disposition: with mother       Skin to Skin    No data filed       Vaginal Count    Initial count RN: Laney Dale RN  Initial count Tech: Linda Jacobson    Initial counts 11   0    Final counts 11   0    Final count RN: Argelia Hogan RN  Final count MD: Taryn Florence MD       Lacerations    Episiotomy: None  Perineal lacerations: None      Vaginal laceration?: No      Cervical laceration?: No    Clitoral laceration?: No                  29 year old  at 38w1d admitted for PROM. Pregnancy significant for BMI 34, epilepsy. Labor augmented with pitocin, progressed to complete. Pushed over one contraction to bring the fetal head to . As the head was delivered the perineum was supported to decrease the risk of tearing.  The shoulders and remainder of the infant followed without difficulty to complete uncomplicated  of vigorous female infant, APGARS 9/9, weight 3350g. The infant was dried and suctioned. Cord clamping delayed and infant placed on maternal abdomen.  Placenta delivered spontaneously, intact. No perineal lacerations.  mL.    The American College of Obstetricians and Gynecologists’ (ACOG) defines postpartum hemorrhage (PPH) as cumulative blood loss > 1,000 mL or blood loss accompanied by signs or symptoms of hypovolemia within 24 hours after the birth process (includes intrapartum loss) regardless of route of delivery    This patient is stable, asymptomatic and blood loss is within the expected amount following delivery. Patient does not meet ACOG criteria for hemorrhage at this time.      Taryn Florence MD

## 2025-04-13 NOTE — PROGRESS NOTES
Labor Analgesia Follow Up Note    Patient underwent epidural anesthesia for labor analgesia,    Placenta Date/Time: 4/12/2025  7:35 PM    Delivery Date/Time:: 4/12/2025  7:32 PM    /74 (BP Location: Right arm)   Pulse 73   Temp 97.8 °F (36.6 °C) (Oral)   Resp 16   Ht 1.524 m (5')   Wt 86.6 kg (191 lb)   LMP 07/10/2024 (Exact Date)   SpO2 99%   Breastfeeding Yes   BMI 37.30 kg/m²     Assessment:  Patient seen and no apparent anesthesia related complications.    Thank you for asking us to participate in the care of your patient.

## 2025-04-13 NOTE — PLAN OF CARE
Problem: POSTPARTUM  Goal: Long Term Goal:Experiences normal postpartum course  Description: INTERVENTIONS:- Assess and monitor vital signs and lab values.- Assess fundus and lochia.- Provide ice/sitz baths for perineum discomfort.- Monitor healing of incision/episiotomy/laceration, and assess for signs and symptoms of infection and hematoma.- Assess bladder function and monitor for bladder distention.- Provide/instruct/assist with pericare as needed.- Provide VTE prophylaxis as needed.- Monitor bowel function.- Encourage ambulation and provide assistance as needed.- Assess and monitor emotional status and provide social service/psych resources as needed.- Utilize standard precautions and use personal protective equipment as indicated. Ensure aseptic care of all intravenous lines and invasive tubes/drains.- Obtain immunization and exposure to communicable diseases history.  Outcome: Progressing  Goal: Experiences normal breast weaning course  Description: INTERVENTIONS:- Assess for and manage engorgement.- Instruct on breast care.- Provide comfort measures.  Outcome: Progressing  Goal: Appropriate maternal -  bonding  Description: INTERVENTIONS:- Assess caregiver- interactions.- Assess caregiver's emotional status and coping mechanisms.- Encourage caregiver to participate in  daily care.- Assess support systems available to mother/family.- Provide /case management support as needed.  Outcome: Progressing

## 2025-04-13 NOTE — PROGRESS NOTES
[facility]  Obstetrics and Gynecology    OB/GYN: Postpartum Progress Note     SUBJECTIVE:  Patient is a 29 year old  female who is s/p . She is PPD#1.   Doing well. Denies fever, chills, N, V, chest pain and SOB. Bleeding has been stable. Voiding without difficulty.  Passing flatus. Tolerating general diet. Ambulating without difficulty.    OBJECTIVE:  Vitals:    25 2100 25 2115 25 2130 25 2238   BP: 106/55 105/57 101/52 107/67   Pulse: 108 107 108 88   Resp:       Temp:       TempSrc:       SpO2:       Weight:       Height:           Physical Exam:    General:    alert, appears stated age, and cooperative   Lochia:  appropriate   Uterine Fundus:   firm below umbilicus   Perineum:  healing well, no significant drainage, no dehiscence, no significant erythema    DVT Evaluation:  No evidence of DVT seen on physical exam.     Urinary output is adequate.   I/O last 3 completed shifts:  In: 2200 [I.V.:2000; IV PIGGYBACK:200]  Out: 1076 [Urine:800; Blood:276]      Labs:  Recent Labs   Lab 25  1152   RBC 4.66   HGB 13.2   HCT 39.8   MCV 85.4   MCH 28.3   MCHC 33.2   RDW 13.4   NEPRELIM 7.59   WBC 10.1   .0       ASSESSMENT/PLAN:  Patient is a 29 year old  female who is s/p  PPD# 1.     Doing well   Continue routine postpartum care  Hgb on admit --> postpartum: 13.2 --> 11.9  Vitals per routine   Encourage ambulation   Plan for discharge to home PPD1, discharge instructions and follow up discussed  Follow up in 6 weeks      Elizabeth Stephenson MD   [facility] - OBGYN      Note to patient and family   The 21st Century Cures Act makes medical notes available to patients in the interest of transparency.  However, please be advised that this is a medical document.  It is intended as [id number] communication.  It is written and medical language may contain abbreviations or verbiage that are technical and unfamiliar.  It may appear blunt or direct.  Medical documents  are intended to carry relevant information, facts as evident, and the clinical opinion of the practitioner.

## 2025-04-13 NOTE — DISCHARGE INSTRUCTIONS
Postpartum care: What to expect after a vaginal birth  When caring for a , you might forget to care for yourself. But that's important too. Learn what's involved as you recover from giving birth.  Pregnancy changes a body in more ways than you might expect. And that doesn't stop when you give birth. Here's what can happen physically and emotionally after a vaginal delivery.  Vaginal soreness  You might have had a tear in your vagina during delivery. Or your healthcare professional may have made a cut in the vaginal opening, called an episiotomy, to make delivery easier. The wound may hurt for a few weeks. Large tears can take longer to heal. To ease the pain:  Sit on a pillow or padded ring.  Cool the area with an ice pack. Or put a chilled witch hazel pad between a sanitary napkin and the area between your vaginal opening and anus. That area is called the perineum.  Use a squirt bottle to spray warm water over the perineum as you urinate.  Sit in a warm bath just deep enough to cover your buttocks and hips for five minutes. Use cold water if it feels better.  Take a pain reliever that you can buy without a prescription. Ask your healthcare professional about a numbing spray or cream, if needed.  .Avoid straining due to constipation through adequate hydration and a diet that incorporates whole foods that are plant-based.  If this is not enough to keep your stools a toothpaste like consistency, please add over-the-counter fiber supplementation like Metamucil or a daily osmotic laxative like Miralax.  You can take one capful of Miralax with water or juice each morning and, as needed, in the evening.  While having a bowel movement, you can use can also use a stool under your feet or a squatty potty to help prevent straining.  Tell your healthcare professional if you have intense pain, lasting pain or if the pain gets worse. It could be a sign of an infection.    Vaginal discharge  After delivery, a mix of blood,  mucus and tissue from the uterus comes out of the vagina. This is called discharge. The discharge changes color and lessens over 4 to 6 weeks after a baby is born. It starts bright red, then turns darker red. After that, it usually turns yellow or white. The discharge then slows and becomes watery until it stops.  Contact your healthcare professional if blood from your vagina soaks a pad hourly for two hours in a row, especially if you also have a fever, pelvic pain or tenderness.  Contractions  You might feel contractions, sometimes called afterpains, for a few days after delivery. These contractions often feel like menstrual cramps. They help keep you from bleeding too much because they put pressure on the blood vessels in the uterus. Afterpains are common during breastfeeding. That's because breastfeeding causes the release of the hormone oxytocin.  To ease the pain, you can use acetaminophen (Tylenol, others) or ibuprofen (Advil, Motrin IB, others).  Leaking urine  Pregnancy, labor and a vaginal delivery can stretch or hurt your pelvic floor muscles. These muscles support the uterus, bladder and rectum. As a result, some urine might leak when you sneeze, laugh or cough. The leaking usually gets better within a week. But it might go on longer. Leaking urine also is called incontinence.  Until the leaking stops, wear sanitary pads. Do pelvic floor muscle training, also called Kegels, to tone your pelvic floor muscles and help control your bladder.  To do Kegels, think of sitting on a marble. Tighten your pelvic muscles as if you're lifting the marble. Try it for three seconds at a time, then relax for a count of three. Work up to doing the exercise 10 to 15 times in a row, at least three times a day. To make sure you're doing Kegels right, it might help to see a physical therapist who specializes in pelvic floor exercises.  Hemorrhoids and bowel movements  If you notice pain during bowel movements and feel  swelling near your anus, you might have swollen veins in the anus or lower rectum, called hemorrhoids. To ease hemorrhoid pain:  Use a hemorrhoid cream or a medicine that you put into your anus, called a suppository, that has hydrocortisone. You can buy either without a prescription.  Wipe the area with pads that have witch hazel or a numbing agent.  Soak your anal area in plain warm water for 10 to 15 minutes 2 to 3 times a day.  You might be afraid to have a bowel movement because you don't want to make the pain of hemorrhoids or your episiotomy wound worse. Take steps to keep stools soft and regular. Eat foods high in fiber, including fruits, vegetables and whole grains. Drink plenty of water. Ask your healthcare professional about a stool softener, if needed.  Sore breasts  A few days after giving birth, you might have full, firm, sore breasts. That's because your breast tissue overfills with milk, blood and other fluids. This condition is called engorgement. Breastfeed your baby often on both breasts to help keep them from overfilling.  If your breasts are engorged, your baby might have trouble attaching for breastfeeding. To help your baby latch on, you can use your hand or a breast pump to let out some breast milk before feeding your baby. That process is called expressing.  To ease sore breasts, put warm washcloths on them or take a warm shower before breastfeeding or expressing. That can make it easier for the milk to flow. Between feedings, put cold washcloths on your breasts. Pain relievers you can buy without a prescription might help too.  If you're not breastfeeding, wear a bra that supports your breasts, such as a sports bra. Don't pump your breasts or express the milk. That causes your breasts to make more milk. Putting ice packs on your breasts can ease discomfort. Pain relievers available without a prescription also can be helpful.  Hair loss and skin changes  During pregnancy, higher hormone  levels mean your hair grows faster than it sheds. The result is more hair on your head. But for up to five months after giving birth, you lose more hair than you grow. This hair loss stops over time.  Stretch marks on the skin don't go away after delivery. But in time, they fade. Expect any skin that got darker during pregnancy, such as dark patches on your face, to fade slowly too.  Mood changes  Childbirth can trigger a lot of feelings. Many people have a period of feeling down or anxious after giving birth, sometimes called the baby blues. Symptoms include mood swings, crying spells, anxiety and trouble sleeping. These feelings often go away within two weeks. In the meantime, take good care of yourself. Share your feelings, and ask your partner, loved ones or friends for help.  If you have large mood swings, don't feel like eating, are very tired and lack nasir in life shortly after childbirth, you might have postpartum depression. Contact your healthcare professional if you think you might be depressed. Be sure to seek help if:  Your symptoms don't go away on their own.  You have trouble caring for your baby.  You have a hard time doing daily tasks.  You think of harming yourself or your baby.    Medicines and counseling often can ease postpartum depression.  Please talk to your provider if you are interested in either of these treatments.  Weight loss  It's common to still look pregnant after giving birth. Most people lose about 13 pounds (6 kilograms) during delivery. This loss includes the weight of the baby, placenta and amniotic fluid.  In the days after delivery, you'll lose more weight from leftover fluids. After that, a healthy diet and regular exercise can help you to return to the weight you were before pregnancy.  Postpartum checkups  The American College of Obst[employer] Gy[employer] says that postpartum care should be an ongoing process rather than a single visit after delivery. Check in with  your healthcare professional within 2 to 3 weeks after delivery by phone or in person to talk about any issues you've had since giving birth.  Within 6 to 12 weeks after delivery, see your healthcare professional for a complete postpartum exam. During this visit, your healthcare professional does a physical exam and checks your belly, vagina, cervix and uterus to see how well you're healing.  Things to talk about at this visit include:  Your mood and emotional well-being.  How well you're sleeping.  Other symptoms you might have, such as tiredness.  Birth control and birth spacing.  Baby care and feeding.  When you can start having sex again.  What you can do about pain with sex or not wanting to have sex.  How you're adjusting to life with a new baby.  This checkup is a chance for you and your healthcare professional to make sure you're OK. It's also a time to get answers to questions you have about life after giving birth.  While you were here we were administering these following medications to you:     Ibuprofen 600 mg every 6 hours on the following schedule: 6 am-12 pm-6 pm-12 am    Tylenol 500-1000 mg every 6 hours as needed to alternate with ibuprofen.    Colace 100 mg twice daily at 6 am and 6 pm

## 2025-04-13 NOTE — PROGRESS NOTES
Patient up to bathroom with assist x 1.  Voided 250mL Patient transferred to mother/baby room 1113 per wheelchair in stable condition with baby and personal belongings.  Accompanied by significant other and staff.  Report given to mother/baby BRIGITTE Peterson.

## 2025-04-14 VITALS
DIASTOLIC BLOOD PRESSURE: 58 MMHG | RESPIRATION RATE: 16 BRPM | OXYGEN SATURATION: 99 % | WEIGHT: 191 LBS | BODY MASS INDEX: 37.5 KG/M2 | HEIGHT: 60 IN | HEART RATE: 72 BPM | SYSTOLIC BLOOD PRESSURE: 96 MMHG | TEMPERATURE: 98 F

## 2025-04-14 NOTE — PROGRESS NOTES
Pt given discharge instructions. ID bands verified by 2 Rns. All questions answered. Pt transferred safely off unit to car.

## 2025-04-15 ENCOUNTER — PATIENT OUTREACH (OUTPATIENT)
Dept: CASE MANAGEMENT | Age: 30
End: 2025-04-15

## 2025-04-15 ENCOUNTER — TELEPHONE (OUTPATIENT)
Dept: OBGYN UNIT | Facility: HOSPITAL | Age: 30
End: 2025-04-15

## 2025-04-15 NOTE — PROGRESS NOTES
Hospital Follow up for Post partum (Discharge 4/13 edw)     Taryn Guardado; Follow up 6w p/p   [address] [id number] [phone]   Delivery Date:4/12/2025   Delivery Type: Normal spontaneous vaginal delivery   Appt made for 5/22@11am with luiza    Confirmed with pt   Closing encounter

## 2025-04-16 ENCOUNTER — HOSPITAL ENCOUNTER (EMERGENCY)
Facility: HOSPITAL | Age: 30
Discharge: HOME | End: 2025-04-17
Attending: EMERGENCY MEDICINE
Payer: MEDICAID

## 2025-04-16 DIAGNOSIS — G40.919 BREAKTHROUGH SEIZURE (HCC): Primary | ICD-10-CM

## 2025-04-16 LAB
ALBUMIN SERPL-MCNC: 4.4 G/DL (ref 3.2–4.8)
ALBUMIN/GLOB SERPL: 1.5 {RATIO} (ref 1–2)
ALP LIVER SERPL-CCNC: 114 U/L (ref 37–98)
ALT SERPL-CCNC: 28 U/L (ref 10–49)
ANION GAP SERPL CALC-SCNC: 10 MMOL/L (ref 0–18)
APTT PPP: 24.1 SECONDS (ref 23–36)
AST SERPL-CCNC: 32 U/L (ref ?–34)
BASOPHILS # BLD AUTO: 0.05 X10(3) UL (ref 0–0.2)
BASOPHILS NFR BLD AUTO: 0.4 %
BILIRUB SERPL-MCNC: 0.3 MG/DL (ref 0.3–1.2)
BUN BLD-MCNC: 10 MG/DL (ref 9–23)
CALCIUM BLD-MCNC: 9.7 MG/DL (ref 8.7–10.6)
CHLORIDE SERPL-SCNC: 104 MMOL/L (ref 98–112)
CO2 SERPL-SCNC: 26 MMOL/L (ref 21–32)
CREAT BLD-MCNC: 0.66 MG/DL (ref 0.55–1.02)
EGFRCR SERPLBLD CKD-EPI 2021: 123 ML/MIN/1.73M2 (ref 60–?)
EOSINOPHIL # BLD AUTO: 0.23 X10(3) UL (ref 0–0.7)
EOSINOPHIL NFR BLD AUTO: 2 %
ERYTHROCYTE [DISTWIDTH] IN BLOOD BY AUTOMATED COUNT: 13.4 %
FIBRINOGEN PPP-MCNC: 615 MG/DL (ref 200–480)
GLOBULIN PLAS-MCNC: 3 G/DL (ref 2–3.5)
GLUCOSE BLD-MCNC: 100 MG/DL (ref 70–99)
HCT VFR BLD AUTO: 36.9 % (ref 35–48)
HGB BLD-MCNC: 12.4 G/DL (ref 12–16)
IMM GRANULOCYTES # BLD AUTO: 0.1 X10(3) UL (ref 0–1)
IMM GRANULOCYTES NFR BLD: 0.9 %
INR BLD: 0.95 (ref 0.8–1.2)
LYMPHOCYTES # BLD AUTO: 2 X10(3) UL (ref 1–4)
LYMPHOCYTES NFR BLD AUTO: 17.2 %
MCH RBC QN AUTO: 28.8 PG (ref 26–34)
MCHC RBC AUTO-ENTMCNC: 33.6 G/DL (ref 31–37)
MCV RBC AUTO: 85.8 FL (ref 80–100)
MONOCYTES # BLD AUTO: 0.68 X10(3) UL (ref 0.1–1)
MONOCYTES NFR BLD AUTO: 5.8 %
NEUTROPHILS # BLD AUTO: 8.6 X10 (3) UL (ref 1.5–7.7)
NEUTROPHILS # BLD AUTO: 8.6 X10(3) UL (ref 1.5–7.7)
NEUTROPHILS NFR BLD AUTO: 73.7 %
OSMOLALITY SERPL CALC.SUM OF ELEC: 289 MOSM/KG (ref 275–295)
PLATELET # BLD AUTO: 335 10(3)UL (ref 150–450)
POTASSIUM SERPL-SCNC: 3.9 MMOL/L (ref 3.5–5.1)
PROT SERPL-MCNC: 7.4 G/DL (ref 5.7–8.2)
PROTHROMBIN TIME: 12.8 SECONDS (ref 11.6–14.8)
RBC # BLD AUTO: 4.3 X10(6)UL (ref 3.8–5.3)
SODIUM SERPL-SCNC: 140 MMOL/L (ref 136–145)
URATE SERPL-MCNC: 6.7 MG/DL (ref 3.1–7.8)
WBC # BLD AUTO: 11.7 X10(3) UL (ref 4–11)

## 2025-04-16 PROCEDURE — 85610 PROTHROMBIN TIME: CPT | Performed by: EMERGENCY MEDICINE

## 2025-04-16 PROCEDURE — 99285 EMERGENCY DEPT VISIT HI MDM: CPT

## 2025-04-16 PROCEDURE — 84550 ASSAY OF BLOOD/URIC ACID: CPT | Performed by: EMERGENCY MEDICINE

## 2025-04-16 PROCEDURE — 85025 COMPLETE CBC W/AUTO DIFF WBC: CPT

## 2025-04-16 PROCEDURE — 96374 THER/PROPH/DIAG INJ IV PUSH: CPT

## 2025-04-16 PROCEDURE — 85730 THROMBOPLASTIN TIME PARTIAL: CPT | Performed by: EMERGENCY MEDICINE

## 2025-04-16 PROCEDURE — 85025 COMPLETE CBC W/AUTO DIFF WBC: CPT | Performed by: EMERGENCY MEDICINE

## 2025-04-16 PROCEDURE — 80177 DRUG SCRN QUAN LEVETIRACETAM: CPT | Performed by: EMERGENCY MEDICINE

## 2025-04-16 PROCEDURE — 80175 DRUG SCREEN QUAN LAMOTRIGINE: CPT | Performed by: EMERGENCY MEDICINE

## 2025-04-16 PROCEDURE — 80053 COMPREHEN METABOLIC PANEL: CPT | Performed by: EMERGENCY MEDICINE

## 2025-04-16 PROCEDURE — 80053 COMPREHEN METABOLIC PANEL: CPT

## 2025-04-16 PROCEDURE — 85384 FIBRINOGEN ACTIVITY: CPT | Performed by: EMERGENCY MEDICINE

## 2025-04-17 VITALS
HEIGHT: 60 IN | OXYGEN SATURATION: 100 % | DIASTOLIC BLOOD PRESSURE: 81 MMHG | BODY MASS INDEX: 35.34 KG/M2 | WEIGHT: 180 LBS | TEMPERATURE: 99 F | RESPIRATION RATE: 26 BRPM | HEART RATE: 80 BPM | SYSTOLIC BLOOD PRESSURE: 130 MMHG

## 2025-04-17 RX ORDER — LEVETIRACETAM 500 MG/5ML
1500 INJECTION, SOLUTION, CONCENTRATE INTRAVENOUS ONCE
Status: COMPLETED | OUTPATIENT
Start: 2025-04-17 | End: 2025-04-17

## 2025-04-17 NOTE — ED INITIAL ASSESSMENT (HPI)
4 days postpartum NVD. Uncomplicated pregnancy but known tohave epilepsy. Per pt have <1 min gran mal seizure while @ home. + headache & nausea, post ictal

## 2025-04-17 NOTE — DISCHARGE INSTRUCTIONS
Resume your Keppra and Lamictal in the morning.  Try to get plenty of rest.  Seizure precautions (no driving, no swimming or bathing alone, avoid high heights)

## 2025-04-18 RX ORDER — LEVETIRACETAM 750 MG/1
1500 TABLET ORAL 2 TIMES DAILY
Qty: 120 TABLET | Refills: 1 | Status: SHIPPED | OUTPATIENT
Start: 2025-04-18

## 2025-04-18 NOTE — TELEPHONE ENCOUNTER
Update reviewed; will continue Keppra 1500 mg bid (increased dose) as prescribed at discharge from OSH - plan to discuss further when returns to office for scheduled visit

## 2025-04-18 NOTE — TELEPHONE ENCOUNTER
Patient stated she had several seizures and admitted to [facility].    Please call patient to discuss details and advise.

## 2025-04-18 NOTE — TELEPHONE ENCOUNTER
Patient states she has a seizure at home on Wednesday evening.  Patient reports she delivered a baby full term on .  She took a ride share to the ED at [facility] and was discharge home after an increase dose of Levetiracetam.  She took a ride share home from the ED and had a seizure in the ride share.  She states the  pulled her out of the car and she hit her face on ground while seizing and she has scratches and bruising on her face.  She states the rider share  called EMS and they took her to [facility] in [place] were she is currently admitted.  She states she has had an MRI brain and EEG while admitted.  She reports they increased her Levetiracetam to 1500 mg BID and her Lamictal has remained the same at 150 mg BID.  She reports that she will leave [facility] if she is not discharge by 2 pm today as she would like to get back home to her  baby.    Patient is called and informed that per Dr. Alfonso, she will increase the Levetiracetam to 1500 mg BID and keep the dose of Lamotrigine at 150 mg BID.  She is scheduled to see Dr. Alfonso on  at which time they will discuss next steps.  She verbalizes understanding and no further questions are voiced.

## 2025-04-18 NOTE — ED PROVIDER NOTES
Patient Seen in: [facility] Emergency Department      History     Chief Complaint   Patient presents with    Seizures     Stated Complaint: Seizure 40 min ago, hx seizure, pt had baby 4 day ago    Subjective:   HPI    Patient is a 29-year-old female presenting to the ED after having a witnessed seizure at home.  The patient has a known history of seizures, well-controlled with her last seizure about 3 days ago with her last pregnancy and delivery.  The patient is 4 days postpartum from a normal spontaneous vaginal delivery.  The patient did have an epidural.  She has no complaints of fever or chills.  She had a witnessed grand mal seizure at home that lasted 40 seconds with a postictal period.  The patient has been compliant with her medications.  The seizure occurred at 10 PM and she took her evening medications at 10:30 PM.  She states that she has had a lack of sleep especially last night which she believes is contributing the breakthrough seizure.  She did have associated urinary incontinence.  No associated injury.  Patient is followed by Dr. Cantu.  Her pregnancy was not complicated by preeclampsia.  No lower extremity edema.  She has a very mild headache which is typical in her postictal period.  No visual disturbance.  She currently has no change in mental status.  She feels that her symptoms are very typical for her at this time with a breakthrough seizure due to epilepsy.  Patient is not breast-feeding.    History of Present Illness               Objective:     Past Medical History:    Cervical laceration    exam under anesthesia, repair of cervical laceration, evacuation of blood clots from uterus manually - Dr. Lora Benjamin, [facility].    COVID-19    Depression    Epilepsy (HCC)    1st grand mal seizure at 17    Female infertility    Gestational diabetes (HCC)    Hx of transfusion of packed red blood cells    [facility]    Hyperemesis gravidarum (HCC)    Was put on Zofran - didn't help much.     Hyperemesis gravidarum (HCC)    Iron deficiency anemia during pregnancy, antepartum (HCC)    h/o IV iron x 3 doses (12/22/21 - 12/29/22) during pregnancy (not able to tolerate PO iron)    Keloid scar    chest    Low maternal serum vitamin B12    Migraine without aura    Imitrex per neurologist    Obesity (BMI 30-39.9)    Other immediate postpartum hemorrhage (HCC)    exam under anesthesia, repair of cervical laceration, evacuation of blood clots from uterus manually - Dr. Lora Benjamin, [facility].    Pap smear for cervical cancer screening    Pap neg 9/15/22    Pneumonia of both lower lobes    Pubic bone pain    Request for sterilization    1/12/2022 Discussed permanent/irreversible, optional nature of sterilization. Patient expressed understanding. Medicaid consent form signed together.     Screening for genetic disease carrier status    Carrier Screen = Negative    Sialorrhea    Subchorionic hemorrhage in first trimester (HCC)    Trauma during pregnancy (HCC)    Vitamin D deficiency    Worst headache of life    7/29/24 Neuro visit - MRI/MRA brain ordered due to \"worst headache of her life\" on 7/23/24 - steroids helped.              Past Surgical History:   Procedure Laterality Date    Cholecystectomy  2018    Insert intrauterine device  03/01/2022    Copper IUD insertion at postpartum visit - Dr. Lora Gibson    Other surgical history  2012    benign keloid bx chest area    Other surgical history  01/17/2022    exam under anesthesia, repair of cervical laceration, evacuation of blood clots from uterus manually - Dr. Lora Benjamin, [facility].                Social History     Socioeconomic History    Marital status:    Tobacco Use    Smoking status: Never     Passive exposure: Never    Smokeless tobacco: Never   Vaping Use    Vaping status: Never Used   Substance and Sexual Activity    Alcohol  use: Never    Drug use: Never    Sexual activity: Yes     Partners: Male   Other Topics Concern    Caffeine Concern No    Exercise Yes    Seat Belt Yes    Special Diet No    Stress Concern No    Weight Concern Yes     Social Drivers of Health     Food Insecurity: No Food Insecurity (4/12/2025)    NCSS - Food Insecurity     Worried About Running Out of Food in the Last Year: No     Ran Out of Food in the Last Year: No   Transportation Needs: No Transportation Needs (4/12/2025)    NCSS - Transportation     Lack of Transportation: No   Stress: No Stress Concern Present (4/12/2025)    Stress     Feeling of Stress : No   Housing Stability: Not At Risk (4/12/2025)    NCSS - Housing/Utilities     Has Housing: Yes     Worried About Losing Housing: No     Unable to Get Utilities: No                                Physical Exam     ED Triage Vitals [04/16/25 2213]   /83   Pulse 90   Resp 16   Temp 99 °F (37.2 °C)   Temp src Oral   SpO2 97 %   O2 Device None (Room air)       Current Vitals:   Vital Signs  BP: 130/81  Pulse: 80  Resp: 26  MAP (mmHg): 94    Oxygen Therapy  SpO2: 100 %  O2 Device: None (Room air)        Physical Exam  Vitals and nursing note reviewed.   Constitutional:       General: She is not in acute distress.     Appearance: Normal appearance. She is well-developed. She is not ill-appearing or toxic-appearing.   HENT:      Head: Normocephalic and atraumatic.      Comments: No signs of head injury or intraoral injury.     Right Ear: External ear normal.      Left Ear: External ear normal.      Mouth/Throat:      Mouth: Mucous membranes are moist.      Pharynx: Oropharynx is clear.   Eyes:      Conjunctiva/sclera: Conjunctivae normal.      Pupils: Pupils are equal, round, and reactive to light.   Cardiovascular:      Rate and Rhythm: Normal rate and regular rhythm.      Heart sounds: Normal heart sounds.   Pulmonary:      Effort: Pulmonary effort is normal.      Breath sounds: Normal breath sounds.    Abdominal:      General: Abdomen is flat. Bowel sounds are normal. There is no distension.      Tenderness: There is no abdominal tenderness.   Musculoskeletal:      Right lower leg: No edema.      Left lower leg: No edema.   Skin:     General: Skin is warm.      Capillary Refill: Capillary refill takes less than 2 seconds.      Findings: No rash.   Neurological:      General: No focal deficit present.      Mental Status: She is alert and oriented to person, place, and time.      Sensory: No sensory deficit.      Motor: No weakness.      Comments: Clear speech, no facial droop.   Psychiatric:         Mood and Affect: Mood normal.         Behavior: Behavior normal.           Physical Exam                ED Course     Labs Reviewed   CBC WITH DIFFERENTIAL WITH PLATELET - Abnormal; Notable for the following components:       Result Value    WBC 11.7 (*)     Neutrophil Absolute Prelim 8.60 (*)     Neutrophil Absolute 8.60 (*)     All other components within normal limits   COMP METABOLIC PANEL (14) - Abnormal; Notable for the following components:    Glucose 100 (*)     Alkaline Phosphatase 114 (*)     All other components within normal limits   FIBRINOGEN ACTIVITY - Abnormal; Notable for the following components:    Fibrinogen 615 (*)     All other components within normal limits   URIC ACID - Normal   PROTHROMBIN TIME (PT) - Normal   PTT, ACTIVATED - Normal   LEVETIRACETAM, S   LAMOTRIGINE (LAMICTAL), SERUM   RAINBOW DRAW BLUE          Results                                 MDM      History obtained from patient.     Differential diagnosis includes breakthrough seizure given history of epilepsy, subtherapeutic Keppra or Lamictal levels, less likely eclampsia.    Previous records reviewed.  Reviewed recent admission  status post .  Patient was discharged 2025.    Testing considered and ordered includes CBC, CMP, coagulation studies, uric acid, fibrinogen, Keppra level, Lamictal, UA    I  reviewed all results.  CMP is unremarkable other than slight elevation of alk phos at 114.  CBC reviewed with mild leukocytosis with WBC of 11.7, afebrile, slight neutrophilic shift although this could be reactive to seizure.  Coagulation studies normal.  Fibrinogen is elevated at 615.  Uric acid is normal.  Lamictal and Keppra levels are pending.    Imaging such as CT brain were not performed at this time as patient is neurologically intact, a history of seizures and epilepsy in the past.    Others who assisted in patient's care included neurology, Dr. Duarte, as well as OB, Dr. Stephenson.  Patient's BP upon arrival was 120/83, next was 135/88, repeat evaluation prior to discharge was 103/58.  Patient has no lower extremity edema on examination.  Labs were reviewed.  The patient does have some mild leukocytosis and neutrophilic shift but is afebrile without any complaints of fever prior to arrival.  Coagulation studies were normal with elevated fibrinogen, normal liver function test, serum Keppra and Lamictal levels are pending.  Uric acid is normal.  Given presentation with history of epilepsy which has been known to have breakthrough seizures with due to lack of sleep and after further discussion with neurology and OB, I likely suspect that seizure is more of a breakthrough seizure due to epilepsy and not due to eclampsia.  Neurology recommending additional dose of Keppra to be given in the ED, 1500 mg IV prior to discharge with resuming prescribed medications in the morning and close outpatient follow-up with Dr. Alfonso.    Patient's postictal symptoms are typical for her with very minimal headache, no complaints of visual disturbance, has generalized soreness.    Keppra IV was given prior to discharge.  I did discuss the discharge plan with patient as well as returning to the ED immediately if any symptoms worsen, persist, or new symptoms develop.  Comfortable discharge plan.  Patient wanted to go home to  her baby.  Patient also follow-up with OB as scheduled.  She did not require anything for pain while she was in the ED.              Medical Decision Making      Disposition and Plan     Clinical Impression:  1. Breakthrough seizure (HCC)         Disposition:  Discharge  4/17/2025  2:22 am    Follow-up:  Yogi Alfonso MD  [address] [facility] [address]  [phone]    Schedule an appointment as soon as possible for a visit in 2 day(s)      Anne James MD  [address] [place] [id number] [phone]    Schedule an appointment as soon as possible for a visit      [facility] Emergency Department  [address] [facility] [address]  [phone]  Follow up  IF SYMPTOMS WORSEN, PERSIST, OR NEW SYMPTOMS DEVEL          Medications Prescribed:  Discharge Medication List as of 4/17/2025  2:38 AM          Supplementary Documentation:

## 2025-04-22 LAB
LAMOTRIGINE LVL: 2.6 UG/ML
LEVETIRACETAM LVL: 18.2 UG/ML

## 2025-04-29 ENCOUNTER — OFFICE VISIT (OUTPATIENT)
Dept: NEUROLOGY | Facility: CLINIC | Age: 30
End: 2025-04-29
Payer: MEDICAID

## 2025-04-29 VITALS
BODY MASS INDEX: 35.34 KG/M2 | WEIGHT: 180 LBS | RESPIRATION RATE: 16 BRPM | SYSTOLIC BLOOD PRESSURE: 121 MMHG | DIASTOLIC BLOOD PRESSURE: 88 MMHG | HEIGHT: 60 IN | HEART RATE: 95 BPM

## 2025-04-29 DIAGNOSIS — G40.919 BREAKTHROUGH SEIZURE (HCC): ICD-10-CM

## 2025-04-29 DIAGNOSIS — G40.909 SEIZURE DISORDER (HCC): Primary | ICD-10-CM

## 2025-04-29 DIAGNOSIS — G43.009 MIGRAINE WITHOUT AURA AND WITHOUT STATUS MIGRAINOSUS, NOT INTRACTABLE: ICD-10-CM

## 2025-04-29 PROCEDURE — 99214 OFFICE O/P EST MOD 30 MIN: CPT | Performed by: OTHER

## 2025-04-29 NOTE — PROGRESS NOTES
[facility] Progress Note    HPI  Chief Complaint   Patient presents with    Seizures     3 month follow up and had a seizure 04/16/2025    Test Results     MRI Brain  with and without contrast 04/17/2025       As per my initial H&P from 6/16/2021,   \"   Dotty Gross is a 25 year old female, who presents for evaluation of seizures.      Patient states she has history of seizures.  She states these usually last 30 seconds and she does not recall them but is told she has a grunting sound and then shakes and loses awareness; denies auras of odd tastes, smells or sounds; has some post ictal confusion and is sore after seizures; may bite tongue and jaw muscles are sore.      She states last seizure was July 29, 2020.  She states she previously had COVID July 18-19, with extensive weight loss and nausea/emesis.  She denied lack of taste and smell and denied fevers or headaches.      She states when she had her last seizure, she was taking Keppra but was not able to keep down medication; was on 750 mg bid and was increased up to 1000 mg bid.  Since this time, she has not had any seizures.  She is currently 9 weeks pregnant and has remained on Keppra; her first pregnancy was in 2017 - had seizure during second trimester.     She has been doing well currently but has been having hyperemesis gravidarum.   Otherwise, patient denies any recent weight change, fevers, chills, double vision/ blurry vision / loss of vision, chest pain, palpitations, shortness of breath, rashes, joint pains, bowel / bladder incontinence or mood issues. \"    Prior notes as below 3/17/2022    Patient at time of last visit was unable to seen formally and was sent to ER as she had breakthrough seizures - states she does not recall coming to office but admits she was out of Lamictal for the prior 2 weeks - she had titrated up to 100 mg dose but never got this refilled and then started to have more confusion and was not as quick to  respond; she had 4 seizures reportedly on day of admission 3/9/2022.  She was admitted for workup and had EEG which was abnormal. She was treated with Depakote 500 mg q8 hrs and Lamictal was restarted at 25 mg bid.  She was discharged home with Depakote 500 mg bid with plan to increase Lamictal to 50 mg bid after 1 week, which she has not done yet.  She remains on Keppra 1500 mg bid.      She denies any recurrent seizures and denies any auras of odd tastes, smells or sounds; she feels alert and back to her usual self.     Prior notes as below: 4/19/2022  Patient last seen 3/17/2022.  Since last visit, she has increased lamotrigine (Lamictal) up to 50 mg bid and reduced valproic acid (Depakote) to 250 mg AM / 500 mg PM- her last level was done 3/21/2022 with valproic acid level elevated and lamotrigine within normal range. She was on Depakote 500 mg bid and Lamictal 50 mg bid at the time and advised to have repeat levels done in ~2 weeks but results not available at this time.  Currently, she is doing well with no seizures and denies any rash, balance issues, double vision, nausea; she denies any auras of odd tastes, smells or sounds and denies recurrent seizures recently.   She has not been able to lose weight since her pregnancy but denies tremor or balance issues.            Prior notes as below 6/8/2022.  Patient last seen 4/19/2022.  Since last visit, she has been on Depakote 250 mg AM/ 500 mg PM and Lamictal 50 mg bid along with Keppra 1500 mg bid.  On this regimen, she denies any seizures and denies any auras of odd tastes,smells, sounds or dizzy spells or episodes of loss of awareness. She has noted some continued weight gain but denies any other side effect of rash, double vision, nausea/emesis or balance issues/falls.            Prior notes as per 7/13/2022.  Patient last seen 6/8/2022.  She is now off Depakote and remains on Keppra 1500 mg bid and Lamictal 50 mg bid; she denies any seizures and denies any  auras of odd tastes,smells, sounds or dizzy spells but had an episode a couple of weeks ago where she briefly \"zoned out\" for a matter of seconds, after she stopped taking the Depakote but has not had any since this time; She denies other  episodes of loss of awareness. She denies other side effect of rash, double vision, nausea/emesis or balance issues/falls.  She recently had levels checked.        Prior notes as per 10/17/2022.  Patient last seen 7/13/2022.  She is still on Keppra 1500 mg bid and is now on Lamictal 100 mg bid.  She has been doing well on this dose.  She denies any episodes of \"zoning out\" and denies auras of odd tastes, smells or sounds.  She denies any episodes of speech arrest and denies other side effects of rash, double vision, nausea/emeiss or balance issues/falls. She has been going to weight loss clinic and is now on fluoxetine 20 mg daily but denies any significant improvement.      Prior notes as per 1/17/2023.  Patient last seen 10/17/2022. Since last visit, she has been weaned down on Keppra currently at 1000 mg bid.  She has remained on Lamictal 100 mg bid and denies any seizures or episodes of loss of awareness, or speech arrest or auras of odd tastes, smells or sounds.  She denies side effects of rash, balance issues, double vision, or nausea.  She has been working with weight loss clinic but is not on fluoxetine or topiramate at this time.         Prior notes as per 7/31/2023.  Patient last seen 1/17/2023.  Patient since last visit had a generalized seizure 2/17/2023.  At that time, she was on Keppra 1000 mg bid and Lamictal 100 mg bid.  She had missed doses of Keppra and Lamictal the night prior to her seizure and did not have recurrent seizures; no auras of odd tastes, smells or sounds.  She admitted to poor sleep and increased stress at time of seizure.  She was recommended to have levels checked but has not done so yet.  Currently, she is doing well with no recurrent seizures  and denies auras of odd tastes, smells or sounds or episodes of loss of awareness or speech arrest.  She denies side effects of rash, balance issues, double vision or nausea.        Prior notes as per 1/29/2024.  Patient last seen 7/31/2023.  Since last visit, she has been doing well.  She remains on Lamictal 100 mg bid and Keppra 1000 mg bid and denies any seizures.  She denies missing any doses of medication and has been sleeping better as her child is sleeping through the night ( no issues for child age 4). She denies any auras of odd tastes, smells or sounds or episodes of speech arrest or loss of awareness; no issues with waking in the AM having wet the bed or bitten her tongue.  She additionally denies any side effects of rash, balance issues, double vision or nausea.  She has been driving without issues.       Prior notes as per 5/13/2024.  Patient last seen 1/29/2024.  She was doing well on Keppra 1000 mg bid and Lamictal 100 mg bid. However, in the past few weeks she has been tired during the day despite getting good sleep. She admits to some weight gain and wakes up in the middle of the night coughing.  She may fall asleep in a dark room but not when driving. She has been having episodes in the past 2 weeks where she has difficulty with concentration and has episodes of \"staring off\" but denies loss of awareness.      She denies being tested for sleep apnea but has been gaining weight; she denies issues when driving but notes this mainly when she is conversing with others; denies auras of odd tastes, smells or sounds. She notes when she coughs she has a \"bleach taste\" in her mouth.     Of note, she had pneumonia 3/2024. She has some headaches in the front of the head as well recently; she is light sensitive; no recent head trauma.        Prior notes as per 7/29/2024.  Patient last seen 5/13/2024. In terms of seizures, she is doing well on Keppra 1000 mg bid and Lamcital 100 mg bid - denies seizures or  auras but had migraine and severe headache 7/23/2024 - having headaches when she got to work with pain in head and did not improve with Tylenol. She then noted light sensitivity and nausea and went to lie in a dark room with some brief improvement. She denied loss of awareness and denied associated focal numbness/tingling/weakness. She was given steroid pack with improvement. She felt this headache was the worst of her life.           Prior notes as per 10/29/2024.  Patient last seen 7/29/2024. Since last visit, she has not been able to have MRI due to finding out she was pregnant. She is currently 15 weeks gestation and states she has been taking Keppra 1000 mg bid and Lamictal 100 mg bid. She states she had hyperemesis gravidarum in first trimester but is improving. She is no longer having severe headaches but states she has only rare migraines for which sumatriptan has helped. She has not been having any seizures and denies auras of odd tastes, smells or sounds.  She is following with maternal-fetal-medicine service and has been doing well.       Prior notes as per 1/29/2025.  Patient last seen 10/29/2024.  Patient since last visit has been doing well. She is now 28 weeks pregnant and her level of lamotrigine was mildly low - her dose was increased ~12/11 from 100 mg bid to 150 mg bid; she remains on Keppra 1000 mg bid and denies any seizures or auras of odd tastes, smells or sounds; no major side effects on medication; migraines have not been occurring since last visit but she has Tylenol and sumatriptan if needed; she has not had to use either of these medications, however. She follows with maternal-fetal-medicine and has been doing well - less hyperemesis gravidarum as well.        Patient last seen 1/29/2025.  She was doing well with no seizures on Keppra 1000 mg bid and Lamictal 150 mg bid. However, she delivered her baby (healthy baby girl) with no complications during delivery and remained on same dose of  Keppra 1000 mg bid and Lamictal 150 mg bid. However after delivery on 4/16, she started to feel \"odd\" having difficulty with speaking and \"spacing out\" early in the afternoon. She become concerned she was going to have a seizure and later that evening, she stood up from the couch to go to the restroom and fell on the ground with generalized tonic clonic seizure lasting ~ 5 minutes. She went to [place] ER and was given loading dose of Keppra.      Prior to delivery, she was given pitocin and dexamethasone as she had Group B Strep - she was also given antibiotics - thinks penicillin; CBC was noted to have elevated WBC on 4/13 at 23.5; on 4/16, WBC was 11.7, levetiracetam 18.2 on 4/16 and lamotrigine 2.6.       She was given loading dose 4/16 in [place] ED and discharged home. However, when she was being taken home by  4/17, she had another seizure while in the car (no aura) and had injury to face from having seizure on the ground. She then went to [facility] and her level of Keppra was 60.7. She was monitored overnight and had MRI brain 4/17 which showed right periorbital and parietal soft tissue swelling/ hematoma but no intracranial acute pathology.     EEG done showed rare generalized 15-30 uV spike/spike wave and polyspike discharges during drowsiness and sleep on routine EEG.      She is now on Lamictal 150 mg bid and Keppra 1500 mg bid and denies any recurrent seizures or auras to suggest seizures recently.        Past Medical History:    Cervical laceration    exam under anesthesia, repair of cervical laceration, evacuation of blood clots from uterus manually - Dr. Lora Benjamin, [facility].    COVID-19    Depression    Epilepsy (HCC)    1st grand mal seizure at 17    Female infertility    Gestational diabetes (HCC)    Hx of transfusion of packed red blood cells    Received 2 units packed red blood cells for postpartum hemorrhage - atonic & cervical laceration    Hyperemesis gravidarum ([facility])     Was put on Zofran - didn't help much.     Hyperemesis gravidarum (HCC)    Iron deficiency anemia during pregnancy, antepartum (HCC)    h/o IV iron x 3 doses (12/22/21 - 12/29/22) during pregnancy (not able to tolerate PO iron)    Keloid scar    chest    Low maternal serum vitamin B12    Migraine without aura    Imitrex per neurologist    Obesity (BMI 30-39.9)    Other immediate postpartum hemorrhage (HCC)    exam under anesthesia, repair of cervical laceration, evacuation of blood clots from uterus manually - Dr. Lora Benjamin, [facility].    Pap smear for cervical cancer screening    Pap neg 9/15/22    Pneumonia of both lower lobes    Pubic bone pain    Request for sterilization    1/12/2022 Discussed permanent/irreversible, optional nature of sterilization. Patient expressed understanding. Medicaid consent form signed together.     Screening for genetic disease carrier status    Carrier Screen = Negative    Sialorrhea    Subchorionic hemorrhage in first trimester (HCC)    Trauma during pregnancy (HCC)    Vitamin D deficiency    Worst headache of life    7/29/24 Neuro visit - MRI/MRA brain ordered due to \"worst headache of her life\" on 7/23/24 - steroids helped.     Past Surgical History:   Procedure Laterality Date    Cholecystectomy  2018    Insert intrauterine device  03/01/2022    Copper IUD insertion at postpartum visit - Dr. Lora Gibson    Other surgical history  2012    benign keloid bx chest area    Other surgical history  01/17/2022    exam under anesthesia, repair of cervical laceration, evacuation of blood clots from uterus manually - Dr. Lora Benjamin, [facility].     Family History   Problem Relation Age of Onset    Obesity Mother     No Known Problems Father     No Known Problems Sister     No Known Problems Sister     No Known Problems Sister     Diabetes Maternal Grandmother     No Known Problems Maternal Grandfather     No Known Problems Paternal Grandmother     No Known Problems  Paternal Grandfather     Other (Other) Daughter         severe GI sensitivities to multiple foods - no clear diagnosis    Diabetes Maternal Aunt     Birth Defects Neg     Genetic Disease Neg     Thyroid disease Neg     Breast Cancer Neg     Ovarian Cancer Neg     Colon Cancer Neg     DVT/VTE Neg      Social History     Socioeconomic History    Marital status:    Tobacco Use    Smoking status: Never     Passive exposure: Never    Smokeless tobacco: Never   Vaping Use    Vaping status: Never Used   Substance and Sexual Activity    Alcohol use: Never    Drug use: Never    Sexual activity: Yes     Partners: Male   Other Topics Concern    Caffeine Concern No    Exercise Yes    Seat Belt Yes    Special Diet No    Stress Concern No    Weight Concern Yes     Allergies   Allergen Reactions    Shellfish-Derived Products SWELLING         Current Outpatient Medications:     levetiracetam 750 MG Oral Tab, Take 2 tablets (1,500 mg total) by mouth 2 (two) times daily., Disp: 120 tablet, Rfl: 1    acetaminophen 500 MG Oral Tab, Take 2 tablets (1,000 mg total) by mouth every 6 (six) hours as needed., Disp: 40 tablet, Rfl: 0    docusate sodium 100 MG Oral Cap, Take 100 mg by mouth 2 (two) times daily as needed for constipation., Disp: 40 capsule, Rfl: 0    ibuprofen 600 MG Oral Tab, Take 1 tablet (600 mg total) by mouth every 6 (six) hours., Disp: 40 tablet, Rfl: 0    Blood Glucose Monitoring Suppl (ONETOUCH VERIO) w/Device Does not apply Kit, 1 each in the morning, at noon, in the evening, and at bedtime. Check blood sugar 4 times a day. Fasting and 2 hours after each meal., Disp: 1 kit, Rfl: 0    Lancets Does not apply Misc, Check blood sugar 4 times a day. Fasting and 2 hours after each meal., Disp: 100 each, Rfl: 2    lamoTRIgine 150 MG Oral Tab, Take 1 tablet (150 mg total) by mouth 2 (two) times daily., Disp: 180 tablet, Rfl: 1    famotidine 20 MG Oral Tab, Take 1 tablet (20 mg total) by mouth 2 (two) times daily., Disp:  60 tablet, Rfl: 5    SUMAtriptan 50 MG Oral Tab, USE AT ONSET OF MIGRAINE. REPEAT ONCE AFTER 2 HOURS. MAX 2 IN 24 HOURS. MUST LAST 30 DAYS., Disp: 9 tablet, Rfl: 5    Review of Systems:  No chest pain or palpitations; otherwise as noted in HPI.    Exam:  /88 (BP Location: Left arm, Patient Position: Sitting, Cuff Size: large)   Pulse 95   Resp 16   Ht 60\"   Wt 180 lb (81.6 kg)   LMP 07/10/2024 (Exact Date)   BMI 35.15 kg/m²   Estimated body mass index is 35.15 kg/m² as calculated from the following:    Height as of this encounter: 60\".    Weight as of this encounter: 180 lb (81.6 kg).    Gen: well developed, well nourished, no acute distress  HEENT: normocephalic  Heart; normal S1/S2, regular rate and rhythm  Lungs: clear to auscultation bilaterally  Extremities: no edema, peripheral pulses intact    Neck: supple, full range of motion; no carotid bruits    Neuro:  Mental status:  Orientation: Alert and oriented to person, place, time  Speech Fluent and conversational    CN: PERRL, EOMI with no nystagmaus, VFF, smile symmetric, sensation intact, tongue and palate midline, SCM intact, otherwise, CN 2-12 intact  Motor: 5/5 strength throughout, tone normal  DTR: 2+ symmetric throughout, toes downgoing bilaterally, no clonus; +tromners in UE bilaterally   Sensory: intact to light touch throughout  Coord: FNF intact with no tremor or dysmetria; rapid alternating movements intact bilaterally  Romberg: absent  Gait: normal casual gait    Labs:  New    As noted in HPI    Prior as noted below    Component      Latest Ref Rng 2/4/2025 4/12/2025   WBC      4.0 - 11.0 x10(3) uL     RBC      3.80 - 5.30 x10(6)uL     Hemoglobin      12.0 - 16.0 g/dL     Hematocrit      35.0 - 48.0 %     Platelet Count      150.0 - 450.0 10(3)uL     MCV      80.0 - 100.0 fL     MCH      26.0 - 34.0 pg     MCHC      31.0 - 37.0 g/dL     RDW      %     Prelim Neutrophil Abs      1.50 - 7.70 x10 (3) uL     Neutrophils Absolute      1.50 -  7.70 x10(3) uL     Lymphocytes Absolute      1.00 - 4.00 x10(3) uL     Monocytes Absolute      0.10 - 1.00 x10(3) uL     Eosinophils Absolute      0.00 - 0.70 x10(3) uL     Basophils Absolute      0.00 - 0.20 x10(3) uL     Immature Granulocyte Absolute      0.00 - 1.00 x10(3) uL     Neutrophils %      %     Lymphocytes %      %     Monocytes %      %     Eosinophils %      %     Basophils %      %     Immature Granulocyte %      %     Glucose      70 - 99 mg/dL     Sodium      136 - 145 mmol/L     Potassium      3.5 - 5.1 mmol/L     Chloride      98 - 112 mmol/L     Carbon Dioxide, Total      21.0 - 32.0 mmol/L     ANION GAP      0 - 18 mmol/L     BUN      9 - 23 mg/dL     CREATININE      0.55 - 1.02 mg/dL     CALCIUM      8.7 - 10.6 mg/dL     CALCULATED OSMOLALITY      275 - 295 mOsm/kg     EGFR      >=60 mL/min/1.73m2     AST (SGOT)      <34 U/L     ALT (SGPT)      10 - 49 U/L     ALKALINE PHOSPHATASE      37 - 98 U/L     Total Bilirubin      0.3 - 1.2 mg/dL     PROTEIN, TOTAL      5.7 - 8.2 g/dL     Albumin      3.2 - 4.8 g/dL     Globulin      2.0 - 3.5 g/dL     A/G Ratio      1.0 - 2.0      Lamotrigine Lvl      2.0 - 20.0 ug/mL 3.4     TREPONEMA PALLIDUM AB, PARTICLE AGGLUTINATION      Nonreactive    Nonreactive    Levetiracetam Lvl      10.0 - 40.0 ug/mL       Component      Latest Ref [facility] 4/13/2025 4/16/2025   WBC      4.0 - 11.0 x10(3) uL 23.5 (H)  11.7 (H)    RBC      3.80 - 5.30 x10(6)uL 4.14  4.30    Hemoglobin      12.0 - 16.0 g/dL 11.9 (L)  12.4    Hematocrit      35.0 - 48.0 % 35.9  36.9    Platelet Count      150.0 - 450.0 10(3)uL 254.0  335.0    MCV      80.0 - 100.0 fL 86.7  85.8    MCH      26.0 - 34.0 pg 28.7  28.8    MCHC      31.0 - 37.0 g/dL 33.1  33.6    RDW      % 13.6  13.4    Prelim Neutrophil Abs      1.50 - 7.70 x10 (3) uL 18.99 (H)  8.60 (H)    Neutrophils Absolute      1.50 - 7.70 x10(3) uL 18.99 (H)  8.60 (H)    Lymphocytes Absolute      1.00 - 4.00 x10(3) uL 2.68  2.00    Monocytes  Absolute      0.10 - 1.00 x10(3) uL 1.47 (H)  0.68    Eosinophils Absolute      0.00 - 0.70 x10(3) uL 0.14  0.23    Basophils Absolute      0.00 - 0.20 x10(3) uL 0.09  0.05    Immature Granulocyte Absolute      0.00 - 1.00 x10(3) uL 0.09  0.10    Neutrophils %      % 80.9  73.7    Lymphocytes %      % 11.4  17.2    Monocytes %      % 6.3  5.8    Eosinophils %      % 0.6  2.0    Basophils %      % 0.4  0.4    Immature Granulocyte %      % 0.4  0.9    Glucose      70 - 99 mg/dL  100 (H)    Sodium      136 - 145 mmol/L  140    Potassium      3.5 - 5.1 mmol/L  3.9    Chloride      98 - 112 mmol/L  104    Carbon Dioxide, Total      21.0 - 32.0 mmol/L  26.0    ANION GAP      0 - 18 mmol/L  10    BUN      9 - 23 mg/dL  10    CREATININE      0.55 - 1.02 mg/dL  0.66    CALCIUM      8.7 - 10.6 mg/dL  9.7    CALCULATED OSMOLALITY      275 - 295 mOsm/kg  289    EGFR      >=60 mL/min/1.73m2  123    AST (SGOT)      <34 U/L  32    ALT (SGPT)      10 - 49 U/L  28    ALKALINE PHOSPHATASE      37 - 98 U/L  114 (H)    Total Bilirubin      0.3 - 1.2 mg/dL  0.3    PROTEIN, TOTAL      5.7 - 8.2 g/dL  7.4    Albumin      3.2 - 4.8 g/dL  4.4    Globulin      2.0 - 3.5 g/dL  3.0    A/G Ratio      1.0 - 2.0   1.5    Lamotrigine Lvl      2.0 - 20.0 ug/mL  2.6    TREPONEMA PALLIDUM AB, PARTICLE AGGLUTINATION      Nonreactive       Levetiracetam Lvl      10.0 - 40.0 ug/mL  18.2       Prior as noted below    Component      Latest Ref [facility] 12/6/2024   Lamotrigine Lvl      2.0 - 20.0 ug/mL 1.7 (L)    Levetiracetam Lvl      10.0 - 40.0 ug/mL 19.5       Component      Latest Ref [facility] 12/6/2024   WBC      4.0 - 11.0 x10(3) uL 11.4 (H)    RBC      3.80 - 5.30 x10(6)uL 4.02    Hemoglobin      12.0 - 16.0 g/dL 12.1    Hematocrit      35.0 - 48.0 % 36.1    Platelet Count      150.0 - 450.0 10(3)uL 333.0    MCV      80.0 - 100.0 fL 89.8    MCH      26.0 - 34.0 pg 30.1    MCHC      31.0 - 37.0 g/dL 33.5    RDW      % 14.6    Prelim Neutrophil Abs      1.50 -  7.70 x10 (3) uL 8.41 (H)    Neutrophils Absolute      1.50 - 7.70 x10(3) uL 8.41 (H)    Lymphocytes Absolute      1.00 - 4.00 x10(3) uL 2.16    Monocytes Absolute      0.10 - 1.00 x10(3) uL 0.57    Eosinophils Absolute      0.00 - 0.70 x10(3) uL 0.15    Basophils Absolute      0.00 - 0.20 x10(3) uL 0.03    Immature Granulocyte Absolute      0.00 - 1.00 x10(3) uL 0.05    Neutrophils %      % 74.0    Lymphocytes %      % 19.0    Monocytes %      % 5.0    Eosinophils %      % 1.3    Basophils %      % 0.3    Immature Granulocyte %      % 0.4    Glucose      70 - 99 mg/dL 85    Sodium      136 - 145 mmol/L 139    Potassium      3.5 - 5.1 mmol/L 4.2    Chloride      98 - 112 mmol/L 107    Carbon Dioxide, Total      21.0 - 32.0 mmol/L 26.0    ANION GAP      0 - 18 mmol/L 6    BUN      9 - 23 mg/dL 10    CREATININE      0.55 - 1.02 mg/dL 0.58    CALCIUM      8.7 - 10.4 mg/dL 9.6    CALCULATED OSMOLALITY      275 - 295 mOsm/kg 286    EGFR      >=60 mL/min/1.73m2 127    AST (SGOT)      <34 U/L 10    ALT (SGPT)      10 - 49 U/L <7 (L)    ALKALINE PHOSPHATASE      37 - 98 U/L 59    Total Bilirubin      0.3 - 1.2 mg/dL 0.3    PROTEIN, TOTAL      5.7 - 8.2 g/dL 6.9    Albumin      3.2 - 4.8 g/dL 4.1    Globulin      2.0 - 3.5 g/dL 2.8    A/G Ratio      1.0 - 2.0  1.5    Patient Fasting for CMP? No    HEMOGLOBIN A1c      <5.7 % 5.1    ESTIMATED AVERAGE GLUCOSE      68 - 126 mg/dL 100         Prior as noted below    Component      Latest Ref Rng 6/1/2024   WBC      3.8 - 10.8 Thousand/uL 7.7    RBC      3.80 - 5.10 Million/uL 5.22 (H)    Hemoglobin      11.7 - 15.5 g/dL 14.0    Hematocrit      35.0 - 45.0 % 44.9    MCV      80.0 - 100.0 fL 86.0    MCH      27.0 - 33.0 pg 26.8 (L)    MCHC      32.0 - 36.0 g/dL 31.2 (L)    RDW      11.0 - 15.0 % 13.6    Platelet Count      140 - 400 Thousand/uL 392    MPV      7.5 - 12.5 fL 10.3    Neutrophils Absolute      1,500 - 7,800 cells/uL 3,781    Lymphocytes Absolute      850 - 3,900  cells/uL 3,011    Monocytes Absolute      200 - 950 cells/uL 593    Eosinophils Absolute      15 - 500 cells/uL 262    Basophils Absolute      0 - 200 cells/uL 54    Neutrophils %      % 49.1    Lymphocytes %      % 39.1    Monocytes %      % 7.7    Eosinophils %      % 3.4    Basophils %      % 0.7    Glucose      65 - 99 mg/dL 76    BUN      7 - 25 mg/dL 14    CREATININE      0.50 - 0.96 mg/dL 0.64    EGFR      > OR = 60 mL/min/1.73m2 125    BUN/CREATININE RATIO      6 - 22 (calc) SEE NOTE:    Sodium      135 - 146 mmol/L 137    Potassium      3.5 - 5.3 mmol/L 4.8    Chloride      98 - 110 mmol/L 102    Carbon Dioxide, Total      20 - 32 mmol/L 27    CALCIUM      8.6 - 10.2 mg/dL 9.9    PROTEIN, TOTAL      6.1 - 8.1 g/dL 8.1    Albumin      3.6 - 5.1 g/dL 4.5    Globulin      1.9 - 3.7 g/dL (calc) 3.6    A/G Ratio      1.0 - 2.5 (calc) 1.3    Total Bilirubin      0.2 - 1.2 mg/dL 0.4    Alkaline Phosphatase      31 - 125 U/L 81    AST (SGOT)      10 - 30 U/L 22    ALT (SGPT)      6 - 29 U/L 29    LAMOTRIGINE      2.5 - 15.0 mcg/mL 6.1    LEVETIRACETAM      mcg/mL 45.5           Prior as noted below    Component      Latest Ref Rng 8/7/2023   Lamotrigine Lvl      2.0 - 20.0 ug/mL 4.6      From EMR    12/6/2023    CMP: normal range with exception of ALT 38 (minimally elevated)  CBC: normal range    1/25/2023:   From Outside records    Lamotrigine: 1.5 (low)     1/11/2023: 3.3 (mildly low)     Prior as noted below    Component      Latest Ref Rng & Units 8/2/2022   LAMOTRIGINE      4.0 - 18.0 mcg/mL 5.3       Prior as noted below     Component      Latest Ref Rng & Units 7/9/2022   LAMOTRIGINE      4.0 - 18.0 mcg/mL 3.1 (L)       Prior as noted below  Component      Latest Ref Rng & Units 4/18/2022   LAMOTRIGINE      4.0 - 18.0 mcg/mL 6.7   VALPROIC ACID      50.0 - 100.0 mg/L  64.6     Prior as noted below    Component      Latest Ref Rng & Units 3/21/2022   LAMOTRIGINE      4.0 - 18.0 mcg/mL 5.8   VALPROIC ACID       50.0 - 100.0 mg/L  131.6 (H)     Prior as noted below    Component      Latest Ref Rng & Units 3/11/2022 3/9/2022 2/17/2022   WBC      4.0 - 11.0 x10(3) uL  13.4 (H)    RBC      3.80 - 5.30 x10(6)uL  4.70    Hemoglobin      12.0 - 16.0 g/dL  13.1    Hematocrit      35.0 - 48.0 %  41.2    Platelet Count      150.0 - 450.0 10(3)uL  324.0    MCV      80.0 - 100.0 fL  87.7    MCH      26.0 - 34.0 pg  27.9    MCHC      31.0 - 37.0 g/dL  31.8    RDW      %  14.0    Prelim Neutrophil Abs      1.50 - 7.70 x10 (3) uL  11.28 (H)    Neutrophils Absolute      1.50 - 7.70 x10(3) uL  11.28 (H)    Lymphocytes Absolute      1.00 - 4.00 x10(3) uL  1.62    Monocytes Absolute      0.10 - 1.00 x10(3) uL  0.40    Eosinophils Absolute      0.00 - 0.70 x10(3) uL  0.04    Basophils Absolute      0.00 - 0.20 x10(3) uL  0.02    Immature Granulocyte Absolute      0.00 - 1.00 x10(3) uL  0.05    Neutrophils %      %  84.1    Lymphocytes %      %  12.1    Monocytes %      %  3.0    Eosinophils %      %  0.3    Basophils %      %  0.1    Immature Granulocyte %      %  0.4    Glucose      70 - 99 mg/dL  113 (H)    Sodium      136 - 145 mmol/L  139    Potassium      3.5 - 5.1 mmol/L  4.0    Chloride      98 - 112 mmol/L  109    Carbon Dioxide, Total      21.0 - 32.0 mmol/L  23.0    ANION GAP      0 - 18 mmol/L  7    BUN      7 - 18 mg/dL  11    CREATININE      0.55 - 1.02 mg/dL  0.80    CALCIUM      8.5 - 10.1 mg/dL  9.6    CALCULATED OSMOLALITY      275 - 295 mOsm/kg  288    eGFR NON-AFR. AMERICAN      >=60  104    eGFR       >=60  119    AST (SGOT)      15 - 37 U/L  28    ALT (SGPT)      13 - 56 U/L  51    ALKALINE PHOSPHATASE      37 - 98 U/L  75    Total Bilirubin      0.1 - 2.0 mg/dL  0.3    PROTEIN, TOTAL      6.4 - 8.2 g/dL  8.8 (H)    Albumin      3.4 - 5.0 g/dL  4.2    Globulin      2.8 - 4.4 g/dL  4.6 (H)    A/G Ratio      1.0 - 2.0  0.9 (L)    Color Urine      Yellow  Yellow    Clarity Urine      Clear  Cloudy (A)    Spec  Gravity      1.001 - 1.030  1.024    Glucose Urine      Negative mg/dL  Negative    Bilirubin Urine      Negative  Negative    Ketones, UA      Negative mg/dL  Trace (A)    Blood Urine      Negative  Large (A)    PH Urine      5.0 - 8.0  5.0    Protein Urine      Negative mg/dL  30 (A)    Urobilinogen Urine      <2.0 mg/dL  <2.0    Nitrite Urine      Negative  Negative    Leukocyte Esterase Urine      Negative  Large (A)    WBC Urine      0 - 5 /HPF  11-20 (A)    RBC Urine      0 - 2 /HPF  >10 (A)    Bacteria Urine      None Seen /HPF  Rare (A)    SQUAM EPI CELLS UR      None Seen /HPF  Few (A)    RENAL TUBULAR EPITHELIAL CELLS      None Seen /HPF  None Seen    TRANSITIONAL EPI CELLS      None Seen /HPF  None Seen    YEAST URINE      None Seen /HPF  None Seen    AMPHETAMINE URINE      Negative  Negative    BENZODIAZEPINES URINE      Negative  Negative    COCAINE URINE      Negative  Negative    CANNABINOID URINE      Negative  Negative    Ecstasy Urine      Negative  Negative    OPIATE URINE      Negative  Negative    Oxycodone Urine      Negative  Negative    CREATININE UR RANDOM      mg/dL  168.00    LEVETIRACETAM (KEPPRA)      12 - 46 ug/mL      LAMOTRIGINE      4.0 - 18.0 mcg/mL   2.9 (L)   VALPROIC ACID      50.0 - 100.0 ug/mL 80.6       Component      Latest Ref Rng & Units 1/21/2022   WBC      4.0 - 11.0 x10(3) uL    RBC      3.80 - 5.30 x10(6)uL    Hemoglobin      12.0 - 16.0 g/dL    Hematocrit      35.0 - 48.0 %    Platelet Count      150.0 - 450.0 10(3)uL    MCV      80.0 - 100.0 fL    MCH      26.0 - 34.0 pg    MCHC      31.0 - 37.0 g/dL    RDW      %    Prelim Neutrophil Abs      1.50 - 7.70 x10 (3) uL    Neutrophils Absolute      1.50 - 7.70 x10(3) uL    Lymphocytes Absolute      1.00 - 4.00 x10(3) uL    Monocytes Absolute      0.10 - 1.00 x10(3) uL    Eosinophils Absolute      0.00 - 0.70 x10(3) uL    Basophils Absolute      0.00 - 0.20 x10(3) uL    Immature Granulocyte Absolute      0.00 - 1.00 x10(3)  uL    Neutrophils %      %    Lymphocytes %      %    Monocytes %      %    Eosinophils %      %    Basophils %      %    Immature Granulocyte %      %    Glucose      70 - 99 mg/dL    Sodium      136 - 145 mmol/L    Potassium      3.5 - 5.1 mmol/L    Chloride      98 - 112 mmol/L    Carbon Dioxide, Total      21.0 - 32.0 mmol/L    ANION GAP      0 - 18 mmol/L    BUN      7 - 18 mg/dL    CREATININE      0.55 - 1.02 mg/dL    CALCIUM      8.5 - 10.1 mg/dL    CALCULATED OSMOLALITY      275 - 295 mOsm/kg    eGFR NON-AFR. AMERICAN      >=60    eGFR       >=60    AST (SGOT)      15 - 37 U/L    ALT (SGPT)      13 - 56 U/L    ALKALINE PHOSPHATASE      37 - 98 U/L    Total Bilirubin      0.1 - 2.0 mg/dL    PROTEIN, TOTAL      6.4 - 8.2 g/dL    Albumin      3.4 - 5.0 g/dL    Globulin      2.8 - 4.4 g/dL    A/G Ratio      1.0 - 2.0    Color Urine      Yellow    Clarity Urine      Clear    Spec Gravity      1.001 - 1.030    Glucose Urine      Negative mg/dL    Bilirubin Urine      Negative    Ketones, UA      Negative mg/dL    Blood Urine      Negative    PH Urine      5.0 - 8.0    Protein Urine      Negative mg/dL    Urobilinogen Urine      <2.0 mg/dL    Nitrite Urine      Negative    Leukocyte Esterase Urine      Negative    WBC Urine      0 - 5 /HPF    RBC Urine      0 - 2 /HPF    Bacteria Urine      None Seen /HPF    SQUAM EPI CELLS UR      None Seen /HPF    RENAL TUBULAR EPITHELIAL CELLS      None Seen /HPF    TRANSITIONAL EPI CELLS      None Seen /HPF    YEAST URINE      None Seen /HPF    AMPHETAMINE URINE      Negative    BENZODIAZEPINES URINE      Negative    COCAINE URINE      Negative    CANNABINOID URINE      Negative    Ecstasy Urine      Negative    OPIATE URINE      Negative    Oxycodone Urine      Negative    CREATININE UR RANDOM      mg/dL    LEVETIRACETAM (KEPPRA)      12 - 46 ug/mL 55 (H)   LAMOTRIGINE      4.0 - 18.0 mcg/mL    VALPROIC ACID      50.0 - 100.0 ug/mL          Component       Latest Ref Rng & Units 1/21/2022   WBC      4.0 - 11.0 x10(3) uL 10.5   RBC      3.80 - 5.30 x10(6)uL 3.69 (L)   Hemoglobin      12.0 - 16.0 g/dL 10.3 (L)   Hematocrit      35.0 - 48.0 % 32.6 (L)   Platelet Count      150.0 - 450.0 10(3)uL 287.0   MCV      80.0 - 100.0 fL 88.3   MCH      26.0 - 34.0 pg 27.9   MCHC      31.0 - 37.0 g/dL 31.6   RDW      % 16.5   Prelim Neutrophil Abs      1.50 - 7.70 x10 (3) uL 7.73 (H)   Neutrophils Absolute      1.50 - 7.70 x10(3) uL 7.73 (H)   Lymphocytes Absolute      1.00 - 4.00 x10(3) uL 1.94   Monocytes Absolute      0.10 - 1.00 x10(3) uL 0.55   Eosinophils Absolute      0.00 - 0.70 x10(3) uL 0.20   Basophils Absolute      0.00 - 0.20 x10(3) uL 0.03   Immature Granulocyte Absolute      0.00 - 1.00 x10(3) uL 0.06   Neutrophils %      % 73.5   Lymphocytes %      % 18.5   Monocytes %      % 5.2   Eosinophils %      % 1.9   Basophils %      % 0.3   Immature Granulocyte %      % 0.6   Glucose      70 - 99 mg/dL 77   Sodium      136 - 145 mmol/L 140   Potassium      3.5 - 5.1 mmol/L 3.8   Chloride      98 - 112 mmol/L 108   Carbon Dioxide, Total      21.0 - 32.0 mmol/L 30.0   ANION GAP      0 - 18 mmol/L 2   BUN      7 - 18 mg/dL 12   CREATININE      0.55 - 1.02 mg/dL 0.55   CALCIUM      8.5 - 10.1 mg/dL 8.8   CALCULATED OSMOLALITY      275 - 295 mOsm/kg 289   eGFR NON-AFR. AMERICAN      >=60 132   eGFR       >=60 152   AST (SGOT)      15 - 37 U/L 22   ALT (SGPT)      13 - 56 U/L 22   ALKALINE PHOSPHATASE      37 - 98 U/L 114 (H)   Total Bilirubin      0.1 - 2.0 mg/dL 0.4   PROTEIN, TOTAL      6.4 - 8.2 g/dL 7.0   Albumin      3.4 - 5.0 g/dL 2.7 (L)   Globulin      2.8 - 4.4 g/dL 4.3   A/G Ratio      1.0 - 2.0 0.6 (L)   Color Urine      Yellow Yellow   Clarity Urine      Clear Hazy (A)   Spec Gravity      1.001 - 1.030 >1.030 (H)   Glucose Urine      Negative mg/dL Negative   Bilirubin Urine      Negative Negative   Ketones, UA      Negative mg/dL Negative   Blood  Urine      Negative Large (A)   PH Urine      5.0 - 8.0 7.0   Protein Urine      Negative mg/dL 100 (A)   Urobilinogen Urine      <2.0 mg/dL <2.0   Nitrite Urine      Negative Negative   Leukocyte Esterase Urine      Negative Small (A)   WBC Urine      0 - 5 /HPF >50 (A)   RBC Urine      0 - 2 /HPF >10 (A)   Bacteria Urine      None Seen /HPF Rare (A)   SQUAM EPI CELLS UR      None Seen /HPF Few (A)   RENAL TUBULAR EPITHELIAL CELLS      None Seen /HPF None Seen   TRANSITIONAL EPI CELLS      None Seen /HPF None Seen   YEAST URINE      None Seen /HPF None Seen   LEVETIRACETAM (KEPPRA)      12 - 46 ug/mL 55 (H)     Prior as noted below  Component      Latest Ref Rng & Units 8/12/2021   LEVETIRACETAM (KEPPRA)      12 - 46 ug/mL 34       Imaging:  New    From [facility] per [facility]    4/18/2025  MR venogram     TECHNIQUE: 8 cc of gadolinium this were given. Three-D reconstructed images of the cerebral venous vasculature were obtained.     FINDINGS: Comparison is made to MRI brain from 4/17/2025.     No evidence of healing defect within the sinuses. Superior sagittal and transverse sinuses are unremarkable. 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     Straight sinus unremarkable.     Visualized cortical veins and internal jugular veins are also normal.     IMPRESSION:   Unremarkable MRV without evidence of venous sinus thrombosis.     4/17/2025  MRI Brain with Contrast, Seizure Protocol     Technique: Multi-planar multi-sequence imaging of the brain was obtained both with and without the injection of intravenous gadolinium. 8 cc of gadolinium were used for the study. Thin section imaging through the mesial temporal lobes was obtained.     Comparison is made to CT head from earlier same day.     Findings: There is no evidence of abnormal signal within the brain parenchyma. No abnormal masses or fluid collections are identified. The ventricles appear normal in size and shape. There  is no evidence of abnormal meningeal or leptomeningeal enhancement. There is no intracranial hemorrhage. Diffusion weighted imaging demonstrates no areas of abnormal signal. Mesial temporal lobes are symmetric without abnormal signal or enhancement.     There is right frontal scalp and periorbital soft tissue swelling extending to the midline frontal lobe. Heterogeneous T1 and T2 attenuation is noted. A few darkened areas are noted on gradient echo imaging. Findings consistent with CT.           The imaged orbits and extraocular muscles, paranasal sinuses, and mastoid air spaces are normal. There are no gross bony abnormalities.           IMPRESSION:   Impression:   1. No acute abnormality. No findings to correlate with symptoms of seizures   2. Right periorbital and parietal scalp soft tissue swelling/hematoma, stable     Other procedures;  New    EEG (routine ) - 4/17/2025  Impression     This is an abnormal awake drowsy and asleep study.  Rare generalized spike/spike wave and polyspike discharges that are  recorded primarily during drowsiness and sleep.  These findings are suggestive of an underlying generalized epilepsy. No  seizures or focal slowing were recorded.             Impression/ Plan:  Dotty Gross is a 29 year old female patient with PMHx significant for seizure disorder, who originally presented 6/2021, for evaluation and management.     Neurologic examination is normal and nonfocal     Patient endorses a history of seizures since 2012.  She has no recollection during these events.    Previously, her seizures were well controlled on Keppra at 1000 mg twice daily, with last seizure occurring approximately 1 year prior, and one prior to that occurring during second trimester of pregnancy in 2017.    However, she was having complications of hyperemesis gravidarum during first and second trimesters with most recent pregnancy.  She did not have any seizures during that time.  However, she had recurrent  seizures and was increased on Keppra up to 1500 mg bid - she subsequently delivered her baby and had recurrent seizures, which suspect are breakthrough seizures and may be related to missing dose of medication or receiving blood transfusion.  However, levels were therapeutic and she has had seizures on several occasions during and after pregnancy.     Given this most recent seizure cluster, she was previously recommended to start Lamictal and was doing well on dose up to 100 mg daily after following starter kit instructions; however, she did not refill the medication and stopped talking for ~ 2 weeks and presented to ED for admission 3/9/2022 following witnessed seizure in office with reports of 3 or 4 additional seizures that day.       She is now doing well and suspect her breakthrough seizures were due to missing anti-seizure medication as well as concurrent infectious process - she was doing well on Depakote and Lamictal aside from weight gain -  levels were in range as of 4/18/2022 - given weight gain, she was weaned off Depakote and started on Lamictal up to 100 mg bid along with Keppra 1500 mg bid.  Levels  done off Depakote and on Lamictal 100 mg bid were within normal limits; given improvement, she has been weaned down on Keppra to 1000 mg bid and was doing well.    Lamictal level was low range as of 1/30/2023 and patient had seizure 2/17/2023.  She has not had any recurrent seizures and repeat level 8/2023 was within range.  She had new severe headache - symptoms seem most consistent with migraine but she denies prior history of migraine and given this was worst headache of life, MRI brain / MRA brain ordered without contrast previously       Patient was doing well with no seizures on Keppra 1000 mg bid and Lamictal 150 mg bid. However, she delivered her baby (healthy baby girl) with no complications during delivery and remained on same dose of Keppra 1000 mg bid and Lamictal 150 mg bid. However after  delivery on 4/16, she started to feel \"odd\" having difficulty with speaking and \"spacing out\" early in the afternoon. She became concerned she was going to have a seizure and later that evening, she stood up from the couch to go to the restroom and fell on the ground with generalized tonic clonic seizure lasting ~ 5 minutes. She went to [facility] and was given loading dose of Keppra.      Prior to delivery, she was given pitocin and dexamethasone as she had Group B Strep - she was also given antibiotics - thinks penicillin; CBC was noted to have elevated WBC on 4/13 at 23.5; on 4/16, WBC was 11.7, levetiracetam 18.2 on 4/16 and lamotrigine 2.6.       She was given loading dose 4/16 in [facility] and discharged home. However, when she was being taken home by  4/17, she had another seizure while in the car (no aura) and had injury to face from having seizure on the ground. She then went to [facility] and her level of Keppra was 60.7. She was monitored overnight and had MRI brain 4/17 which showed right periorbital and parietal soft tissue swelling/ hematoma but no intracranial acute pathology.     EEG done showed rare generalized 15-30 uV spike/spike wave and polyspike discharges during drowsiness and sleep on routine EEG.      She is now on Lamictal 150 mg bid and Keppra 1500 mg bid and denies any recurrent seizures or auras to suggest seizures recently - will check levels and continue same dose; migraines stable    1. Seizure disorder (HCC)  As noted above   - Levetiracetam, S; Future  - Lamotrigine (Lamictal), Serum; Future    2. Migraine without aura and without status migrainosus, not intractable  As noted above     3. Breakthrough seizure (HCC)  As noted above  - Levetiracetam, S; Future  - Lamotrigine (Lamictal), Serum; Future      Return in about 3 months (around 7/29/2025).    30 total minutes spent, including chart review, discussion of pertinent labs and imaging with patient with plan of care  /discussion as noted above; patient allowed to ask questions and all questions answered to the best of my ability     Yogi Alfonso MD, Neurology  [facility] [place]  Pager [phone]  4/29/2025

## 2025-04-29 NOTE — PATIENT INSTRUCTIONS

## 2025-04-30 ENCOUNTER — LAB ENCOUNTER (OUTPATIENT)
Dept: LAB | Facility: HOSPITAL | Age: 30
End: 2025-04-30
Attending: Other
Payer: MEDICAID

## 2025-04-30 DIAGNOSIS — G40.919 BREAKTHROUGH SEIZURE (HCC): ICD-10-CM

## 2025-04-30 DIAGNOSIS — G40.909 SEIZURE DISORDER (HCC): ICD-10-CM

## 2025-04-30 PROCEDURE — 80177 DRUG SCRN QUAN LEVETIRACETAM: CPT

## 2025-04-30 PROCEDURE — 36415 COLL VENOUS BLD VENIPUNCTURE: CPT

## 2025-04-30 PROCEDURE — 80175 DRUG SCREEN QUAN LAMOTRIGINE: CPT

## 2025-05-02 LAB — LEVETIRACETAM LVL: 39.1 UG/ML

## 2025-05-03 LAB — LAMOTRIGINE LVL: 7.3 UG/ML

## 2025-05-22 ENCOUNTER — POSTPARTUM (OUTPATIENT)
Facility: CLINIC | Age: 30
End: 2025-05-22
Payer: MEDICAID

## 2025-05-22 ENCOUNTER — TELEPHONE (OUTPATIENT)
Facility: CLINIC | Age: 30
End: 2025-05-22

## 2025-05-22 VITALS
DIASTOLIC BLOOD PRESSURE: 68 MMHG | SYSTOLIC BLOOD PRESSURE: 102 MMHG | HEIGHT: 60 IN | BODY MASS INDEX: 34.95 KG/M2 | HEART RATE: 101 BPM | WEIGHT: 178 LBS

## 2025-05-22 DIAGNOSIS — Z12.4 CERVICAL CANCER SCREENING: ICD-10-CM

## 2025-05-22 PROCEDURE — 87624 HPV HI-RISK TYP POOLED RSLT: CPT | Performed by: OBSTETRICS & GYNECOLOGY

## 2025-05-22 PROCEDURE — 88175 CYTOPATH C/V AUTO FLUID REDO: CPT | Performed by: OBSTETRICS & GYNECOLOGY

## 2025-05-22 RX ORDER — FOLIC ACID 0.4 MG
400 TABLET ORAL DAILY
COMMUNITY

## 2025-05-22 NOTE — PROGRESS NOTES
HPI    Dotty Gross is a 29 year old female  here for 6 week post-partum visit.  Patient delivered a  female infant on 25 via .  Patient desires tubal ligation for contraception.  Patient is bottle feeding.   Patient denies symptoms of depression, [place]  depression scale score of 0 out of 30.     [place]  DEPRESSION SCALE  I have been able to laugh and see the funny side of things: As much as I always could  I have looked forward with enjoyment to things: As much as I ever did  I have been anxious or worried for no good reason: No, not at all  I have felt scared or panicky for no good reason: No, not at all  Things have been getting on top of me: No, I have been coping as well as ever  I have been so unhappy that I have had difficulty sleeping: Not at all  I have felt sad or miserable: No, not at all  I have been so unhappy that I have been crying: No, never  The thought of harming myself has occurred to me: Never  Total: 0    OBSTETRIC HISTORY  OB History    Para Term  AB Living   3 3 3  0 3   SAB IAB Ectopic Multiple Live Births   0   0 3      # Outcome Date GA Lbr Hunter/2nd Weight Sex Type Anes PTL Lv   3 Term 25 38w1d 04:00 / 00:02 7 lb 6.2 oz (3.35 kg) F NORMAL SPONT EPI N JAMES   2 Term 22 39w2d 07:00 / 00:25 7 lb 5.5 oz (3.33 kg) M NORMAL SPONT EPI N JAMES   1 Term 17 42w0d  6 lb 10 oz (3.005 kg) F Vag-Spont   JAMES      Birth Comments: Hyperemesis       Obstetric Comments   2017 Hyperemesis gravidarum. Epilepsy. Had seizure during second trimester. Did have vaginal bleeding during the pregnancy around 34-35 weeks or so.       2022 - \"Noah\" - Obesity, Hyperemesis gravidarum. Epilepsy. Several seizures at around 15-16 wk gestation. Declined aspirin.  IV iron x 3 doses (21 - 22) during pregnancy (not able to tolerate PO iron). Meconium, Variable Decels. 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        Current - Obesity. Epilepsy. Hyperemesis gravidarum. Iron deficiency s/p IV iron infusions x 4 (10/18/24-11/6/24), history of postpartum hemorrhage (atony & cervical laceration) with transfusion (2022), Vaginal bleeding episode at 33w4d suspicious for mild placental abruption, GBS carrier. Bloody vaginal discharge at 38w0d        GYNE HISTORY        MEDICATIONS:  Medications - Current[1]    ALLERGIES:  Allergies[2]    PHYSICAL EXAM  Blood pressure 102/68, pulse 101, height 60\", weight 178 lb (80.7 kg), last menstrual period 07/10/2024, not currently breastfeeding.  General:  Well nourished, well developed woman in no acute distress  Abdomen:  soft, nontender, no masses  External Genitalia: normal appearance, hair distribution, and no lesions  Vagina:  Normal appearance without lesions, no abnormal discharge  Cervix:  Normal without tenderness on motion  Uterus: normal in size, contour, position, mobility, without tenderness  Adnexa: normal without masses or tenderness  Perineum: well healed perineum  Anus: no hemorroids     Discussed b/l salpingectomy, irreversible, consent signed 2/5/25 - will proceed to schedule laparoscopic b/l salpingectomy    ASSESSMENT/PLAN  Dotty was seen today for postpartum care and other.    Diagnoses and all orders for this visit:    Postpartum exam (HCC)    Cervical cancer screening  -     Hpv High Risk , Thin Prep Collect; Future  -     ThinPrep PAP Smear B; Future      D       [1]   Current Outpatient Medications:     folic acid 400 MCG Oral Tab, Take 1 tablet (400 mcg total) by mouth daily., Disp: , Rfl:     levetiracetam 750 MG Oral Tab, Take 2 tablets (1,500 mg total) by mouth 2 (two) times daily., Disp: 120 tablet, Rfl: 1    lamoTRIgine 150 MG Oral Tab, Take 1 tablet (150 mg total) by mouth 2 (two) times daily., Disp: 180 tablet, Rfl: 1    acetaminophen 500 MG Oral Tab, Take 2 tablets (1,000 mg total) by mouth every 6 (six) hours as needed. (Patient not taking: Reported on  5/22/2025), Disp: 40 tablet, Rfl: 0    docusate sodium 100 MG Oral Cap, Take 100 mg by mouth 2 (two) times daily as needed for constipation. (Patient not taking: Reported on 5/22/2025), Disp: 40 capsule, Rfl: 0    ibuprofen 600 MG Oral Tab, Take 1 tablet (600 mg total) by mouth every 6 (six) hours. (Patient not taking: Reported on 5/22/2025), Disp: 40 tablet, Rfl: 0    Blood Glucose Monitoring Suppl (ONETOUCH VERIO) w/Device Does not apply Kit, 1 each in the morning, at noon, in the evening, and at bedtime. Check blood sugar 4 times a day. Fasting and 2 hours after each meal. (Patient not taking: Reported on 5/22/2025), Disp: 1 kit, Rfl: 0    Lancets Does not apply Misc, Check blood sugar 4 times a day. Fasting and 2 hours after each meal. (Patient not taking: Reported on 5/22/2025), Disp: 100 each, Rfl: 2    famotidine 20 MG Oral Tab, Take 1 tablet (20 mg total) by mouth 2 (two) times daily. (Patient not taking: Reported on 5/22/2025), Disp: 60 tablet, Rfl: 5    SUMAtriptan 50 MG Oral Tab, USE AT ONSET OF MIGRAINE. REPEAT ONCE AFTER 2 HOURS. MAX 2 IN 24 HOURS. MUST LAST 30 DAYS. (Patient not taking: Reported on 5/22/2025), Disp: 9 tablet, Rfl: 5  [2]   Allergies  Allergen Reactions    Shellfish-Derived Products SWELLING

## 2025-05-23 LAB — HPV E6+E7 MRNA CVX QL NAA+PROBE: NEGATIVE

## 2025-06-17 ENCOUNTER — OFFICE VISIT (OUTPATIENT)
Dept: FAMILY MEDICINE CLINIC | Facility: CLINIC | Age: 30
End: 2025-06-17
Payer: MEDICAID

## 2025-06-17 VITALS
HEART RATE: 82 BPM | RESPIRATION RATE: 12 BRPM | HEIGHT: 60 IN | SYSTOLIC BLOOD PRESSURE: 100 MMHG | BODY MASS INDEX: 35.14 KG/M2 | WEIGHT: 179 LBS | DIASTOLIC BLOOD PRESSURE: 62 MMHG | OXYGEN SATURATION: 98 % | TEMPERATURE: 98 F

## 2025-06-17 DIAGNOSIS — R73.03 PREDIABETES: ICD-10-CM

## 2025-06-17 DIAGNOSIS — G40.909 NONINTRACTABLE EPILEPSY WITHOUT STATUS EPILEPTICUS, UNSPECIFIED EPILEPSY TYPE (HCC): ICD-10-CM

## 2025-06-17 DIAGNOSIS — Z00.00 LABORATORY EXAMINATION ORDERED AS PART OF A COMPLETE PHYSICAL EXAMINATION: Primary | ICD-10-CM

## 2025-06-17 PROCEDURE — 99203 OFFICE O/P NEW LOW 30 MIN: CPT | Performed by: PHYSICIAN ASSISTANT

## 2025-06-17 NOTE — PROGRESS NOTES
The following individual(s) verbally consented to be recorded using ambient AI listening technology and understand that they can each withdraw their consent to this listening technology at any point by asking the clinician to turn off or pause the recording:    Patient name: Dotty Ogz

## 2025-06-19 NOTE — PROGRESS NOTES
Subjective:   Dotty Gross is a 29 year old female who presents for Establish Care (New pt )     History/Other:   History of Present Illness  Dotty Gross is a 29 year old female with epilepsy who presents with concerns about seizure management postpartum. She is accompanied by her three-year-old son. New patient here today.     She has a history of epilepsy since age 17, which was well-controlled with medication until her pregnancies. During her recent pregnancy, she did not experience seizures, but a week postpartum, she had a severe seizure episode resulting in a hospital admission. She experienced five seizures in one day, including one in an [employer], leading to a head injury and a large hematoma. She was admitted to [facility] for further evaluation and treatment.    She has been on Keppra and lamotrigine for seizure management. During the recent episode, she received an additional 1500 mg of Keppra intravenously in the ER. She usually takes 1500 mg of Keppra twice daily along with lamotrigine. She now follows up with her neurologist every three months.    She has a history of hyperemesis gravidarum with all three pregnancies, with the most recent being less severe than previous ones. Her second pregnancy was particularly difficult, leading to significant weight loss and seizures during pregnancy.    She has a history of prediabetes, with an A1c at prediabetic levels before her recent pregnancy. She has attempted weight loss through various means, including trying Ozempic, which helped reduce her snacking habits. Diabetes runs in her family, with her mother and several relatives having the condition.    She is considering a tubal ligation to prevent future pregnancies.      Chief Complaint Reviewed and Verified  Nursing Notes Reviewed and   Verified  Tobacco Reviewed  Allergies Reviewed  Medications Reviewed    Problem List Reviewed  OB Status Reviewed  Social History Reviewed         Tobacco:  She has  never smoked tobacco.    Current Medications[1]    Objective:   /62   Pulse 82   Temp 97.8 °F (36.6 °C)   Resp 12   Ht 5' (1.524 m)   Wt 179 lb (81.2 kg)   LMP 05/31/2025 (Exact Date)   SpO2 98%   Breastfeeding No   BMI 34.96 kg/m²  Estimated body mass index is 34.96 kg/m² as calculated from the following:    Height as of this encounter: 5' (1.524 m).    Weight as of this encounter: 179 lb (81.2 kg).  Results       Physical Exam  GENERAL: Alert, cooperative, well developed, no acute distress  HEENT: Normocephalic, normal oropharynx, moist mucous membranes  CHEST: Clear to auscultation bilaterally, No wheezes, rhonchi, or crackles  CARDIOVASCULAR: Normal heart rate and rhythm, S1 and S2 normal without murmurs      Assessment & Plan:   1. Laboratory examination ordered as part of a complete physical examination (Primary)  -     CBC With Differential With Platelet; Future; Expected date: 06/17/2025  -     Comp Metabolic Panel (14); Future; Expected date: 06/17/2025  -     Lipid Panel; Future; Expected date: 06/17/2025  -     Hemoglobin A1C; Future; Expected date: 06/17/2025  -     TSH W Reflex To Free T4; Future; Expected date: 06/17/2025  -     Insulin; Future; Expected date: 06/17/2025    Assessment & Plan  Epilepsy  Epilepsy with a history of seizures since age 17, previously well-controlled with medication. Recent postpartum exacerbation with multiple seizures, including a severe episode resulting in a hematoma and hospitalization. Neurologist follow-up increased to every three months due to recent events.  - Follow up with neurologist every three months.    Prediabetes  Prediabetic A1c levels prior to pregnancy. Family history of diabetes on maternal side. Previous attempts at weight loss with medications like Ozempic. Plan to reassess current glycemic status postpartum.  - Order fasting blood work to reassess A1c and insulin levels.  - Advise fasting for 8-10 hours before blood  draw.          OXANA Jacobs             [1]   Current Outpatient Medications   Medication Sig Dispense Refill    levetiracetam 750 MG Oral Tab Take 2 tablets (1,500 mg total) by mouth 2 (two) times daily. 120 tablet 1    lamoTRIgine 150 MG Oral Tab Take 1 tablet (150 mg total) by mouth 2 (two) times daily. 180 tablet 1    folic acid 400 MCG Oral Tab Take 1 tablet (400 mcg total) by mouth daily. (Patient not taking: Reported on 6/17/2025)      SUMAtriptan 50 MG Oral Tab USE AT ONSET OF MIGRAINE. REPEAT ONCE AFTER 2 HOURS. MAX 2 IN 24 HOURS. MUST LAST 30 DAYS. (Patient not taking: Reported on 6/17/2025) 9 tablet 5

## 2025-07-01 DIAGNOSIS — Z30.2 ENCOUNTER FOR STERILIZATION: Primary | ICD-10-CM

## 2025-07-14 DIAGNOSIS — G40.909 SEIZURE DISORDER (HCC): ICD-10-CM

## 2025-07-14 RX ORDER — LEVETIRACETAM 750 MG/1
1500 TABLET ORAL 2 TIMES DAILY
Qty: 360 TABLET | Refills: 1 | Status: SHIPPED | OUTPATIENT
Start: 2025-07-14

## 2025-07-14 RX ORDER — LAMOTRIGINE 150 MG/1
150 TABLET ORAL 2 TIMES DAILY
Qty: 180 TABLET | Refills: 1 | Status: SHIPPED | OUTPATIENT
Start: 2025-07-14

## 2025-07-14 NOTE — TELEPHONE ENCOUNTER
Patient requesting refills of Levetiracetam, lamotrigine. Please esnd to [facility] on [address] in [place]. Thank you.

## 2025-07-14 NOTE — TELEPHONE ENCOUNTER
Medication: Lamotrigine 150 mg & Levetiracetam 750 mg     Date of last refill: 01/29/25 # 180/1 & 04/18/25 # 120/1  [place]      Last office visit: 4/29/2025  Due back to clinic per last office note:    Date next office visit scheduled:    Future Appointments   Date Time Provider Department Center   7/23/2025 10:20 AM Yogi Alfonso MD [facility] [place]   8/25/2025 10:00 AM Lora Benjamin DO [facility] [place]           Last OV note recommendation:    Impression/ Plan:  Dotyt Gross is a 29 year old female patient with PMHx significant for seizure disorder, who originally presented 6/2021, for evaluation and management.     Neurologic examination is normal and nonfocal     Patient endorses a history of seizures since 2012.  She has no recollection during these events.    Previously, her seizures were well controlled on Keppra at 1000 mg twice daily, with last seizure occurring approximately 1 year prior, and one prior to that occurring during second trimester of pregnancy in 2017.     However, she was having complications of hyperemesis gravidarum during first and second trimesters with most recent pregnancy.  She did not have any seizures during that time.  However, she had recurrent seizures and was increased on Keppra up to 1500 mg bid - she subsequently delivered her baby and had recurrent seizures, which suspect are breakthrough seizures and may be related to missing dose of medication or receiving blood transfusion.  However, levels were therapeutic and she has had seizures on several occasions during and after pregnancy.     Given this most recent seizure cluster, she was previously recommended to start Lamictal and was doing well on dose up to 100 mg daily after following starter kit instructions; however, she did not refill the medication and stopped talking for ~ 2 weeks and presented to ED for admission 3/9/2022 following witnessed seizure in office with  reports of 3 or 4 additional seizures that day.       She is now doing well and suspect her breakthrough seizures were due to missing anti-seizure medication as well as concurrent infectious process - she was doing well on Depakote and Lamictal aside from weight gain -  levels were in range as of 4/18/2022 - given weight gain, she was weaned off Depakote and started on Lamictal up to 100 mg bid along with Keppra 1500 mg bid.  Levels  done off Depakote and on Lamictal 100 mg bid were within normal limits; given improvement, she has been weaned down on Keppra to 1000 mg bid and was doing well.    Lamictal level was low range as of 1/30/2023 and patient had seizure 2/17/2023.  She has not had any recurrent seizures and repeat level 8/2023 was within range.  She had new severe headache - symptoms seem most consistent with migraine but she denies prior history of migraine and given this was worst headache of life, MRI brain / MRA brain ordered without contrast previously       Patient was doing well with no seizures on Keppra 1000 mg bid and Lamictal 150 mg bid. However, she delivered her baby (healthy baby girl) with no complications during delivery and remained on same dose of Keppra 1000 mg bid and Lamictal 150 mg bid. However after delivery on 4/16, she started to feel \"odd\" having difficulty with speaking and \"spacing out\" early in the afternoon. She became concerned she was going to have a seizure and later that evening, she stood up from the couch to go to the restroom and fell on the ground with generalized tonic clonic seizure lasting ~ 5 minutes. She went to [facility] and was given loading dose of Keppra.       Prior to delivery, she was given pitocin and dexamethasone as she had Group B Strep - she was also given antibiotics - thinks penicillin; CBC was noted to have elevated WBC on 4/13 at 23.5; on 4/16, WBC was 11.7, levetiracetam 18.2 on 4/16 and lamotrigine 2.6.        She was given loading dose 4/16 in  [facility] and discharged home. However, when she was being taken home by  4/17, she had another seizure while in the car (no aura) and had injury to face from having seizure on the ground. She then went to [facility] and her level of Keppra was 60.7. She was monitored overnight and had MRI brain 4/17 which showed right periorbital and parietal soft tissue swelling/ hematoma but no intracranial acute pathology.      EEG done showed rare generalized 15-30 uV spike/spike wave and polyspike discharges during drowsiness and sleep on routine EEG.       She is now on Lamictal 150 mg bid and Keppra 1500 mg bid and denies any recurrent seizures or auras to suggest seizures recently - will check levels and continue same dose; migraines stable     1. Seizure disorder (HCC)  As noted above   - Levetiracetam, S; Future  - Lamotrigine (Lamictal), Serum; Future     2. Migraine without aura and without status migrainosus, not intractable  As noted above      3. Breakthrough seizure (HCC)  As noted above  - Levetiracetam, S; Future  - Lamotrigine (Lamictal), Serum; Future        Return in about 3 months (around 7/29/2025).

## 2025-07-15 ENCOUNTER — TELEPHONE (OUTPATIENT)
Facility: CLINIC | Age: 30
End: 2025-07-15

## 2025-07-15 DIAGNOSIS — Z30.2 ENCOUNTER FOR STERILIZATION: Primary | ICD-10-CM

## 2025-07-15 NOTE — TELEPHONE ENCOUNTER
Pt surgery date has been changed.Pt consent form for sterilization will  in Aug.Pt surgery has been rescheduled to  Sept.Pt will stop by the office to sign a new consent form.

## 2025-07-21 DIAGNOSIS — Z30.2 ENCOUNTER FOR STERILIZATION: Primary | ICD-10-CM

## 2025-07-23 ENCOUNTER — TELEMEDICINE (OUTPATIENT)
Dept: NEUROLOGY | Facility: CLINIC | Age: 30
End: 2025-07-23
Payer: MEDICAID

## 2025-07-23 DIAGNOSIS — G40.909 SEIZURE DISORDER (HCC): Primary | ICD-10-CM

## 2025-07-23 DIAGNOSIS — G43.009 MIGRAINE WITHOUT AURA AND WITHOUT STATUS MIGRAINOSUS, NOT INTRACTABLE: ICD-10-CM

## 2025-07-23 PROCEDURE — 99213 OFFICE O/P EST LOW 20 MIN: CPT | Performed by: OTHER

## 2025-07-23 NOTE — PROGRESS NOTES
[facility] Virtual Video Progress Note    HPI  No chief complaint on file.      Please note that the following visit was completed using two-way, real-time interactive audio and video communication.  This has been done in good yaniv to provide continuity of care in the best interest of the provider-patient relationship, due to the ongoing public health crisis/national emergency and because of restrictions of visitation.  There are limitations of this visit as no physical exam could be performed.  Every conscious effort was taken to allow for sufficient and adequate time.    As per my initial H&P from 6/16/2021,   \"   Dotty Gross is a 25 year old female, who presents for evaluation of seizures.      Patient states she has history of seizures.  She states these usually last 30 seconds and she does not recall them but is told she has a grunting sound and then shakes and loses awareness; denies auras of odd tastes, smells or sounds; has some post ictal confusion and is sore after seizures; may bite tongue and jaw muscles are sore.      She states last seizure was July 29, 2020.  She states she previously had COVID July 18-19, with extensive weight loss and nausea/emesis.  She denied lack of taste and smell and denied fevers or headaches.      She states when she had her last seizure, she was taking Keppra but was not able to keep down medication; was on 750 mg bid and was increased up to 1000 mg bid.  Since this time, she has not had any seizures.  She is currently 9 weeks pregnant and has remained on Keppra; her first pregnancy was in 2017 - had seizure during second trimester.     She has been doing well currently but has been having hyperemesis gravidarum.   Otherwise, patient denies any recent weight change, fevers, chills, double vision/ blurry vision / loss of vision, chest pain, palpitations, shortness of breath, rashes, joint pains, bowel / bladder incontinence or mood issues. \"    Prior  notes as below 3/17/2022    Patient at time of last visit was unable to seen formally and was sent to ER as she had breakthrough seizures - states she does not recall coming to office but admits she was out of Lamictal for the prior 2 weeks - she had titrated up to 100 mg dose but never got this refilled and then started to have more confusion and was not as quick to respond; she had 4 seizures reportedly on day of admission 3/9/2022.  She was admitted for workup and had EEG which was abnormal. She was treated with Depakote 500 mg q8 hrs and Lamictal was restarted at 25 mg bid.  She was discharged home with Depakote 500 mg bid with plan to increase Lamictal to 50 mg bid after 1 week, which she has not done yet.  She remains on Keppra 1500 mg bid.      She denies any recurrent seizures and denies any auras of odd tastes, smells or sounds; she feels alert and back to her usual self.     Prior notes as below: 4/19/2022  Patient last seen 3/17/2022.  Since last visit, she has increased lamotrigine (Lamictal) up to 50 mg bid and reduced valproic acid (Depakote) to 250 mg AM / 500 mg PM- her last level was done 3/21/2022 with valproic acid level elevated and lamotrigine within normal range. She was on Depakote 500 mg bid and Lamictal 50 mg bid at the time and advised to have repeat levels done in ~2 weeks but results not available at this time.  Currently, she is doing well with no seizures and denies any rash, balance issues, double vision, nausea; she denies any auras of odd tastes, smells or sounds and denies recurrent seizures recently.   She has not been able to lose weight since her pregnancy but denies tremor or balance issues.            Prior notes as below 6/8/2022.  Patient last seen 4/19/2022.  Since last visit, she has been on Depakote 250 mg AM/ 500 mg PM and Lamictal 50 mg bid along with Keppra 1500 mg bid.  On this regimen, she denies any seizures and denies any auras of odd tastes,smells, sounds or dizzy  spells or episodes of loss of awareness. She has noted some continued weight gain but denies any other side effect of rash, double vision, nausea/emesis or balance issues/falls.            Prior notes as per 7/13/2022.  Patient last seen 6/8/2022.  She is now off Depakote and remains on Keppra 1500 mg bid and Lamictal 50 mg bid; she denies any seizures and denies any auras of odd tastes,smells, sounds or dizzy spells but had an episode a couple of weeks ago where she briefly \"zoned out\" for a matter of seconds, after she stopped taking the Depakote but has not had any since this time; She denies other  episodes of loss of awareness. She denies other side effect of rash, double vision, nausea/emesis or balance issues/falls.  She recently had levels checked.        Prior notes as per 10/17/2022.  Patient last seen 7/13/2022.  She is still on Keppra 1500 mg bid and is now on Lamictal 100 mg bid.  She has been doing well on this dose.  She denies any episodes of \"zoning out\" and denies auras of odd tastes, smells or sounds.  She denies any episodes of speech arrest and denies other side effects of rash, double vision, nausea/emeiss or balance issues/falls. She has been going to weight loss clinic and is now on fluoxetine 20 mg daily but denies any significant improvement.      Prior notes as per 1/17/2023.  Patient last seen 10/17/2022. Since last visit, she has been weaned down on Keppra currently at 1000 mg bid.  She has remained on Lamictal 100 mg bid and denies any seizures or episodes of loss of awareness, or speech arrest or auras of odd tastes, smells or sounds.  She denies side effects of rash, balance issues, double vision, or nausea.  She has been working with weight loss clinic but is not on fluoxetine or topiramate at this time.         Prior notes as per 7/31/2023.  Patient last seen 1/17/2023.  Patient since last visit had a generalized seizure 2/17/2023.  At that time, she was on Keppra 1000 mg bid and  Lamictal 100 mg bid.  She had missed doses of Keppra and Lamictal the night prior to her seizure and did not have recurrent seizures; no auras of odd tastes, smells or sounds.  She admitted to poor sleep and increased stress at time of seizure.  She was recommended to have levels checked but has not done so yet.  Currently, she is doing well with no recurrent seizures and denies auras of odd tastes, smells or sounds or episodes of loss of awareness or speech arrest.  She denies side effects of rash, balance issues, double vision or nausea.        Prior notes as per 1/29/2024.  Patient last seen 7/31/2023.  Since last visit, she has been doing well.  She remains on Lamictal 100 mg bid and Keppra 1000 mg bid and denies any seizures.  She denies missing any doses of medication and has been sleeping better as her child is sleeping through the night ( no issues for child age 4). She denies any auras of odd tastes, smells or sounds or episodes of speech arrest or loss of awareness; no issues with waking in the AM having wet the bed or bitten her tongue.  She additionally denies any side effects of rash, balance issues, double vision or nausea.  She has been driving without issues.       Prior notes as per 5/13/2024.  Patient last seen 1/29/2024.  She was doing well on Keppra 1000 mg bid and Lamictal 100 mg bid. However, in the past few weeks she has been tired during the day despite getting good sleep. She admits to some weight gain and wakes up in the middle of the night coughing.  She may fall asleep in a dark room but not when driving. She has been having episodes in the past 2 weeks where she has difficulty with concentration and has episodes of \"staring off\" but denies loss of awareness.      She denies being tested for sleep apnea but has been gaining weight; she denies issues when driving but notes this mainly when she is conversing with others; denies auras of odd tastes, smells or sounds. She notes when she  coughs she has a \"bleach taste\" in her mouth.     Of note, she had pneumonia 3/2024. She has some headaches in the front of the head as well recently; she is light sensitive; no recent head trauma.        Prior notes as per 7/29/2024.  Patient last seen 5/13/2024. In terms of seizures, she is doing well on Keppra 1000 mg bid and Lamcital 100 mg bid - denies seizures or auras but had migraine and severe headache 7/23/2024 - having headaches when she got to work with pain in head and did not improve with Tylenol. She then noted light sensitivity and nausea and went to lie in a dark room with some brief improvement. She denied loss of awareness and denied associated focal numbness/tingling/weakness. She was given steroid pack with improvement. She felt this headache was the worst of her life.           Prior notes as per 10/29/2024.  Patient last seen 7/29/2024. Since last visit, she has not been able to have MRI due to finding out she was pregnant. She is currently 15 weeks gestation and states she has been taking Keppra 1000 mg bid and Lamictal 100 mg bid. She states she had hyperemesis gravidarum in first trimester but is improving. She is no longer having severe headaches but states she has only rare migraines for which sumatriptan has helped. She has not been having any seizures and denies auras of odd tastes, smells or sounds.  She is following with maternal-fetal-medicine service and has been doing well.       Prior notes as per 1/29/2025.  Patient last seen 10/29/2024.  Patient since last visit has been doing well. She is now 28 weeks pregnant and her level of lamotrigine was mildly low - her dose was increased ~12/11 from 100 mg bid to 150 mg bid; she remains on Keppra 1000 mg bid and denies any seizures or auras of odd tastes, smells or sounds; no major side effects on medication; migraines have not been occurring since last visit but she has Tylenol and sumatriptan if needed; she has not had to use either of  these medications, however. She follows with maternal-fetal-medicine and has been doing well - less hyperemesis gravidarum as well.        Prior notes as per 4/29/2025.  Patient last seen 1/29/2025.  She was doing well with no seizures on Keppra 1000 mg bid and Lamictal 150 mg bid. However, she delivered her baby (healthy baby girl) with no complications during delivery and remained on same dose of Keppra 1000 mg bid and Lamictal 150 mg bid. However after delivery on 4/16, she started to feel \"odd\" having difficulty with speaking and \"spacing out\" early in the afternoon. She become concerned she was going to have a seizure and later that evening, she stood up from the couch to go to the restroom and fell on the ground with generalized tonic clonic seizure lasting ~ 5 minutes. She went to [facility] and was given loading dose of Keppra.      Prior to delivery, she was given pitocin and dexamethasone as she had Group B Strep - she was also given antibiotics - thinks penicillin; CBC was noted to have elevated WBC on 4/13 at 23.5; on 4/16, WBC was 11.7, levetiracetam 18.2 on 4/16 and lamotrigine 2.6.       She was given loading dose 4/16 in [facility] and discharged home. However, when she was being taken home by  4/17, she had another seizure while in the car (no aura) and had injury to face from having seizure on the ground. She then went to [facility] and her level of Keppra was 60.7. She was monitored overnight and had MRI brain 4/17 which showed right periorbital and parietal soft tissue swelling/ hematoma but no intracranial acute pathology.     EEG done showed rare generalized 15-30 uV spike/spike wave and polyspike discharges during drowsiness and sleep on routine EEG.      She is now on Lamictal 150 mg bid and Keppra 1500 mg bid and denies any recurrent seizures or auras to suggest seizures recently.       Patient last seen 4/29/2025.  Patient since last visit has been doing well overall on current  dose of Lamictal 150 mg bid and Keppra 1500 mg bid; denies side effects of rash, excessive drowsiness or mood changes or balance issues. She recently traveled to [place] this past week and was feeling achy and was feeling nauseated while she was in the car going [place]. She has returned and has been having nausea/vomiting this past evening.       Past Medical History:    Cervical laceration    exam under anesthesia, repair of cervical laceration, evacuation of blood clots from uterus manually - Dr. Lora Benjamin, [facility].    COVID-19    Depression    Epilepsy (HCC)    1st grand mal seizure at 17    Female infertility    Gestational diabetes ([facility])    Hx of transfusion of packed red blood cells    Received 2 units packed red blood cells for postpartum hemorrhage - atonic & cervical laceration    Hyperemesis gravidarum ([facility])    Was put on Zofran - didn't help much.     Hyperemesis gravidarum ([facility])    Iron deficiency anemia during pregnancy, antepartum ([facility])    h/o IV iron x 3 doses (12/22/21 - 12/29/22) during pregnancy (not able to tolerate PO iron)    Keloid scar    chest    Low maternal serum vitamin B12    Migraine without aura    Imitrex per neurologist    Obesity (BMI 30-39.9)    Other immediate postpartum hemorrhage (HCC)    exam under anesthesia, repair of cervical laceration, evacuation of blood clots from uterus manually - Dr. Lora Benjamin, [facility].    Pap smear for cervical cancer screening    Pap neg 9/15/22    Pneumonia of both lower lobes    Pubic bone pain    Request for sterilization    1/12/2022 Discussed permanent/irreversible, optional nature of sterilization. Patient expressed understanding. Medicaid consent form signed together.     Screening for genetic disease carrier status    Carrier Screen = Negative    Sialorrhea    Subchorionic hemorrhage in first trimester (HCC)    Trauma during pregnancy ([facility])    Vitamin D deficiency    Worst headache of life     7/29/24 Neuro visit - MRI/MRA brain ordered due to \"worst headache of her life\" on 7/23/24 - steroids helped.     Past Surgical History:   Procedure Laterality Date    Cholecystectomy  2018    Insert intrauterine device  03/01/2022    Copper IUD insertion at postpartum visit - Dr. Lora Gibson    Other surgical history  2012    benign keloid bx chest area    Other surgical history  01/17/2022    exam under anesthesia, repair of cervical laceration, evacuation of blood clots from uterus manually - Dr. Lora Benjamin, [facility].     Family History   Problem Relation Age of Onset    Obesity Mother     Diabetes Mother     No Known Problems Father     No Known Problems Sister     No Known Problems Sister     No Known Problems Sister     Diabetes Maternal Grandmother     No Known Problems Maternal Grandfather     No Known Problems Paternal Grandmother     No Known Problems Paternal Grandfather     Other (Other) Daughter         severe GI sensitivities to multiple foods - no clear diagnosis    Diabetes Maternal Aunt     Birth Defects Neg     Genetic Disease Neg     Thyroid disease Neg     Breast Cancer Neg     Ovarian Cancer Neg     Colon Cancer Neg     DVT/VTE Neg      Social History     Socioeconomic History    Marital status:    Tobacco Use    Smoking status: Never     Passive exposure: Never    Smokeless tobacco: Never   Vaping Use    Vaping status: Never Used   Substance and Sexual Activity    Alcohol use: Never    Drug use: Never    Sexual activity: Not Currently     Partners: Male   Other Topics Concern    Caffeine Concern No     Comment: 1 cup daily    Exercise Yes    Seat Belt Yes    Special Diet No    Stress Concern No    Weight Concern Yes     Allergies   Allergen Reactions    Shellfish-Derived Products SWELLING         Current Outpatient Medications:     levetiracetam 750 MG Oral Tab, Take 2 tablets (1,500 mg total) by mouth 2 (two) times daily., Disp: 360 tablet, Rfl: 1    lamoTRIgine 150 MG  Oral Tab, Take 1 tablet (150 mg total) by mouth 2 (two) times daily., Disp: 180 tablet, Rfl: 1    folic acid 400 MCG Oral Tab, Take 1 tablet (400 mcg total) by mouth daily. (Patient not taking: Reported on 6/17/2025), Disp: , Rfl:     SUMAtriptan 50 MG Oral Tab, USE AT ONSET OF MIGRAINE. REPEAT ONCE AFTER 2 HOURS. MAX 2 IN 24 HOURS. MUST LAST 30 DAYS. (Patient not taking: Reported on 6/17/2025), Disp: 9 tablet, Rfl: 5    Review of Systems:  No chest pain or palpitations; otherwise as noted in HPI.    Exam:  Exam limited due to virtual visit - unable to comment on sensation,  strength, rigidity or coordination     Speech fluent and conversational over phone  Gen: well developed, well nourished, no acute distress  HEENT: normocephalic    Neuro:  Mental status:  Orientation: Alert and oriented to person, place, time  Speech fluent and conversational     CN: PERRL, EOMI without nystagmus, VFF, smile symmetric, sensation intact, tongue and palate midline, SCM intact; otherwise, 2-12 intact  Motor: no focal weakness noted on gross visual inspection   DTR: unable to assess due to virtual visit   Sensory: reports no sensory deficits   Coord: FNF intact with no tremor or dysmetria; rapid alternating movements intact bilaterally  Romberg: negative   Gait: normal casual gait observed grossly    Labs:  New    Component      Latest Ref Rng 4/30/2025   Levetiracetam Lvl      10.0 - 40.0 ug/mL 39.1    Lamotrigine Lvl      2.0 - 20.0 ug/mL 7.3        Prior as noted below    Component      Latest Ref Rng 2/4/2025 4/12/2025   WBC      4.0 - 11.0 x10(3) uL     RBC      3.80 - 5.30 x10(6)uL     Hemoglobin      12.0 - 16.0 g/dL     Hematocrit      35.0 - 48.0 %     Platelet Count      150.0 - 450.0 10(3)uL     MCV      80.0 - 100.0 fL     MCH      26.0 - 34.0 pg     MCHC      31.0 - 37.0 g/dL     RDW      %     Prelim Neutrophil Abs      1.50 - 7.70 x10 (3) uL     Neutrophils Absolute      1.50 - 7.70 x10(3) uL     Lymphocytes  Absolute      1.00 - 4.00 x10(3) uL     Monocytes Absolute      0.10 - 1.00 x10(3) uL     Eosinophils Absolute      0.00 - 0.70 x10(3) uL     Basophils Absolute      0.00 - 0.20 x10(3) uL     Immature Granulocyte Absolute      0.00 - 1.00 x10(3) uL     Neutrophils %      %     Lymphocytes %      %     Monocytes %      %     Eosinophils %      %     Basophils %      %     Immature Granulocyte %      %     Glucose      70 - 99 mg/dL     Sodium      136 - 145 mmol/L     Potassium      3.5 - 5.1 mmol/L     Chloride      98 - 112 mmol/L     Carbon Dioxide, Total      21.0 - 32.0 mmol/L     ANION GAP      0 - 18 mmol/L     BUN      9 - 23 mg/dL     CREATININE      0.55 - 1.02 mg/dL     CALCIUM      8.7 - 10.6 mg/dL     CALCULATED OSMOLALITY      275 - 295 mOsm/kg     EGFR      >=60 mL/min/1.73m2     AST (SGOT)      <34 U/L     ALT (SGPT)      10 - 49 U/L     ALKALINE PHOSPHATASE      37 - 98 U/L     Total Bilirubin      0.3 - 1.2 mg/dL     PROTEIN, TOTAL      5.7 - 8.2 g/dL     Albumin      3.2 - 4.8 g/dL     Globulin      2.0 - 3.5 g/dL     A/G Ratio      1.0 - 2.0      Lamotrigine Lvl      2.0 - 20.0 ug/mL 3.4     TREPONEMA PALLIDUM AB, PARTICLE AGGLUTINATION      Nonreactive    Nonreactive    Levetiracetam Lvl      10.0 - 40.0 ug/mL       Component      Latest Ref Rn 4/13/2025 4/16/2025   WBC      4.0 - 11.0 x10(3) uL 23.5 (H)  11.7 (H)    RBC      3.80 - 5.30 x10(6)uL 4.14  4.30    Hemoglobin      12.0 - 16.0 g/dL 11.9 (L)  12.4    Hematocrit      35.0 - 48.0 % 35.9  36.9    Platelet Count      150.0 - 450.0 10(3)uL 254.0  335.0    MCV      80.0 - 100.0 fL 86.7  85.8    MCH      26.0 - 34.0 pg 28.7  28.8    MCHC      31.0 - 37.0 g/dL 33.1  33.6    RDW      % 13.6  13.4    Prelim Neutrophil Abs      1.50 - 7.70 x10 (3) uL 18.99 (H)  8.60 (H)    Neutrophils Absolute      1.50 - 7.70 x10(3) uL 18.99 (H)  8.60 (H)    Lymphocytes Absolute      1.00 - 4.00 x10(3) uL 2.68  2.00    Monocytes Absolute      0.10 - 1.00 x10(3)  uL 1.47 (H)  0.68    Eosinophils Absolute      0.00 - 0.70 x10(3) uL 0.14  0.23    Basophils Absolute      0.00 - 0.20 x10(3) uL 0.09  0.05    Immature Granulocyte Absolute      0.00 - 1.00 x10(3) uL 0.09  0.10    Neutrophils %      % 80.9  73.7    Lymphocytes %      % 11.4  17.2    Monocytes %      % 6.3  5.8    Eosinophils %      % 0.6  2.0    Basophils %      % 0.4  0.4    Immature Granulocyte %      % 0.4  0.9    Glucose      70 - 99 mg/dL  100 (H)    Sodium      136 - 145 mmol/L  140    Potassium      3.5 - 5.1 mmol/L  3.9    Chloride      98 - 112 mmol/L  104    Carbon Dioxide, Total      21.0 - 32.0 mmol/L  26.0    ANION GAP      0 - 18 mmol/L  10    BUN      9 - 23 mg/dL  10    CREATININE      0.55 - 1.02 mg/dL  0.66    CALCIUM      8.7 - 10.6 mg/dL  9.7    CALCULATED OSMOLALITY      275 - 295 mOsm/kg  289    EGFR      >=60 mL/min/1.73m2  123    AST (SGOT)      <34 U/L  32    ALT (SGPT)      10 - 49 U/L  28    ALKALINE PHOSPHATASE      37 - 98 U/L  114 (H)    Total Bilirubin      0.3 - 1.2 mg/dL  0.3    PROTEIN, TOTAL      5.7 - 8.2 g/dL  7.4    Albumin      3.2 - 4.8 g/dL  4.4    Globulin      2.0 - 3.5 g/dL  3.0    A/G Ratio      1.0 - 2.0   1.5    Lamotrigine Lvl      2.0 - 20.0 ug/mL  2.6    TREPONEMA PALLIDUM AB, PARTICLE AGGLUTINATION      Nonreactive       Levetiracetam Lvl      10.0 - 40.0 ug/mL  18.2       Prior as noted below    Component      Latest Ref Rng 12/6/2024   Lamotrigine Lvl      2.0 - 20.0 ug/mL 1.7 (L)    Levetiracetam Lvl      10.0 - 40.0 ug/mL 19.5       Component      Latest Ref Rng 12/6/2024   WBC      4.0 - 11.0 x10(3) uL 11.4 (H)    RBC      3.80 - 5.30 x10(6)uL 4.02    Hemoglobin      12.0 - 16.0 g/dL 12.1    Hematocrit      35.0 - 48.0 % 36.1    Platelet Count      150.0 - 450.0 10(3)uL 333.0    MCV      80.0 - 100.0 fL 89.8    MCH      26.0 - 34.0 pg 30.1    MCHC      31.0 - 37.0 g/dL 33.5    RDW      % 14.6    Prelim Neutrophil Abs      1.50 - 7.70 x10 (3) uL 8.41 (H)     Neutrophils Absolute      1.50 - 7.70 x10(3) uL 8.41 (H)    Lymphocytes Absolute      1.00 - 4.00 x10(3) uL 2.16    Monocytes Absolute      0.10 - 1.00 x10(3) uL 0.57    Eosinophils Absolute      0.00 - 0.70 x10(3) uL 0.15    Basophils Absolute      0.00 - 0.20 x10(3) uL 0.03    Immature Granulocyte Absolute      0.00 - 1.00 x10(3) uL 0.05    Neutrophils %      % 74.0    Lymphocytes %      % 19.0    Monocytes %      % 5.0    Eosinophils %      % 1.3    Basophils %      % 0.3    Immature Granulocyte %      % 0.4    Glucose      70 - 99 mg/dL 85    Sodium      136 - 145 mmol/L 139    Potassium      3.5 - 5.1 mmol/L 4.2    Chloride      98 - 112 mmol/L 107    Carbon Dioxide, Total      21.0 - 32.0 mmol/L 26.0    ANION GAP      0 - 18 mmol/L 6    BUN      9 - 23 mg/dL 10    CREATININE      0.55 - 1.02 mg/dL 0.58    CALCIUM      8.7 - 10.4 mg/dL 9.6    CALCULATED OSMOLALITY      275 - 295 mOsm/kg 286    EGFR      >=60 mL/min/1.73m2 127    AST (SGOT)      <34 U/L 10    ALT (SGPT)      10 - 49 U/L <7 (L)    ALKALINE PHOSPHATASE      37 - 98 U/L 59    Total Bilirubin      0.3 - 1.2 mg/dL 0.3    PROTEIN, TOTAL      5.7 - 8.2 g/dL 6.9    Albumin      3.2 - 4.8 g/dL 4.1    Globulin      2.0 - 3.5 g/dL 2.8    A/G Ratio      1.0 - 2.0  1.5    Patient Fasting for CMP? No    HEMOGLOBIN A1c      <5.7 % 5.1    ESTIMATED AVERAGE GLUCOSE      68 - 126 mg/dL 100         Prior as noted below    Component      Latest Ref Rng 6/1/2024   WBC      3.8 - 10.8 Thousand/uL 7.7    RBC      3.80 - 5.10 Million/uL 5.22 (H)    Hemoglobin      11.7 - 15.5 g/dL 14.0    Hematocrit      35.0 - 45.0 % 44.9    MCV      80.0 - 100.0 fL 86.0    MCH      27.0 - 33.0 pg 26.8 (L)    MCHC      32.0 - 36.0 g/dL 31.2 (L)    RDW      11.0 - 15.0 % 13.6    Platelet Count      140 - 400 Thousand/uL 392    MPV      7.5 - 12.5 fL 10.3    Neutrophils Absolute      1,500 - 7,800 cells/uL 3,781    Lymphocytes Absolute      850 - 3,900 cells/uL 3,011    Monocytes  Absolute      200 - 950 cells/uL 593    Eosinophils Absolute      15 - 500 cells/uL 262    Basophils Absolute      0 - 200 cells/uL 54    Neutrophils %      % 49.1    Lymphocytes %      % 39.1    Monocytes %      % 7.7    Eosinophils %      % 3.4    Basophils %      % 0.7    Glucose      65 - 99 mg/dL 76    BUN      7 - 25 mg/dL 14    CREATININE      0.50 - 0.96 mg/dL 0.64    EGFR      > OR = 60 mL/min/1.73m2 125    BUN/CREATININE RATIO      6 - 22 (calc) SEE NOTE:    Sodium      135 - 146 mmol/L 137    Potassium      3.5 - 5.3 mmol/L 4.8    Chloride      98 - 110 mmol/L 102    Carbon Dioxide, Total      20 - 32 mmol/L 27    CALCIUM      8.6 - 10.2 mg/dL 9.9    PROTEIN, TOTAL      6.1 - 8.1 g/dL 8.1    Albumin      3.6 - 5.1 g/dL 4.5    Globulin      1.9 - 3.7 g/dL (calc) 3.6    A/G Ratio      1.0 - 2.5 (calc) 1.3    Total Bilirubin      0.2 - 1.2 mg/dL 0.4    Alkaline Phosphatase      31 - 125 U/L 81    AST (SGOT)      10 - 30 U/L 22    ALT (SGPT)      6 - 29 U/L 29    LAMOTRIGINE      2.5 - 15.0 mcg/mL 6.1    LEVETIRACETAM      mcg/mL 45.5           Prior as noted below    Component      Latest Ref Rng 8/7/2023   Lamotrigine Lvl      2.0 - 20.0 ug/mL 4.6      From EMR    12/6/2023    CMP: normal range with exception of ALT 38 (minimally elevated)  CBC: normal range    1/25/2023:   From Outside records    Lamotrigine: 1.5 (low)     1/11/2023: 3.3 (mildly low)     Prior as noted below    Component      Latest Ref Rng & Units 8/2/2022   LAMOTRIGINE      4.0 - 18.0 mcg/mL 5.3       Prior as noted below     Component      Latest Ref Rng & Units 7/9/2022   LAMOTRIGINE      4.0 - 18.0 mcg/mL 3.1 (L)       Prior as noted below  Component      Latest Ref Rng & Units 4/18/2022   LAMOTRIGINE      4.0 - 18.0 mcg/mL 6.7   VALPROIC ACID      50.0 - 100.0 mg/L  64.6     Prior as noted below    Component      Latest Ref Rng & Units 3/21/2022   LAMOTRIGINE      4.0 - 18.0 mcg/mL 5.8   VALPROIC ACID      50.0 - 100.0 mg/L  131.6  (H)     Prior as noted below    Component      Latest Ref Rng & Units 3/11/2022 3/9/2022 2/17/2022   WBC      4.0 - 11.0 x10(3) uL  13.4 (H)    RBC      3.80 - 5.30 x10(6)uL  4.70    Hemoglobin      12.0 - 16.0 g/dL  13.1    Hematocrit      35.0 - 48.0 %  41.2    Platelet Count      150.0 - 450.0 10(3)uL  324.0    MCV      80.0 - 100.0 fL  87.7    MCH      26.0 - 34.0 pg  27.9    MCHC      31.0 - 37.0 g/dL  31.8    RDW      %  14.0    Prelim Neutrophil Abs      1.50 - 7.70 x10 (3) uL  11.28 (H)    Neutrophils Absolute      1.50 - 7.70 x10(3) uL  11.28 (H)    Lymphocytes Absolute      1.00 - 4.00 x10(3) uL  1.62    Monocytes Absolute      0.10 - 1.00 x10(3) uL  0.40    Eosinophils Absolute      0.00 - 0.70 x10(3) uL  0.04    Basophils Absolute      0.00 - 0.20 x10(3) uL  0.02    Immature Granulocyte Absolute      0.00 - 1.00 x10(3) uL  0.05    Neutrophils %      %  84.1    Lymphocytes %      %  12.1    Monocytes %      %  3.0    Eosinophils %      %  0.3    Basophils %      %  0.1    Immature Granulocyte %      %  0.4    Glucose      70 - 99 mg/dL  113 (H)    Sodium      136 - 145 mmol/L  139    Potassium      3.5 - 5.1 mmol/L  4.0    Chloride      98 - 112 mmol/L  109    Carbon Dioxide, Total      21.0 - 32.0 mmol/L  23.0    ANION GAP      0 - 18 mmol/L  7    BUN      7 - 18 mg/dL  11    CREATININE      0.55 - 1.02 mg/dL  0.80    CALCIUM      8.5 - 10.1 mg/dL  9.6    CALCULATED OSMOLALITY      275 - 295 mOsm/kg  288    eGFR NON-AFR. AMERICAN      >=60  104    eGFR       >=60  119    AST (SGOT)      15 - 37 U/L  28    ALT (SGPT)      13 - 56 U/L  51    ALKALINE PHOSPHATASE      37 - 98 U/L  75    Total Bilirubin      0.1 - 2.0 mg/dL  0.3    PROTEIN, TOTAL      6.4 - 8.2 g/dL  8.8 (H)    Albumin      3.4 - 5.0 g/dL  4.2    Globulin      2.8 - 4.4 g/dL  4.6 (H)    A/G Ratio      1.0 - 2.0  0.9 (L)    Color Urine      Yellow  Yellow    Clarity Urine      Clear  Cloudy (A)    Spec Gravity      1.001 -  1.030  1.024    Glucose Urine      Negative mg/dL  Negative    Bilirubin Urine      Negative  Negative    Ketones, UA      Negative mg/dL  Trace (A)    Blood Urine      Negative  Large (A)    PH Urine      5.0 - 8.0  5.0    Protein Urine      Negative mg/dL  30 (A)    Urobilinogen Urine      <2.0 mg/dL  <2.0    Nitrite Urine      Negative  Negative    Leukocyte Esterase Urine      Negative  Large (A)    WBC Urine      0 - 5 /HPF  11-20 (A)    RBC Urine      0 - 2 /HPF  >10 (A)    Bacteria Urine      None Seen /HPF  Rare (A)    SQUAM EPI CELLS UR      None Seen /HPF  Few (A)    RENAL TUBULAR EPITHELIAL CELLS      None Seen /HPF  None Seen    TRANSITIONAL EPI CELLS      None Seen /HPF  None Seen    YEAST URINE      None Seen /HPF  None Seen    AMPHETAMINE URINE      Negative  Negative    BENZODIAZEPINES URINE      Negative  Negative    COCAINE URINE      Negative  Negative    CANNABINOID URINE      Negative  Negative    Ecstasy Urine      Negative  Negative    OPIATE URINE      Negative  Negative    Oxycodone Urine      Negative  Negative    CREATININE UR RANDOM      mg/dL  168.00    LEVETIRACETAM (KEPPRA)      12 - 46 ug/mL      LAMOTRIGINE      4.0 - 18.0 mcg/mL   2.9 (L)   VALPROIC ACID      50.0 - 100.0 ug/mL 80.6       Component      Latest Ref Rng & Units 1/21/2022   WBC      4.0 - 11.0 x10(3) uL    RBC      3.80 - 5.30 x10(6)uL    Hemoglobin      12.0 - 16.0 g/dL    Hematocrit      35.0 - 48.0 %    Platelet Count      150.0 - 450.0 10(3)uL    MCV      80.0 - 100.0 fL    MCH      26.0 - 34.0 pg    MCHC      31.0 - 37.0 g/dL    RDW      %    Prelim Neutrophil Abs      1.50 - 7.70 x10 (3) uL    Neutrophils Absolute      1.50 - 7.70 x10(3) uL    Lymphocytes Absolute      1.00 - 4.00 x10(3) uL    Monocytes Absolute      0.10 - 1.00 x10(3) uL    Eosinophils Absolute      0.00 - 0.70 x10(3) uL    Basophils Absolute      0.00 - 0.20 x10(3) uL    Immature Granulocyte Absolute      0.00 - 1.00 x10(3) uL    Neutrophils  %      %    Lymphocytes %      %    Monocytes %      %    Eosinophils %      %    Basophils %      %    Immature Granulocyte %      %    Glucose      70 - 99 mg/dL    Sodium      136 - 145 mmol/L    Potassium      3.5 - 5.1 mmol/L    Chloride      98 - 112 mmol/L    Carbon Dioxide, Total      21.0 - 32.0 mmol/L    ANION GAP      0 - 18 mmol/L    BUN      7 - 18 mg/dL    CREATININE      0.55 - 1.02 mg/dL    CALCIUM      8.5 - 10.1 mg/dL    CALCULATED OSMOLALITY      275 - 295 mOsm/kg    eGFR NON-AFR. AMERICAN      >=60    eGFR       >=60    AST (SGOT)      15 - 37 U/L    ALT (SGPT)      13 - 56 U/L    ALKALINE PHOSPHATASE      37 - 98 U/L    Total Bilirubin      0.1 - 2.0 mg/dL    PROTEIN, TOTAL      6.4 - 8.2 g/dL    Albumin      3.4 - 5.0 g/dL    Globulin      2.8 - 4.4 g/dL    A/G Ratio      1.0 - 2.0    Color Urine      Yellow    Clarity Urine      Clear    Spec Gravity      1.001 - 1.030    Glucose Urine      Negative mg/dL    Bilirubin Urine      Negative    Ketones, UA      Negative mg/dL    Blood Urine      Negative    PH Urine      5.0 - 8.0    Protein Urine      Negative mg/dL    Urobilinogen Urine      <2.0 mg/dL    Nitrite Urine      Negative    Leukocyte Esterase Urine      Negative    WBC Urine      0 - 5 /HPF    RBC Urine      0 - 2 /HPF    Bacteria Urine      None Seen /HPF    SQUAM EPI CELLS UR      None Seen /HPF    RENAL TUBULAR EPITHELIAL CELLS      None Seen /HPF    TRANSITIONAL EPI CELLS      None Seen /HPF    YEAST URINE      None Seen /HPF    AMPHETAMINE URINE      Negative    BENZODIAZEPINES URINE      Negative    COCAINE URINE      Negative    CANNABINOID URINE      Negative    Ecstasy Urine      Negative    OPIATE URINE      Negative    Oxycodone Urine      Negative    CREATININE UR RANDOM      mg/dL    LEVETIRACETAM (KEPPRA)      12 - 46 ug/mL 55 (H)   LAMOTRIGINE      4.0 - 18.0 mcg/mL    VALPROIC ACID      50.0 - 100.0 ug/mL          Component      Latest Ref Rng & Units  1/21/2022   WBC      4.0 - 11.0 x10(3) uL 10.5   RBC      3.80 - 5.30 x10(6)uL 3.69 (L)   Hemoglobin      12.0 - 16.0 g/dL 10.3 (L)   Hematocrit      35.0 - 48.0 % 32.6 (L)   Platelet Count      150.0 - 450.0 10(3)uL 287.0   MCV      80.0 - 100.0 fL 88.3   MCH      26.0 - 34.0 pg 27.9   MCHC      31.0 - 37.0 g/dL 31.6   RDW      % 16.5   Prelim Neutrophil Abs      1.50 - 7.70 x10 (3) uL 7.73 (H)   Neutrophils Absolute      1.50 - 7.70 x10(3) uL 7.73 (H)   Lymphocytes Absolute      1.00 - 4.00 x10(3) uL 1.94   Monocytes Absolute      0.10 - 1.00 x10(3) uL 0.55   Eosinophils Absolute      0.00 - 0.70 x10(3) uL 0.20   Basophils Absolute      0.00 - 0.20 x10(3) uL 0.03   Immature Granulocyte Absolute      0.00 - 1.00 x10(3) uL 0.06   Neutrophils %      % 73.5   Lymphocytes %      % 18.5   Monocytes %      % 5.2   Eosinophils %      % 1.9   Basophils %      % 0.3   Immature Granulocyte %      % 0.6   Glucose      70 - 99 mg/dL 77   Sodium      136 - 145 mmol/L 140   Potassium      3.5 - 5.1 mmol/L 3.8   Chloride      98 - 112 mmol/L 108   Carbon Dioxide, Total      21.0 - 32.0 mmol/L 30.0   ANION GAP      0 - 18 mmol/L 2   BUN      7 - 18 mg/dL 12   CREATININE      0.55 - 1.02 mg/dL 0.55   CALCIUM      8.5 - 10.1 mg/dL 8.8   CALCULATED OSMOLALITY      275 - 295 mOsm/kg 289   eGFR NON-AFR. AMERICAN      >=60 132   eGFR       >=60 152   AST (SGOT)      15 - 37 U/L 22   ALT (SGPT)      13 - 56 U/L 22   ALKALINE PHOSPHATASE      37 - 98 U/L 114 (H)   Total Bilirubin      0.1 - 2.0 mg/dL 0.4   PROTEIN, TOTAL      6.4 - 8.2 g/dL 7.0   Albumin      3.4 - 5.0 g/dL 2.7 (L)   Globulin      2.8 - 4.4 g/dL 4.3   A/G Ratio      1.0 - 2.0 0.6 (L)   Color Urine      Yellow Yellow   Clarity Urine      Clear Hazy (A)   Spec Gravity      1.001 - 1.030 >1.030 (H)   Glucose Urine      Negative mg/dL Negative   Bilirubin Urine      Negative Negative   Ketones, UA      Negative mg/dL Negative   Blood Urine      Negative Large  (A)   PH Urine      5.0 - 8.0 7.0   Protein Urine      Negative mg/dL 100 (A)   Urobilinogen Urine      <2.0 mg/dL <2.0   Nitrite Urine      Negative Negative   Leukocyte Esterase Urine      Negative Small (A)   WBC Urine      0 - 5 /HPF >50 (A)   RBC Urine      0 - 2 /HPF >10 (A)   Bacteria Urine      None Seen /HPF Rare (A)   SQUAM EPI CELLS UR      None Seen /HPF Few (A)   RENAL TUBULAR EPITHELIAL CELLS      None Seen /HPF None Seen   TRANSITIONAL EPI CELLS      None Seen /HPF None Seen   YEAST URINE      None Seen /HPF None Seen   LEVETIRACETAM (KEPPRA)      12 - 46 ug/mL 55 (H)     Prior as noted below  Component      Latest Ref Rng & Units 8/12/2021   LEVETIRACETAM (KEPPRA)      12 - 46 ug/mL 34       Imaging:  None new    Prior as noted below    From [facility] per [facility]    4/18/2025  MR venogram     TECHNIQUE: 8 cc of gadolinium this were given. Three-D reconstructed images of the cerebral venous vasculature were obtained.     FINDINGS: Comparison is made to MRI brain from 4/17/2025.     No evidence of healing defect within the sinuses. Superior sagittal and transverse sinuses are unremarkable. 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     Straight sinus unremarkable.     Visualized cortical veins and internal jugular veins are also normal.     IMPRESSION:   Unremarkable MRV without evidence of venous sinus thrombosis.     4/17/2025  MRI Brain with Contrast, Seizure Protocol     Technique: Multi-planar multi-sequence imaging of the brain was obtained both with and without the injection of intravenous gadolinium. 8 cc of gadolinium were used for the study. Thin section imaging through the mesial temporal lobes was obtained.     Comparison is made to CT head from earlier same day.     Findings: There is no evidence of abnormal signal within the brain parenchyma. No abnormal masses or fluid collections are identified. The ventricles appear normal in size and shape.  There is no evidence of abnormal meningeal or leptomeningeal enhancement. There is no intracranial hemorrhage. Diffusion weighted imaging demonstrates no areas of abnormal signal. Mesial temporal lobes are symmetric without abnormal signal or enhancement.     There is right frontal scalp and periorbital soft tissue swelling extending to the midline frontal lobe. Heterogeneous T1 and T2 attenuation is noted. A few darkened areas are noted on gradient echo imaging. Findings consistent with CT.           The imaged orbits and extraocular muscles, paranasal sinuses, and mastoid air spaces are normal. There are no gross bony abnormalities.           IMPRESSION:   Impression:   1. No acute abnormality. No findings to correlate with symptoms of seizures   2. Right periorbital and parietal scalp soft tissue swelling/hematoma, stable     Other procedures;    None new    Prior as noted below    EEG (routine ) - 4/17/2025  Impression     This is an abnormal awake drowsy and asleep study.  Rare generalized spike/spike wave and polyspike discharges that are  recorded primarily during drowsiness and sleep.  These findings are suggestive of an underlying generalized epilepsy. No  seizures or focal slowing were recorded.             Impression/ Plan:  Dotty Gross is a 29 year old female patient with PMHx significant for seizure disorder, who originally presented 6/2021, for evaluation and management.     Neurologic examination is normal and nonfocal     Patient endorses a history of seizures since 2012.  She has no recollection during these events.    Previously, her seizures were well controlled on Keppra at 1000 mg twice daily, with last seizure occurring approximately 1 year prior, and one prior to that occurring during second trimester of pregnancy in 2017.    However, she was having complications of hyperemesis gravidarum during first and second trimesters with most recent pregnancy.  She did not have any seizures during that  time.  However, she had recurrent seizures and was increased on Keppra up to 1500 mg bid - she subsequently delivered her baby and had recurrent seizures, which suspect are breakthrough seizures and may be related to missing dose of medication or receiving blood transfusion.  However, levels were therapeutic and she has had seizures on several occasions during and after pregnancy.     Given this most recent seizure cluster, she was previously recommended to start Lamictal and was doing well on dose up to 100 mg daily after following starter kit instructions; however, she did not refill the medication and stopped talking for ~ 2 weeks and presented to ED for admission 3/9/2022 following witnessed seizure in office with reports of 3 or 4 additional seizures that day.       She is now doing well and suspect her breakthrough seizures were due to missing anti-seizure medication as well as concurrent infectious process - she was doing well on Depakote and Lamictal aside from weight gain -  levels were in range as of 4/18/2022 - given weight gain, she was weaned off Depakote and started on Lamictal up to 100 mg bid along with Keppra 1500 mg bid.  Levels  done off Depakote and on Lamictal 100 mg bid were within normal limits; given improvement, she has been weaned down on Keppra to 1000 mg bid and was doing well.    Lamictal level was low range as of 1/30/2023 and patient had seizure 2/17/2023.  She has not had any recurrent seizures and repeat level 8/2023 was within range.  She had new severe headache - symptoms seem most consistent with migraine but she denies prior history of migraine and given this was worst headache of life, MRI brain / MRA brain ordered without contrast previously       Patient was doing well with no seizures on Keppra 1000 mg bid and Lamictal 150 mg bid. However, she delivered her baby (healthy baby girl) with no complications during delivery and remained on same dose of Keppra 1000 mg bid and  Lamictal 150 mg bid. However after delivery on 4/16, she started to feel \"odd\" having difficulty with speaking and \"spacing out\" early in the afternoon. She became concerned she was going to have a seizure and later that evening, she stood up from the couch to go to the restroom and fell on the ground with generalized tonic clonic seizure lasting ~ 5 minutes. She went to [facility] and was given loading dose of Keppra.      Prior to delivery, she was given pitocin and dexamethasone as she had Group B Strep - she was also given antibiotics - thinks penicillin; CBC was noted to have elevated WBC on 4/13 at 23.5; on 4/16, WBC was 11.7, levetiracetam 18.2 on 4/16 and lamotrigine 2.6.       She was given loading dose 4/16 in [facility] and discharged home. However, when she was being taken home by  4/17, she had another seizure while in the car (no aura) and had injury to face from having seizure on the ground. She then went to [facility] and her level of Keppra was 60.7. She was monitored overnight and had MRI brain 4/17 which showed right periorbital and parietal soft tissue swelling/ hematoma but no intracranial acute pathology.     EEG done showed rare generalized 15-30 uV spike/spike wave and polyspike discharges during drowsiness and sleep on routine EEG.      She is now on Lamictal 150 mg bid and Keppra 1500 mg bid and denies any recurrent seizures or auras to suggest seizures recently - levels in range and migraines stable; will continue same dose     1. Seizure disorder (HCC)  As noted above     2. Migraine without aura and without status migrainosus, not intractable  As noted above     No follow-ups on file.      Please note that the following visit was completed using two-way, real-time interactive audio and video communication.  This has been done in good yaniv to provide continuity of care in the best interest of the provider-patient relationship, due to the ongoing public health crisis/national  emergency and because of restrictions of visitation.  There are limitations of this visit as no physical exam could be performed.  Every conscious effort was taken to allow for sufficient and adequate time.  This billing was spent on reviewing labs, medications, radiology tests and decision making.  Appropriate medical decision-making and tests are ordered as detailed in the plan of care above.      Yogi Alfonso MD, Neurology  [facility]  Pager [phone]  7/23/2025

## 2025-08-12 ENCOUNTER — LAB ENCOUNTER (OUTPATIENT)
Dept: LAB | Age: 30
End: 2025-08-12
Attending: PHYSICIAN ASSISTANT

## 2025-08-12 DIAGNOSIS — Z00.00 LABORATORY EXAMINATION ORDERED AS PART OF A COMPLETE PHYSICAL EXAMINATION: ICD-10-CM

## 2025-08-12 LAB
ALBUMIN SERPL-MCNC: 4.9 G/DL (ref 3.2–4.8)
ALBUMIN/GLOB SERPL: 1.6 (ref 1–2)
ALP LIVER SERPL-CCNC: 73 U/L (ref 37–98)
ALT SERPL-CCNC: 49 U/L (ref 10–49)
ANION GAP SERPL CALC-SCNC: 13 MMOL/L (ref 0–18)
AST SERPL-CCNC: 31 U/L (ref ?–34)
BASOPHILS # BLD AUTO: 0.04 X10(3) UL (ref 0–0.2)
BASOPHILS NFR BLD AUTO: 0.5 %
BILIRUB SERPL-MCNC: 0.3 MG/DL (ref 0.3–1.2)
BUN BLD-MCNC: 13 MG/DL (ref 9–23)
CALCIUM BLD-MCNC: 9.6 MG/DL (ref 8.7–10.6)
CHLORIDE SERPL-SCNC: 103 MMOL/L (ref 98–112)
CHOLEST SERPL-MCNC: 154 MG/DL (ref ?–200)
CO2 SERPL-SCNC: 25 MMOL/L (ref 21–32)
CREAT BLD-MCNC: 0.82 MG/DL (ref 0.55–1.02)
EGFRCR SERPLBLD CKD-EPI 2021: 100 ML/MIN/1.73M2 (ref 60–?)
EOSINOPHIL # BLD AUTO: 0.32 X10(3) UL (ref 0–0.7)
EOSINOPHIL NFR BLD AUTO: 3.6 %
ERYTHROCYTE [DISTWIDTH] IN BLOOD BY AUTOMATED COUNT: 13.5 %
EST. AVERAGE GLUCOSE BLD GHB EST-MCNC: 111 MG/DL (ref 68–126)
FASTING PATIENT LIPID ANSWER: YES
FASTING STATUS PATIENT QL REPORTED: YES
GLOBULIN PLAS-MCNC: 3 G/DL (ref 2–3.5)
GLUCOSE BLD-MCNC: 105 MG/DL (ref 70–99)
HBA1C MFR BLD: 5.5 % (ref ?–5.7)
HCT VFR BLD AUTO: 41.8 % (ref 35–48)
HDLC SERPL-MCNC: 32 MG/DL (ref 40–59)
HGB BLD-MCNC: 13.3 G/DL (ref 12–16)
IMM GRANULOCYTES # BLD AUTO: 0.03 X10(3) UL (ref 0–1)
IMM GRANULOCYTES NFR BLD: 0.3 %
INSULIN SERPL-ACNC: 39.8 MU/L (ref 3–25)
LDLC SERPL CALC-MCNC: 85 MG/DL (ref ?–100)
LYMPHOCYTES # BLD AUTO: 2.93 X10(3) UL (ref 1–4)
LYMPHOCYTES NFR BLD AUTO: 33.3 %
MCH RBC QN AUTO: 28.7 PG (ref 26–34)
MCHC RBC AUTO-ENTMCNC: 31.8 G/DL (ref 31–37)
MCV RBC AUTO: 90.3 FL (ref 80–100)
MONOCYTES # BLD AUTO: 0.62 X10(3) UL (ref 0.1–1)
MONOCYTES NFR BLD AUTO: 7 %
NEUTROPHILS # BLD AUTO: 4.87 X10 (3) UL (ref 1.5–7.7)
NEUTROPHILS # BLD AUTO: 4.87 X10(3) UL (ref 1.5–7.7)
NEUTROPHILS NFR BLD AUTO: 55.3 %
NONHDLC SERPL-MCNC: 122 MG/DL (ref ?–130)
OSMOLALITY SERPL CALC.SUM OF ELEC: 292 MOSM/KG (ref 275–295)
PLATELET # BLD AUTO: 367 10(3)UL (ref 150–450)
POTASSIUM SERPL-SCNC: 4.3 MMOL/L (ref 3.5–5.1)
PROT SERPL-MCNC: 7.9 G/DL (ref 5.7–8.2)
RBC # BLD AUTO: 4.63 X10(6)UL (ref 3.8–5.3)
SODIUM SERPL-SCNC: 141 MMOL/L (ref 136–145)
TRIGL SERPL-MCNC: 220 MG/DL (ref 30–149)
TSI SER-ACNC: 1.3 UIU/ML (ref 0.55–4.78)
VLDLC SERPL CALC-MCNC: 35 MG/DL (ref 0–30)
WBC # BLD AUTO: 8.8 X10(3) UL (ref 4–11)

## 2025-08-12 PROCEDURE — 85025 COMPLETE CBC W/AUTO DIFF WBC: CPT

## 2025-08-12 PROCEDURE — 80061 LIPID PANEL: CPT

## 2025-08-12 PROCEDURE — 36415 COLL VENOUS BLD VENIPUNCTURE: CPT

## 2025-08-12 PROCEDURE — 80053 COMPREHEN METABOLIC PANEL: CPT

## 2025-08-12 PROCEDURE — 83525 ASSAY OF INSULIN: CPT

## 2025-08-12 PROCEDURE — 83036 HEMOGLOBIN GLYCOSYLATED A1C: CPT

## 2025-08-12 PROCEDURE — 84443 ASSAY THYROID STIM HORMONE: CPT

## 2025-08-20 PROBLEM — O21.0 HYPEREMESIS GRAVIDARUM (HCC): Status: RESOLVED | Noted: 2024-09-12 | Resolved: 2025-08-20

## 2025-08-20 PROBLEM — Z30.2 REQUEST FOR STERILIZATION: Status: RESOLVED | Noted: 2024-09-13 | Resolved: 2025-08-20

## 2025-08-20 PROBLEM — Z3A.38 38 WEEKS GESTATION OF PREGNANCY (HCC): Status: RESOLVED | Noted: 2025-04-11 | Resolved: 2025-08-20

## 2025-08-20 PROBLEM — O42.90 AMNIOTIC FLUID LEAKING (HCC): Status: RESOLVED | Noted: 2025-03-19 | Resolved: 2025-08-20

## 2025-08-20 PROBLEM — Z34.83 PRENATAL CARE, SUBSEQUENT PREGNANCY IN THIRD TRIMESTER (HCC): Status: RESOLVED | Noted: 2024-09-13 | Resolved: 2025-08-20

## 2025-08-20 PROBLEM — Z34.90 PREGNANCY (HCC): Status: RESOLVED | Noted: 2025-04-12 | Resolved: 2025-08-20

## 2025-08-20 PROBLEM — O09.292 HISTORY OF POSTPARTUM HEMORRHAGE, CURRENTLY PREGNANT IN SECOND TRIMESTER (HCC): Status: RESOLVED | Noted: 2024-09-12 | Resolved: 2025-08-20

## 2025-08-20 PROBLEM — O99.213 OBESITY AFFECTING PREGNANCY IN THIRD TRIMESTER (HCC): Status: RESOLVED | Noted: 2024-09-12 | Resolved: 2025-08-20

## 2025-08-20 PROBLEM — Z34.91 PREGNANCY WITH UNCERTAIN DATE OF LAST MENSTRUAL PERIOD IN FIRST TRIMESTER, ANTEPARTUM (HCC): Status: RESOLVED | Noted: 2024-09-13 | Resolved: 2025-08-20

## 2025-08-20 PROBLEM — E87.6 HYPOKALEMIA DUE TO EXCESSIVE GASTROINTESTINAL LOSS OF POTASSIUM: Status: RESOLVED | Noted: 2024-10-18 | Resolved: 2025-08-20

## 2025-08-20 PROBLEM — N93.9 VAGINAL SPOTTING: Status: RESOLVED | Noted: 2025-04-11 | Resolved: 2025-08-20

## 2025-08-20 PROBLEM — R73.03 PREDIABETES: Status: ACTIVE | Noted: 2025-03-25

## 2025-08-20 PROBLEM — O99.820 GBS (GROUP B STREPTOCOCCUS CARRIER), +RV CULTURE, CURRENTLY PREGNANT (HCC): Status: RESOLVED | Noted: 2025-04-11 | Resolved: 2025-08-20

## 2025-08-21 ENCOUNTER — OFFICE VISIT (OUTPATIENT)
Dept: FAMILY MEDICINE CLINIC | Facility: CLINIC | Age: 30
End: 2025-08-21

## 2025-08-21 VITALS
HEIGHT: 60 IN | RESPIRATION RATE: 16 BRPM | WEIGHT: 183.81 LBS | BODY MASS INDEX: 36.08 KG/M2 | SYSTOLIC BLOOD PRESSURE: 110 MMHG | OXYGEN SATURATION: 98 % | DIASTOLIC BLOOD PRESSURE: 70 MMHG | HEART RATE: 76 BPM

## 2025-08-21 DIAGNOSIS — G40.909 NONINTRACTABLE EPILEPSY WITHOUT STATUS EPILEPTICUS, UNSPECIFIED EPILEPSY TYPE (HCC): ICD-10-CM

## 2025-08-21 DIAGNOSIS — E66.01 SEVERE OBESITY (BMI 35.0-39.9) WITH COMORBIDITY (HCC): ICD-10-CM

## 2025-08-21 DIAGNOSIS — E11.9 CONTROLLED TYPE 2 DIABETES MELLITUS WITHOUT COMPLICATION, WITHOUT LONG-TERM CURRENT USE OF INSULIN (HCC): Primary | ICD-10-CM

## 2025-08-21 PROBLEM — O99.353 EPILEPSY AFFECTING PREGNANCY IN THIRD TRIMESTER (HCC): Status: RESOLVED | Noted: 2024-09-12 | Resolved: 2025-08-21

## 2025-08-21 RX ORDER — SEMAGLUTIDE 0.68 MG/ML
INJECTION, SOLUTION SUBCUTANEOUS
Qty: 1 EACH | Refills: 0 | COMMUNITY
Start: 2025-08-21 | End: 2025-10-16

## 2025-08-21 RX ORDER — TIRZEPATIDE 2.5 MG/.5ML
2.5 INJECTION, SOLUTION SUBCUTANEOUS WEEKLY
Qty: 2 ML | Refills: 0 | Status: SHIPPED | OUTPATIENT
Start: 2025-08-21 | End: 2025-08-21

## 2025-08-22 ENCOUNTER — TELEPHONE (OUTPATIENT)
Dept: FAMILY MEDICINE CLINIC | Facility: CLINIC | Age: 30
End: 2025-08-22

## 2025-08-22 DIAGNOSIS — E11.9 CONTROLLED TYPE 2 DIABETES MELLITUS WITHOUT COMPLICATION, WITHOUT LONG-TERM CURRENT USE OF INSULIN (HCC): ICD-10-CM

## 2025-08-22 RX ORDER — SEMAGLUTIDE 0.68 MG/ML
INJECTION, SOLUTION SUBCUTANEOUS
Qty: 1 EACH | Refills: 0 | Status: CANCELLED | OUTPATIENT
Start: 2025-08-22 | End: 2025-10-17

## (undated) DIAGNOSIS — G40.909 SEIZURE DISORDER (HCC): Primary | ICD-10-CM

## (undated) DIAGNOSIS — Z02.9 ENCOUNTERS FOR ADMINISTRATIVE PURPOSE: ICD-10-CM

## (undated) NOTE — Clinical Note
Britney Martinez,  I saw Ms. Carlos De Paz in the office today, I am wondering if you would be ok with her trying prozac (given her seizure history). Sincerely, NGOC Lees APRN, [facility] Obesity Medicine [address] [facility]  [facility] [address], [address] [facility], [place], [address]   [phone] Anna Marieevelyn Nichols. [facility]

## (undated) NOTE — MR AVS SNAPSHOT
After Visit Summary   4/8/2021    Umang Almeida    MRN: [id number]           Visit Information     Date & Time  4/8/2021  6:15 PM Provider  Scott Paz MD [address] [facility]  Dept.  Phone  [phone] [facility] COVID VACCINE RESOURCE [facility] COVID VACCINE       April 9, 2021      Gwen Marker   [address]     Dear Ksenia Share :    Thank you for enrolling in [address]  Please follow the instructions below to securely access yo conditions such as allergies, colds, cough, fever, rash, sore throat, headache and pink eye. The cost for a Video Visit is currently $35.         If you receive a survey from [employer], please take a few minutes to complete it and provide feedback.  We s needing urgent attention, but are   non-life-threatening. Also available by appointment Average cost  $120*     EMERGENCY ROOM Life-threatening emergencies needing immediate intervention at a hospital emergency room.  Average cost  $2,300*   *Cost varies

## (undated) NOTE — LETTER
Date & Time: 3/19/2024, 11:55 AM  Patient: Dotty Gross  Encounter Provider(s):    Elizabet Mclaughlin MD       To Whom It May Concern:    Dotty Gross was seen and treated in our department on 3/19/2024. She should not return to work until Thursday, March 21, 2024 .    If you have any questions or concerns, please do not hesitate to call.        _____________________________  Physician/APC Signature

## (undated) NOTE — LETTER
Patient Name: Debby Velásquez  : 1995  MRN: [id number]  Patient Address: [address] [place] [id number]      Coronavirus Disease 2019 (COVID-19)     [facility] is committed to the safety and well-being of our patients, members, employe your symptoms get worse, call your healthcare provider immediately. 3. Get rest and stay hydrated.    4. If you have a medical appointment, call the healthcare provider ahead of time and tell them that you have or may have COVID-19.  5. For medical emergen fever-reducing medications; and  · Improvement in respiratory symptoms (e.g., cough, shortness of breath); and  · At least 10 days have passed since symptoms first appeared OR if asymptomatic patient or date of symptom onset is unclear then use 10 days pos donors must:    · Have had a confirmed diagnosis of COVID-19  · Be symptom-free for at least 14 days*    *Some people will be required to have a repeat COVID-19 test in order to be eligible to donate.  If you’re instructed by Gen that a repeat test is r random. Researchers are trying to identify similarities between people with a Post-COVID condition to better understand if there are risk factors. How do I prevent a Post-COVID condition?   The best way to prevent the long-term symptoms of COVID-19 is

## (undated) NOTE — LETTER
Date & Time: 3/19/2024, 12:48 PM  Patient: Dotty Gross  Encounter Provider(s):    Elizabet Mclaughlin MD       To Whom It May Concern:    Dotty Gross was seen and treated in our department on 3/19/2024. She should not return to work until 03/22/2024 .    If you have any questions or concerns, please do not hesitate to call.        _____________________________  Physician/APC Signature